# Patient Record
Sex: MALE | Race: WHITE | Employment: FULL TIME | URBAN - METROPOLITAN AREA
[De-identification: names, ages, dates, MRNs, and addresses within clinical notes are randomized per-mention and may not be internally consistent; named-entity substitution may affect disease eponyms.]

---

## 2017-11-08 PROBLEM — E11.9 TYPE 2 DIABETES MELLITUS WITHOUT COMPLICATION (HCC): Status: ACTIVE | Noted: 2017-11-08

## 2018-06-25 ENCOUNTER — HOSPITAL ENCOUNTER (EMERGENCY)
Age: 53
Discharge: HOME | End: 2018-06-25
Payer: COMMERCIAL

## 2018-06-25 VITALS
OXYGEN SATURATION: 96 % | HEART RATE: 97 BPM | TEMPERATURE: 98.5 F | RESPIRATION RATE: 20 BRPM | DIASTOLIC BLOOD PRESSURE: 80 MMHG | SYSTOLIC BLOOD PRESSURE: 155 MMHG

## 2018-06-25 DIAGNOSIS — H10.9 CONJUNCTIVITIS OF LEFT EYE, UNSPECIFIED CONJUNCTIVITIS TYPE: Primary | ICD-10-CM

## 2018-06-25 PROCEDURE — 99212 OFFICE O/P EST SF 10 MIN: CPT

## 2018-06-25 RX ORDER — SULFACETAMIDE SODIUM 100 MG/ML
2 SOLUTION/ DROPS OPHTHALMIC
Qty: 5 ML | Refills: 0 | Status: SHIPPED | OUTPATIENT
Start: 2018-06-25 | End: 2018-06-30

## 2018-07-26 ENCOUNTER — OFFICE VISIT (OUTPATIENT)
Dept: FAMILY MEDICINE CLINIC | Age: 53
End: 2018-07-26
Payer: COMMERCIAL

## 2018-07-26 VITALS
HEART RATE: 86 BPM | WEIGHT: 201.1 LBS | SYSTOLIC BLOOD PRESSURE: 124 MMHG | BODY MASS INDEX: 30.58 KG/M2 | DIASTOLIC BLOOD PRESSURE: 80 MMHG | OXYGEN SATURATION: 97 %

## 2018-07-26 DIAGNOSIS — E78.2 MIXED HYPERLIPIDEMIA: ICD-10-CM

## 2018-07-26 DIAGNOSIS — E11.9 TYPE 2 DIABETES MELLITUS WITHOUT COMPLICATION, WITHOUT LONG-TERM CURRENT USE OF INSULIN (HCC): ICD-10-CM

## 2018-07-26 DIAGNOSIS — I10 BENIGN ESSENTIAL HTN: ICD-10-CM

## 2018-07-26 DIAGNOSIS — Z12.11 COLON CANCER SCREENING: Primary | ICD-10-CM

## 2018-07-26 LAB — HBA1C MFR BLD: 9.4 %

## 2018-07-26 PROCEDURE — 3046F HEMOGLOBIN A1C LEVEL >9.0%: CPT | Performed by: FAMILY MEDICINE

## 2018-07-26 PROCEDURE — G8427 DOCREV CUR MEDS BY ELIG CLIN: HCPCS | Performed by: FAMILY MEDICINE

## 2018-07-26 PROCEDURE — 3017F COLORECTAL CA SCREEN DOC REV: CPT | Performed by: FAMILY MEDICINE

## 2018-07-26 PROCEDURE — 99213 OFFICE O/P EST LOW 20 MIN: CPT | Performed by: FAMILY MEDICINE

## 2018-07-26 PROCEDURE — 1036F TOBACCO NON-USER: CPT | Performed by: FAMILY MEDICINE

## 2018-07-26 PROCEDURE — 2022F DILAT RTA XM EVC RTNOPTHY: CPT | Performed by: FAMILY MEDICINE

## 2018-07-26 PROCEDURE — G8417 CALC BMI ABV UP PARAM F/U: HCPCS | Performed by: FAMILY MEDICINE

## 2018-07-26 PROCEDURE — 83036 HEMOGLOBIN GLYCOSYLATED A1C: CPT | Performed by: FAMILY MEDICINE

## 2018-07-26 RX ORDER — ROSUVASTATIN CALCIUM 20 MG/1
TABLET, COATED ORAL
Qty: 30 TABLET | Refills: 5 | Status: SHIPPED | OUTPATIENT
Start: 2018-07-26 | End: 2019-02-15 | Stop reason: SDUPTHER

## 2018-07-26 RX ORDER — HYDROCHLOROTHIAZIDE 25 MG/1
TABLET ORAL
Qty: 30 TABLET | Refills: 5 | Status: SHIPPED | OUTPATIENT
Start: 2018-07-26 | End: 2019-02-15 | Stop reason: SDUPTHER

## 2018-07-26 RX ORDER — GLIPIZIDE 10 MG/1
TABLET ORAL
Qty: 60 TABLET | Refills: 5 | Status: SHIPPED | OUTPATIENT
Start: 2018-07-26 | End: 2019-02-15 | Stop reason: SDUPTHER

## 2018-07-26 RX ORDER — POTASSIUM CHLORIDE 750 MG/1
10 TABLET, FILM COATED, EXTENDED RELEASE ORAL DAILY
Qty: 30 TABLET | Refills: 5 | Status: SHIPPED | OUTPATIENT
Start: 2018-07-26 | End: 2019-01-15 | Stop reason: SDUPTHER

## 2018-07-26 RX ORDER — LISINOPRIL 10 MG/1
TABLET ORAL
Qty: 30 TABLET | Refills: 5 | Status: SHIPPED | OUTPATIENT
Start: 2018-07-26 | End: 2019-02-15 | Stop reason: SDUPTHER

## 2018-07-30 ASSESSMENT — ENCOUNTER SYMPTOMS
VOMITING: 0
DIARRHEA: 0
ANAL BLEEDING: 0
CHOKING: 0
BACK PAIN: 0
ABDOMINAL PAIN: 0
COLOR CHANGE: 0
FACIAL SWELLING: 0
ABDOMINAL DISTENTION: 0
EYE DISCHARGE: 0
RHINORRHEA: 0
EYE REDNESS: 0
CHEST TIGHTNESS: 0
NAUSEA: 0
VOICE CHANGE: 0
TROUBLE SWALLOWING: 0
RECTAL PAIN: 0
WHEEZING: 0
PHOTOPHOBIA: 0
APNEA: 0
CONSTIPATION: 0
BLOOD IN STOOL: 0
EYE PAIN: 0
STRIDOR: 0
EYE ITCHING: 0

## 2018-07-31 NOTE — PROGRESS NOTES
hyperlipidemia. Pertinent negatives include no chest pain or myalgias. Current antihyperlipidemic treatment includes statins. The current treatment provides mild improvement of lipids. There are no compliance problems. Risk factors for coronary artery disease include diabetes mellitus, dyslipidemia and hypertension. Review of Systems   Constitutional: Negative for activity change, appetite change, fatigue, fever and unexpected weight change. HENT: Negative for dental problem, drooling, ear discharge, facial swelling, hearing loss, mouth sores, nosebleeds, postnasal drip, rhinorrhea, tinnitus, trouble swallowing and voice change. Eyes: Negative for photophobia, pain, discharge, redness, itching and visual disturbance. Respiratory: Negative for apnea, choking, chest tightness, wheezing and stridor. Cardiovascular: Negative for chest pain, palpitations and leg swelling. Gastrointestinal: Negative for abdominal distention, abdominal pain, anal bleeding, blood in stool, constipation, diarrhea, nausea, rectal pain and vomiting. Endocrine: Negative for cold intolerance, heat intolerance, polydipsia, polyphagia and polyuria. Genitourinary: Negative for decreased urine volume, difficulty urinating, discharge, dysuria, enuresis, flank pain, frequency, genital sores, hematuria, penile pain, penile swelling, scrotal swelling, testicular pain and urgency. Musculoskeletal: Negative for arthralgias, back pain, gait problem, joint swelling, myalgias and neck stiffness. Skin: Negative for color change, pallor, rash and wound. Allergic/Immunologic: Negative for environmental allergies, food allergies and immunocompromised state. Neurological: Negative for dizziness, tremors, seizures, syncope, facial asymmetry, speech difficulty, weakness, light-headedness and numbness. Hematological: Negative for adenopathy. Does not bruise/bleed easily.    Psychiatric/Behavioral: Negative for agitation, behavioral problems, confusion, decreased concentration, dysphoric mood, hallucinations, self-injury, sleep disturbance and suicidal ideas. The patient is not nervous/anxious and is not hyperactive. Past Medical History:   Diagnosis Date    Diabetes mellitus ([address]     Hyperlipidemia     Hypertension        Social History   Substance Use Topics    Smoking status: Former Smoker     Types: Cigars    Smokeless tobacco: Never Used    Alcohol use 0.0 oz/week      Comment: socially       Family History   Problem Relation Age of Onset    Heart Disease Mother        Current Outpatient Prescriptions   Medication Sig Dispense Refill    rosuvastatin (CRESTOR) 20 MG tablet TAKE ONE TABLET BY MOUTH EVERY DAY 30 tablet 5    potassium chloride (KLOR-CON) 10 MEQ extended release tablet Take 1 tablet by mouth daily 30 tablet 5    metFORMIN (GLUCOPHAGE) 1000 MG tablet TAKE ONE TABLET BY MOUTH TWO TIMES A DAY with meals 60 tablet 5    lisinopril (PRINIVIL;ZESTRIL) 10 MG tablet TAKE ONE TABLET BY MOUTH EVERY DAY 30 tablet 5    hydrochlorothiazide (HYDRODIURIL) 25 MG tablet TAKE ONE TABLET BY MOUTH EVERY DAY 30 tablet 5    glipiZIDE (GLUCOTROL) 10 MG tablet TAKE ONE TABLET BY MOUTH TWO TIMES A DAY before meals 60 tablet 5    empagliflozin (JARDIANCE) 25 MG tablet One a day 30 tablet 5    linagliptin (TRADJENTA) 5 MG tablet Take 1 tablet by mouth daily 30 tablet 5    fluticasone (FLONASE) 50 MCG/ACT nasal spray Two sprays to each nostril daily x 1 week (for congestion/runny nose). 1 Bottle 0    vitamin D (ERGOCALCIFEROL) 16874 units CAPS capsule TAKE 1 CAPSULE BY MOUTH TWICE A WEEK 8 capsule 5    SITagliptin (JANUVIA) 100 MG tablet Take 1 tablet by mouth every morning (before breakfast) 30 tablet 5    ibuprofen (ADVIL;MOTRIN) 800 MG tablet Take 1 tablet by mouth every 8 hours as needed for Pain. 30 tablet 0    Cinnamon 500 MG CAPS Take 1,000 mg by mouth 2 times daily.       clotrimazole-betamethasone (LOTRISONE) 1-0.05 % cream Apply topically 2 times daily for 4 weeks 15 g 0    sildenafil (VIAGRA) 100 MG tablet Take 1 tablet by mouth as needed for Erectile Dysfunction 10 tablet 5     No current facility-administered medications for this visit. No Known Allergies    I have reviewed Chaz's allergies, medications, problem list, medical, social and family history and have updated as needed in the electronic medical record. ROS: negative other what was mentioned above. Objective:   Physical Exam   Constitutional: He is oriented to person, place, and time. He appears well-developed and well-nourished. No distress. HENT:   Head: Normocephalic and atraumatic. Right Ear: External ear normal.   Left Ear: External ear normal.   Nose: Nose normal.   Mouth/Throat: Oropharynx is clear and moist. No oropharyngeal exudate. Eyes: Conjunctivae and EOM are normal. Pupils are equal, round, and reactive to light. Right eye exhibits no discharge. Left eye exhibits no discharge. No scleral icterus. Neck: Normal range of motion. Neck supple. No JVD present. No tracheal deviation present. No thyromegaly present. Cardiovascular: Normal rate, regular rhythm, normal heart sounds and intact distal pulses. Exam reveals no gallop and no friction rub. No murmur heard. Pulmonary/Chest: Effort normal and breath sounds normal. No stridor. No respiratory distress. He has no wheezes. He has no rales. He exhibits no tenderness. Abdominal: Soft. Bowel sounds are normal. He exhibits no distension and no mass. There is no tenderness. There is no rebound and no guarding. Genitourinary:   Genitourinary Comments: Deferred by patient   Musculoskeletal: Normal range of motion. He exhibits no edema or tenderness. Lymphadenopathy:     He has no cervical adenopathy. Neurological: He is alert and oriented to person, place, and time. He has normal reflexes. No cranial nerve deficit. He exhibits normal muscle tone.  Coordination normal.   Skin: Panel; Future  -     Hemoglobin A1C; Future  -     TSH without Reflex; Future  -     Lipid Panel; Future  -     MICROALBUMIN, UR; Future    Other orders  -     Cancel: SITagliptin (JANUVIA) 100 MG tablet; Take 1 tablet by mouth every morning (before breakfast)      reviewed health maintenance report. Patient is aware of deficiencies and suggested preventative tests.

## 2019-01-15 DIAGNOSIS — I10 BENIGN ESSENTIAL HTN: ICD-10-CM

## 2019-01-15 RX ORDER — POTASSIUM CHLORIDE 750 MG/1
TABLET, FILM COATED, EXTENDED RELEASE ORAL
Qty: 30 TABLET | Refills: 4 | Status: SHIPPED | OUTPATIENT
Start: 2019-01-15 | End: 2019-02-15 | Stop reason: SDUPTHER

## 2019-02-15 ENCOUNTER — TELEPHONE (OUTPATIENT)
Dept: FAMILY MEDICINE CLINIC | Age: 54
End: 2019-02-15

## 2019-02-15 DIAGNOSIS — I10 BENIGN ESSENTIAL HTN: ICD-10-CM

## 2019-02-15 DIAGNOSIS — E11.9 TYPE 2 DIABETES MELLITUS WITHOUT COMPLICATION, WITHOUT LONG-TERM CURRENT USE OF INSULIN (HCC): ICD-10-CM

## 2019-02-15 DIAGNOSIS — E78.2 MIXED HYPERLIPIDEMIA: ICD-10-CM

## 2019-02-16 DIAGNOSIS — E78.2 MIXED HYPERLIPIDEMIA: ICD-10-CM

## 2019-02-16 DIAGNOSIS — E11.9 TYPE 2 DIABETES MELLITUS WITHOUT COMPLICATION, WITHOUT LONG-TERM CURRENT USE OF INSULIN (HCC): ICD-10-CM

## 2019-02-16 DIAGNOSIS — I10 BENIGN ESSENTIAL HTN: ICD-10-CM

## 2019-02-18 RX ORDER — GLIPIZIDE 10 MG/1
TABLET ORAL
Qty: 60 TABLET | Refills: 5 | Status: SHIPPED | OUTPATIENT
Start: 2019-02-18 | End: 2019-03-17 | Stop reason: SDUPTHER

## 2019-02-18 RX ORDER — HYDROCHLOROTHIAZIDE 25 MG/1
TABLET ORAL
Qty: 30 TABLET | Refills: 4 | Status: SHIPPED | OUTPATIENT
Start: 2019-02-18 | End: 2019-05-02 | Stop reason: SDUPTHER

## 2019-02-18 RX ORDER — HYDROCHLOROTHIAZIDE 25 MG/1
TABLET ORAL
Qty: 30 TABLET | Refills: 5 | Status: SHIPPED | OUTPATIENT
Start: 2019-02-18 | End: 2019-08-08 | Stop reason: SDUPTHER

## 2019-02-18 RX ORDER — LISINOPRIL 10 MG/1
TABLET ORAL
Qty: 30 TABLET | Refills: 5 | Status: SHIPPED | OUTPATIENT
Start: 2019-02-18 | End: 2019-08-08 | Stop reason: SDUPTHER

## 2019-02-18 RX ORDER — ROSUVASTATIN CALCIUM 20 MG/1
TABLET, COATED ORAL
Qty: 30 TABLET | Refills: 4 | Status: SHIPPED | OUTPATIENT
Start: 2019-02-18 | End: 2019-05-02 | Stop reason: SDUPTHER

## 2019-02-18 RX ORDER — POTASSIUM CHLORIDE 750 MG/1
TABLET, FILM COATED, EXTENDED RELEASE ORAL
Qty: 30 TABLET | Refills: 4 | Status: SHIPPED | OUTPATIENT
Start: 2019-02-18 | End: 2019-08-08 | Stop reason: SDUPTHER

## 2019-02-18 RX ORDER — LISINOPRIL 10 MG/1
TABLET ORAL
Qty: 30 TABLET | Refills: 4 | Status: SHIPPED | OUTPATIENT
Start: 2019-02-18 | End: 2019-05-02 | Stop reason: SDUPTHER

## 2019-02-18 RX ORDER — GLIPIZIDE 10 MG/1
TABLET ORAL
Qty: 60 TABLET | Refills: 4 | Status: SHIPPED | OUTPATIENT
Start: 2019-02-18 | End: 2019-05-02 | Stop reason: SDUPTHER

## 2019-02-18 RX ORDER — ROSUVASTATIN CALCIUM 20 MG/1
TABLET, COATED ORAL
Qty: 30 TABLET | Refills: 5 | Status: SHIPPED | OUTPATIENT
Start: 2019-02-18 | End: 2019-08-08 | Stop reason: SDUPTHER

## 2019-04-09 DIAGNOSIS — N52.9 ERECTILE DYSFUNCTION, UNSPECIFIED ERECTILE DYSFUNCTION TYPE: ICD-10-CM

## 2019-04-12 RX ORDER — SILDENAFIL 100 MG/1
100 TABLET, FILM COATED ORAL PRN
Qty: 10 TABLET | Refills: 5 | Status: SHIPPED
Start: 2019-04-12 | End: 2020-04-29 | Stop reason: SDUPTHER

## 2019-04-15 ENCOUNTER — HOSPITAL ENCOUNTER (EMERGENCY)
Age: 54
Discharge: HOME | End: 2019-04-15
Payer: COMMERCIAL

## 2019-04-15 ENCOUNTER — APPOINTMENT (OUTPATIENT)
Dept: ULTRASOUND IMAGING | Age: 54
End: 2019-04-15
Payer: COMMERCIAL

## 2019-04-15 VITALS
RESPIRATION RATE: 16 BRPM | TEMPERATURE: 97.9 F | SYSTOLIC BLOOD PRESSURE: 132 MMHG | DIASTOLIC BLOOD PRESSURE: 90 MMHG | OXYGEN SATURATION: 99 % | HEART RATE: 96 BPM | HEIGHT: 68 IN | WEIGHT: 207.56 LBS | BODY MASS INDEX: 31.46 KG/M2

## 2019-04-15 DIAGNOSIS — B02.9 HERPES ZOSTER WITHOUT COMPLICATION: Primary | ICD-10-CM

## 2019-04-15 PROCEDURE — 93971 EXTREMITY STUDY: CPT

## 2019-04-15 PROCEDURE — 99284 EMERGENCY DEPT VISIT MOD MDM: CPT

## 2019-04-15 RX ORDER — HYDROCODONE BITARTRATE AND ACETAMINOPHEN 5; 325 MG/1; MG/1
1 TABLET ORAL EVERY 6 HOURS PRN
Qty: 8 TABLET | Refills: 0 | Status: SHIPPED | OUTPATIENT
Start: 2019-04-15 | End: 2019-04-17

## 2019-04-15 ASSESSMENT — PAIN DESCRIPTION - ORIENTATION: ORIENTATION: LEFT

## 2019-04-15 ASSESSMENT — PAIN SCALES - GENERAL: PAINLEVEL_OUTOF10: 7

## 2019-04-15 ASSESSMENT — PAIN DESCRIPTION - FREQUENCY: FREQUENCY: INTERMITTENT

## 2019-04-15 ASSESSMENT — PAIN DESCRIPTION - LOCATION: LOCATION: GROIN;HIP

## 2019-04-15 ASSESSMENT — PAIN DESCRIPTION - DESCRIPTORS: DESCRIPTORS: SHARP;RADIATING;BURNING

## 2019-04-15 NOTE — ED PROVIDER NOTES
[facility]       Department of Emergency Medicine   ED  Provider Note  Admit Date/RoomTime: 4/15/2019  7:20 AM  ED Room: 16/16  MRN: [id number]  Chief Complaint: Other (sharp burnng pain to left iinner groin, thigh and hip area. no injury)       History of Present Illness   Source of history provided by:  patient. History/Exam Limitations: none. Paco Ramsay is a 48 y.o. male who has a past medical history of:   Past Medical History:   Diagnosis Date    Diabetes mellitus ([address]     Hyperlipidemia     Hypertension     presents to the ED by private car for left leg pain. Patient states that he started 2 weeks ago. He denies any fall or injury. The patient states that he has severe pain when anything touches his skin, including his clothing. He has no pain with weightbearing or movement. Nothing makes it better. Patient does report a rash to the area that started 2 weeks ago as well. He denies any fevers or chills. He denies any abdominal pain or vomiting. He denies any testicular pain. ROS    Pertinent positives and negatives are stated within HPI, all other systems reviewed and are negative. Past Surgical History:   Procedure Laterality Date    TONSILLECTOMY     Social History:  reports that he has quit smoking. His smoking use included cigars. He has never used smokeless tobacco. He reports that he drinks alcohol. He reports that he does not use drugs. Family History: family history includes Heart Disease in his mother. Allergies: Patient has no known allergies. Physical Exam   Oxygen Saturation Interpretation: Normal.   ED Triage Vitals [04/15/19 0714]   BP Temp Temp Source Pulse Resp SpO2 Height Weight   (!) 132/90 97.9 °F (36.6 °C) Oral 96 16 99 % 5' 8\" (1.727 m) 207 lb 9 oz (94.1 kg)       Physical Exam  · Constitutional/General: Alert and oriented x3, well appearing, non toxic  · HEENT:  NC/NT. PERRLA,  Airway patent.   · Neck: Supple, full ROM, non tender to palpation in the midline, no stridor, no crepitus, no meningeal signs  · Respiratory: Lungs clear to auscultation bilaterally, no wheezes, rales, or rhonchi. Not in respiratory distress  · CV:  Regular rate. Regular rhythm. No murmurs, gallops, or rubs. 2+ distal pulses  · Chest: No chest wall tenderness  · GI:  Abdomen Soft, Non tender, Non distended. +BS. No rebound, guarding, or rigidity. No pulsatile masses. · Musculoskeletal: Moves all extremities x 4. Warm and well perfused, no clubbing, cyanosis, or edema. Capillary refill <3 seconds. 2+ dorsalis pedis pulse of the left foot. Brisk capillary refill the digits of the left foot. No calf tenderness, swelling or erythema. No tenderness to palpation over the left lateral hip. Full active range of motion at the left hip and left knee without any pain. Patient is ambulating w/o difficulty. · Integument: skin warm and dry. Patient has a small area of vesicular rash to the left lateral scrotum. No surrounding erythema or induration. No fluctuance or sign of abscess formation. No red streaking. There are also 2 other small areas of clusters of a vesicular rash to the left lateral upper thigh. It is tender to palpation and patient states that this completely reproduces his pain. · Lymphatic: no lymphadenopathy noted  · Neurologic: GCS 15, no focal deficits, symmetric strength 5/5 in the upper and lower extremities bilaterally  · Psychiatric: Normal Affect    Lab / Imaging Results   (All laboratory and radiology results have been personally reviewed by myself)  Labs:  No results found for this visit on 04/15/19. Imaging: All Radiology results interpreted by Radiologist unless otherwise noted. US DUP LOWER EXTREMITY LEFT GILDA   Final Result   No sonographic evidence of left lower extremity deep   venous thrombosis. ED Course / Medical Decision Making   Medications - No data to display     Re-examination:  4/15/19       Time: 0920   Updated on results. Consult(s):   None    Procedure(s):   none    MDM:   Patient has no abdominal pain, denies testicular pain, no vomiting. He has localized pain over the areas of the rash only. No sign of DVT on US today. Patient has a good peripheral pulse. There is no sign of cellulitis or abscess formation. Will d/c home at this time. Likely shingles. Advised to return to the ER if worse in any way. Otherwise to f/u w/PCP. Counseling: The emergency provider has spoken with the patient and discussed todays results, in addition to providing specific details for the plan of care and counseling regarding the diagnosis and prognosis. Questions are answered at this time and they are agreeable with the plan. Assessment      1. Herpes zoster without complication      Plan   Discharge to home  Patient condition is good    New Medications     New Prescriptions    HYDROCODONE-ACETAMINOPHEN (NORCO) 5-325 MG PER TABLET    Take 1 tablet by mouth every 6 hours as needed for Pain for up to 2 days. Electronically signed by SONIA Driver   DD: 4/15/19  **This report was transcribed using voice recognition software. Every effort was made to ensure accuracy; however, inadvertent computerized transcription errors may be present.   END OF ED PROVIDER NOTE       Ozzy Horowitz, [address]  04/15/19 7222

## 2019-04-16 ENCOUNTER — TELEPHONE (OUTPATIENT)
Dept: FAMILY MEDICINE CLINIC | Age: 54
End: 2019-04-16

## 2019-04-16 RX ORDER — VALACYCLOVIR HYDROCHLORIDE 1 G/1
1000 TABLET, FILM COATED ORAL 3 TIMES DAILY
Qty: 21 TABLET | Refills: 0 | Status: SHIPPED | OUTPATIENT
Start: 2019-04-16 | End: 2019-04-23

## 2019-04-16 RX ORDER — PREDNISONE 20 MG/1
40 TABLET ORAL DAILY
Qty: 10 TABLET | Refills: 0 | Status: SHIPPED | OUTPATIENT
Start: 2019-04-16 | End: 2019-04-21

## 2019-04-16 NOTE — TELEPHONE ENCOUNTER
Patient called and states that he was seen in ED and diagnosed with shingles. Patient states he was only given pain meds in ED and sent home. He is asking if he should be seen in the office or if he should be prescribed and anti-viral. Please advise.

## 2019-05-02 ENCOUNTER — OFFICE VISIT (OUTPATIENT)
Dept: FAMILY MEDICINE CLINIC | Age: 54
End: 2019-05-02
Payer: COMMERCIAL

## 2019-05-02 VITALS
WEIGHT: 203 LBS | HEART RATE: 102 BPM | OXYGEN SATURATION: 96 % | BODY MASS INDEX: 30.77 KG/M2 | HEIGHT: 68 IN | SYSTOLIC BLOOD PRESSURE: 130 MMHG | DIASTOLIC BLOOD PRESSURE: 84 MMHG | RESPIRATION RATE: 18 BRPM

## 2019-05-02 DIAGNOSIS — E11.65 TYPE 2 DIABETES MELLITUS WITH HYPERGLYCEMIA, WITHOUT LONG-TERM CURRENT USE OF INSULIN (HCC): Primary | ICD-10-CM

## 2019-05-02 LAB
CREATININE URINE POCT: 100
HBA1C MFR BLD: 12.5 %
MICROALBUMIN/CREAT 24H UR: 150 MG/G{CREAT}
MICROALBUMIN/CREAT UR-RTO: >300

## 2019-05-02 PROCEDURE — 1036F TOBACCO NON-USER: CPT | Performed by: FAMILY MEDICINE

## 2019-05-02 PROCEDURE — 3017F COLORECTAL CA SCREEN DOC REV: CPT | Performed by: FAMILY MEDICINE

## 2019-05-02 PROCEDURE — 82044 UR ALBUMIN SEMIQUANTITATIVE: CPT | Performed by: FAMILY MEDICINE

## 2019-05-02 PROCEDURE — 83036 HEMOGLOBIN GLYCOSYLATED A1C: CPT | Performed by: FAMILY MEDICINE

## 2019-05-02 PROCEDURE — G8417 CALC BMI ABV UP PARAM F/U: HCPCS | Performed by: FAMILY MEDICINE

## 2019-05-02 PROCEDURE — G8427 DOCREV CUR MEDS BY ELIG CLIN: HCPCS | Performed by: FAMILY MEDICINE

## 2019-05-02 PROCEDURE — 99213 OFFICE O/P EST LOW 20 MIN: CPT | Performed by: FAMILY MEDICINE

## 2019-05-02 PROCEDURE — 3046F HEMOGLOBIN A1C LEVEL >9.0%: CPT | Performed by: FAMILY MEDICINE

## 2019-05-02 PROCEDURE — 2022F DILAT RTA XM EVC RTNOPTHY: CPT | Performed by: FAMILY MEDICINE

## 2019-05-02 ASSESSMENT — PATIENT HEALTH QUESTIONNAIRE - PHQ9
1. LITTLE INTEREST OR PLEASURE IN DOING THINGS: 0
SUM OF ALL RESPONSES TO PHQ QUESTIONS 1-9: 0
SUM OF ALL RESPONSES TO PHQ QUESTIONS 1-9: 0
2. FEELING DOWN, DEPRESSED OR HOPELESS: 0
SUM OF ALL RESPONSES TO PHQ9 QUESTIONS 1 & 2: 0

## 2019-05-04 ASSESSMENT — ENCOUNTER SYMPTOMS
FACIAL SWELLING: 0
ANAL BLEEDING: 0
EYE ITCHING: 0
WHEEZING: 0
ABDOMINAL PAIN: 0
APNEA: 0
CHEST TIGHTNESS: 0
EYE DISCHARGE: 0
DIARRHEA: 0
STRIDOR: 0
RHINORRHEA: 0
ABDOMINAL DISTENTION: 0
NAUSEA: 0
BACK PAIN: 0
EYE PAIN: 0
PHOTOPHOBIA: 0
CHOKING: 0
CONSTIPATION: 0
EYE REDNESS: 0
VOICE CHANGE: 0
RECTAL PAIN: 0
TROUBLE SWALLOWING: 0
COLOR CHANGE: 0
BLOOD IN STOOL: 0
VOMITING: 0

## 2019-05-05 NOTE — PROGRESS NOTES
hearing loss, mouth sores, nosebleeds, postnasal drip, rhinorrhea, tinnitus, trouble swallowing and voice change. Eyes: Negative for photophobia, pain, discharge, redness, itching and visual disturbance. Respiratory: Negative for apnea, choking, chest tightness, wheezing and stridor. Cardiovascular: Negative for leg swelling. Gastrointestinal: Negative for abdominal distention, abdominal pain, anal bleeding, blood in stool, constipation, diarrhea, nausea, rectal pain and vomiting. Endocrine: Negative for cold intolerance, heat intolerance, polydipsia, polyphagia and polyuria. Genitourinary: Negative for decreased urine volume, difficulty urinating, discharge, dysuria, enuresis, flank pain, frequency, genital sores, hematuria, penile pain, penile swelling, scrotal swelling, testicular pain and urgency. Musculoskeletal: Negative for arthralgias, back pain, gait problem, joint swelling, myalgias and neck stiffness. Skin: Negative for color change, pallor, rash and wound. Allergic/Immunologic: Negative for environmental allergies, food allergies and immunocompromised state. Neurological: Negative for dizziness, tremors, seizures, syncope, facial asymmetry, speech difficulty, weakness, light-headedness and numbness. Hematological: Negative for adenopathy. Does not bruise/bleed easily. Psychiatric/Behavioral: Negative for agitation, behavioral problems, confusion, decreased concentration, dysphoric mood, hallucinations, self-injury, sleep disturbance and suicidal ideas. The patient is not nervous/anxious and is not hyperactive. Past Medical History:   Diagnosis Date    Diabetes mellitus ([address]     Hyperlipidemia     Hypertension        Social History     Tobacco Use    Smoking status: Former Smoker     Types: Cigars    Smokeless tobacco: Never Used   Substance Use Topics    Alcohol use:  Yes     Alcohol/week: 0.0 oz     Comment: socially       Family History   Problem Relation Age of and time. He appears well-developed and well-nourished. No distress. HENT:   Head: Normocephalic and atraumatic. Right Ear: External ear normal.   Left Ear: External ear normal.   Nose: Nose normal.   Mouth/Throat: Oropharynx is clear and moist. No oropharyngeal exudate. Eyes: Pupils are equal, round, and reactive to light. Conjunctivae and EOM are normal. Right eye exhibits no discharge. Left eye exhibits no discharge. No scleral icterus. Neck: Normal range of motion. Neck supple. No JVD present. No tracheal deviation present. No thyromegaly present. Cardiovascular: Normal rate, regular rhythm, normal heart sounds and intact distal pulses. Exam reveals no gallop and no friction rub. No murmur heard. Pulmonary/Chest: Effort normal and breath sounds normal. No stridor. No respiratory distress. He has no wheezes. He has no rales. He exhibits no tenderness. Abdominal: Soft. Bowel sounds are normal. He exhibits no distension and no mass. There is no tenderness. There is no rebound and no guarding. Genitourinary:   Genitourinary Comments: Deferred by patient   Musculoskeletal: Normal range of motion. He exhibits no edema or tenderness. Lymphadenopathy:     He has no cervical adenopathy. Neurological: He is alert and oriented to person, place, and time. He has normal reflexes. No cranial nerve deficit. He exhibits normal muscle tone. Coordination normal.   Skin: Skin is warm and dry. No rash noted. He is not diaphoretic. No erythema. No pallor. Psychiatric: He has a normal mood and affect. His behavior is normal. Judgment and thought content normal.   Nursing note and vitals reviewed. Assessment / Plan:      Kelly Sandoval was seen today for diabetes and health maintenance.     Diagnoses and all orders for this visit:    Type 2 diabetes mellitus with hyperglycemia, without long-term current use of insulin (HCC)  -     POCT glycosylated hemoglobin (Hb A1C)  -     POCT Microalbumin  -     Dulaglutide

## 2019-05-21 DIAGNOSIS — E11.65 TYPE 2 DIABETES MELLITUS WITH HYPERGLYCEMIA, WITHOUT LONG-TERM CURRENT USE OF INSULIN (HCC): Primary | ICD-10-CM

## 2019-08-05 ENCOUNTER — HOSPITAL ENCOUNTER (OUTPATIENT)
Age: 54
Discharge: HOME | End: 2019-08-07
Payer: COMMERCIAL

## 2019-08-05 DIAGNOSIS — E11.65 TYPE 2 DIABETES MELLITUS WITH HYPERGLYCEMIA, WITHOUT LONG-TERM CURRENT USE OF INSULIN (HCC): ICD-10-CM

## 2019-08-05 LAB
ALBUMIN SERPL-MCNC: 3 G/DL (ref 3.5–5.2)
ALP BLD-CCNC: 72 U/L (ref 40–129)
ALT SERPL-CCNC: 14 U/L (ref 0–40)
ANION GAP SERPL CALCULATED.3IONS-SCNC: 15 MMOL/L (ref 7–16)
AST SERPL-CCNC: 13 U/L (ref 0–39)
BASOPHILS ABSOLUTE: 0.04 E9/L (ref 0–0.2)
BASOPHILS RELATIVE PERCENT: 0.7 % (ref 0–2)
BILIRUB SERPL-MCNC: <0.2 MG/DL (ref 0–1.2)
BUN BLDV-MCNC: 36 MG/DL (ref 6–20)
CALCIUM SERPL-MCNC: 8.6 MG/DL (ref 8.6–10.2)
CHLORIDE BLD-SCNC: 108 MMOL/L (ref 98–107)
CHOLESTEROL, TOTAL: 180 MG/DL (ref 0–199)
CO2: 20 MMOL/L (ref 22–29)
CREAT SERPL-MCNC: 2 MG/DL (ref 0.7–1.2)
EOSINOPHILS ABSOLUTE: 0.16 E9/L (ref 0.05–0.5)
EOSINOPHILS RELATIVE PERCENT: 2.9 % (ref 0–6)
GFR AFRICAN AMERICAN: 43
GFR NON-AFRICAN AMERICAN: 35 ML/MIN/1.73
GLUCOSE BLD-MCNC: 313 MG/DL (ref 74–99)
HBA1C MFR BLD: 11.4 % (ref 4–5.6)
HCT VFR BLD CALC: 38.2 % (ref 37–54)
HDLC SERPL-MCNC: 56 MG/DL
HEMOGLOBIN: 12.5 G/DL (ref 12.5–16.5)
IMMATURE GRANULOCYTES #: 0.02 E9/L
IMMATURE GRANULOCYTES %: 0.4 % (ref 0–5)
LDL CHOLESTEROL CALCULATED: 94 MG/DL (ref 0–99)
LYMPHOCYTES ABSOLUTE: 1.24 E9/L (ref 1.5–4)
LYMPHOCYTES RELATIVE PERCENT: 22.5 % (ref 20–42)
MCH RBC QN AUTO: 27.7 PG (ref 26–35)
MCHC RBC AUTO-ENTMCNC: 32.7 % (ref 32–34.5)
MCV RBC AUTO: 84.5 FL (ref 80–99.9)
MONOCYTES ABSOLUTE: 0.43 E9/L (ref 0.1–0.95)
MONOCYTES RELATIVE PERCENT: 7.8 % (ref 2–12)
NEUTROPHILS ABSOLUTE: 3.61 E9/L (ref 1.8–7.3)
NEUTROPHILS RELATIVE PERCENT: 65.7 % (ref 43–80)
PDW BLD-RTO: 13 FL (ref 11.5–15)
PLATELET # BLD: 252 E9/L (ref 130–450)
PMV BLD AUTO: 11.9 FL (ref 7–12)
POTASSIUM SERPL-SCNC: 3.9 MMOL/L (ref 3.5–5)
RBC # BLD: 4.52 E12/L (ref 3.8–5.8)
SODIUM BLD-SCNC: 143 MMOL/L (ref 132–146)
TOTAL PROTEIN: 5.2 G/DL (ref 6.4–8.3)
TRIGL SERPL-MCNC: 149 MG/DL (ref 0–149)
VLDLC SERPL CALC-MCNC: 30 MG/DL
WBC # BLD: 5.5 E9/L (ref 4.5–11.5)

## 2019-08-05 PROCEDURE — 80061 LIPID PANEL: CPT

## 2019-08-05 PROCEDURE — 80053 COMPREHEN METABOLIC PANEL: CPT

## 2019-08-05 PROCEDURE — 36415 COLL VENOUS BLD VENIPUNCTURE: CPT

## 2019-08-05 PROCEDURE — 83036 HEMOGLOBIN GLYCOSYLATED A1C: CPT

## 2019-08-05 PROCEDURE — 85025 COMPLETE CBC W/AUTO DIFF WBC: CPT

## 2019-08-08 ENCOUNTER — HOSPITAL ENCOUNTER (OUTPATIENT)
Age: 54
Discharge: HOME | End: 2019-08-10
Payer: COMMERCIAL

## 2019-08-08 ENCOUNTER — OFFICE VISIT (OUTPATIENT)
Dept: FAMILY MEDICINE CLINIC | Age: 54
End: 2019-08-08
Payer: COMMERCIAL

## 2019-08-08 VITALS
DIASTOLIC BLOOD PRESSURE: 84 MMHG | WEIGHT: 210 LBS | SYSTOLIC BLOOD PRESSURE: 138 MMHG | BODY MASS INDEX: 31.83 KG/M2 | HEART RATE: 97 BPM | OXYGEN SATURATION: 97 % | RESPIRATION RATE: 18 BRPM | HEIGHT: 68 IN

## 2019-08-08 DIAGNOSIS — E55.9 VITAMIN D DEFICIENCY: ICD-10-CM

## 2019-08-08 DIAGNOSIS — E11.65 TYPE 2 DIABETES MELLITUS WITH HYPERGLYCEMIA, WITHOUT LONG-TERM CURRENT USE OF INSULIN (HCC): ICD-10-CM

## 2019-08-08 DIAGNOSIS — I10 BENIGN ESSENTIAL HTN: ICD-10-CM

## 2019-08-08 DIAGNOSIS — E78.2 MIXED HYPERLIPIDEMIA: ICD-10-CM

## 2019-08-08 DIAGNOSIS — E11.9 TYPE 2 DIABETES MELLITUS WITHOUT COMPLICATION, WITHOUT LONG-TERM CURRENT USE OF INSULIN (HCC): ICD-10-CM

## 2019-08-08 LAB
ANION GAP SERPL CALCULATED.3IONS-SCNC: 14 MMOL/L (ref 7–16)
BUN BLDV-MCNC: 32 MG/DL (ref 6–20)
CALCIUM SERPL-MCNC: 8.7 MG/DL (ref 8.6–10.2)
CHLORIDE BLD-SCNC: 105 MMOL/L (ref 98–107)
CO2: 19 MMOL/L (ref 22–29)
CREAT SERPL-MCNC: 1.9 MG/DL (ref 0.7–1.2)
GFR AFRICAN AMERICAN: 45
GFR NON-AFRICAN AMERICAN: 37 ML/MIN/1.73
GLUCOSE BLD-MCNC: 436 MG/DL (ref 74–99)
POTASSIUM SERPL-SCNC: 4.4 MMOL/L (ref 3.5–5)
SODIUM BLD-SCNC: 138 MMOL/L (ref 132–146)

## 2019-08-08 PROCEDURE — G8417 CALC BMI ABV UP PARAM F/U: HCPCS | Performed by: FAMILY MEDICINE

## 2019-08-08 PROCEDURE — 1036F TOBACCO NON-USER: CPT | Performed by: FAMILY MEDICINE

## 2019-08-08 PROCEDURE — 80048 BASIC METABOLIC PNL TOTAL CA: CPT

## 2019-08-08 PROCEDURE — 99214 OFFICE O/P EST MOD 30 MIN: CPT | Performed by: FAMILY MEDICINE

## 2019-08-08 PROCEDURE — 3017F COLORECTAL CA SCREEN DOC REV: CPT | Performed by: FAMILY MEDICINE

## 2019-08-08 PROCEDURE — 3046F HEMOGLOBIN A1C LEVEL >9.0%: CPT | Performed by: FAMILY MEDICINE

## 2019-08-08 PROCEDURE — 2022F DILAT RTA XM EVC RTNOPTHY: CPT | Performed by: FAMILY MEDICINE

## 2019-08-08 PROCEDURE — G8427 DOCREV CUR MEDS BY ELIG CLIN: HCPCS | Performed by: FAMILY MEDICINE

## 2019-08-08 PROCEDURE — 36415 COLL VENOUS BLD VENIPUNCTURE: CPT

## 2019-08-08 RX ORDER — GLIPIZIDE 10 MG/1
TABLET ORAL
Qty: 180 TABLET | Refills: 5 | Status: SHIPPED | OUTPATIENT
Start: 2019-08-08 | End: 2019-09-09 | Stop reason: SDUPTHER

## 2019-08-08 RX ORDER — HYDROCHLOROTHIAZIDE 25 MG/1
TABLET ORAL
Qty: 90 TABLET | Refills: 5 | Status: SHIPPED | OUTPATIENT
Start: 2019-08-08 | End: 2019-08-08

## 2019-08-08 RX ORDER — POTASSIUM CHLORIDE 750 MG/1
TABLET, FILM COATED, EXTENDED RELEASE ORAL
Qty: 90 TABLET | Refills: 4 | Status: ON HOLD | OUTPATIENT
Start: 2019-08-08 | End: 2020-01-07

## 2019-08-08 RX ORDER — ERGOCALCIFEROL 1.25 MG/1
CAPSULE ORAL
Qty: 27 CAPSULE | Refills: 5 | Status: SHIPPED | OUTPATIENT
Start: 2019-08-08 | End: 2019-10-22 | Stop reason: SDUPTHER

## 2019-08-08 RX ORDER — LISINOPRIL 10 MG/1
TABLET ORAL
Qty: 90 TABLET | Refills: 5 | Status: SHIPPED | OUTPATIENT
Start: 2019-08-08 | End: 2019-10-22 | Stop reason: SDUPTHER

## 2019-08-08 RX ORDER — ROSUVASTATIN CALCIUM 20 MG/1
TABLET, COATED ORAL
Qty: 90 TABLET | Refills: 5 | Status: SHIPPED | OUTPATIENT
Start: 2019-08-08 | End: 2019-10-22

## 2019-08-08 ASSESSMENT — PATIENT HEALTH QUESTIONNAIRE - PHQ9
2. FEELING DOWN, DEPRESSED OR HOPELESS: 0
SUM OF ALL RESPONSES TO PHQ QUESTIONS 1-9: 0
SUM OF ALL RESPONSES TO PHQ QUESTIONS 1-9: 0
SUM OF ALL RESPONSES TO PHQ9 QUESTIONS 1 & 2: 0
1. LITTLE INTEREST OR PLEASURE IN DOING THINGS: 0

## 2019-08-09 ASSESSMENT — ENCOUNTER SYMPTOMS
CHOKING: 0
WHEEZING: 0
EYE REDNESS: 0
APNEA: 0
BLOOD IN STOOL: 0
VOMITING: 0
PHOTOPHOBIA: 0
TROUBLE SWALLOWING: 0
CONSTIPATION: 0
EYE PAIN: 0
SHORTNESS OF BREATH: 0
ABDOMINAL PAIN: 0
EYE DISCHARGE: 0
SORE THROAT: 0
ABDOMINAL DISTENTION: 0
NAUSEA: 0
FACIAL SWELLING: 0
VOICE CHANGE: 0
STRIDOR: 0
DIARRHEA: 0
EYE ITCHING: 0
ANAL BLEEDING: 0
RHINORRHEA: 0
BACK PAIN: 0
COLOR CHANGE: 0
RECTAL PAIN: 0
COUGH: 0
CHEST TIGHTNESS: 0
SINUS PRESSURE: 0

## 2019-08-09 NOTE — PROGRESS NOTES
abdominal distention, abdominal pain, anal bleeding, blood in stool, constipation, diarrhea, nausea, rectal pain and vomiting. Endocrine: Negative for cold intolerance, heat intolerance, polydipsia, polyphagia and polyuria. Genitourinary: Negative for decreased urine volume, difficulty urinating, discharge, dysuria, enuresis, flank pain, frequency, genital sores, hematuria, penile pain, penile swelling, scrotal swelling, testicular pain and urgency. Musculoskeletal: Negative for arthralgias, back pain, gait problem, joint swelling, myalgias, neck pain and neck stiffness. Skin: Negative for color change, pallor, rash and wound. Allergic/Immunologic: Negative for environmental allergies, food allergies and immunocompromised state. Neurological: Negative for dizziness, tremors, seizures, syncope, facial asymmetry, speech difficulty, weakness, light-headedness, numbness and headaches. Hematological: Negative for adenopathy. Does not bruise/bleed easily. Psychiatric/Behavioral: Negative for agitation, behavioral problems, confusion, decreased concentration, dysphoric mood, hallucinations, self-injury, sleep disturbance and suicidal ideas. The patient is not nervous/anxious and is not hyperactive.         Past Medical History:   Diagnosis Date    [address] mellitus [address] [address]     Hyperlipidemia     Hypertension        Social History     Socioeconomic History    Marital status:      Spouse name: Not on file    Number of children: Not on file    Years of education: Not on file    Highest education level: Not on file   Occupational History    Not on file   Social Needs    Financial resource strain: Not on file    Food insecurity:     Worry: Not on file     Inability: Not on file    Transportation needs:     Medical: Not on file     Non-medical: Not on file   Tobacco Use    Smoking status: Former Smoker     Types: Cigars    Smokeless tobacco: Never Used   Substance and Sexual Activity    Alcohol use: Yes     Alcohol/week: 0.0 standard drinks     Comment: socially    Drug use: No    Sexual activity: Yes     Partners: Female   Lifestyle    Physical activity:     Days per week: Not on file     Minutes per session: Not on file    Stress: Not on file   Relationships    Social connections:     Talks on phone: Not on file     Gets together: Not on file     Attends Shinto service: Not on file     Active member of club or organization: Not on file     Attends meetings of clubs or organizations: Not on file     Relationship status: Not on file    Intimate partner violence:     Fear of current or ex partner: Not on file     Emotionally abused: Not on file     Physically abused: Not on file     Forced sexual activity: Not on file   Other Topics Concern    Not on file   Social History Narrative    Not on file       Family History   Problem Relation Age of Onset    Heart Disease Mother        Current Outpatient Medications on File Prior to Visit   Medication Sig Dispense Refill    sildenafil (VIAGRA) 100 MG tablet Take 1 tablet by mouth as needed for Erectile Dysfunction 10 tablet 5    fluticasone (FLONASE) 50 MCG/ACT nasal spray Two sprays to each nostril daily x 1 week (for congestion/runny nose). 1 Bottle 0    clotrimazole-betamethasone (LOTRISONE) 1-0.05 % cream Apply topically 2 times daily for 4 weeks 15 g 0    ibuprofen (ADVIL;MOTRIN) 800 MG tablet Take 1 tablet by mouth every 8 hours as needed for Pain. 30 tablet 0    Cinnamon 500 MG CAPS Take 1,000 mg by mouth 2 times daily. No current facility-administered medications on file prior to visit. No Known Allergies    I have reviewed his allergies, medications, problem list, medical,social and family history and have updated as needed in the electronic medical record. Objective:     Physical Exam   Constitutional: He is oriented to person, place, and time. He appears well-developed and well-nourished. No distress.    HENT:   Head:

## 2019-08-13 ENCOUNTER — TELEPHONE (OUTPATIENT)
Dept: FAMILY MEDICINE CLINIC | Age: 54
End: 2019-08-13

## 2019-08-13 RX ORDER — ACYCLOVIR 800 MG/1
800 TABLET ORAL 2 TIMES DAILY
Qty: 20 TABLET | Refills: 0 | Status: SHIPPED | OUTPATIENT
Start: 2019-08-13 | End: 2019-08-23

## 2019-08-29 ENCOUNTER — TELEPHONE (OUTPATIENT)
Dept: FAMILY MEDICINE CLINIC | Age: 54
End: 2019-08-29

## 2019-09-08 DIAGNOSIS — E11.9 TYPE 2 DIABETES MELLITUS WITHOUT COMPLICATION, WITHOUT LONG-TERM CURRENT USE OF INSULIN (HCC): ICD-10-CM

## 2019-09-09 ENCOUNTER — OFFICE VISIT (OUTPATIENT)
Dept: FAMILY MEDICINE CLINIC | Age: 54
End: 2019-09-09

## 2019-09-09 VITALS
OXYGEN SATURATION: 96 % | HEART RATE: 105 BPM | SYSTOLIC BLOOD PRESSURE: 122 MMHG | WEIGHT: 216 LBS | DIASTOLIC BLOOD PRESSURE: 88 MMHG | RESPIRATION RATE: 18 BRPM | BODY MASS INDEX: 32.74 KG/M2 | HEIGHT: 68 IN

## 2019-09-09 DIAGNOSIS — E11.21 TYPE 2 DIABETES MELLITUS WITH DIABETIC NEPHROPATHY, WITHOUT LONG-TERM CURRENT USE OF INSULIN (HCC): Primary | ICD-10-CM

## 2019-09-09 DIAGNOSIS — R60.0 BILATERAL LEG EDEMA: ICD-10-CM

## 2019-09-09 PROCEDURE — 99213 OFFICE O/P EST LOW 20 MIN: CPT | Performed by: FAMILY MEDICINE

## 2019-09-09 RX ORDER — LINAGLIPTIN 5 MG/1
TABLET, FILM COATED ORAL
Qty: 30 TABLET | Refills: 4 | Status: SHIPPED | OUTPATIENT
Start: 2019-09-09 | End: 2019-10-22 | Stop reason: SDUPTHER

## 2019-09-09 RX ORDER — FUROSEMIDE 40 MG/1
40 TABLET ORAL DAILY
Qty: 14 TABLET | Refills: 0 | Status: SHIPPED | OUTPATIENT
Start: 2019-09-09 | End: 2019-10-22

## 2019-09-09 RX ORDER — GLIPIZIDE 10 MG/1
TABLET ORAL
Qty: 180 TABLET | Refills: 5 | Status: SHIPPED | OUTPATIENT
Start: 2019-09-09 | End: 2019-10-22 | Stop reason: SDUPTHER

## 2019-09-09 ASSESSMENT — ENCOUNTER SYMPTOMS
ANAL BLEEDING: 0
CHEST TIGHTNESS: 0
TROUBLE SWALLOWING: 0
APNEA: 0
COUGH: 0
FACIAL SWELLING: 0
SHORTNESS OF BREATH: 0
ABDOMINAL DISTENTION: 0
EYE DISCHARGE: 0
SORE THROAT: 0
RECTAL PAIN: 0
STRIDOR: 0
ABDOMINAL PAIN: 0
DIARRHEA: 0
VOMITING: 0
RHINORRHEA: 0
COLOR CHANGE: 0
CHOKING: 0
EYE REDNESS: 0
NAUSEA: 0
EYE ITCHING: 0
PHOTOPHOBIA: 0
BLOOD IN STOOL: 0
CONSTIPATION: 0
EYE PAIN: 0
WHEEZING: 0
VOICE CHANGE: 0
SINUS PRESSURE: 0
BACK PAIN: 0

## 2019-09-09 ASSESSMENT — PATIENT HEALTH QUESTIONNAIRE - PHQ9
2. FEELING DOWN, DEPRESSED OR HOPELESS: 0
SUM OF ALL RESPONSES TO PHQ QUESTIONS 1-9: 0
1. LITTLE INTEREST OR PLEASURE IN DOING THINGS: 0
SUM OF ALL RESPONSES TO PHQ QUESTIONS 1-9: 0
SUM OF ALL RESPONSES TO PHQ9 QUESTIONS 1 & 2: 0

## 2019-09-10 NOTE — PROGRESS NOTES
Sima Fraser is a 48 y.o. male  . Subjective:      Diabetes   He presents for his follow-up diabetic visit. He has type 2 diabetes mellitus. His disease course has been worsening. There are no hypoglycemic associated symptoms. Pertinent negatives for hypoglycemia include no confusion, dizziness, headaches, nervousness/anxiousness, pallor, seizures, speech difficulty or tremors. Pertinent negatives for diabetes include no chest pain, no fatigue, no foot paresthesias, no polydipsia, no polyphagia, no polyuria and no weakness. There are no hypoglycemic complications. Symptoms are improving. There are no diabetic complications. Risk factors for coronary artery disease include diabetes mellitus, dyslipidemia and hypertension. Current diabetic treatment includes oral agent (triple therapy). He is compliant with treatment some of the time. His weight is stable. He is following a diabetic diet. Meal planning includes avoidance of concentrated sweets. He has not had a previous visit with a dietitian. He participates in exercise intermittently. His home blood glucose trend is increasing steadily. An ACE inhibitor/angiotensin II receptor blocker is being taken. He does not see a podiatrist.Eye exam is current. Hyperlipidemia   This is a chronic problem. The current episode started more than 1 year ago. The problem is uncontrolled. Recent lipid tests were reviewed and are variable. There are no known factors aggravating his hyperlipidemia. Pertinent negatives include no chest pain, myalgias or shortness of breath. Current antihyperlipidemic treatment includes statins. The current treatment provides mild improvement of lipids. There are no compliance problems. Risk factors for coronary artery disease include diabetes mellitus, dyslipidemia and hypertension. Other   This is a new (leg edema ) problem. The current episode started 1 to 4 weeks ago. The problem occurs daily. The problem has been waxing and waning.  Pertinent numbness and headaches. Hematological: Negative for adenopathy. Does not bruise/bleed easily. Psychiatric/Behavioral: Negative for agitation, behavioral problems, confusion, decreased concentration, dysphoric mood, hallucinations, self-injury, sleep disturbance and suicidal ideas. The patient is not nervous/anxious and is not hyperactive. Past Medical History:   Diagnosis Date    Diabetes mellitus ([facility] [address]     Hyperlipidemia     Hypertension        Social History     Socioeconomic History    Marital status:      Spouse name: Not on file    Number of children: Not on file    Years of education: Not on file    Highest education level: Not on file   Occupational History    Not on file   Social Needs    Financial resource strain: Not on file    Food insecurity:     Worry: Not on file     Inability: Not on file    Transportation needs:     Medical: Not on file     Non-medical: Not on file   Tobacco Use    Smoking status: Former Smoker     Types: Cigars    Smokeless tobacco: Never Used   Substance and Sexual Activity    Alcohol use:  Yes     Alcohol/week: 0.0 standard drinks     Comment: socially    Drug use: No    Sexual activity: Yes     Partners: Female   Lifestyle    Physical activity:     Days per week: Not on file     Minutes per session: Not on file    Stress: Not on file   Relationships    Social connections:     Talks on phone: Not on file     Gets together: Not on file     Attends Scientology service: Not on file     Active member of club or organization: Not on file     Attends meetings of clubs or organizations: Not on file     Relationship status: Not on file    Intimate partner violence:     Fear of current or ex partner: Not on file     Emotionally abused: Not on file     Physically abused: Not on file     Forced sexual activity: Not on file   Other Topics Concern    Not on file   Social History Narrative    Not on file       Family History   Problem Relation Age of Onset

## 2019-09-30 ENCOUNTER — TELEPHONE (OUTPATIENT)
Dept: FAMILY MEDICINE CLINIC | Age: 54
End: 2019-09-30

## 2019-09-30 RX ORDER — FUROSEMIDE 40 MG/1
40 TABLET ORAL DAILY
Qty: 30 TABLET | Refills: 5 | Status: SHIPPED | OUTPATIENT
Start: 2019-09-30 | End: 2019-10-22

## 2019-09-30 RX ORDER — POTASSIUM CHLORIDE 20 MEQ/1
20 TABLET, EXTENDED RELEASE ORAL DAILY
Qty: 30 TABLET | Refills: 5 | Status: ON HOLD | OUTPATIENT
Start: 2019-09-30 | End: 2020-01-10 | Stop reason: HOSPADM

## 2019-09-30 NOTE — TELEPHONE ENCOUNTER
Patient's wife called and she is very concerned about Chaz's leg swelling, since he stopped the 40mg Lasix the swelling has started to to go up into the thigh and waist area. He has not changed anything, no leg pain they just feel heavy, no fluid seeping from the legs.   Please advise    [phone]  Or  [phone]    Last Appointment   9/9/2019  Next Appointment  12/9/2019

## 2019-10-22 ENCOUNTER — HOSPITAL ENCOUNTER (OUTPATIENT)
Age: 54
Discharge: HOME | End: 2019-10-24

## 2019-10-22 ENCOUNTER — OFFICE VISIT (OUTPATIENT)
Dept: FAMILY MEDICINE CLINIC | Age: 54
End: 2019-10-22

## 2019-10-22 VITALS
SYSTOLIC BLOOD PRESSURE: 138 MMHG | TEMPERATURE: 97.9 F | HEART RATE: 111 BPM | WEIGHT: 228 LBS | BODY MASS INDEX: 34.56 KG/M2 | OXYGEN SATURATION: 97 % | DIASTOLIC BLOOD PRESSURE: 86 MMHG | HEIGHT: 68 IN

## 2019-10-22 DIAGNOSIS — E55.9 VITAMIN D DEFICIENCY: ICD-10-CM

## 2019-10-22 DIAGNOSIS — E11.21 TYPE 2 DIABETES MELLITUS WITH DIABETIC NEPHROPATHY, WITHOUT LONG-TERM CURRENT USE OF INSULIN (HCC): ICD-10-CM

## 2019-10-22 DIAGNOSIS — R60.0 BILATERAL LEG EDEMA: ICD-10-CM

## 2019-10-22 DIAGNOSIS — E11.65 TYPE 2 DIABETES MELLITUS WITH HYPERGLYCEMIA, WITHOUT LONG-TERM CURRENT USE OF INSULIN (HCC): ICD-10-CM

## 2019-10-22 DIAGNOSIS — I10 BENIGN ESSENTIAL HTN: ICD-10-CM

## 2019-10-22 DIAGNOSIS — N28.9 RENAL INSUFFICIENCY: Primary | ICD-10-CM

## 2019-10-22 DIAGNOSIS — E11.9 TYPE 2 DIABETES MELLITUS WITHOUT COMPLICATION, WITHOUT LONG-TERM CURRENT USE OF INSULIN (HCC): ICD-10-CM

## 2019-10-22 LAB
ANION GAP SERPL CALCULATED.3IONS-SCNC: 9 MMOL/L (ref 7–16)
BUN BLDV-MCNC: 50 MG/DL (ref 6–20)
CALCIUM SERPL-MCNC: 8.1 MG/DL (ref 8.6–10.2)
CHLORIDE BLD-SCNC: 102 MMOL/L (ref 98–107)
CO2: 24 MMOL/L (ref 22–29)
CREAT SERPL-MCNC: 2.6 MG/DL (ref 0.7–1.2)
GFR AFRICAN AMERICAN: 31
GFR NON-AFRICAN AMERICAN: 26 ML/MIN/1.73
GLUCOSE BLD-MCNC: 436 MG/DL (ref 74–99)
HBA1C MFR BLD: 12.7 %
POTASSIUM SERPL-SCNC: 4.2 MMOL/L (ref 3.5–5)
SODIUM BLD-SCNC: 135 MMOL/L (ref 132–146)

## 2019-10-22 PROCEDURE — 83036 HEMOGLOBIN GLYCOSYLATED A1C: CPT | Performed by: FAMILY MEDICINE

## 2019-10-22 PROCEDURE — 90686 IIV4 VACC NO PRSV 0.5 ML IM: CPT | Performed by: FAMILY MEDICINE

## 2019-10-22 PROCEDURE — 99213 OFFICE O/P EST LOW 20 MIN: CPT | Performed by: FAMILY MEDICINE

## 2019-10-22 PROCEDURE — 90471 IMMUNIZATION ADMIN: CPT | Performed by: FAMILY MEDICINE

## 2019-10-22 PROCEDURE — 36415 COLL VENOUS BLD VENIPUNCTURE: CPT

## 2019-10-22 PROCEDURE — 80048 BASIC METABOLIC PNL TOTAL CA: CPT

## 2019-10-22 RX ORDER — FUROSEMIDE 40 MG/1
40 TABLET ORAL DAILY
Qty: 14 TABLET | Refills: 0 | Status: CANCELLED | OUTPATIENT
Start: 2019-10-22 | End: 2019-12-04

## 2019-10-22 RX ORDER — LOVASTATIN 40 MG/1
40 TABLET ORAL DAILY
Qty: 30 TABLET | Refills: 5 | Status: SHIPPED
Start: 2019-10-22 | End: 2020-05-11

## 2019-10-22 RX ORDER — GLIPIZIDE 10 MG/1
TABLET ORAL
Qty: 120 TABLET | Refills: 5 | Status: SHIPPED | OUTPATIENT
Start: 2019-10-22 | End: 2020-01-06

## 2019-10-22 RX ORDER — LISINOPRIL 10 MG/1
TABLET ORAL
Qty: 90 TABLET | Refills: 5 | Status: ON HOLD | OUTPATIENT
Start: 2019-10-22 | End: 2020-01-10 | Stop reason: HOSPADM

## 2019-10-22 RX ORDER — ERGOCALCIFEROL 1.25 MG/1
CAPSULE ORAL
Qty: 27 CAPSULE | Refills: 3 | Status: SHIPPED
Start: 2019-10-22 | End: 2020-09-08 | Stop reason: SDUPTHER

## 2019-10-23 ASSESSMENT — ENCOUNTER SYMPTOMS
CHEST TIGHTNESS: 0
DIARRHEA: 0
FACIAL SWELLING: 0
EYE PAIN: 0
CONSTIPATION: 0
EYE DISCHARGE: 0
STRIDOR: 0
CHOKING: 0
NAUSEA: 0
RHINORRHEA: 0
ANAL BLEEDING: 0
RECTAL PAIN: 0
EYE ITCHING: 0
WHEEZING: 0
PHOTOPHOBIA: 0
BLOOD IN STOOL: 0
ABDOMINAL PAIN: 0
SINUS PRESSURE: 0
VOMITING: 0
VOICE CHANGE: 0
SORE THROAT: 0
ABDOMINAL DISTENTION: 0
TROUBLE SWALLOWING: 0
APNEA: 0
BACK PAIN: 0
SHORTNESS OF BREATH: 0
COLOR CHANGE: 0
EYE REDNESS: 0
COUGH: 0

## 2019-10-24 ENCOUNTER — TELEPHONE (OUTPATIENT)
Dept: FAMILY MEDICINE CLINIC | Age: 54
End: 2019-10-24

## 2019-10-24 DIAGNOSIS — E11.21 TYPE 2 DIABETES MELLITUS WITH DIABETIC NEPHROPATHY, WITHOUT LONG-TERM CURRENT USE OF INSULIN (HCC): ICD-10-CM

## 2019-10-24 DIAGNOSIS — E11.9 TYPE 2 DIABETES MELLITUS WITHOUT COMPLICATION, WITHOUT LONG-TERM CURRENT USE OF INSULIN (HCC): ICD-10-CM

## 2019-12-09 ENCOUNTER — HOSPITAL ENCOUNTER (OUTPATIENT)
Age: 54
Discharge: HOME | End: 2019-12-11

## 2019-12-09 ENCOUNTER — OFFICE VISIT (OUTPATIENT)
Dept: FAMILY MEDICINE CLINIC | Age: 54
End: 2019-12-09

## 2019-12-09 VITALS
SYSTOLIC BLOOD PRESSURE: 130 MMHG | RESPIRATION RATE: 18 BRPM | OXYGEN SATURATION: 98 % | WEIGHT: 213 LBS | DIASTOLIC BLOOD PRESSURE: 84 MMHG | HEIGHT: 68 IN | HEART RATE: 106 BPM | BODY MASS INDEX: 32.28 KG/M2

## 2019-12-09 DIAGNOSIS — E11.21 TYPE 2 DIABETES MELLITUS WITH DIABETIC NEPHROPATHY, WITHOUT LONG-TERM CURRENT USE OF INSULIN (HCC): ICD-10-CM

## 2019-12-09 DIAGNOSIS — E11.21 TYPE 2 DIABETES MELLITUS WITH DIABETIC NEPHROPATHY, WITHOUT LONG-TERM CURRENT USE OF INSULIN (HCC): Primary | ICD-10-CM

## 2019-12-09 LAB
ALBUMIN SERPL-MCNC: 2.7 G/DL (ref 3.5–5.2)
ALP BLD-CCNC: 73 U/L (ref 40–129)
ALT SERPL-CCNC: 12 U/L (ref 0–40)
ANION GAP SERPL CALCULATED.3IONS-SCNC: 13 MMOL/L (ref 7–16)
AST SERPL-CCNC: 15 U/L (ref 0–39)
BILIRUB SERPL-MCNC: <0.2 MG/DL (ref 0–1.2)
BUN BLDV-MCNC: 48 MG/DL (ref 6–20)
CALCIUM SERPL-MCNC: 8.8 MG/DL (ref 8.6–10.2)
CHLORIDE BLD-SCNC: 109 MMOL/L (ref 98–107)
CO2: 19 MMOL/L (ref 22–29)
CREAT SERPL-MCNC: 2.7 MG/DL (ref 0.7–1.2)
GFR AFRICAN AMERICAN: 30
GFR NON-AFRICAN AMERICAN: 25 ML/MIN/1.73
GLUCOSE BLD-MCNC: 305 MG/DL (ref 74–99)
HBA1C MFR BLD: 10.1 % (ref 4–5.6)
POTASSIUM SERPL-SCNC: 4.9 MMOL/L (ref 3.5–5)
SODIUM BLD-SCNC: 141 MMOL/L (ref 132–146)
TOTAL PROTEIN: 5.1 G/DL (ref 6.4–8.3)

## 2019-12-09 PROCEDURE — 80053 COMPREHEN METABOLIC PANEL: CPT

## 2019-12-09 PROCEDURE — 36415 COLL VENOUS BLD VENIPUNCTURE: CPT

## 2019-12-09 PROCEDURE — 99213 OFFICE O/P EST LOW 20 MIN: CPT | Performed by: FAMILY MEDICINE

## 2019-12-09 PROCEDURE — 83036 HEMOGLOBIN GLYCOSYLATED A1C: CPT

## 2019-12-09 ASSESSMENT — ENCOUNTER SYMPTOMS
COLOR CHANGE: 0
COUGH: 0
NAUSEA: 0
STRIDOR: 0
VOICE CHANGE: 0
DIARRHEA: 0
EYE DISCHARGE: 0
RHINORRHEA: 0
SINUS PRESSURE: 0
SHORTNESS OF BREATH: 0
BLOOD IN STOOL: 0
BACK PAIN: 0
ANAL BLEEDING: 0
EYE ITCHING: 0
CHOKING: 0
SORE THROAT: 0
FACIAL SWELLING: 0
WHEEZING: 0
RECTAL PAIN: 0
ABDOMINAL DISTENTION: 0
EYE PAIN: 0
VOMITING: 0
EYE REDNESS: 0
CHEST TIGHTNESS: 0
ABDOMINAL PAIN: 0
TROUBLE SWALLOWING: 0
PHOTOPHOBIA: 0
APNEA: 0
CONSTIPATION: 0

## 2019-12-31 ENCOUNTER — HOSPITAL ENCOUNTER (OUTPATIENT)
Age: 54
Discharge: HOME | End: 2020-01-02

## 2019-12-31 ENCOUNTER — TELEPHONE (OUTPATIENT)
Dept: FAMILY MEDICINE CLINIC | Age: 54
End: 2019-12-31

## 2019-12-31 LAB
ALBUMIN SERPL-MCNC: 2.8 G/DL (ref 3.5–5.2)
ALP BLD-CCNC: 64 U/L (ref 40–129)
ALT SERPL-CCNC: 13 U/L (ref 0–40)
ANION GAP SERPL CALCULATED.3IONS-SCNC: 12 MMOL/L (ref 7–16)
AST SERPL-CCNC: 17 U/L (ref 0–39)
BASOPHILS ABSOLUTE: 0.04 E9/L (ref 0–0.2)
BASOPHILS RELATIVE PERCENT: 0.7 % (ref 0–2)
BILIRUB SERPL-MCNC: <0.2 MG/DL (ref 0–1.2)
BUN BLDV-MCNC: 43 MG/DL (ref 6–20)
CALCIUM SERPL-MCNC: 8.6 MG/DL (ref 8.6–10.2)
CHLORIDE BLD-SCNC: 107 MMOL/L (ref 98–107)
CO2: 22 MMOL/L (ref 22–29)
CREAT SERPL-MCNC: 2.9 MG/DL (ref 0.7–1.2)
EOSINOPHILS ABSOLUTE: 0.2 E9/L (ref 0.05–0.5)
EOSINOPHILS RELATIVE PERCENT: 3.5 % (ref 0–6)
GFR AFRICAN AMERICAN: 28
GFR NON-AFRICAN AMERICAN: 23 ML/MIN/1.73
GLUCOSE BLD-MCNC: 95 MG/DL (ref 74–99)
HCT VFR BLD CALC: 38.8 % (ref 37–54)
HEMOGLOBIN: 11.7 G/DL (ref 12.5–16.5)
IMMATURE GRANULOCYTES #: 0.02 E9/L
IMMATURE GRANULOCYTES %: 0.3 % (ref 0–5)
LYMPHOCYTES ABSOLUTE: 0.99 E9/L (ref 1.5–4)
LYMPHOCYTES RELATIVE PERCENT: 17.3 % (ref 20–42)
MAGNESIUM: 1.9 MG/DL (ref 1.6–2.6)
MCH RBC QN AUTO: 25.8 PG (ref 26–35)
MCHC RBC AUTO-ENTMCNC: 30.2 % (ref 32–34.5)
MCV RBC AUTO: 85.5 FL (ref 80–99.9)
MONOCYTES ABSOLUTE: 0.49 E9/L (ref 0.1–0.95)
MONOCYTES RELATIVE PERCENT: 8.6 % (ref 2–12)
NEUTROPHILS ABSOLUTE: 3.99 E9/L (ref 1.8–7.3)
NEUTROPHILS RELATIVE PERCENT: 69.6 % (ref 43–80)
PDW BLD-RTO: 13.3 FL (ref 11.5–15)
PHOSPHORUS: 4.4 MG/DL (ref 2.5–4.5)
PLATELET # BLD: 313 E9/L (ref 130–450)
PMV BLD AUTO: 11.1 FL (ref 7–12)
POTASSIUM SERPL-SCNC: 4.4 MMOL/L (ref 3.5–5)
PRO-BNP: 3123 PG/ML (ref 0–125)
RBC # BLD: 4.54 E12/L (ref 3.8–5.8)
SODIUM BLD-SCNC: 141 MMOL/L (ref 132–146)
TOTAL PROTEIN: 5.1 G/DL (ref 6.4–8.3)
URIC ACID, SERUM: 6.1 MG/DL (ref 3.4–7)
WBC # BLD: 5.7 E9/L (ref 4.5–11.5)

## 2019-12-31 PROCEDURE — 80053 COMPREHEN METABOLIC PANEL: CPT

## 2019-12-31 PROCEDURE — 84550 ASSAY OF BLOOD/URIC ACID: CPT

## 2019-12-31 PROCEDURE — 85025 COMPLETE CBC W/AUTO DIFF WBC: CPT

## 2019-12-31 PROCEDURE — 36415 COLL VENOUS BLD VENIPUNCTURE: CPT

## 2019-12-31 PROCEDURE — 83735 ASSAY OF MAGNESIUM: CPT

## 2019-12-31 PROCEDURE — 83880 ASSAY OF NATRIURETIC PEPTIDE: CPT

## 2019-12-31 PROCEDURE — 84100 ASSAY OF PHOSPHORUS: CPT

## 2020-01-02 ENCOUNTER — HOSPITAL ENCOUNTER (OUTPATIENT)
Age: 55
Discharge: HOME | End: 2020-01-04

## 2020-01-02 LAB
24HR URINE VOLUME (ML): 2500 ML
CREATININE 24 HOUR URINE: 1775 MG/24H (ref 980–2200)
Lab: 24 HOURS
PROTEIN 24 HOUR URINE: 15 G/24HR (ref 0–0.14)

## 2020-01-02 PROCEDURE — 84156 ASSAY OF PROTEIN URINE: CPT

## 2020-01-04 ENCOUNTER — APPOINTMENT (OUTPATIENT)
Dept: GENERAL RADIOLOGY | Age: 55
DRG: 871 | End: 2020-01-04
Payer: COMMERCIAL

## 2020-01-04 ENCOUNTER — HOSPITAL ENCOUNTER (EMERGENCY)
Age: 55
Discharge: HOME | DRG: 871 | End: 2020-01-04
Attending: NURSE PRACTITIONER
Payer: COMMERCIAL

## 2020-01-04 VITALS
HEART RATE: 100 BPM | WEIGHT: 210 LBS | TEMPERATURE: 100.2 F | RESPIRATION RATE: 18 BRPM | DIASTOLIC BLOOD PRESSURE: 76 MMHG | OXYGEN SATURATION: 94 % | HEIGHT: 68 IN | SYSTOLIC BLOOD PRESSURE: 160 MMHG | BODY MASS INDEX: 31.83 KG/M2

## 2020-01-04 PROCEDURE — 99212 OFFICE O/P EST SF 10 MIN: CPT

## 2020-01-04 PROCEDURE — 71046 X-RAY EXAM CHEST 2 VIEWS: CPT

## 2020-01-04 RX ORDER — DOXYCYCLINE 100 MG/1
100 CAPSULE ORAL 2 TIMES DAILY
Qty: 20 CAPSULE | Refills: 0 | Status: ON HOLD | OUTPATIENT
Start: 2020-01-04 | End: 2020-01-10 | Stop reason: HOSPADM

## 2020-01-04 RX ORDER — VITAMIN E 268 MG
400 CAPSULE ORAL DAILY
COMMUNITY

## 2020-01-04 ASSESSMENT — PAIN SCALES - GENERAL
PAINLEVEL_OUTOF10: 5
PAINLEVEL_OUTOF10: 5

## 2020-01-04 ASSESSMENT — PAIN DESCRIPTION - PROGRESSION
CLINICAL_PROGRESSION: NOT CHANGED
CLINICAL_PROGRESSION: GRADUALLY WORSENING

## 2020-01-04 ASSESSMENT — PAIN DESCRIPTION - ONSET
ONSET: ON-GOING
ONSET: ON-GOING

## 2020-01-04 ASSESSMENT — PAIN DESCRIPTION - LOCATION: LOCATION: GENERALIZED

## 2020-01-04 ASSESSMENT — PAIN DESCRIPTION - FREQUENCY
FREQUENCY: CONTINUOUS
FREQUENCY: CONTINUOUS

## 2020-01-04 ASSESSMENT — PAIN - FUNCTIONAL ASSESSMENT: PAIN_FUNCTIONAL_ASSESSMENT: 0-10

## 2020-01-04 ASSESSMENT — PAIN DESCRIPTION - PAIN TYPE
TYPE: ACUTE PAIN
TYPE: ACUTE PAIN

## 2020-01-04 ASSESSMENT — PAIN DESCRIPTION - DESCRIPTORS
DESCRIPTORS: ACHING
DESCRIPTORS: ACHING

## 2020-01-04 NOTE — ED PROVIDER NOTES
was transcribed using voice recognition software. Every effort was made to ensure accuracy; however, inadvertent computerized transcription errors may be present.   END OF ED PROVIDER NOTE       DONOVAN Salazar CNP  01/04/20 0869

## 2020-01-06 ENCOUNTER — HOSPITAL ENCOUNTER (INPATIENT)
Age: 55
LOS: 4 days | Discharge: HOME | DRG: 871 | End: 2020-01-10
Attending: EMERGENCY MEDICINE | Admitting: INTERNAL MEDICINE
Payer: COMMERCIAL

## 2020-01-06 ENCOUNTER — APPOINTMENT (OUTPATIENT)
Dept: GENERAL RADIOLOGY | Age: 55
DRG: 871 | End: 2020-01-06
Payer: COMMERCIAL

## 2020-01-06 ENCOUNTER — OFFICE VISIT (OUTPATIENT)
Dept: FAMILY MEDICINE CLINIC | Age: 55
End: 2020-01-06
Payer: COMMERCIAL

## 2020-01-06 VITALS
BODY MASS INDEX: 33.49 KG/M2 | RESPIRATION RATE: 18 BRPM | HEART RATE: 109 BPM | DIASTOLIC BLOOD PRESSURE: 76 MMHG | HEIGHT: 68 IN | OXYGEN SATURATION: 85 % | WEIGHT: 221 LBS | SYSTOLIC BLOOD PRESSURE: 128 MMHG

## 2020-01-06 PROBLEM — A41.9 SEVERE SEPSIS WITH ACUTE ORGAN DYSFUNCTION (HCC): Status: ACTIVE | Noted: 2020-01-06

## 2020-01-06 PROBLEM — R65.20 SEVERE SEPSIS WITH ACUTE ORGAN DYSFUNCTION (HCC): Status: ACTIVE | Noted: 2020-01-06

## 2020-01-06 LAB
ANION GAP SERPL CALCULATED.3IONS-SCNC: 14 MMOL/L (ref 7–16)
BASOPHILS ABSOLUTE: 0.01 E9/L (ref 0–0.2)
BASOPHILS RELATIVE PERCENT: 0.2 % (ref 0–2)
BUN BLDV-MCNC: 55 MG/DL (ref 6–20)
CALCIUM SERPL-MCNC: 8.1 MG/DL (ref 8.6–10.2)
CHLORIDE BLD-SCNC: 102 MMOL/L (ref 98–107)
CHP ED QC CHECK: NORMAL
CO2: 18 MMOL/L (ref 22–29)
CREAT SERPL-MCNC: 3.4 MG/DL (ref 0.7–1.2)
EOSINOPHILS ABSOLUTE: 0 E9/L (ref 0.05–0.5)
EOSINOPHILS RELATIVE PERCENT: 0 % (ref 0–6)
GFR AFRICAN AMERICAN: 23
GFR NON-AFRICAN AMERICAN: 19 ML/MIN/1.73
GLUCOSE BLD-MCNC: 102 MG/DL
GLUCOSE BLD-MCNC: 105 MG/DL (ref 74–99)
HCT VFR BLD CALC: 40.1 % (ref 37–54)
HEMOGLOBIN: 13.2 G/DL (ref 12.5–16.5)
IMMATURE GRANULOCYTES #: 0.03 E9/L
IMMATURE GRANULOCYTES %: 0.5 % (ref 0–5)
INFLUENZA A BY PCR: NOT DETECTED
INFLUENZA B BY PCR: NOT DETECTED
LACTIC ACID, SEPSIS: 1.1 MMOL/L (ref 0.5–1.9)
LYMPHOCYTES ABSOLUTE: 0.6 E9/L (ref 1.5–4)
LYMPHOCYTES RELATIVE PERCENT: 9.1 % (ref 20–42)
MCH RBC QN AUTO: 26.2 PG (ref 26–35)
MCHC RBC AUTO-ENTMCNC: 32.9 % (ref 32–34.5)
MCV RBC AUTO: 79.6 FL (ref 80–99.9)
METER GLUCOSE: 102 MG/DL (ref 74–99)
METER GLUCOSE: 128 MG/DL (ref 74–99)
MONOCYTES ABSOLUTE: 0.39 E9/L (ref 0.1–0.95)
MONOCYTES RELATIVE PERCENT: 5.9 % (ref 2–12)
NEUTROPHILS ABSOLUTE: 5.54 E9/L (ref 1.8–7.3)
NEUTROPHILS RELATIVE PERCENT: 84.3 % (ref 43–80)
PDW BLD-RTO: 12.9 FL (ref 11.5–15)
PLATELET # BLD: 265 E9/L (ref 130–450)
PMV BLD AUTO: 10.9 FL (ref 7–12)
POTASSIUM SERPL-SCNC: 4.2 MMOL/L (ref 3.5–5)
RBC # BLD: 5.04 E12/L (ref 3.8–5.8)
SODIUM BLD-SCNC: 134 MMOL/L (ref 132–146)
WBC # BLD: 6.6 E9/L (ref 4.5–11.5)

## 2020-01-06 PROCEDURE — 1200000000 HC SEMI PRIVATE

## 2020-01-06 PROCEDURE — 71046 X-RAY EXAM CHEST 2 VIEWS: CPT

## 2020-01-06 PROCEDURE — 83605 ASSAY OF LACTIC ACID: CPT

## 2020-01-06 PROCEDURE — 2700000000 HC OXYGEN THERAPY PER DAY

## 2020-01-06 PROCEDURE — 36415 COLL VENOUS BLD VENIPUNCTURE: CPT

## 2020-01-06 PROCEDURE — 87502 INFLUENZA DNA AMP PROBE: CPT

## 2020-01-06 PROCEDURE — 6360000002 HC RX W HCPCS: Performed by: STUDENT IN AN ORGANIZED HEALTH CARE EDUCATION/TRAINING PROGRAM

## 2020-01-06 PROCEDURE — 96375 TX/PRO/DX INJ NEW DRUG ADDON: CPT

## 2020-01-06 PROCEDURE — 82962 GLUCOSE BLOOD TEST: CPT

## 2020-01-06 PROCEDURE — 6370000000 HC RX 637 (ALT 250 FOR IP): Performed by: STUDENT IN AN ORGANIZED HEALTH CARE EDUCATION/TRAINING PROGRAM

## 2020-01-06 PROCEDURE — 87040 BLOOD CULTURE FOR BACTERIA: CPT

## 2020-01-06 PROCEDURE — 2580000003 HC RX 258: Performed by: STUDENT IN AN ORGANIZED HEALTH CARE EDUCATION/TRAINING PROGRAM

## 2020-01-06 PROCEDURE — 96374 THER/PROPH/DIAG INJ IV PUSH: CPT

## 2020-01-06 PROCEDURE — 80048 BASIC METABOLIC PNL TOTAL CA: CPT

## 2020-01-06 PROCEDURE — 85025 COMPLETE CBC W/AUTO DIFF WBC: CPT

## 2020-01-06 PROCEDURE — 99213 OFFICE O/P EST LOW 20 MIN: CPT | Performed by: FAMILY MEDICINE

## 2020-01-06 PROCEDURE — 99285 EMERGENCY DEPT VISIT HI MDM: CPT

## 2020-01-06 PROCEDURE — 84145 PROCALCITONIN (PCT): CPT

## 2020-01-06 RX ORDER — ACETAMINOPHEN 500 MG
1000 TABLET ORAL ONCE
Status: COMPLETED | OUTPATIENT
Start: 2020-01-06 | End: 2020-01-06

## 2020-01-06 RX ORDER — SODIUM CHLORIDE 9 MG/ML
INJECTION, SOLUTION INTRAVENOUS CONTINUOUS
Status: DISCONTINUED | OUTPATIENT
Start: 2020-01-06 | End: 2020-01-07 | Stop reason: SDUPTHER

## 2020-01-06 RX ADMIN — AZITHROMYCIN DIHYDRATE 500 MG: 500 INJECTION, POWDER, LYOPHILIZED, FOR SOLUTION INTRAVENOUS at 22:41

## 2020-01-06 RX ADMIN — SODIUM CHLORIDE: 9 INJECTION, SOLUTION INTRAVENOUS at 22:42

## 2020-01-06 RX ADMIN — ACETAMINOPHEN 1000 MG: 500 TABLET, FILM COATED ORAL at 21:54

## 2020-01-06 RX ADMIN — WATER 1 G: 1 INJECTION INTRAMUSCULAR; INTRAVENOUS; SUBCUTANEOUS at 22:42

## 2020-01-06 ASSESSMENT — ENCOUNTER SYMPTOMS
DIARRHEA: 0
NAUSEA: 0
VOICE CHANGE: 0
WHEEZING: 0
COUGH: 1
CONSTIPATION: 0
COLOR CHANGE: 0
BACK PAIN: 0
SORE THROAT: 0
STRIDOR: 0
VOMITING: 0
TROUBLE SWALLOWING: 0
BLOOD IN STOOL: 0
ABDOMINAL PAIN: 0
SHORTNESS OF BREATH: 1

## 2020-01-06 ASSESSMENT — PAIN SCALES - GENERAL: PAINLEVEL_OUTOF10: 2

## 2020-01-06 ASSESSMENT — PATIENT HEALTH QUESTIONNAIRE - PHQ9
SUM OF ALL RESPONSES TO PHQ QUESTIONS 1-9: 0
1. LITTLE INTEREST OR PLEASURE IN DOING THINGS: 0
SUM OF ALL RESPONSES TO PHQ9 QUESTIONS 1 & 2: 0
SUM OF ALL RESPONSES TO PHQ QUESTIONS 1-9: 0
2. FEELING DOWN, DEPRESSED OR HOPELESS: 0

## 2020-01-06 NOTE — PROGRESS NOTES
Tess Lott is a 48 y.o. male  . Subjective:      Patient came in very fatigued and short of breath. Spo2 would not improve over 88. States sugars have been low. Coughing up copious mucus. Was diagnosed with pneumonia and started on antibiotics but has worsened steadily. Discussed with he and his wife that he needs to get to ER immediately. I suggested by chris . The refused but assured me that they will get there immediately . Review of Systems   Constitutional: Positive for diaphoresis and fatigue. HENT: Positive for congestion, postnasal drip, rhinorrhea, sinus pressure, sinus pain and sneezing. Respiratory: Positive for cough, shortness of breath and wheezing. Past Medical History:   Diagnosis Date    Diabetes mellitus ([facility]     Hyperlipidemia     Hypertension        Social History     Socioeconomic History    Marital status:      Spouse name: Not on file    Number of children: Not on file    Years of education: Not on file    Highest education level: Not on file   Occupational History    Not on file   Social Needs    Financial resource strain: Not on file    Food insecurity:     Worry: Not on file     Inability: Not on file    Transportation needs:     Medical: Not on file     Non-medical: Not on file   Tobacco Use    Smoking status: Former Smoker     Types: Cigars    Smokeless tobacco: Never Used   Substance and Sexual Activity    Alcohol use:  Yes     Alcohol/week: 0.0 standard drinks     Comment: socially    Drug use: No    Sexual activity: Yes     Partners: Female   Lifestyle    Physical activity:     Days per week: Not on file     Minutes per session: Not on file    Stress: Not on file   Relationships    Social connections:     Talks on phone: Not on file     Gets together: Not on file     Attends [employer] service: Not on file     Active member of club or organization: Not on file     Attends meetings of clubs or organizations: Not on file     Relationship

## 2020-01-07 ENCOUNTER — APPOINTMENT (OUTPATIENT)
Dept: CT IMAGING | Age: 55
DRG: 871 | End: 2020-01-07
Payer: COMMERCIAL

## 2020-01-07 ENCOUNTER — APPOINTMENT (OUTPATIENT)
Dept: ULTRASOUND IMAGING | Age: 55
DRG: 871 | End: 2020-01-07
Payer: COMMERCIAL

## 2020-01-07 PROBLEM — A41.9 SEPSIS (HCC): Status: ACTIVE | Noted: 2020-01-07

## 2020-01-07 LAB
ADENOVIRUS BY PCR: NOT DETECTED
ALBUMIN SERPL-MCNC: 1.6 G/DL (ref 3.5–5.2)
ANION GAP SERPL CALCULATED.3IONS-SCNC: 13 MMOL/L (ref 7–16)
BASOPHILS ABSOLUTE: 0.02 E9/L (ref 0–0.2)
BASOPHILS RELATIVE PERCENT: 0.3 % (ref 0–2)
BORDETELLA PARAPERTUSSIS BY PCR: NOT DETECTED
BORDETELLA PERTUSSIS BY PCR: NOT DETECTED
BUN BLDV-MCNC: 55 MG/DL (ref 6–20)
CALCIUM SERPL-MCNC: 7.7 MG/DL (ref 8.6–10.2)
CHLAMYDOPHILIA PNEUMONIAE BY PCR: NOT DETECTED
CHLORIDE BLD-SCNC: 103 MMOL/L (ref 98–107)
CO2: 19 MMOL/L (ref 22–29)
CORONAVIRUS 229E BY PCR: NOT DETECTED
CORONAVIRUS HKU1 BY PCR: NOT DETECTED
CORONAVIRUS NL63 BY PCR: NOT DETECTED
CORONAVIRUS OC43 BY PCR: NOT DETECTED
CREAT SERPL-MCNC: 3.5 MG/DL (ref 0.7–1.2)
EOSINOPHILS ABSOLUTE: 0 E9/L (ref 0.05–0.5)
EOSINOPHILS RELATIVE PERCENT: 0 % (ref 0–6)
GFR AFRICAN AMERICAN: 22
GFR NON-AFRICAN AMERICAN: 18 ML/MIN/1.73
GLUCOSE BLD-MCNC: 133 MG/DL (ref 74–99)
HBA1C MFR BLD: 8.3 % (ref 4–5.6)
HCT VFR BLD CALC: 34.6 % (ref 37–54)
HEMOGLOBIN: 11.2 G/DL (ref 12.5–16.5)
HUMAN METAPNEUMOVIRUS BY PCR: DETECTED
HUMAN RHINOVIRUS/ENTEROVIRUS BY PCR: NOT DETECTED
IMMATURE GRANULOCYTES #: 0.03 E9/L
IMMATURE GRANULOCYTES %: 0.5 % (ref 0–5)
INFLUENZA A BY PCR: NOT DETECTED
INFLUENZA B BY PCR: NOT DETECTED
L. PNEUMOPHILA SEROGP 1 UR AG: NORMAL
LACTIC ACID, SEPSIS: 0.6 MMOL/L (ref 0.5–1.9)
LACTIC ACID, SEPSIS: 0.6 MMOL/L (ref 0.5–1.9)
LYMPHOCYTES ABSOLUTE: 0.75 E9/L (ref 1.5–4)
LYMPHOCYTES RELATIVE PERCENT: 12.2 % (ref 20–42)
MCH RBC QN AUTO: 26 PG (ref 26–35)
MCHC RBC AUTO-ENTMCNC: 32.4 % (ref 32–34.5)
MCV RBC AUTO: 80.3 FL (ref 80–99.9)
METER GLUCOSE: 158 MG/DL (ref 74–99)
METER GLUCOSE: 335 MG/DL (ref 74–99)
METER GLUCOSE: 85 MG/DL (ref 74–99)
METER GLUCOSE: 92 MG/DL (ref 74–99)
METER GLUCOSE: 97 MG/DL (ref 74–99)
MONOCYTES ABSOLUTE: 0.43 E9/L (ref 0.1–0.95)
MONOCYTES RELATIVE PERCENT: 7 % (ref 2–12)
MYCOPLASMA PNEUMONIAE BY PCR: NOT DETECTED
NEUTROPHILS ABSOLUTE: 4.9 E9/L (ref 1.8–7.3)
NEUTROPHILS RELATIVE PERCENT: 80 % (ref 43–80)
PARAINFLUENZA VIRUS 1 BY PCR: NOT DETECTED
PARAINFLUENZA VIRUS 2 BY PCR: NOT DETECTED
PARAINFLUENZA VIRUS 3 BY PCR: NOT DETECTED
PARAINFLUENZA VIRUS 4 BY PCR: NOT DETECTED
PDW BLD-RTO: 12.9 FL (ref 11.5–15)
PLATELET # BLD: 246 E9/L (ref 130–450)
PMV BLD AUTO: 10.7 FL (ref 7–12)
POTASSIUM REFLEX MAGNESIUM: 4.2 MMOL/L (ref 3.5–5)
PROCALCITONIN: 0.82 NG/ML (ref 0–0.08)
PROCALCITONIN: 0.88 NG/ML (ref 0–0.08)
RBC # BLD: 4.31 E12/L (ref 3.8–5.8)
RESPIRATORY SYNCYTIAL VIRUS BY PCR: NOT DETECTED
SODIUM BLD-SCNC: 135 MMOL/L (ref 132–146)
STREP PNEUMONIAE ANTIGEN, URINE: NORMAL
TROPONIN: 0.08 NG/ML (ref 0–0.03)
TROPONIN: 0.09 NG/ML (ref 0–0.03)
TROPONIN: 0.09 NG/ML (ref 0–0.03)
WBC # BLD: 6.1 E9/L (ref 4.5–11.5)

## 2020-01-07 PROCEDURE — 85025 COMPLETE CBC W/AUTO DIFF WBC: CPT

## 2020-01-07 PROCEDURE — 97161 PT EVAL LOW COMPLEX 20 MIN: CPT | Performed by: PHYSICAL THERAPIST

## 2020-01-07 PROCEDURE — 0100U HC RESPIRPTHGN MULT REV TRANS & AMP PRB TECH 21 TRGT: CPT

## 2020-01-07 PROCEDURE — 84484 ASSAY OF TROPONIN QUANT: CPT

## 2020-01-07 PROCEDURE — 6370000000 HC RX 637 (ALT 250 FOR IP): Performed by: NURSE PRACTITIONER

## 2020-01-07 PROCEDURE — 6370000000 HC RX 637 (ALT 250 FOR IP): Performed by: SPECIALIST

## 2020-01-07 PROCEDURE — 2700000000 HC OXYGEN THERAPY PER DAY

## 2020-01-07 PROCEDURE — 87450 HC DIRECT STREP B ANTIGEN: CPT

## 2020-01-07 PROCEDURE — 82962 GLUCOSE BLOOD TEST: CPT

## 2020-01-07 PROCEDURE — 87206 SMEAR FLUORESCENT/ACID STAI: CPT

## 2020-01-07 PROCEDURE — 97530 THERAPEUTIC ACTIVITIES: CPT

## 2020-01-07 PROCEDURE — 83605 ASSAY OF LACTIC ACID: CPT

## 2020-01-07 PROCEDURE — 6360000002 HC RX W HCPCS: Performed by: NURSE PRACTITIONER

## 2020-01-07 PROCEDURE — 36415 COLL VENOUS BLD VENIPUNCTURE: CPT

## 2020-01-07 PROCEDURE — 83036 HEMOGLOBIN GLYCOSYLATED A1C: CPT

## 2020-01-07 PROCEDURE — 97165 OT EVAL LOW COMPLEX 30 MIN: CPT

## 2020-01-07 PROCEDURE — 76775 US EXAM ABDO BACK WALL LIM: CPT

## 2020-01-07 PROCEDURE — 87070 CULTURE OTHR SPECIMN AEROBIC: CPT

## 2020-01-07 PROCEDURE — 2580000003 HC RX 258: Performed by: NURSE PRACTITIONER

## 2020-01-07 PROCEDURE — 94669 MECHANICAL CHEST WALL OSCILL: CPT

## 2020-01-07 PROCEDURE — 94640 AIRWAY INHALATION TREATMENT: CPT

## 2020-01-07 PROCEDURE — 84145 PROCALCITONIN (PCT): CPT

## 2020-01-07 PROCEDURE — 1200000000 HC SEMI PRIVATE

## 2020-01-07 PROCEDURE — 97530 THERAPEUTIC ACTIVITIES: CPT | Performed by: PHYSICAL THERAPIST

## 2020-01-07 PROCEDURE — 2500000003 HC RX 250 WO HCPCS: Performed by: NURSE PRACTITIONER

## 2020-01-07 PROCEDURE — 71250 CT THORAX DX C-: CPT

## 2020-01-07 PROCEDURE — 6360000002 HC RX W HCPCS: Performed by: INTERNAL MEDICINE

## 2020-01-07 PROCEDURE — 82040 ASSAY OF SERUM ALBUMIN: CPT

## 2020-01-07 PROCEDURE — 80048 BASIC METABOLIC PNL TOTAL CA: CPT

## 2020-01-07 RX ORDER — SODIUM CHLORIDE 0.9 % (FLUSH) 0.9 %
10 SYRINGE (ML) INJECTION PRN
Status: DISCONTINUED | OUTPATIENT
Start: 2020-01-07 | End: 2020-01-10 | Stop reason: HOSPADM

## 2020-01-07 RX ORDER — NICOTINE POLACRILEX 4 MG
15 LOZENGE BUCCAL PRN
Status: DISCONTINUED | OUTPATIENT
Start: 2020-01-07 | End: 2020-01-10 | Stop reason: HOSPADM

## 2020-01-07 RX ORDER — DEXTROSE MONOHYDRATE 25 G/50ML
12.5 INJECTION, SOLUTION INTRAVENOUS PRN
Status: DISCONTINUED | OUTPATIENT
Start: 2020-01-07 | End: 2020-01-10 | Stop reason: HOSPADM

## 2020-01-07 RX ORDER — FORMOTEROL FUMARATE 20 UG/2ML
20 SOLUTION RESPIRATORY (INHALATION) 2 TIMES DAILY
Status: DISCONTINUED | OUTPATIENT
Start: 2020-01-07 | End: 2020-01-10 | Stop reason: HOSPADM

## 2020-01-07 RX ORDER — LEVOFLOXACIN 750 MG/1
750 TABLET ORAL EVERY OTHER DAY
Status: DISCONTINUED | OUTPATIENT
Start: 2020-01-07 | End: 2020-01-10 | Stop reason: HOSPADM

## 2020-01-07 RX ORDER — ALBUTEROL SULFATE 2.5 MG/3ML
2.5 SOLUTION RESPIRATORY (INHALATION) EVERY 4 HOURS PRN
Status: DISCONTINUED | OUTPATIENT
Start: 2020-01-07 | End: 2020-01-10 | Stop reason: HOSPADM

## 2020-01-07 RX ORDER — SODIUM CHLORIDE 9 MG/ML
INJECTION, SOLUTION INTRAVENOUS CONTINUOUS
Status: DISCONTINUED | OUTPATIENT
Start: 2020-01-07 | End: 2020-01-08

## 2020-01-07 RX ORDER — METHYLPREDNISOLONE SODIUM SUCCINATE 125 MG/2ML
80 INJECTION, POWDER, LYOPHILIZED, FOR SOLUTION INTRAMUSCULAR; INTRAVENOUS EVERY 8 HOURS
Status: DISCONTINUED | OUTPATIENT
Start: 2020-01-07 | End: 2020-01-08

## 2020-01-07 RX ORDER — ACETAMINOPHEN 325 MG/1
650 TABLET ORAL EVERY 4 HOURS PRN
Status: DISCONTINUED | OUTPATIENT
Start: 2020-01-07 | End: 2020-01-10 | Stop reason: HOSPADM

## 2020-01-07 RX ORDER — BUDESONIDE 0.5 MG/2ML
0.5 INHALANT ORAL 2 TIMES DAILY
Status: DISCONTINUED | OUTPATIENT
Start: 2020-01-07 | End: 2020-01-10 | Stop reason: HOSPADM

## 2020-01-07 RX ORDER — DEXTROSE MONOHYDRATE 50 MG/ML
100 INJECTION, SOLUTION INTRAVENOUS PRN
Status: DISCONTINUED | OUTPATIENT
Start: 2020-01-07 | End: 2020-01-10 | Stop reason: HOSPADM

## 2020-01-07 RX ORDER — SODIUM CHLORIDE 0.9 % (FLUSH) 0.9 %
10 SYRINGE (ML) INJECTION EVERY 12 HOURS SCHEDULED
Status: DISCONTINUED | OUTPATIENT
Start: 2020-01-07 | End: 2020-01-10 | Stop reason: HOSPADM

## 2020-01-07 RX ADMIN — ENOXAPARIN SODIUM 30 MG: 30 INJECTION SUBCUTANEOUS at 10:40

## 2020-01-07 RX ADMIN — FORMOTEROL FUMARATE DIHYDRATE 20 MCG: 20 SOLUTION RESPIRATORY (INHALATION) at 06:10

## 2020-01-07 RX ADMIN — INSULIN LISPRO 4 UNITS: 100 INJECTION, SOLUTION INTRAVENOUS; SUBCUTANEOUS at 21:52

## 2020-01-07 RX ADMIN — DOXYCYCLINE 100 MG: 100 INJECTION, POWDER, LYOPHILIZED, FOR SOLUTION INTRAVENOUS at 12:28

## 2020-01-07 RX ADMIN — DOXYCYCLINE 100 MG: 100 INJECTION, POWDER, LYOPHILIZED, FOR SOLUTION INTRAVENOUS at 01:16

## 2020-01-07 RX ADMIN — SODIUM CHLORIDE, PRESERVATIVE FREE 10 ML: 5 INJECTION INTRAVENOUS at 21:54

## 2020-01-07 RX ADMIN — LEVOFLOXACIN 750 MG: 750 TABLET, FILM COATED ORAL at 18:25

## 2020-01-07 RX ADMIN — METHYLPREDNISOLONE SODIUM SUCCINATE 80 MG: 125 INJECTION, POWDER, FOR SOLUTION INTRAMUSCULAR; INTRAVENOUS at 12:28

## 2020-01-07 RX ADMIN — BUDESONIDE 500 MCG: 0.5 INHALANT RESPIRATORY (INHALATION) at 06:10

## 2020-01-07 RX ADMIN — METHYLPREDNISOLONE SODIUM SUCCINATE 80 MG: 125 INJECTION, POWDER, FOR SOLUTION INTRAMUSCULAR; INTRAVENOUS at 21:52

## 2020-01-07 RX ADMIN — SODIUM CHLORIDE: 9 INJECTION, SOLUTION INTRAVENOUS at 10:39

## 2020-01-07 ASSESSMENT — PAIN DESCRIPTION - PAIN TYPE
TYPE: ACUTE PAIN
TYPE: ACUTE PAIN

## 2020-01-07 ASSESSMENT — PAIN DESCRIPTION - PROGRESSION
CLINICAL_PROGRESSION: GRADUALLY WORSENING
CLINICAL_PROGRESSION: NOT CHANGED

## 2020-01-07 ASSESSMENT — PAIN SCALES - GENERAL
PAINLEVEL_OUTOF10: 0
PAINLEVEL_OUTOF10: 2
PAINLEVEL_OUTOF10: 5

## 2020-01-07 ASSESSMENT — PAIN DESCRIPTION - FREQUENCY
FREQUENCY: INTERMITTENT
FREQUENCY: INTERMITTENT

## 2020-01-07 ASSESSMENT — PAIN DESCRIPTION - DESCRIPTORS
DESCRIPTORS: ACHING;DISCOMFORT;SORE
DESCRIPTORS: ACHING;DISCOMFORT;SORE

## 2020-01-07 ASSESSMENT — PAIN DESCRIPTION - LOCATION
LOCATION: SHOULDER
LOCATION: GENERALIZED

## 2020-01-07 ASSESSMENT — PAIN DESCRIPTION - ONSET
ONSET: ON-GOING
ONSET: GRADUAL

## 2020-01-07 ASSESSMENT — PAIN - FUNCTIONAL ASSESSMENT
PAIN_FUNCTIONAL_ASSESSMENT: ACTIVITIES ARE NOT PREVENTED
PAIN_FUNCTIONAL_ASSESSMENT: PREVENTS OR INTERFERES SOME ACTIVE ACTIVITIES AND ADLS

## 2020-01-07 ASSESSMENT — PAIN DESCRIPTION - ORIENTATION: ORIENTATION: RIGHT

## 2020-01-07 NOTE — PROGRESS NOTES
Internal Medicine Progress Note    Rupal Johnson. Kareen Sultana., & [address] Kareen Sultana., F.AUSTEN.MARTA.O.I. Carol Ann Leon D.O., KEERTHIO.I. Primary Care Physician: Tori Dewitt DO   Admitting Physician:  Brien Cabot, DO  Admission date and time: 1/6/2020  8:35 PM    Room:  [address] [place] [address]    Patient Name: Ward Jo  MRN: [id number]    Date of Service: 1/7/2020     Subjective:  Travis was admitted last evening after being found to have a significant bilateral pneumonia associated with severe sepsis and hypoxic respiratory failure. He was placed on empiric antibiotic therapy, IV fluid hydration and respiratory treatments. Today, he states he feels slightly better but it is not near his baseline. He remains weak and deconditioned. He is coughing with congestion and remains short of breath. Review of System: HEENT:  [place] ear pain, sore throat, sinus or eye problems  Cardiovascular:   Denies any chest pain, irregular heartbeats, or palpitations. Respiratory:   Admits to improving shortness of breath with harsh congested cough. Denies sputum production, hemoptysis, or wheezing. Gastrointestinal:   Denies nausea, vomiting, diarrhea, or constipation. Denies any abdominal pain. Extremities:   Denies any lower extremity swelling or edema. Neurology:    Denies any headache or focal neurological deficits. Admits to generalized weakness and deconditioning but denies focal weakness or paresthesia. Derm:    Denies any rashes, ulcers, or excoriations. Denies bruising. Genitourinary:    Denies any urgency, frequency, hematuria. Voiding without difficulty. Musculoskeletal:  Denies myalgias, joint complaints or back pain      Physical Exam:  I/O this shift:  In: 300 [P.O.:300]  Out: -   Blood pressure (!) 169/81, pulse 100, temperature 98.4 °F (36.9 °C), temperature source Oral, resp. rate 23, height 5' 8\" (1.727 m), weight 210 lb 3.2 oz (95.3 kg), SpO2 94 %.     HEENT: 01/07/2020    LABALBU 1.6 01/07/2020    CREATININE 3.5 01/07/2020    CALCIUM 7.7 01/07/2020    GFRAA 22 01/07/2020    LABGLOM 18 01/07/2020    GLUCOSE 133 01/07/2020     Last 3 Troponin:    Lab Results   Component Value Date    TROPONINI 0.08 01/07/2020    TROPONINI 0.09 01/07/2020       Wound Documentation:          Assessment:   Patient Active Problem List    Diagnosis Date Noted    Sepsis secondary to CAP ([facility] 01/07/2020    Severe [facility] dysfunction ([facility] 01/06/2020    Type 2 diabetes mellitus without complication ([facility] 26/70/8868       ASSESSMENT:  · Severe sepsis with associated acute organ dysfunction secondary to community-acquired pneumonia  · Acute renal failure superimposed on chronic kidney disease stage 4  · Acute respiratory failure with hypoxia secondary to #1  · Insulin-dependent diabetes mellitus  · Hypertension  · Hyperlipidemia     PLAN:  Elder Riana is a 61-year-old  gentleman admitted from [address] [place] [address] emergency department January 6, 2020 for evaluation and treatment of severe sepsis due to community-acquired pneumonia. Infectious evaluation will be undertaken including procalcitonin which was elevated at 0.88, RFA (+) for human metapneumovirus, and cultures and the patient is initiated on IV Rocephin and doxycycline. Scheduled and as needed respiratory treatments will be utilized as well as supportive care and fever management. Gentle IV fluid will utilize due to the acute on chronic kidney injury, nephrology consult will be obtained. Volume status will be monitored, strict intake and output, daily labs. Avoid nephrotoxic medications. Patient is requiring nasal cannula oxygen for respiratory failure, and we will actively work on tapering off nasal cannula oxygen as the patient's condition allows. Patient will be maintained on sliding scale insulin as well as basal insulin and Accu-Cheks will be performed 4 times daily with meals and at bedtime.  Continue current

## 2020-01-07 NOTE — PROGRESS NOTES
Occupational Therapy  Occupational Therapy Initial Assessment    Date:2020  Patient Name: Evans Hammond  MRN: [id number]  : 1965  ROOM #: [id number]    Placement Recommendation: Corinna Angelucci if needed    Equipment Prescriptions Needed: none     [facility]   AM-PAC Daily Activity Inpatient   How much help for putting on and taking off regular lower body clothing?: A Little  How much help for Bathing?: A Little  How much help for Toileting?: A Little  How much help for putting on and taking off regular upper body clothing?: A Little  How much help for taking care of personal grooming?: None  How much help for eating meals?: None  AM-PAC Inpatient Daily Activity Raw Score: 20  AM-PAC Inpatient ADL T-Scale Score : 42.03  ADL Inpatient CMS 0-100% Score: 38.32  ADL Inpatient CMS G-Code Modifier : CJ    Diagnosis:   1. Severe sepsis with acute organ dysfunction ([facility]    2. Acute respiratory failure with hypoxia (HCC)    3. Community acquired pneumonia, unspecified laterality      Past Medical History:   Past Medical History:   Diagnosis Date    Diabetes mellitus ([facility]     Hyperlipidemia     Hypertension          The admitting diagnosis and active problem list as listed above have been reviewed prior to the initiation of this evaluation. Nursing cleared the patient for evaluation. Patient is agreeable to evaluation. Precautions: falls, O2   Pain Scale: Numeric Rate: no pain; Nursing notified. Social history: with family: spouse       Drive: yes    Career:    Home architecture: single family home, [place], 6 steps to enter with rail, tub shower. PLOF: independent with BADL and IADL, ambulated with no device   Equipment owned: grab bars  Cognition: oriented x 4; follows 3 step directions.     good  Problem solving skills   good  Memory    good  Sequencing  Communication: intact   Visual perceptual skills: intact     Glasses: yes, readers    Edema: no    Sensation: intact   Hand Dominance:  Left X  Right     Left Right Comment   Passive range of motion [facility]     Active range of motion [facility]     Muscle Grade 4/5 4/5    /pinch Strength Intact  Intact     [] Malnutrition indicators have been identified and nursing has been notified to ensure a dietitian consult is ordered. Function Assessment    Status  Goal Comment   Feeding:  Independent   Independent      Grooming:  Independent  Independent      Toileting: Supervision  Independent     UE dressing:  Supervision  Independent     LE dressing:  Supervision Independent     Bathing:  Supervision  Independent     Bed Mobility:     N/T  Independent     Functional Transfers:    Supervision  for sit to stand transfer from hallway chair  Independent  Safety: good    Functional Mobility: 90 feet x 2 with no device and  Supervision  Independent       Pt/family participated in establishment of goals. Balance:   Sitting: good   Standing: fair with no device    Endurance: fair       Assessment of Current Deficits:   [x]Functional mobility  []ROM  [x]Strength   []Cognition   []Safety Awareness    [x]ADLs [x]IADLs   [x]Endurance  [x]Balance    []Vision  []Sensation     []Gross Motor Coordination       []Fine Motor Coordination     · Low Complexity  · History: Brief history including review of medical records relating to the problem  · Exam: 1-3 performance Deficits  · Assistance/Modification: No assistance or modifications required to perform tasks. No comorbities affecting occupational performance. Patients Goal: return home   Treatment: OT eval, pt seated in  Hallway chair upon arrival, transfer training with verbal cues for hand placement, static standing balance with no device, functional mobility with no device in hallway, pt assisted back to room and was seated in the bedside chair, lower body dressing while seated in the bedside chair. All needs within reach.   Pt would benefit from continued skilled OT to increase safety and independence with

## 2020-01-07 NOTE — PLAN OF CARE
Problem: Pain:  Goal: Pain level will decrease  Description  Pain level will decrease  Outcome: Met This Shift  Goal: Control of acute pain  Description  Control of acute pain  Outcome: Met This Shift  Goal: Control of chronic pain  Description  Control of chronic pain  Outcome: Met This Shift     Problem: Falls - Risk of:  Goal: Will remain free from falls  Description  Will remain free from falls  Outcome: Met This Shift  Goal: Absence of physical injury  Description  Absence of physical injury  Outcome: Met This Shift     Problem: Discharge Planning:  Goal: Participates in care planning  Description  Participates in care planning  Outcome: Met This Shift     Problem: Airway Clearance - Ineffective:  Goal: Ability to maintain a clear airway will improve  Description  Ability to maintain a clear airway will improve  Outcome: Met This Shift     Problem: Fluid Volume - Deficit:  Goal: Achieves intake and output within specified parameters  Description  Achieves intake and output within specified parameters  Outcome: Met This Shift     Problem: Hyperthermia:  Goal: Ability to maintain a body temperature in the normal range will improve  Description  Ability to maintain a body temperature in the normal range will improve  Outcome: Met This Shift     Problem: Discharge Planning:  Goal: Discharged to appropriate level of care  Description  Discharged to appropriate level of care  Outcome: Not Met This Shift  Note:   Pt. Not discharged yet-newer admission     Problem: Airway Clearance - Ineffective:  Goal: Clear lung sounds  Description  Clear lung sounds  Outcome: Not Met This Shift  Note:   Pt.'s lungs sound junky/have rhonic     Problem: Gas Exchange - Impaired:  Goal: Levels of oxygenation will improve  Description  Levels of oxygenation will improve  Outcome: Not Met This Shift  Note:   Pt.  On NEW oxygen 5 L NC

## 2020-01-07 NOTE — ED PROVIDER NOTES
MCHC 32.9 32.0 - 34.5 %    RDW 12.9 11.5 - 15.0 fL    Platelets 633 872 - 421 E9/L    MPV 10.9 7.0 - 12.0 fL    Neutrophils % 84.3 (H) 43.0 - 80.0 %    Immature Granulocytes % 0.5 0.0 - 5.0 %    Lymphocytes % 9.1 (L) 20.0 - 42.0 %    Monocytes % 5.9 2.0 - 12.0 %    Eosinophils % 0.0 0.0 - 6.0 %    Basophils % 0.2 0.0 - 2.0 %    Neutrophils Absolute 5.54 1.80 - 7.30 E9/L    Immature Granulocytes # 0.03 E9/L    Lymphocytes Absolute 0.60 (L) 1.50 - 4.00 E9/L    Monocytes Absolute 0.39 0.10 - 0.95 E9/L    Eosinophils Absolute 0.00 (L) 0.05 - 0.50 E9/L    Basophils Absolute 0.01 0.00 - 0.20 R7/F   Basic Metabolic Panel   Result Value Ref Range    Sodium 134 132 - 146 mmol/L    Potassium 4.2 3.5 - 5.0 mmol/L    Chloride 102 98 - 107 mmol/L    CO2 18 (L) 22 - 29 mmol/L    Anion Gap 14 7 - 16 mmol/L    Glucose 105 (H) 74 - 99 mg/dL    BUN 55 (H) 6 - 20 mg/dL    CREATININE 3.4 (H) 0.7 - 1.2 mg/dL    GFR Non-African American 19 >=60 mL/min/1.73    GFR African American 23     Calcium 8.1 (L) 8.6 - 10.2 mg/dL   Lactate, Sepsis   Result Value Ref Range    Lactic Acid, Sepsis 1.1 0.5 - 1.9 mmol/L   POCT Glucose   Result Value Ref Range    Glucose 102 mg/dL    QC OK? ok    POCT Glucose   Result Value Ref Range    Meter Glucose 102 (H) 74 - 99 mg/dL       RADIOLOGY:  XR CHEST STANDARD (2 VW)    (Results Pending)         ------------------------- NURSING NOTES AND VITALS REVIEWED ---------------------------  Date / Time Roomed:  1/6/2020  8:35 PM  ED Bed Assignment:  07/07    The nursing notes within the ED encounter and vital signs as below have been reviewed.      Patient Vitals for the past 24 hrs:   BP Temp Temp src Pulse Resp SpO2 Height Weight   01/06/20 2202 (!) 182/88 100.1 °F (37.8 °C) Oral 105 22 93 % -- --   01/06/20 1556 (!) 209/97 101.3 °F (38.5 °C) -- 103 24 93 % 5' 8\" (1.727 m) 210 lb (95.3 kg)       Oxygen Saturation Interpretation: Abnormal and Improved after treatment    ------------------------------------------

## 2020-01-07 NOTE — PROGRESS NOTES
initiation of this evaluation. Last time out of bed: prior to admit    Precautions: falls and O2 ,    Social history: Patient [place] with [place]  [place]  with 2 steps to enter without  Rail then 6-8 steps with rail  Tub shower     Equipment owned: None,       Mentation: alert, cooperative, oriented x 3  and follows directions,      ROM: wfl    STRENGTH: 4/5  PAIN: (measured on a visual analog scale with 0=no pain and 10=excruciating pain) 0/10. FUNCTIONAL ASSESSMENT   Bed Mobility- Supine to sit- Independent          Scooting- Independent       Sit to supine- Not assessed       Transfers-Sit to stand- Supervision      Gait:  Patient ambulated 2 x 100 feet using no device with Supervision   6 liters O2   Steps:  Not assessed      Balance: sit-good        stand fair +    Edema: none noted   Endurance: fair       Treatment:  Therapist educated and facilitated patient on techniques to increase safety and independence with bed mobility, balance, functional transfers, and functional mobility. Sat EOB x 5 minutes to increase dynamic sitting balance and activity tolerance. Patient ambulated in hallway  seated rest pulse ox 96%. Ambulated back to room, assisted up to chair. Patient demonstrating fair   understanding of education/techniques, requiring additional training/education. Skilled monitoring of heartrate, oxygen saturation and patients response to treatment. At end of session, patient in chair with  call light and phone within reach,   all lines and tubes intact, nursing notified. Pt would benefit from continued skilled PT to increase functional independence and quality of life. Patients Goal: home      ASSESSMENT  Patient exhibits decreased strength, balance, coordination impairing functional mobility. GOALS to be met in 2 days.    Bed mobility-  Independent        Transfers-Sit to stand-Independent     Gait:  Patient to ambulate 150 feet   Independent     Steps: Patient to go up

## 2020-01-07 NOTE — PROGRESS NOTES
Blenda Heimlich, charge nurse, was notified of patient's higher BP and that patient did not take dose of lisinopril yesterday at home.     Electronically signed by Alma Chavez RN on 1/7/2020 at 1:48 AM

## 2020-01-07 NOTE — CONSULTS
fever, chills,   ALLERGIES:    No urticaria, hay fever,    EYES:     No blurry vision, loss of vision,eye pain  ENT:      No hearing loss, sore throat  CARDIOVASCULAR:  No chest pain or palpitations  RESPIRATORY:    cough, sob  ENDOCRINE:    No increase thirst, urination   HEME-LYMPH:   No easy bruising or bleeding  GI:     No nausea, vomiting or diarrhea  :     No urinary complaints  NEURO:    No seizures, stroke, HA  MUSCULOSKELETAL:  No muscle aches or pain, no jointpain  SKIN:     No rash or itch  PSYCH:    No depression or anxiety    CURRENT MEDICATIONS     Current Facility-Administered Medications:     budesonide (PULMICORT) nebulizer suspension 500 mcg, 0.5 mg, Nebulization, BID, DONOVAN Roberts - CNP, 500 mcg at 01/07/20 0610    formoterol (PERFOROMIST) nebulizer solution 20 mcg, 20 mcg, Nebulization, BID, DONOVAN Roberts - CNP, 20 mcg at 01/07/20 0610    albuterol (PROVENTIL) nebulizer solution 2.5 mg, 2.5 mg, Nebulization, Q4H PRN, DONOVAN Roberts CNP    sodium chloride flush 0.9 % injection 10 mL, 10 mL, Intravenous, 2 times per day, DONOVAN Roberts CNP    sodium chloride flush 0.9 % injection 10 mL, 10 mL, Intravenous, PRN, DONOVAN Roberts CNP    enoxaparin (LOVENOX) injection 30 mg, 30 mg, Subcutaneous, Daily, DONOVAN Roberts CNP, 30 mg at 01/07/20 1040    0.9 % sodium chloride infusion, , Intravenous, Continuous, DONOVAN Roberts CNP, Last Rate: 75 mL/hr at 01/07/20 1039    acetaminophen (TYLENOL) tablet 650 mg, 650 mg, Oral, Q4H PRN, DONOVAN Roberts CNP    cefTRIAXone (ROCEPHIN) 1 g in sterile water 10 mL IV syringe, 1 g, Intravenous, Q24H, DONOVAN Roberts CNP    doxycycline (VIBRAMYCIN) 100 mg in dextrose 5 % 100 mL IVPB, 100 mg, Intravenous, Q12H, DONOVAN Roberts CNP, Stopped at 01/07/20 0216    glucose (GLUTOSE) 40 % oral gel 15 g, 15 g, Oral, PRN, Imelda Kaur APRN - CNP    dextrose 50 % IV solution, 12.5 g, Intravenous, PRN, with    Cough     dx with pneumonia on 1/4 and at pcp today and sent in, no improvement, did not take diabetic meds due to blood sugars running low, fatigue        FINAL IMPRESSION      FEVERS  Human Metapneumovirus   R/o HCAP  For CT chest   -PROCAL 0.88 repeat  DWAIN/CKD bladder/renal scan     isolation   check resp cx  atbx levaquin q48  Check IG G level    Imaging and labs were reviewed per medical records and any ID pertinent labs were addressed with the patient. The patient/FAMILY  was educated about the diagnosis, prognosis, indications, risks and benefits of treatment. An opportunity to ask questions was given to the patient/FAMILY and questions were answered. Thank you for the consult. We will follow with you.        Electronically signed by Genia Granados MD on 1/7/2020 at 10:44 AM

## 2020-01-08 LAB
ALBUMIN SERPL-MCNC: 1.7 G/DL (ref 3.5–5.2)
ANION GAP SERPL CALCULATED.3IONS-SCNC: 14 MMOL/L (ref 7–16)
BASOPHILS ABSOLUTE: 0 E9/L (ref 0–0.2)
BASOPHILS RELATIVE PERCENT: 0 % (ref 0–2)
BUN BLDV-MCNC: 63 MG/DL (ref 6–20)
BURR CELLS: ABNORMAL
CALCIUM SERPL-MCNC: 8.1 MG/DL (ref 8.6–10.2)
CHLORIDE BLD-SCNC: 104 MMOL/L (ref 98–107)
CO2: 16 MMOL/L (ref 22–29)
CREAT SERPL-MCNC: 3.3 MG/DL (ref 0.7–1.2)
CREATININE URINE: 79 MG/DL (ref 40–278)
EOSINOPHILS ABSOLUTE: 0 E9/L (ref 0.05–0.5)
EOSINOPHILS RELATIVE PERCENT: 0 % (ref 0–6)
GFR AFRICAN AMERICAN: 24
GFR NON-AFRICAN AMERICAN: 20 ML/MIN/1.73
GLUCOSE BLD-MCNC: 450 MG/DL (ref 74–99)
HCT VFR BLD CALC: 37.7 % (ref 37–54)
HEMOGLOBIN: 12.3 G/DL (ref 12.5–16.5)
IGG: 267 MG/DL (ref 700–1600)
LV EF: 55 %
LVEF MODALITY: NORMAL
LYMPHOCYTES ABSOLUTE: 0.16 E9/L (ref 1.5–4)
LYMPHOCYTES RELATIVE PERCENT: 3 % (ref 20–42)
MCH RBC QN AUTO: 26.6 PG (ref 26–35)
MCHC RBC AUTO-ENTMCNC: 32.6 % (ref 32–34.5)
MCV RBC AUTO: 81.6 FL (ref 80–99.9)
METER GLUCOSE: 335 MG/DL (ref 74–99)
METER GLUCOSE: 343 MG/DL (ref 74–99)
METER GLUCOSE: 443 MG/DL (ref 74–99)
METER GLUCOSE: 479 MG/DL (ref 74–99)
MONOCYTES ABSOLUTE: 0 E9/L (ref 0.1–0.95)
MONOCYTES RELATIVE PERCENT: 0 % (ref 2–12)
NEUTROPHILS ABSOLUTE: 5.14 E9/L (ref 1.8–7.3)
NEUTROPHILS RELATIVE PERCENT: 97 % (ref 43–80)
PDW BLD-RTO: 13.1 FL (ref 11.5–15)
PLATELET # BLD: 299 E9/L (ref 130–450)
PMV BLD AUTO: 11.2 FL (ref 7–12)
POIKILOCYTES: ABNORMAL
POTASSIUM REFLEX MAGNESIUM: 4.9 MMOL/L (ref 3.5–5)
PROTEIN PROTEIN: 508 MG/DL (ref 0–12)
PROTEIN/CREAT RATIO: 6.4
PROTEIN/CREAT RATIO: 6.4 (ref 0–0.2)
RBC # BLD: 4.62 E12/L (ref 3.8–5.8)
SODIUM BLD-SCNC: 134 MMOL/L (ref 132–146)
SODIUM URINE: <20 MMOL/L
UREA NITROGEN, UR: 589 MG/DL (ref 800–1666)
WBC # BLD: 5.3 E9/L (ref 4.5–11.5)

## 2020-01-08 PROCEDURE — 82962 GLUCOSE BLOOD TEST: CPT

## 2020-01-08 PROCEDURE — 6360000002 HC RX W HCPCS: Performed by: NURSE PRACTITIONER

## 2020-01-08 PROCEDURE — 94664 DEMO&/EVAL PT USE INHALER: CPT

## 2020-01-08 PROCEDURE — 84300 ASSAY OF URINE SODIUM: CPT

## 2020-01-08 PROCEDURE — 82784 ASSAY IGA/IGD/IGG/IGM EACH: CPT

## 2020-01-08 PROCEDURE — 85025 COMPLETE CBC W/AUTO DIFF WBC: CPT

## 2020-01-08 PROCEDURE — 2700000000 HC OXYGEN THERAPY PER DAY

## 2020-01-08 PROCEDURE — 82570 ASSAY OF URINE CREATININE: CPT

## 2020-01-08 PROCEDURE — 80048 BASIC METABOLIC PNL TOTAL CA: CPT

## 2020-01-08 PROCEDURE — 94640 AIRWAY INHALATION TREATMENT: CPT

## 2020-01-08 PROCEDURE — 36415 COLL VENOUS BLD VENIPUNCTURE: CPT

## 2020-01-08 PROCEDURE — 6370000000 HC RX 637 (ALT 250 FOR IP): Performed by: NURSE PRACTITIONER

## 2020-01-08 PROCEDURE — 2580000003 HC RX 258: Performed by: INTERNAL MEDICINE

## 2020-01-08 PROCEDURE — 82040 ASSAY OF SERUM ALBUMIN: CPT

## 2020-01-08 PROCEDURE — 84540 ASSAY OF URINE/UREA-N: CPT

## 2020-01-08 PROCEDURE — 6370000000 HC RX 637 (ALT 250 FOR IP): Performed by: INTERNAL MEDICINE

## 2020-01-08 PROCEDURE — 6360000002 HC RX W HCPCS: Performed by: INTERNAL MEDICINE

## 2020-01-08 PROCEDURE — 93306 TTE W/DOPPLER COMPLETE: CPT

## 2020-01-08 PROCEDURE — 84156 ASSAY OF PROTEIN URINE: CPT

## 2020-01-08 PROCEDURE — 94669 MECHANICAL CHEST WALL OSCILL: CPT

## 2020-01-08 PROCEDURE — 1200000000 HC SEMI PRIVATE

## 2020-01-08 PROCEDURE — 2500000003 HC RX 250 WO HCPCS: Performed by: INTERNAL MEDICINE

## 2020-01-08 RX ORDER — METHYLPREDNISOLONE SODIUM SUCCINATE 40 MG/ML
40 INJECTION, POWDER, LYOPHILIZED, FOR SOLUTION INTRAMUSCULAR; INTRAVENOUS EVERY 12 HOURS
Status: DISCONTINUED | OUTPATIENT
Start: 2020-01-08 | End: 2020-01-09

## 2020-01-08 RX ORDER — LEVOFLOXACIN 750 MG/1
750 TABLET ORAL EVERY OTHER DAY
Qty: 2 TABLET | Refills: 0 | Status: SHIPPED | OUTPATIENT
Start: 2020-01-09 | End: 2020-01-12

## 2020-01-08 RX ORDER — INSULIN GLARGINE 100 [IU]/ML
20 INJECTION, SOLUTION SUBCUTANEOUS NIGHTLY
Status: DISCONTINUED | OUTPATIENT
Start: 2020-01-08 | End: 2020-01-10 | Stop reason: HOSPADM

## 2020-01-08 RX ADMIN — ENOXAPARIN SODIUM 30 MG: 30 INJECTION SUBCUTANEOUS at 08:11

## 2020-01-08 RX ADMIN — INSULIN LISPRO 12 UNITS: 100 INJECTION, SOLUTION INTRAVENOUS; SUBCUTANEOUS at 11:42

## 2020-01-08 RX ADMIN — BUDESONIDE 500 MCG: 0.5 INHALANT RESPIRATORY (INHALATION) at 06:05

## 2020-01-08 RX ADMIN — INSULIN GLARGINE 20 UNITS: 100 INJECTION, SOLUTION SUBCUTANEOUS at 21:07

## 2020-01-08 RX ADMIN — INSULIN LISPRO 12 UNITS: 100 INJECTION, SOLUTION INTRAVENOUS; SUBCUTANEOUS at 16:56

## 2020-01-08 RX ADMIN — FORMOTEROL FUMARATE DIHYDRATE 20 MCG: 20 SOLUTION RESPIRATORY (INHALATION) at 06:05

## 2020-01-08 RX ADMIN — FORMOTEROL FUMARATE DIHYDRATE 20 MCG: 20 SOLUTION RESPIRATORY (INHALATION) at 19:52

## 2020-01-08 RX ADMIN — SODIUM BICARBONATE: 84 INJECTION, SOLUTION INTRAVENOUS at 10:58

## 2020-01-08 RX ADMIN — INSULIN LISPRO 8 UNITS: 100 INJECTION, SOLUTION INTRAVENOUS; SUBCUTANEOUS at 08:10

## 2020-01-08 RX ADMIN — BUDESONIDE 500 MCG: 0.5 INHALANT RESPIRATORY (INHALATION) at 19:51

## 2020-01-08 RX ADMIN — METHYLPREDNISOLONE SODIUM SUCCINATE 80 MG: 125 INJECTION, POWDER, FOR SOLUTION INTRAMUSCULAR; INTRAVENOUS at 06:21

## 2020-01-08 RX ADMIN — INSULIN LISPRO 4 UNITS: 100 INJECTION, SOLUTION INTRAVENOUS; SUBCUTANEOUS at 21:07

## 2020-01-08 RX ADMIN — METHYLPREDNISOLONE SODIUM SUCCINATE 40 MG: 40 INJECTION, POWDER, FOR SOLUTION INTRAMUSCULAR; INTRAVENOUS at 16:55

## 2020-01-08 ASSESSMENT — PAIN DESCRIPTION - PROGRESSION
CLINICAL_PROGRESSION: NOT CHANGED

## 2020-01-08 ASSESSMENT — PAIN SCALES - GENERAL
PAINLEVEL_OUTOF10: 0

## 2020-01-08 NOTE — PROGRESS NOTES
[address] [place] [address] [facility]  Progress Note    CC: resting in bed- feeling better    Face to face encounter  SUBJECTIVE:  Patient is tolerating medications. No reported adverse drug reactions. No nausea, vomiting, diarrhea. Cough is improved. No rash. Less short of breath. Fevers improved  Feeling better    Medications:  Scheduled Meds:   methylPREDNISolone  40 mg Intravenous Q12H    insulin glargine  20 Units Subcutaneous Nightly    budesonide  0.5 mg Nebulization BID    formoterol  20 mcg Nebulization BID    sodium chloride flush  10 mL Intravenous 2 times per day    enoxaparin  30 mg Subcutaneous Daily    insulin lispro  0-12 Units Subcutaneous TID WC    insulin lispro  0-6 Units Subcutaneous Nightly    levofloxacin  750 mg Oral Every Other Day     Continuous Infusions:   IV infusion builder 75 mL/hr at 01/08/20 1058    dextrose       PRN Meds:albuterol, sodium chloride flush, acetaminophen, glucose, dextrose, glucagon (rDNA), dextrose  OBJECTIVE:  Patient Vitals for the past 24 hrs:   BP Temp Temp src Pulse Resp SpO2 Weight   01/08/20 1030 -- -- -- -- -- 93 % --   01/08/20 0800 (!) 182/81 97.6 °F (36.4 °C) Oral 82 18 96 % --   01/08/20 0704 -- -- -- -- -- -- 223 lb 8 oz (101.4 kg)   01/08/20 0347 -- -- -- 92 -- -- --   01/07/20 2030 (!) 150/82 97.9 °F (36.6 °C) Oral 89 22 93 % --   01/07/20 1500 -- -- -- -- -- 93 % --     Constitutional: The patient is awake, alert, and oriented. Cough is improving-   Skin: Warm and dry. No rashes were noted. Head: Eyes show round, and reactive pupils. No jaundice. Mouth: Moist mucous membranes, no ulcerations, no thrush. Neck: Supple to movements. No lymphadenopathy. Chest: No use of accessory muscles to breathe. Symmetrical expansion. Auscultation reveals no wheezing, crackles, or rhonchi. Diminished on room air today  Cardiovascular: S1 and S2 are rhythmic and regular. No murmurs appreciated.    Abdomen: Positive bowel sounds to auscultation. Benign to palpation. Extremities: No clubbing, no cyanosis, no edema. Laboratory and Tests Review:  Lab Results   Component Value Date    WBC 5.3 01/08/2020    WBC 6.1 01/07/2020    WBC 6.6 01/06/2020    HGB 12.3 (L) 01/08/2020    HCT 37.7 01/08/2020    MCV 81.6 01/08/2020     01/08/2020     Lab Results   Component Value Date    NEUTROABS 5.14 01/08/2020    NEUTROABS 4.90 01/07/2020    NEUTROABS 5.54 01/06/2020     No results found for: CRP  No results found for: SEDRATE  Lab Results   Component Value Date    ALT 13 12/31/2019    AST 17 12/31/2019    ALKPHOS 64 12/31/2019    BILITOT <0.2 12/31/2019     Lab Results   Component Value Date     01/08/2020    K 4.9 01/08/2020     01/08/2020    CO2 16 01/08/2020    BUN 63 01/08/2020    CREATININE 3.3 01/08/2020    GFRAA 24 01/08/2020    LABGLOM 20 01/08/2020    GLUCOSE 450 01/08/2020    PROT 5.1 12/31/2019    LABALBU 1.7 01/08/2020    CALCIUM 8.1 01/08/2020    BILITOT <0.2 12/31/2019    ALKPHOS 64 12/31/2019    AST 17 12/31/2019    ALT 13 12/31/2019     Radiology:      Microbiology:   1/7/2020- respiratory cx- oral christianne  Blood cx- no growth   resp panel- human metapneumovirus     ASSESSMENT:  · Fevers   · Human metapneumovirus   · R/ out HCAP  · DWAIN/ CKD  · IGG deficiency     PLAN:  · Continue Levaquin every 48 hours   · Check cultures  · IGG- 267  · Will order subsets and IGA  · Will repeat again in a month once patient is better  · On solumedrol   · Monitor labs- pro calcitonin elevated     Maryjo Henderson CNS- BC  1:35 PM  1/8/2020     As above    I have independently performed and/or participated in the history, exam, medical decision making, and agree with all pertinent clinical information by NP. Fevers resolved  Human Metapneumovirus pneumonitis  Multifocal bilateral pneumonia more prominent within the right lower lobe and left upper lobe.   Ig G deficiency   dwain    levaquin q48 for 3 doses  Will nee ig G subset/Ig

## 2020-01-08 NOTE — PROGRESS NOTES
Pt urinated 150 in urinal first specimen was voided in toilet. Second was caught in urinal and sent for sample. 24 hr urine will start next urination. He is aware he is to let us know when he voids next.

## 2020-01-08 NOTE — CONSULTS
Units, Subcutaneous, TID WC  insulin lispro (HUMALOG) injection vial 0-6 Units, 0-6 Units, Subcutaneous, Nightly  levofloxacin (LEVAQUIN) tablet 750 mg, 750 mg, Oral, Every Other Day  Allergies:  Patient has no known allergies. Social History:    TOBACCO:  Never used tobacco  ETOH:  Never drank alcohol  DRUGS:  Never used recreational drugs    Family History:       Problem Relation Age of Onset    Heart Disease Mother      REVIEW OF SYSTEMS:    CONSTITUTIONAL:  positive for  malaise  EYES:  negative  HEENT:  negative  RESPIRATORY:  positive for  dry cough and dyspnea  CARDIOVASCULAR:  positive for  dyspnea  GASTROINTESTINAL:  negative  GENITOURINARY:  negative  INTEGUMENT/BREAST:  negative  HEMATOLOGIC/LYMPHATIC:  negative  ALLERGIC/IMMUNOLOGIC:  negative  ENDOCRINE:  negative  MUSCULOSKELETAL:  positive for  muscle weakness  NEUROLOGICAL:  negative  PHYSICAL EXAM:      Vitals:    VITALS:  BP (!) 182/81   Pulse 82   Temp 97.6 °F (36.4 °C) (Oral)   Resp 18   Ht 5' 8\" (1.727 m)   Wt 223 lb 8 oz (101.4 kg)   SpO2 96%   BMI 33.98 kg/m²   24HR BLOOD PRESSURE RANGE:  Systolic (65DAN), WEJ:708 , Min:150 , RJM:289   ; Diastolic (78LFS), VHT:06, Min:81, Max:82    24HR INTAKE/OUTPUT:    Intake/Output Summary (Last 24 hours) at 1/8/2020 1011  Last data filed at 1/8/2020 0626  Gross per 24 hour   Intake 2188.95 ml   Output 350 ml   Net 1838. 95 ml     8HR INTAKE OUTPUT:  No intake/output data recorded.     Constitutional:  Well developed and nourished, in mild respiratory distress  HEENT:  NC/AT, PERRL, supple no JVD  Respiratory: coarse breath sounds bilaterally  Cardiovascular/Edema:  RRR, nor mal s1,s2  Gastrointestinal: soft, no organomegaly , no cva tenderness, bowel sounds present  Neurologic:  Alert and Ox3, non focal  Skin:  Turgor normal  Other:  ++ pretibial edema    DATA:    CBC:   Lab Results   Component Value Date    WBC 6.1 01/07/2020    RBC 4.31 01/07/2020    HGB 11.2 01/07/2020    HCT 34.6 01/07/2020 MCV 80.3 01/07/2020    MCH 26.0 01/07/2020    MCHC 32.4 01/07/2020    RDW 12.9 01/07/2020     01/07/2020    MPV 10.7 01/07/2020     Hemoglobin/Hematocrit:    Lab Results   Component Value Date    HGB 11.2 01/07/2020    HCT 34.6 01/07/2020     Magnesium:    Lab Results   Component Value Date    MG 1.9 12/31/2019     Phosphorus:    Lab Results   Component Value Date    PHOS 4.4 12/31/2019     Uric Acid:    Lab Results   Component Value Date    LABURIC 6.1 12/31/2019     U/A:  No results found for: NITRITE, COLORU, PROTEINU, PHUR, LABCAST, WBCUA, RBCUA, MUCUS, TRICHOMONAS, YEAST, BACTERIA, CLARITYU, SPECGRAV, LEUKOCYTESUR, UROBILINOGEN, BILIRUBINUR, BLOODU, GLUCOSEU, AMORPHOUS  ABG:  No results found for: PH, PCO2, PO2, HCO3, BE, THGB, TCO2, O2SAT  HgBA1c:    Lab Results   Component Value Date    LABA1C 8.3 01/07/2020     Microalbumen/Creatinine ratio:  No components found for: RUCREAT  TSH:    Lab Results   Component Value Date    TSH 1.710 01/16/2018     VITAMIN B12: No components found for: B12  FOLATE:    Lab Results   Component Value Date    FOLATE 13.8 09/21/2015     IRON:  No results found for: IRON  Iron Saturation:  No components found for: PERCENTFE  TIBC:  No results found for: TIBC  FERRITIN:  No results found for: FERRITIN  Radiology Review:    1. No evidence of obstructive uropathy. 2. Mild diffuse increased renal cortical echogenicity, suggestive of   underlying medical renal disease. .         1. Multifocal bilateral pneumonia more prominent within the right   lower lobe and left upper lobe.           IMPRESSION/RECOMMENDATIONS:      Christa Adamson is a 48 y.o. male who presents with significant past medical history of  has a past medical history of Diabetes mellitus ([address] complicated with stage 4 CKD with baseline serum creatinine of 2.6, Hyperlipidemia, and Hypertension. who presents with SOB and productive cough since last Thursday. it was noted that his serum creatinine increased to 4.0 mg/dl

## 2020-01-08 NOTE — PLAN OF CARE
Problem: Pain:  Goal: Control of acute pain  Description  Control of acute pain  1/8/2020 1140 by June Delgado RN  Outcome: Met This Shift     Problem: Falls - Risk of:  Goal: Will remain free from falls  Description  Will remain free from falls  1/8/2020 1140 by June Delgado RN  Outcome: Met This Shift     Problem: Discharge Planning:  Goal: Discharged to appropriate level of care  Description  Discharged to appropriate level of care  1/8/2020 1140 by June Delgado RN  Outcome: Met This Shift     Problem: Airway Clearance - Ineffective:  Goal: Clear lung sounds  Description  Clear lung sounds  1/8/2020 1140 by June Delgado RN  Outcome: Met This Shift     Problem: Fluid Volume - Deficit:  Goal: Achieves intake and output within specified parameters  Description  Achieves intake and output within specified parameters  1/8/2020 1140 by June Delgado RN  Outcome: Met This Shift     Problem: Gas Exchange - Impaired:  Goal: Levels of oxygenation will improve  Description  Levels of oxygenation will improve  1/8/2020 1140 by June Delagdo RN  Outcome: Met This Shift

## 2020-01-08 NOTE — PROGRESS NOTES
Physical Therapy    [id number]    Patient unavailable for physical therapy treatment due to asking to let him sleep, states this is the first time he has been able to sleep since he got here, will check back.

## 2020-01-08 NOTE — PROGRESS NOTES
Internal Medicine Progress Note    Sincere Miranda. Tracy Watts., & [address] Tracy Watts., F.A.C.O.I. Ana Palmer D.O., F.A.C.O.I. Primary Care Physician: Laura Solares DO   Admitting Physician:  Keith iGllis DO  Admission date and time: 1/6/2020  8:35 PM    Room:  [facility]    Patient Name: Evans Hammond  MRN: [id number]    Date of Service: 1/8/2020     Subjective:  Austen Ruffin was admitted last evening after being found to have a significant bilateral pneumonia associated with severe sepsis and hypoxic respiratory failure. He was placed on empiric antibiotic therapy, IV fluid hydration and respiratory treatments. He has improved substantially during his hospitalization. Respiratory film array returned positive for human metapneumovirus. CT scan was performed which does confirm the multifocal pneumonia with worse distributions in the right lower lobe and left upper lobe. Infectious disease has transitioned the patient's antibiotics to oral therapy with Levaquin. Blood sugars have been elevated and IV corticosteroid therapy will be de-escalating with plans to transition to oral tomorrow. Review of System: HEENT:  Milton ear pain, sore throat, sinus or eye problems  Cardiovascular:   Denies any chest pain, irregular heartbeats, or palpitations. Respiratory:   Admits to improving shortness of breath with harsh congested cough. Denies sputum production, hemoptysis, or wheezing. Gastrointestinal:   Denies nausea, vomiting, diarrhea, or constipation. Denies any abdominal pain. Extremities:   Denies any lower extremity swelling or edema. Neurology:    Denies any headache or focal neurological deficits. Admits to generalized weakness and deconditioning but denies focal weakness or paresthesia. Derm:    Denies any rashes, ulcers, or excoriations. Denies bruising. Genitourinary:    Denies any urgency, frequency, hematuria.   Voiding without MONOPCT 7.0 01/07/2020    BASOPCT 0.3 01/07/2020    MONOSABS 0.43 01/07/2020    LYMPHSABS 0.75 01/07/2020    EOSABS 0.00 01/07/2020    BASOSABS 0.02 01/07/2020     BMP:    Lab Results   Component Value Date     01/07/2020    K 4.2 01/07/2020     01/07/2020    CO2 19 01/07/2020    BUN 55 01/07/2020    LABALBU 1.6 01/07/2020    CREATININE 3.5 01/07/2020    CALCIUM 7.7 01/07/2020    GFRAA 22 01/07/2020    LABGLOM 18 01/07/2020    GLUCOSE 133 01/07/2020     Last 3 Troponin:    Lab Results   Component Value Date    TROPONINI 0.09 01/07/2020    TROPONINI 0.08 01/07/2020    TROPONINI 0.09 01/07/2020       Wound Documentation:          Assessment:   Patient Active Problem List    Diagnosis Date Noted    Sepsis secondary to CAP ([facility] 01/07/2020    Severe sepsis with acute organ dysfunction ([facility] 01/06/2020    Type 2 diabetes mellitus without complication ([facility] 74/57/2953       ASSESSMENT:  · Severe sepsis with associated acute organ dysfunction secondary to community-acquired pneumonia  · Acute renal failure superimposed on chronic kidney disease stage 4  · Acute respiratory failure with hypoxia secondary to #1  · Insulin-dependent diabetes mellitus  · Hypertension  · Hyperlipidemia     PLAN:  Rogers Cota is a 51-year-old  gentleman admitted from [address] [place] [address] emergency department January 6, 2020 for evaluation and treatment of severe sepsis due to community-acquired pneumonia. Infectious evaluation will be undertaken including procalcitonin which was elevated at 0.88, RFA (+) for human metapneumovirus, and cultures and the patient was initiated on IV Rocephin and doxycycline. Infectious disease was consulted and transition the patient to oral Levaquin therapy. Gentle IV fluid will utilize due to the acute on chronic kidney injury, nephrology consultation is pending. Renal ultrasound shows no obstructive process or hydronephrosis. Volume status will be monitored, strict intake and output, daily labs.

## 2020-01-09 PROBLEM — J18.9 PNEUMONIA DUE TO INFECTIOUS ORGANISM: Status: ACTIVE | Noted: 2020-01-09

## 2020-01-09 LAB
ALBUMIN SERPL-MCNC: 2.1 G/DL (ref 3.5–5.2)
ALP BLD-CCNC: 73 U/L (ref 40–129)
ALT SERPL-CCNC: 51 U/L (ref 0–40)
ANION GAP SERPL CALCULATED.3IONS-SCNC: 12 MMOL/L (ref 7–16)
AST SERPL-CCNC: 50 U/L (ref 0–39)
BASOPHILS ABSOLUTE: 0.01 E9/L (ref 0–0.2)
BASOPHILS RELATIVE PERCENT: 0.1 % (ref 0–2)
BILIRUB SERPL-MCNC: <0.2 MG/DL (ref 0–1.2)
BUN BLDV-MCNC: 74 MG/DL (ref 6–20)
CALCIUM SERPL-MCNC: 8.4 MG/DL (ref 8.6–10.2)
CHLORIDE BLD-SCNC: 106 MMOL/L (ref 98–107)
CO2: 19 MMOL/L (ref 22–29)
CREAT SERPL-MCNC: 3.2 MG/DL (ref 0.7–1.2)
CULTURE, RESPIRATORY: NORMAL
EOSINOPHILS ABSOLUTE: 0 E9/L (ref 0.05–0.5)
EOSINOPHILS RELATIVE PERCENT: 0 % (ref 0–6)
GFR AFRICAN AMERICAN: 25
GFR NON-AFRICAN AMERICAN: 20 ML/MIN/1.73
GLUCOSE BLD-MCNC: 240 MG/DL (ref 74–99)
HCT VFR BLD CALC: 35.5 % (ref 37–54)
HEMOGLOBIN: 11.6 G/DL (ref 12.5–16.5)
IGA: 93 MG/DL (ref 70–400)
IMMATURE GRANULOCYTES #: 0.05 E9/L
IMMATURE GRANULOCYTES %: 0.3 % (ref 0–5)
LYMPHOCYTES ABSOLUTE: 0.67 E9/L (ref 1.5–4)
LYMPHOCYTES RELATIVE PERCENT: 4.6 % (ref 20–42)
MCH RBC QN AUTO: 26.1 PG (ref 26–35)
MCHC RBC AUTO-ENTMCNC: 32.7 % (ref 32–34.5)
MCV RBC AUTO: 80 FL (ref 80–99.9)
METER GLUCOSE: 240 MG/DL (ref 74–99)
METER GLUCOSE: 249 MG/DL (ref 74–99)
METER GLUCOSE: 282 MG/DL (ref 74–99)
METER GLUCOSE: 284 MG/DL (ref 74–99)
MONOCYTES ABSOLUTE: 0.54 E9/L (ref 0.1–0.95)
MONOCYTES RELATIVE PERCENT: 3.7 % (ref 2–12)
NEUTROPHILS ABSOLUTE: 13.43 E9/L (ref 1.8–7.3)
NEUTROPHILS RELATIVE PERCENT: 91.3 % (ref 43–80)
PDW BLD-RTO: 13.2 FL (ref 11.5–15)
PLATELET # BLD: 386 E9/L (ref 130–450)
PMV BLD AUTO: 11.2 FL (ref 7–12)
POTASSIUM REFLEX MAGNESIUM: 4.7 MMOL/L (ref 3.5–5)
RBC # BLD: 4.44 E12/L (ref 3.8–5.8)
SMEAR, RESPIRATORY: NORMAL
SODIUM BLD-SCNC: 137 MMOL/L (ref 132–146)
TOTAL PROTEIN: 5.2 G/DL (ref 6.4–8.3)
WBC # BLD: 14.7 E9/L (ref 4.5–11.5)

## 2020-01-09 PROCEDURE — 36415 COLL VENOUS BLD VENIPUNCTURE: CPT

## 2020-01-09 PROCEDURE — 94640 AIRWAY INHALATION TREATMENT: CPT

## 2020-01-09 PROCEDURE — 97116 GAIT TRAINING THERAPY: CPT

## 2020-01-09 PROCEDURE — 82962 GLUCOSE BLOOD TEST: CPT

## 2020-01-09 PROCEDURE — 85025 COMPLETE CBC W/AUTO DIFF WBC: CPT

## 2020-01-09 PROCEDURE — 80053 COMPREHEN METABOLIC PANEL: CPT

## 2020-01-09 PROCEDURE — 2500000003 HC RX 250 WO HCPCS: Performed by: INTERNAL MEDICINE

## 2020-01-09 PROCEDURE — 82784 ASSAY IGA/IGD/IGG/IGM EACH: CPT

## 2020-01-09 PROCEDURE — 6370000000 HC RX 637 (ALT 250 FOR IP): Performed by: SPECIALIST

## 2020-01-09 PROCEDURE — 1200000000 HC SEMI PRIVATE

## 2020-01-09 PROCEDURE — 6360000002 HC RX W HCPCS: Performed by: NURSE PRACTITIONER

## 2020-01-09 PROCEDURE — 2580000003 HC RX 258: Performed by: INTERNAL MEDICINE

## 2020-01-09 PROCEDURE — 94669 MECHANICAL CHEST WALL OSCILL: CPT

## 2020-01-09 PROCEDURE — 86160 COMPLEMENT ANTIGEN: CPT

## 2020-01-09 PROCEDURE — 6370000000 HC RX 637 (ALT 250 FOR IP): Performed by: INTERNAL MEDICINE

## 2020-01-09 PROCEDURE — 84165 PROTEIN E-PHORESIS SERUM: CPT

## 2020-01-09 PROCEDURE — 82787 IGG 1 2 3 OR 4 EACH: CPT

## 2020-01-09 PROCEDURE — 86255 FLUORESCENT ANTIBODY SCREEN: CPT

## 2020-01-09 PROCEDURE — 86162 COMPLEMENT TOTAL (CH50): CPT

## 2020-01-09 PROCEDURE — 84166 PROTEIN E-PHORESIS/URINE/CSF: CPT

## 2020-01-09 PROCEDURE — 6370000000 HC RX 637 (ALT 250 FOR IP): Performed by: NURSE PRACTITIONER

## 2020-01-09 RX ORDER — PREDNISONE 20 MG/1
40 TABLET ORAL DAILY
Status: DISCONTINUED | OUTPATIENT
Start: 2020-01-09 | End: 2020-01-10 | Stop reason: HOSPADM

## 2020-01-09 RX ORDER — ALBUMIN (HUMAN) 12.5 G/50ML
50 SOLUTION INTRAVENOUS EVERY 6 HOURS
Status: DISCONTINUED | OUTPATIENT
Start: 2020-01-09 | End: 2020-01-09

## 2020-01-09 RX ORDER — FUROSEMIDE 10 MG/ML
20 INJECTION INTRAMUSCULAR; INTRAVENOUS EVERY 6 HOURS
Status: DISCONTINUED | OUTPATIENT
Start: 2020-01-09 | End: 2020-01-09

## 2020-01-09 RX ADMIN — METHYLPREDNISOLONE SODIUM SUCCINATE 40 MG: 40 INJECTION, POWDER, FOR SOLUTION INTRAMUSCULAR; INTRAVENOUS at 05:55

## 2020-01-09 RX ADMIN — BUDESONIDE 500 MCG: 0.5 INHALANT RESPIRATORY (INHALATION) at 18:44

## 2020-01-09 RX ADMIN — SODIUM BICARBONATE: 84 INJECTION, SOLUTION INTRAVENOUS at 15:38

## 2020-01-09 RX ADMIN — ENOXAPARIN SODIUM 30 MG: 30 INJECTION SUBCUTANEOUS at 08:40

## 2020-01-09 RX ADMIN — SODIUM BICARBONATE: 84 INJECTION, SOLUTION INTRAVENOUS at 01:26

## 2020-01-09 RX ADMIN — INSULIN LISPRO 3 UNITS: 100 INJECTION, SOLUTION INTRAVENOUS; SUBCUTANEOUS at 20:38

## 2020-01-09 RX ADMIN — LEVOFLOXACIN 750 MG: 750 TABLET, FILM COATED ORAL at 13:36

## 2020-01-09 RX ADMIN — PREDNISONE 40 MG: 20 TABLET ORAL at 11:55

## 2020-01-09 RX ADMIN — BUDESONIDE 500 MCG: 0.5 INHALANT RESPIRATORY (INHALATION) at 07:43

## 2020-01-09 RX ADMIN — FORMOTEROL FUMARATE DIHYDRATE 20 MCG: 20 SOLUTION RESPIRATORY (INHALATION) at 18:44

## 2020-01-09 RX ADMIN — INSULIN GLARGINE 20 UNITS: 100 INJECTION, SOLUTION SUBCUTANEOUS at 20:38

## 2020-01-09 RX ADMIN — FORMOTEROL FUMARATE DIHYDRATE 20 MCG: 20 SOLUTION RESPIRATORY (INHALATION) at 07:43

## 2020-01-09 RX ADMIN — INSULIN LISPRO 4 UNITS: 100 INJECTION, SOLUTION INTRAVENOUS; SUBCUTANEOUS at 11:54

## 2020-01-09 RX ADMIN — INSULIN LISPRO 4 UNITS: 100 INJECTION, SOLUTION INTRAVENOUS; SUBCUTANEOUS at 17:02

## 2020-01-09 RX ADMIN — INSULIN LISPRO 6 UNITS: 100 INJECTION, SOLUTION INTRAVENOUS; SUBCUTANEOUS at 08:34

## 2020-01-09 ASSESSMENT — ENCOUNTER SYMPTOMS
SHORTNESS OF BREATH: 1
SINUS PRESSURE: 1
WHEEZING: 1
SINUS PAIN: 1
RHINORRHEA: 1
COUGH: 1

## 2020-01-09 ASSESSMENT — PAIN SCALES - GENERAL
PAINLEVEL_OUTOF10: 0
PAINLEVEL_OUTOF10: 0

## 2020-01-09 NOTE — PROGRESS NOTES
[address] [place] [address] [facility]  Progress Note    CC: resting in bed- feeling better    Face to face encounter    SUBJECTIVE:  Patient is tolerating medications. No reported adverse drug reactions. No nausea, vomiting, diarrhea. No f/c  No c/o  Off o2    Medications:  Scheduled Meds:   predniSONE  40 mg Oral Daily    insulin glargine  20 Units Subcutaneous Nightly    budesonide  0.5 mg Nebulization BID    formoterol  20 mcg Nebulization BID    sodium chloride flush  10 mL Intravenous 2 times per day    enoxaparin  30 mg Subcutaneous Daily    insulin lispro  0-12 Units Subcutaneous TID     insulin lispro  0-6 Units Subcutaneous Nightly    levofloxacin  750 mg Oral Every Other Day     Continuous Infusions:   IV infusion builder 75 mL/hr at 01/09/20 0615    dextrose       PRN Meds:albuterol, sodium chloride flush, acetaminophen, glucose, dextrose, glucagon (rDNA), dextrose  OBJECTIVE:  Patient Vitals for the past 24 hrs:   BP Temp Temp src Pulse Resp SpO2 Weight   01/09/20 0837 (!) 150/90 97.6 °F (36.4 °C) Oral 80 16 94 % --   01/09/20 0645 -- -- -- -- -- -- 217 lb 4.8 oz (98.6 kg)   01/08/20 2330 (!) 164/77 97.6 °F (36.4 °C) Oral 78 16 93 % --   01/08/20 1549 (!) 175/73 97.5 °F (36.4 °C) Oral 86 16 96 % --     Constitutional: The patient is awake, alert, and oriented. Skin: Warm and dry. No rashes were noted. Head: Eyes show round, and reactive pupils. AT/NC    Chest: No use of accessory muscles to breathe. Symmetrical expansion. Diminished post B on room air   Cardiovascular: S1 and S2 are rhythmic and regular. Abdomen: Positive bowel sounds to auscultation. Benign to palpation. NT  Extremities: No clubbing, no cyanosis, NO edema.   CNS NAD no focal deficits    Laboratory and Tests Review:  Lab Results   Component Value Date    WBC 14.7 (H) 01/09/2020    WBC 5.3 01/08/2020    WBC 6.1 01/07/2020    HGB 11.6 (L) 01/09/2020    HCT 35.5 (L) 01/09/2020    MCV 80.0 01/09/2020

## 2020-01-09 NOTE — PROGRESS NOTES
Physical Therapy  Physical Therapy  Daily Treatment Note  1/9/2020  [id number]                      Franci Norwood   [id number]                              1965    Patient Active Problem List   Diagnosis    Type 2 diabetes mellitus without complication [facility]    Severe sepsis with acute organ dysfunction ([facility]    Sepsis secondary to CAP ([facility]    Pneumonia due to infectious organism       Recommendation for discharge: Home, no anticipated needs  Equipment prescriptions needed:none    AM-PAC Mobility Inpatient   How much difficulty turning over in bed?: None  How much difficulty sitting down on / standing up from a chair with arms?: A Little  How much difficulty moving from lying on back to sitting on side of bed?: None  How much help from another person moving to and from a bed to a chair?: A Little  How much help from another person needed to walk in hospital room?: A Little  How much help from another person for climbing 3-5 steps with a railing?: A Little  AM-[facility] Inpatient Mobility Raw Score : 20  AM-PAC Inpatient T-Scale Score : 47.67  Mobility Inpatient CMS 0-100% Score: 35.83  Mobility Inpatient CMS G-Code Modifier : CJ      Precautions: falls and O2    S: Patient cleared by nursing for treatment. Patient is agreeable to treatment. Pain status: (measured on a visual analog scale with 0=no pain and 10=excruciating pain) 0/10. O: Pt was instructed in and performed the following:   Bed Mobility- Supine to sit-Independent     Scooting- Independent     Sit to supine-Independent   Transfers-sit to stand- Supervision     Gait:  Patient ambulated 2 x 100 feet holding onto IV pole with Supervision. Comments: Pt. Wearing face mask due to droplet precautions. Steps:  na  Treatment: Pt. Transferred to [facility] and ambulated in [place] as above. Comment: C/o being cold, anxious to return to bed. Therapist gave extra blankets. Comment: Call light left by patient. A: Jac.  Well, able to ambulate with no deficits. P: Continue with physical therapy   Rik Grubbs PTA  GOALS to be met in 2 days. Bed mobility-  Independent                                         Transfers-Sit to stand-Independent                Gait:  Patient to ambulate 150 feet   Independent                Steps: Patient to go up and down 10  step(s) using 1 rail(s) with  Supervision        Increase strength in affected mm groups by 1/3 grade  Increase balance to allow for improvement towards functional goals. Increase endurance to allow for improvement towards functional goals.

## 2020-01-09 NOTE — PROGRESS NOTES
Internal Medicine Progress Note    Weinbergjory Casiano. Miguel Angel Guzman., & [address] [facility] Miguel Angel Guzman., F.A.C.O.I. Shavon Mcwilliams D.O., F.A.C.O.I. Primary Care Physician: Valeria Anglin DO   Admitting Physician:  Hermann Rojas DO  Admission date and time: 1/6/2020  8:35 PM    Room:  [address] [place] [address]    Patient Name: Jama Hamilton  MRN: [id number]    Date of Service: 1/9/2020     Subjective:  Ebony Richey is seen and examined at bedside today. No family present during my exam.  In regards to his breathing status, he is doing better and has been weaned off nasal cannula oxygen. Respiratory film array returned positive for human metapneumovirus. CT scan was performed which does confirm the multifocal pneumonia with worse distributions in the right lower lobe and left upper lobe. Infectious disease has transitioned the patient's antibiotics to oral therapy with Levaquin, dose reduction with Levaquin has been performed due to renal function. Cough is productive of scant sputum. Appetite has returned to baseline. Review of System: HEENT:  Milton ear pain, sore throat, sinus or eye problems  Cardiovascular:   Denies any chest pain, irregular heartbeats, or palpitations. Respiratory:   Admits to improving shortness of breath with near resolution to baseline. Scant sputum productive cough. Denies sputum production, hemoptysis, or wheezing. Gastrointestinal:   Denies nausea, vomiting, diarrhea, or constipation. Denies any abdominal pain. Extremities:   Denies any lower extremity swelling or edema. Neurology:    Denies any headache or focal neurological deficits. Admits to generalized weakness and deconditioning, improving, but denies focal weakness or paresthesia. Derm:    Denies any rashes, ulcers, or excoriations. Denies bruising. Genitourinary:    Denies any urgency, frequency, hematuria. Voiding without difficulty.   Musculoskeletal:  Denies myalgias, joint complaints or back 01/08/2020    MONOSABS 0.00 01/08/2020    LYMPHSABS 0.16 01/08/2020    EOSABS 0.00 01/08/2020    BASOSABS 0.00 01/08/2020     BMP:    Lab Results   Component Value Date     01/08/2020    K 4.9 01/08/2020     01/08/2020    CO2 16 01/08/2020    BUN 63 01/08/2020    LABALBU 1.7 01/08/2020    CREATININE 3.3 01/08/2020    CALCIUM 8.1 01/08/2020    GFRAA 24 01/08/2020    LABGLOM 20 01/08/2020    GLUCOSE 450 01/08/2020     Last 3 Troponin:    Lab Results   Component Value Date    TROPONINI 0.09 01/07/2020    TROPONINI 0.08 01/07/2020    TROPONINI 0.09 01/07/2020       Wound Documentation:          Assessment:   Patient Active Problem List    Diagnosis Date Noted    Pneumonia due to infectious organism 01/09/2020    [facility] 01/07/2020    Severe sepsis with acute organ dysfunction ([facility] 01/06/2020    Type 2 diabetes mellitus without complication ([facility] 61/62/2244       ASSESSMENT:  · Severe sepsis with associated acute organ dysfunction secondary to community-acquired pneumonia  · Acute renal failure superimposed on chronic kidney disease stage 4, possible nephrotic component  · Acute respiratory failure with hypoxia secondary to #1  · Hypoalbuminemia with peripheral edema   · insulin-dependent diabetes mellitus  · Hypertension  · Hyperlipidemia     PLAN:  Elder Riana is a 28-year-old  gentleman admitted from [address] [place] [address] emergency department January 6, 2020 for evaluation and treatment of severe sepsis due to community-acquired pneumonia. Infectious evaluation will be undertaken including procalcitonin which was elevated at 0.88, RFA (+) for human metapneumovirus, and cultures and the patient was initiated on IV Rocephin and doxycycline. Infectious disease was consulted and transition the patient to oral Levaquin therapy.   IV corticosteroid therapy was initially utilized and weaned to oral prednisone today as patient has been fully weaned off oxygen and no no longer has evidence of bronchospasm. Gentle IV fluid will utilize due to the acute on chronic kidney injury, nephrology consultation has been obtained and IV fluid adjustment have been made to address the patient's acute on chronic renal failure. Renal ultrasound shows no obstructive process or hydronephrosis. Volume status will be monitored, strict intake and output, daily labs. Avoid nephrotoxic medications. Further evaluation of nephrotic component underlying. Patient has been advised to increase his activity and be up and ambulatory when possible today. Continue current therapy. See orders for further plan of care. More than 50% of my  time was spent at the bedside counseling/coordinating care with the patient and/or family with face to face contact. This time was spent reviewing notes and laboratory data as well as instructing and counseling the patient. Time I spent with the family or surrogate(s) is included only if the patient was incapable of providing the necessary information or participating in medical decisions. I also discussed the differential diagnosis and all of the proposed management plans with the patient and individuals accompanying the patient. Jace Crespo requires this high level of physician care and nursing on the IMC/Telemetry unit due the complexity of decision management and chance of rapid decline or death. Continued cardiac monitoring and higher level of nursing are required. I am readily available for any further decision-making and intervention.        DONOVAN Porter - CNP  1/9/2020  7:26 AM

## 2020-01-09 NOTE — PROGRESS NOTES
Department of Internal Medicine  Nephrology Attending Progress Note    Subjective:  Patient is alert and feels better today. Still has leg swelling and dry cough  Current Medications:    Current Facility-Administered Medications: predniSONE (DELTASONE) tablet 40 mg, 40 mg, Oral, Daily  insulin glargine (LANTUS) injection vial 20 Units, 20 Units, Subcutaneous, Nightly  sodium bicarbonate 75 mEq in sodium chloride 0.45 % 1,000 mL infusion, , Intravenous, Continuous  budesonide (PULMICORT) nebulizer suspension 500 mcg, 0.5 mg, Nebulization, BID  formoterol (PERFOROMIST) nebulizer solution 20 mcg, 20 mcg, Nebulization, BID  albuterol (PROVENTIL) nebulizer solution 2.5 mg, 2.5 mg, Nebulization, Q4H PRN  sodium chloride flush 0.9 % injection 10 mL, 10 mL, Intravenous, 2 times per day  sodium chloride flush 0.9 % injection 10 mL, 10 mL, Intravenous, PRN  enoxaparin (LOVENOX) injection 30 mg, 30 mg, Subcutaneous, Daily  acetaminophen (TYLENOL) tablet 650 mg, 650 mg, Oral, Q4H PRN  glucose (GLUTOSE) 40 % oral gel 15 g, 15 g, Oral, PRN  dextrose 50 % IV solution, 12.5 g, Intravenous, PRN  glucagon (rDNA) injection 1 mg, 1 mg, Intramuscular, PRN  dextrose 5 % solution, 100 mL/hr, Intravenous, PRN  insulin lispro (HUMALOG) injection vial 0-12 Units, 0-12 Units, Subcutaneous, TID WC  insulin lispro (HUMALOG) injection vial 0-6 Units, 0-6 Units, Subcutaneous, Nightly  levofloxacin (LEVAQUIN) tablet 750 mg, 750 mg, Oral, Every Other Day  Allergies:  Patient has no known allergies.     PHYSICAL EXAM:      Vitals:    VITALS:  BP (!) 150/90   Pulse 80   Temp 97.6 °F (36.4 °C) (Oral)   Resp 16   Ht 5' 8\" (1.727 m)   Wt 217 lb 4.8 oz (98.6 kg)   SpO2 94%   BMI 33.04 kg/m²   24HR BLOOD PRESSURE RANGE:  Systolic (50JUJ), BUZ:855 , Min:150 , ZZZ:129   ; Diastolic (90MKL), EDS:59, Min:77, Max:90    24HR INTAKE/OUTPUT:      Intake/Output Summary (Last 24 hours) at 1/9/2020 1701  Last data filed at 1/9/2020 1441  Gross per 24 hour nephrotoxic meds  - antibiotics dose adjusted to renal function  - continue iv fluids  - monitor renal function  - keep MAP>70 mm hg  - continue IVFs to 1/2NS with 75 meq sodium Bicarb  - continue SPA tid x 6 doses  - check spep and upep, HBV, HCV serology      Thank you for this consultation. ...     Ángel Rivera MD

## 2020-01-09 NOTE — PLAN OF CARE
Problem: Pain:  Goal: Pain level will decrease  Description  Pain level will decrease  1/9/2020 1450 by Kate Preciado RN  Outcome: Met This Shift     Problem: Pain:  Goal: Control of acute pain  Description  Control of acute pain  1/9/2020 1450 by Kate Preciado RN  Outcome: Met This Shift     Problem: Pain:  Goal: Control of chronic pain  Description  Control of chronic pain  Outcome: Met This Shift     Problem: Falls - Risk of:  Goal: Will remain free from falls  Description  Will remain free from falls  1/9/2020 1450 by Kate Preciado RN  Outcome: Met This Shift     Problem: Falls - Risk of:  Goal: Absence of physical injury  Description  Absence of physical injury  1/9/2020 1450 by Kate Preciado RN  Outcome: Met This Shift     Problem: Discharge Planning:  Goal: Discharged to appropriate level of care  Description  Discharged to appropriate level of care  Outcome: Met This Shift     Problem: Discharge Planning:  Goal: Participates in care planning  Description  Participates in care planning  Outcome: Met This Shift     Problem: Airway Clearance - Ineffective:  Goal: Clear lung sounds  Description  Clear lung sounds  Outcome: Met This Shift     Problem: Airway Clearance - Ineffective:  Goal: Ability to maintain a clear airway will improve  Description  Ability to maintain a clear airway will improve  Outcome: Met This Shift     Problem: Fluid Volume - Deficit:  Goal: Achieves intake and output within specified parameters  Description  Achieves intake and output within specified parameters  Outcome: Met This Shift     Problem: Gas Exchange - Impaired:  Goal: Levels of oxygenation will improve  Description  Levels of oxygenation will improve  Outcome: Met This Shift     Problem: Hyperthermia:  Goal: Ability to maintain a body temperature in the normal range will improve  Description  Ability to maintain a body temperature in the normal range will improve  Outcome: Met This Shift

## 2020-01-10 ENCOUNTER — TELEPHONE (OUTPATIENT)
Dept: FAMILY MEDICINE CLINIC | Age: 55
End: 2020-01-10

## 2020-01-10 VITALS
TEMPERATURE: 98.3 F | WEIGHT: 216.3 LBS | SYSTOLIC BLOOD PRESSURE: 179 MMHG | BODY MASS INDEX: 32.78 KG/M2 | HEART RATE: 92 BPM | DIASTOLIC BLOOD PRESSURE: 82 MMHG | OXYGEN SATURATION: 94 % | RESPIRATION RATE: 18 BRPM | HEIGHT: 68 IN

## 2020-01-10 LAB
ALBUMIN SERPL-MCNC: 1.5 G/DL (ref 3.5–4.7)
ALBUMIN SERPL-MCNC: 1.8 G/DL (ref 3.5–5.2)
ALPHA-1-GLOBULIN: 0.4 G/DL (ref 0.2–0.4)
ALPHA-2-GLOBULIN: 1.2 G/FL (ref 0.5–1)
ANION GAP SERPL CALCULATED.3IONS-SCNC: 12 MMOL/L (ref 7–16)
BASOPHILS ABSOLUTE: 0.01 E9/L (ref 0–0.2)
BASOPHILS RELATIVE PERCENT: 0.1 % (ref 0–2)
BETA GLOBULIN: 0.9 G/DL (ref 0.8–1.3)
BUN BLDV-MCNC: 70 MG/DL (ref 6–20)
BURR CELLS: ABNORMAL
C3 COMPLEMENT: 112 MG/DL (ref 90–180)
C4 COMPLEMENT: 32 MG/DL (ref 10–40)
CALCIUM SERPL-MCNC: 7.8 MG/DL (ref 8.6–10.2)
CHLORIDE BLD-SCNC: 109 MMOL/L (ref 98–107)
CHOLESTEROL, TOTAL: 219 MG/DL (ref 0–199)
CO2: 17 MMOL/L (ref 22–29)
CREAT SERPL-MCNC: 2.9 MG/DL (ref 0.7–1.2)
ELECTROPHORESIS: ABNORMAL
EOSINOPHILS ABSOLUTE: 0 E9/L (ref 0.05–0.5)
EOSINOPHILS RELATIVE PERCENT: 0 % (ref 0–6)
GAMMA GLOBULIN: 0.4 G/DL (ref 0.7–1.6)
GFR AFRICAN AMERICAN: 28
GFR NON-AFRICAN AMERICAN: 23 ML/MIN/1.73
GLUCOSE BLD-MCNC: 219 MG/DL (ref 74–99)
HCT VFR BLD CALC: 35.6 % (ref 37–54)
HDLC SERPL-MCNC: 39 MG/DL
HEMOGLOBIN: 10.9 G/DL (ref 12.5–16.5)
IMMATURE GRANULOCYTES #: 0.04 E9/L
IMMATURE GRANULOCYTES %: 0.4 % (ref 0–5)
LDL CHOLESTEROL CALCULATED: 131 MG/DL (ref 0–99)
LYMPHOCYTES ABSOLUTE: 0.59 E9/L (ref 1.5–4)
LYMPHOCYTES RELATIVE PERCENT: 5.7 % (ref 20–42)
MCH RBC QN AUTO: 26.3 PG (ref 26–35)
MCHC RBC AUTO-ENTMCNC: 30.6 % (ref 32–34.5)
MCV RBC AUTO: 85.8 FL (ref 80–99.9)
METER GLUCOSE: 187 MG/DL (ref 74–99)
METER GLUCOSE: 290 MG/DL (ref 74–99)
MONOCYTES ABSOLUTE: 0.65 E9/L (ref 0.1–0.95)
MONOCYTES RELATIVE PERCENT: 6.2 % (ref 2–12)
NEUTROPHILS ABSOLUTE: 9.15 E9/L (ref 1.8–7.3)
NEUTROPHILS RELATIVE PERCENT: 87.6 % (ref 43–80)
PDW BLD-RTO: 13 FL (ref 11.5–15)
PLATELET # BLD: 337 E9/L (ref 130–450)
PMV BLD AUTO: 11 FL (ref 7–12)
POIKILOCYTES: ABNORMAL
POTASSIUM REFLEX MAGNESIUM: 4.4 MMOL/L (ref 3.5–5)
RBC # BLD: 4.15 E12/L (ref 3.8–5.8)
SODIUM BLD-SCNC: 138 MMOL/L (ref 132–146)
TOTAL PROTEIN: 4.4 G/DL (ref 6.4–8.3)
TRIGL SERPL-MCNC: 246 MG/DL (ref 0–149)
VLDLC SERPL CALC-MCNC: 49 MG/DL
WBC # BLD: 10.4 E9/L (ref 4.5–11.5)

## 2020-01-10 PROCEDURE — 2500000003 HC RX 250 WO HCPCS: Performed by: INTERNAL MEDICINE

## 2020-01-10 PROCEDURE — 82962 GLUCOSE BLOOD TEST: CPT

## 2020-01-10 PROCEDURE — 6360000002 HC RX W HCPCS: Performed by: NURSE PRACTITIONER

## 2020-01-10 PROCEDURE — 80048 BASIC METABOLIC PNL TOTAL CA: CPT

## 2020-01-10 PROCEDURE — 97116 GAIT TRAINING THERAPY: CPT

## 2020-01-10 PROCEDURE — 94640 AIRWAY INHALATION TREATMENT: CPT

## 2020-01-10 PROCEDURE — 36415 COLL VENOUS BLD VENIPUNCTURE: CPT

## 2020-01-10 PROCEDURE — 80061 LIPID PANEL: CPT

## 2020-01-10 PROCEDURE — 94669 MECHANICAL CHEST WALL OSCILL: CPT

## 2020-01-10 PROCEDURE — 82040 ASSAY OF SERUM ALBUMIN: CPT

## 2020-01-10 PROCEDURE — 2580000003 HC RX 258: Performed by: INTERNAL MEDICINE

## 2020-01-10 PROCEDURE — 97110 THERAPEUTIC EXERCISES: CPT

## 2020-01-10 PROCEDURE — 6370000000 HC RX 637 (ALT 250 FOR IP): Performed by: NURSE PRACTITIONER

## 2020-01-10 PROCEDURE — 6370000000 HC RX 637 (ALT 250 FOR IP): Performed by: INTERNAL MEDICINE

## 2020-01-10 PROCEDURE — 85025 COMPLETE CBC W/AUTO DIFF WBC: CPT

## 2020-01-10 RX ORDER — AMLODIPINE BESYLATE 5 MG/1
5 TABLET ORAL DAILY
Qty: 30 TABLET | Refills: 1 | Status: SHIPPED | OUTPATIENT
Start: 2020-01-10 | End: 2021-10-17 | Stop reason: SDUPTHER

## 2020-01-10 RX ORDER — PREDNISONE 20 MG/1
40 TABLET ORAL DAILY
Qty: 60 TABLET | Refills: 0 | Status: SHIPPED | OUTPATIENT
Start: 2020-01-11 | End: 2020-01-20

## 2020-01-10 RX ORDER — FUROSEMIDE 40 MG/1
40 TABLET ORAL DAILY
Qty: 60 TABLET | Refills: 3 | COMMUNITY
Start: 2020-01-10 | End: 2020-04-29 | Stop reason: SDUPTHER

## 2020-01-10 RX ORDER — AMLODIPINE BESYLATE 5 MG/1
5 TABLET ORAL DAILY
Status: DISCONTINUED | OUTPATIENT
Start: 2020-01-10 | End: 2020-01-10 | Stop reason: HOSPADM

## 2020-01-10 RX ORDER — LOVASTATIN 20 MG/1
40 TABLET ORAL DAILY
Status: DISCONTINUED | OUTPATIENT
Start: 2020-01-10 | End: 2020-01-10 | Stop reason: HOSPADM

## 2020-01-10 RX ORDER — SODIUM BICARBONATE 650 MG/1
650 TABLET ORAL 3 TIMES DAILY
Status: DISCONTINUED | OUTPATIENT
Start: 2020-01-10 | End: 2020-01-10 | Stop reason: HOSPADM

## 2020-01-10 RX ORDER — SODIUM BICARBONATE 650 MG/1
650 TABLET ORAL 3 TIMES DAILY
Qty: 90 TABLET | Refills: 1 | Status: SHIPPED | OUTPATIENT
Start: 2020-01-10

## 2020-01-10 RX ADMIN — SODIUM BICARBONATE: 84 INJECTION, SOLUTION INTRAVENOUS at 05:33

## 2020-01-10 RX ADMIN — ENOXAPARIN SODIUM 30 MG: 30 INJECTION SUBCUTANEOUS at 08:17

## 2020-01-10 RX ADMIN — AMLODIPINE BESYLATE 5 MG: 5 TABLET ORAL at 10:29

## 2020-01-10 RX ADMIN — SODIUM BICARBONATE 650 MG TABLET 650 MG: at 10:29

## 2020-01-10 RX ADMIN — FORMOTEROL FUMARATE DIHYDRATE 20 MCG: 20 SOLUTION RESPIRATORY (INHALATION) at 05:30

## 2020-01-10 RX ADMIN — INSULIN LISPRO 6 UNITS: 100 INJECTION, SOLUTION INTRAVENOUS; SUBCUTANEOUS at 11:31

## 2020-01-10 RX ADMIN — ALBUTEROL SULFATE 2.5 MG: 2.5 SOLUTION RESPIRATORY (INHALATION) at 05:29

## 2020-01-10 RX ADMIN — BUDESONIDE 500 MCG: 0.5 INHALANT RESPIRATORY (INHALATION) at 05:30

## 2020-01-10 RX ADMIN — PREDNISONE 40 MG: 20 TABLET ORAL at 08:17

## 2020-01-10 RX ADMIN — INSULIN LISPRO 2 UNITS: 100 INJECTION, SOLUTION INTRAVENOUS; SUBCUTANEOUS at 08:16

## 2020-01-10 RX ADMIN — LOVASTATIN 40 MG: 20 TABLET ORAL at 11:32

## 2020-01-10 ASSESSMENT — PAIN SCALES - GENERAL: PAINLEVEL_OUTOF10: 0

## 2020-01-10 NOTE — PROGRESS NOTES
Physical Therapy  Physical Therapy  Daily Treatment Note  1/10/2020  [id number]                      Milka Coles   [id number]                              1965    Patient Active Problem List   Diagnosis    Type 2 diabetes mellitus without complication (HCC)    Severe sepsis with acute organ dysfunction ([facility]    Sepsis secondary to CAP ([facility]    Pneumonia due to infectious organism       Recommendation for discharge: Home, no anticipated needs  Equipment prescriptions needed:none    AM-[facility] Mobility Inpatient   How much difficulty turning over in bed?: None  How much difficulty sitting down on / standing up from a chair with arms?: None  How much difficulty moving from lying on back to sitting on side of bed?: None  How much help from another person moving to and from a bed to a chair?: None  How much help from another person needed to walk in hospital room?: A Little  How much help from another person for climbing 3-5 steps with a railing?: A Little  AM-[facility] Inpatient Mobility Raw Score : 22  AM-[facility] Inpatient T-Scale Score : 53.28  Mobility Inpatient CMS 0-100% Score: 20.91  Mobility Inpatient CMS G-Code Modifier : CJ      Precautions: falls and O2    S: Patient cleared by nursing for treatment. Patient is agreeable to treatment. Pain status: (measured on a visual analog scale with 0=no pain and 10=excruciating pain) 0/10. O: Pt was instructed in and performed the following:   Bed Mobility- Supine to sit-Independent     Scooting- Independent     Sit to supine-Independent   Transfers-sit to stand- Supervision     Gait:  Patient ambulated 2 x 100 feet holding onto IV pole with Supervision. Comments: Pt. Wearing face mask due to droplet precautions. Steps:  na  Treatment: Pt. Transferred to [facility] and ambulated in [place]fle[place] as above. Returned to chair and performed ankle pumps, laq and marching and heel raises x 10-15 reps each. Comment: Call light left by patient. A: Jac.  Well, ambulating well, states being

## 2020-01-10 NOTE — PROGRESS NOTES
[address] [place] [address] [facility]  Progress Note    CC: resting in bed- feeling better    Face to face encounter    SUBJECTIVE:  Patient is tolerating medications. No reported adverse drug reactions. No nausea, vomiting, diarrhea. No f/c  Ambulated with pt  No c/o  Off o2    Medications:  Scheduled Meds:   amLODIPine  5 mg Oral Daily    lovastatin  40 mg Oral Daily    sodium bicarbonate  650 mg Oral TID    predniSONE  40 mg Oral Daily    insulin glargine  20 Units Subcutaneous Nightly    budesonide  0.5 mg Nebulization BID    formoterol  20 mcg Nebulization BID    sodium chloride flush  10 mL Intravenous 2 times per day    enoxaparin  30 mg Subcutaneous Daily    insulin lispro  0-12 Units Subcutaneous TID WC    insulin lispro  0-6 Units Subcutaneous Nightly    levofloxacin  750 mg Oral Every Other Day     Continuous Infusions:   IV infusion builder 75 mL/hr at 01/10/20 0533    dextrose       PRN Meds:albuterol, sodium chloride flush, acetaminophen, glucose, dextrose, glucagon (rDNA), dextrose  OBJECTIVE:  Patient Vitals for the past 24 hrs:   BP Temp Temp src Pulse Resp SpO2 Weight   01/10/20 1029 (!) 179/82 -- -- -- -- -- --   01/10/20 0736 (!) 171/81 98.3 °F (36.8 °C) Oral 92 18 94 % --   01/10/20 0601 -- -- -- -- -- -- 216 lb 4.8 oz (98.1 kg)   01/09/20 1930 (!) 175/81 97.4 °F (36.3 °C) Oral 82 16 95 % --     Constitutional: The patient is awake, alert, and oriented. Skin: Warm and dry. Head: Eyes show round, and reactive pupils. AT/NC  Chest: No use of accessory muscles to breathe. Diminished post B on room air   Cardiovascular: S1 and S2 are rhythmic and regular. Abdomen: Positive bowel sounds to auscultation. Benign to palpation. NT/nd  Extremities: No clubbing, no cyanosis, NO edema.   CNS  no focal deficits    Laboratory and Tests Review:  Lab Results   Component Value Date    WBC 10.4 01/10/2020    WBC 14.7 (H) 01/09/2020    WBC 5.3 01/08/2020    HGB 10.9 (L)

## 2020-01-10 NOTE — PROGRESS NOTES
[facility] Quality Flow/Interdisciplinary Rounds Progress Note        Quality Flow Rounds held on January 10, 2020    Disciplines Attending:  Bedside Nurse, ,  and Nursing Unit Leadership    Iva Orellana was admitted on 1/6/2020  8:35 PM    Anticipated Discharge Date:  Expected Discharge Date: 01/10/20    Disposition:    Jonn Score:  Jonn Scale Score: 22    Readmission Risk              Risk of Unplanned Readmission:        14           Discussed patient goal for the day, patient clinical progression, and barriers to discharge.   The following Goal(s) of the Day/Commitment(s) have been identified:  DC home today      Garth Daley  January 10, 2020

## 2020-01-11 LAB
IGG 1: 216 MG/DL (ref 240–1118)
IGG 2: 95 MG/DL (ref 124–549)
IGG 3: 20 MG/DL (ref 21–134)
IGG 4: 2 MG/DL (ref 1–123)

## 2020-01-11 NOTE — DISCHARGE SUMMARY
[address]                               DISCHARGE SUMMARY    PATIENT NAME: Gayatri Fairchild                      :        1965  MED REC NO:   [id number]                            ROOM:       [id number]  ACCOUNT NO:   [de-identified]                           ADMIT DATE: 2020  PROVIDER:     David Newton DO                  DISCHARGE DATE:  01/10/2020      ADMITTING DIAGNOSES:  Severe sepsis with associated acute organ  dysfunction secondary to community-acquired pneumonia. FINAL DISCHARGE DIAGNOSES:  Severe sepsis with associated acute organ  dysfunction secondary to community-acquired pneumonia. SECONDARY DISCHARGE DIAGNOSES:  Acute renal failure superimposed upon  chronic kidney disease, stage IV, with possible nephrotic component;  acute respiratory failure with associated arterial hypoxemia secondary  to pneumonia, resolved; hypoalbuminemia with associated peripheral  edema; insulin-dependent diabetes mellitus type 2; essential  hypertension; hyperlipidemia, on a statin agent; normochromic normocytic  anemia; obesity secondary to excessive caloric intake with elevated BMI. COMPLICATIONS:  None. OPERATIONS:  None. CONSULTATIONS OBTAINED:  Dr. Jazmine Carvalho, Dr. Silvia Stockton, and Dr. Desmond Pelayo. No OMT was  given. ADMITTING PHYSICIANS:  Dr. Alex Urrutia and Dr. Angela Perez. CHIEF COMPLAINT AND HISTORY OF CHIEF COMPLAINT:  A pleasant 81-year-old  white male who was admitted to [address] [place] [address]. The patient  presented to the hospital here and was admitted to the hospital through  the [facility] Emergency Room on 2020 after being evaluated for  worsening symptomatology of pneumonia. The patient was evaluated at  urgent care 2 days prior and diagnosed with atypical pneumonia. The  patient was started on doxycycline, but only taken 3 doses of the  medication.   He then noticed that he was feeling much better, but

## 2020-01-12 LAB
BLOOD CULTURE, ROUTINE: NORMAL
COMPLEMENT TOTAL (CH50): 114 CAE UNITS (ref 60–144)
CULTURE, BLOOD 2: NORMAL

## 2020-01-13 ENCOUNTER — TELEPHONE (OUTPATIENT)
Dept: FAMILY MEDICINE CLINIC | Age: 55
End: 2020-01-13

## 2020-01-13 LAB
ADDENDUM ELECTROPHORESIS URINE RANDOM: NORMAL
ANCA IFA: NORMAL

## 2020-01-14 ENCOUNTER — OFFICE VISIT (OUTPATIENT)
Dept: FAMILY MEDICINE CLINIC | Age: 55
End: 2020-01-14
Payer: COMMERCIAL

## 2020-01-14 VITALS
DIASTOLIC BLOOD PRESSURE: 80 MMHG | HEART RATE: 92 BPM | OXYGEN SATURATION: 97 % | WEIGHT: 218 LBS | BODY MASS INDEX: 33.15 KG/M2 | SYSTOLIC BLOOD PRESSURE: 120 MMHG | RESPIRATION RATE: 18 BRPM

## 2020-01-14 PROCEDURE — 99496 TRANSJ CARE MGMT HIGH F2F 7D: CPT | Performed by: FAMILY MEDICINE

## 2020-01-14 PROCEDURE — 1111F DSCHRG MED/CURRENT MED MERGE: CPT | Performed by: FAMILY MEDICINE

## 2020-01-14 RX ORDER — PROMETHAZINE HYDROCHLORIDE AND CODEINE PHOSPHATE 6.25; 1 MG/5ML; MG/5ML
5 SYRUP ORAL EVERY 4 HOURS PRN
Qty: 90 ML | Refills: 0 | Status: SHIPPED | OUTPATIENT
Start: 2020-01-14 | End: 2020-01-17

## 2020-01-14 RX ORDER — IPRATROPIUM BROMIDE 42 UG/1
2 SPRAY, METERED NASAL 3 TIMES DAILY
Qty: 1 BOTTLE | Refills: 3 | Status: SHIPPED
Start: 2020-01-14 | End: 2020-09-08 | Stop reason: SDUPTHER

## 2020-01-14 ASSESSMENT — PATIENT HEALTH QUESTIONNAIRE - PHQ9
1. LITTLE INTEREST OR PLEASURE IN DOING THINGS: 0
2. FEELING DOWN, DEPRESSED OR HOPELESS: 0
SUM OF ALL RESPONSES TO PHQ QUESTIONS 1-9: 0
SUM OF ALL RESPONSES TO PHQ9 QUESTIONS 1 & 2: 0
SUM OF ALL RESPONSES TO PHQ QUESTIONS 1-9: 0

## 2020-01-18 ASSESSMENT — ENCOUNTER SYMPTOMS
DIARRHEA: 1
EYE ITCHING: 0
CONSTIPATION: 0
BLOOD IN STOOL: 0
VOMITING: 0
SORE THROAT: 0
ANAL BLEEDING: 0
VOICE CHANGE: 0
EYE DISCHARGE: 0
RHINORRHEA: 0
RECTAL PAIN: 0
PHOTOPHOBIA: 0
CHEST TIGHTNESS: 0
EYE REDNESS: 0
ABDOMINAL DISTENTION: 0
FACIAL SWELLING: 0
CHOKING: 0
TROUBLE SWALLOWING: 0
APNEA: 0
BACK PAIN: 0
SHORTNESS OF BREATH: 0
COLOR CHANGE: 0
ABDOMINAL PAIN: 0
EYE PAIN: 0
COUGH: 1
NAUSEA: 0
WHEEZING: 1
STRIDOR: 0
SINUS PRESSURE: 0

## 2020-01-18 NOTE — PROGRESS NOTES
Eulalia Tellez is a 48 y.o. male  . Subjective:      Patient is here status post hospitalization for pneumonia and sepsis. From this standpoint he has improved considerably. No fever , chills or aching . He is still having nasal congestion and chronic cough. Sugars have been elevated but he is currently on steroids. We will stop steroids after tomorrows dosage . If he worsens he is to go to ER . Sugars should improve if they don't, we will adjust medications. We will need him to follow up with nephrology and watch renal numbers closely. Patient has been having diarrhea since his admit , so we need to check for C Diff. Review of Systems   Constitutional: Positive for fatigue. Negative for activity change, appetite change, chills, diaphoresis, fever and unexpected weight change. HENT: Negative for congestion, dental problem, drooling, ear discharge, ear pain, facial swelling, hearing loss, mouth sores, nosebleeds, postnasal drip, rhinorrhea, sinus pressure, sneezing, sore throat, tinnitus, trouble swallowing and voice change. Eyes: Negative for photophobia, pain, discharge, redness, itching and visual disturbance. Respiratory: Positive for cough and wheezing. Negative for apnea, choking, chest tightness, shortness of breath and stridor. Cardiovascular: Negative for chest pain, palpitations and leg swelling. Gastrointestinal: Positive for diarrhea. Negative for abdominal distention, abdominal pain, anal bleeding, blood in stool, constipation, nausea, rectal pain and vomiting. Endocrine: Negative for cold intolerance, heat intolerance, polydipsia, polyphagia and polyuria. Genitourinary: Negative for decreased urine volume, difficulty urinating, discharge, dysuria, enuresis, flank pain, frequency, genital sores, hematuria, penile pain, penile swelling, scrotal swelling, testicular pain and urgency.    Musculoskeletal: Negative for arthralgias, back pain, gait problem, joint swelling, myalgias, neck  Intimate partner violence:     Fear of current or ex partner: Not on file     Emotionally abused: Not on file     Physically abused: Not on file     Forced sexual activity: Not on file   Other Topics Concern    Not on file   Social History Narrative    Not on file       Family History   Problem Relation Age of Onset    Heart Disease Mother        Current Outpatient Medications on File Prior to Visit   Medication Sig Dispense Refill    amLODIPine (NORVASC) 5 MG tablet Take 1 tablet by mouth daily 30 tablet 1    predniSONE (DELTASONE) 20 MG tablet Take 2 tablets by mouth daily 60 tablet 0    furosemide (LASIX) 40 MG tablet Take 1 tablet by mouth daily 60 tablet 3    sodium bicarbonate 650 MG tablet Take 1 tablet by mouth 3 times daily 90 tablet 1    Acetaminophen (TYLENOL) 325 MG CAPS Take 650 mg by mouth as needed (FOR PAIN PER PT.)      vitamin E 400 UNIT capsule Take 400 Units by mouth daily      Insulin Degludec (TRESIBA FLEXTOUCH) 100 UNIT/ML SOPN Inject 20 Units into the skin daily (Patient taking differently: Inject 20 Units into the skin every evening ) 3 pen 5    Insulin Syringe-Needle U-100 (AIMSCO INS SYR .3CC/29GX0.5\") 29G X 1/2\" 0.3 ML MISC 1 each by Does not apply route daily 100 each 3    vitamin D (ERGOCALCIFEROL) 34718 units CAPS capsule TAKE 1 CAPSULE BY MOUTH TWICE A WEEK (Patient taking differently: TAKE 1 CAPSULE BY MOUTH TWICE A WEEK. PATIENT STATES TAKES \"USUALLY Tuesday\" AND \"Thursday\") 27 capsule 3    lovastatin (MEVACOR) 40 MG tablet Take 1 tablet by mouth daily 30 tablet 5    Insulin Syringe-Needle U-100 30G X 1/2\" 1 ML MISC 10 units units of Tresiba daily 100 each 11    sildenafil (VIAGRA) 100 MG tablet Take 1 tablet by mouth as needed for Erectile Dysfunction 10 tablet 5    Cinnamon 500 MG CAPS Take 1,000 mg by mouth 2 times daily. No current facility-administered medications on file prior to visit.         No Known Allergies    I have reviewed his allergies, He is alert and oriented to person, place, and time. Cranial Nerves: No cranial nerve deficit. Motor: No abnormal muscle tone. Coordination: Coordination normal.      Deep Tendon Reflexes: Reflexes are normal and symmetric. Reflexes normal.   Psychiatric:         Behavior: Behavior normal.         Thought Content: Thought content normal.         Judgment: Judgment normal.         Assessment / Plan:   Kristen Yates was seen today for care management. Diagnoses and all orders for this visit:    Diarrhea of presumed infectious origin  -     CLOSTRIDIUM DIFFICILE EIA; Future  -     promethazine-codeine (PHENERGAN WITH CODEINE) 6.25-10 MG/5ML syrup; Take 5 mLs by mouth every 4 hours as needed for Cough for up to 3 days. -     ipratropium (ATROVENT) 0.06 % nasal spray; 2 sprays by Nasal route 3 times daily    Pneumonia due to infectious organism, unspecified laterality, unspecified part of lung  -     promethazine-codeine (PHENERGAN WITH CODEINE) 6.25-10 MG/5ML syrup; Take 5 mLs by mouth every 4 hours as needed for Cough for up to 3 days. -     ipratropium (ATROVENT) 0.06 % nasal spray; 2 sprays by Nasal route 3 times daily  -     [place] DISCHARGE MEDS RECONCILED W/ CURRENT OUTPATIENT MED LIST    Severe sepsis with acute organ dysfunction (HCC)  -     promethazine-codeine (PHENERGAN WITH CODEINE) 6.25-10 MG/5ML syrup; Take 5 mLs by mouth every 4 hours as needed for Cough for up to 3 days. -     ipratropium (ATROVENT) 0.06 % nasal spray; 2 sprays by Nasal route 3 times daily  -     [place] DISCHARGE MEDS RECONCILED W/ CURRENT OUTPATIENT MED LIST      Discussed case at length. See above, He is to have a low threshold for returning to ER due to severity of symptoms. He is to follow with renal as directed. Reviewed healthmaintenance report. Patient is aware of deficiencies and suggested preventative tests.

## 2020-01-20 ENCOUNTER — OFFICE VISIT (OUTPATIENT)
Dept: FAMILY MEDICINE CLINIC | Age: 55
End: 2020-01-20
Payer: COMMERCIAL

## 2020-01-20 VITALS
OXYGEN SATURATION: 95 % | RESPIRATION RATE: 18 BRPM | WEIGHT: 238 LBS | BODY MASS INDEX: 36.19 KG/M2 | SYSTOLIC BLOOD PRESSURE: 132 MMHG | HEART RATE: 104 BPM | DIASTOLIC BLOOD PRESSURE: 80 MMHG

## 2020-01-20 PROCEDURE — 99213 OFFICE O/P EST LOW 20 MIN: CPT | Performed by: FAMILY MEDICINE

## 2020-01-20 NOTE — LETTER
[address] [address]  Phone: [phone]  Fax: [address], DO        January 20, 2020    Delores Santamaria  [address] [place] [address] Drive  Manoj Romero [address]      To Whom It May Concern,    Patient may return to full duty on 1/21/2020. If you have any questions or concerns, please don't hesitate to call.     Sincerely,        Henrique Hampton, DO

## 2020-01-22 PROBLEM — N28.9 RENAL INSUFFICIENCY: Status: ACTIVE | Noted: 2020-01-22

## 2020-01-22 PROBLEM — E78.2 MIXED HYPERLIPIDEMIA: Status: ACTIVE | Noted: 2020-01-22

## 2020-01-22 PROBLEM — Z79.4 TYPE 2 DIABETES MELLITUS WITH DIABETIC NEPHROPATHY, WITH LONG-TERM CURRENT USE OF INSULIN (HCC): Status: ACTIVE | Noted: 2020-01-22

## 2020-01-22 PROBLEM — E11.65 TYPE 2 DIABETES MELLITUS WITH HYPERGLYCEMIA, WITHOUT LONG-TERM CURRENT USE OF INSULIN (HCC): Status: ACTIVE | Noted: 2020-01-22

## 2020-01-22 PROBLEM — I10 BENIGN ESSENTIAL HTN: Status: ACTIVE | Noted: 2020-01-22

## 2020-01-22 PROBLEM — E11.21 TYPE 2 DIABETES MELLITUS WITH DIABETIC NEPHROPATHY, WITH LONG-TERM CURRENT USE OF INSULIN (HCC): Status: ACTIVE | Noted: 2020-01-22

## 2020-01-22 ASSESSMENT — ENCOUNTER SYMPTOMS
CHOKING: 0
DIARRHEA: 0
TROUBLE SWALLOWING: 0
COLOR CHANGE: 0
NAUSEA: 0
APNEA: 0
SORE THROAT: 0
EYE REDNESS: 0
STRIDOR: 0
ABDOMINAL PAIN: 0
CONSTIPATION: 0
PHOTOPHOBIA: 0
EYE DISCHARGE: 0
EYE ITCHING: 0
ANAL BLEEDING: 0
FACIAL SWELLING: 0
WHEEZING: 0
RECTAL PAIN: 0
RHINORRHEA: 0
VOMITING: 0
VOICE CHANGE: 0
COUGH: 1
BACK PAIN: 0
ABDOMINAL DISTENTION: 0
CHEST TIGHTNESS: 0
SINUS PRESSURE: 0
SHORTNESS OF BREATH: 0
BLOOD IN STOOL: 0
EYE PAIN: 0

## 2020-01-22 NOTE — PROGRESS NOTES
[facility] is a 48 y.o. male  . Subjective:      Diarrhea resolved. Pneumonia much improved, sugars are improving with the decrease of prednisone. Has appointment next week with nephrology. Review of Systems   Constitutional: Positive for fatigue. Negative for activity change, appetite change, chills, diaphoresis, fever and unexpected weight change. HENT: Negative for congestion, dental problem, drooling, ear discharge, ear pain, facial swelling, hearing loss, mouth sores, nosebleeds, postnasal drip, rhinorrhea, sinus pressure, sneezing, sore throat, tinnitus, trouble swallowing and voice change. Eyes: Negative for photophobia, pain, discharge, redness, itching and visual disturbance. Respiratory: Positive for cough. Negative for apnea, choking, chest tightness, shortness of breath, wheezing and stridor. Cardiovascular: Negative for chest pain, palpitations and leg swelling. Gastrointestinal: Negative for abdominal distention, abdominal pain, anal bleeding, blood in stool, constipation, diarrhea, nausea, rectal pain and vomiting. Endocrine: Negative for cold intolerance, heat intolerance, polydipsia, polyphagia and polyuria. Genitourinary: Negative for decreased urine volume, difficulty urinating, discharge, dysuria, enuresis, flank pain, frequency, genital sores, hematuria, penile pain, penile swelling, scrotal swelling, testicular pain and urgency. Musculoskeletal: Negative for arthralgias, back pain, gait problem, joint swelling, myalgias, neck pain and neck stiffness. Skin: Negative for color change, pallor, rash and wound. Allergic/Immunologic: Negative for environmental allergies, food allergies and immunocompromised state. Neurological: Negative for dizziness, tremors, seizures, syncope, facial asymmetry, speech difficulty, weakness, light-headedness, numbness and headaches. Hematological: Negative for adenopathy. Does not bruise/bleed easily.    Psychiatric/Behavioral: Negative for agitation, behavioral problems, confusion, decreased concentration, dysphoric mood, hallucinations, self-injury, sleep disturbance and suicidal ideas. The patient is not nervous/anxious and is not hyperactive. Past Medical History:   Diagnosis Date    Diabetes mellitus ([facility]     Hyperlipidemia     Hypertension     Type 2 diabetes mellitus with diabetic nephropathy, with long-term current use of insulin ([facility] 1/22/2020       Social History     Socioeconomic History    Marital status:      Spouse name: Not on file    Number of children: Not on file    Years of education: Not on file    Highest education level: Not on file   Occupational History    Not on file   Social Needs    Financial resource strain: Not on file    Food insecurity:     Worry: Not on file     Inability: Not on file    Transportation needs:     Medical: Not on file     Non-medical: Not on file   Tobacco Use    Smoking status: Former Smoker     Types: Cigars    Smokeless tobacco: Never Used   Substance and Sexual Activity    Alcohol use:  Yes     Alcohol/week: 0.0 standard drinks     Comment: socially    Drug use: No    Sexual activity: Yes     Partners: Female   Lifestyle    Physical activity:     Days per week: Not on file     Minutes per session: Not on file    Stress: Not on file   Relationships    Social connections:     Talks on phone: Not on file     Gets together: Not on file     Attends Holiness service: Not on file     Active member of club or organization: Not on file     Attends meetings of clubs or organizations: Not on file     Relationship status: Not on file    Intimate partner violence:     Fear of current or ex partner: Not on file     Emotionally abused: Not on file     Physically abused: Not on file     Forced sexual activity: Not on file   Other Topics Concern    Not on file   Social History Narrative    Not on file       Family History   Problem Relation Age of Onset    Heart

## 2020-02-15 ENCOUNTER — HOSPITAL ENCOUNTER (OUTPATIENT)
Age: 55
Discharge: HOME | End: 2020-02-17
Payer: COMMERCIAL

## 2020-02-15 LAB
ALBUMIN SERPL-MCNC: 2.5 G/DL (ref 3.5–5.2)
ALP BLD-CCNC: 80 U/L (ref 40–129)
ALT SERPL-CCNC: 17 U/L (ref 0–40)
ANION GAP SERPL CALCULATED.3IONS-SCNC: 14 MMOL/L (ref 7–16)
AST SERPL-CCNC: 15 U/L (ref 0–39)
BASOPHILS ABSOLUTE: 0.03 E9/L (ref 0–0.2)
BASOPHILS RELATIVE PERCENT: 0.5 % (ref 0–2)
BILIRUB SERPL-MCNC: <0.2 MG/DL (ref 0–1.2)
BUN BLDV-MCNC: 50 MG/DL (ref 6–20)
CALCIUM SERPL-MCNC: 8.4 MG/DL (ref 8.6–10.2)
CHLORIDE BLD-SCNC: 108 MMOL/L (ref 98–107)
CO2: 21 MMOL/L (ref 22–29)
CREAT SERPL-MCNC: 3.1 MG/DL (ref 0.7–1.2)
CREATININE URINE: 74 MG/DL (ref 40–278)
EOSINOPHILS ABSOLUTE: 0.17 E9/L (ref 0.05–0.5)
EOSINOPHILS RELATIVE PERCENT: 2.7 % (ref 0–6)
GFR AFRICAN AMERICAN: 26
GFR NON-AFRICAN AMERICAN: 21 ML/MIN/1.73
GLUCOSE BLD-MCNC: 193 MG/DL (ref 74–99)
HCT VFR BLD CALC: 32.2 % (ref 37–54)
HEMOGLOBIN: 10 G/DL (ref 12.5–16.5)
IMMATURE GRANULOCYTES #: 0.02 E9/L
IMMATURE GRANULOCYTES %: 0.3 % (ref 0–5)
LYMPHOCYTES ABSOLUTE: 1.18 E9/L (ref 1.5–4)
LYMPHOCYTES RELATIVE PERCENT: 19 % (ref 20–42)
MAGNESIUM: 1.9 MG/DL (ref 1.6–2.6)
MCH RBC QN AUTO: 25.6 PG (ref 26–35)
MCHC RBC AUTO-ENTMCNC: 31.1 % (ref 32–34.5)
MCV RBC AUTO: 82.4 FL (ref 80–99.9)
MONOCYTES ABSOLUTE: 0.53 E9/L (ref 0.1–0.95)
MONOCYTES RELATIVE PERCENT: 8.5 % (ref 2–12)
NEUTROPHILS ABSOLUTE: 4.27 E9/L (ref 1.8–7.3)
NEUTROPHILS RELATIVE PERCENT: 69 % (ref 43–80)
PARATHYROID HORMONE INTACT: 228 PG/ML (ref 15–65)
PDW BLD-RTO: 14.6 FL (ref 11.5–15)
PHOSPHORUS: 4.6 MG/DL (ref 2.5–4.5)
PLATELET # BLD: 475 E9/L (ref 130–450)
PMV BLD AUTO: 10.1 FL (ref 7–12)
POTASSIUM SERPL-SCNC: 4 MMOL/L (ref 3.5–5)
PROTEIN PROTEIN: >600 MG/DL (ref 0–12)
PROTEIN/CREAT RATIO: 8.1
PROTEIN/CREAT RATIO: 8.1 (ref 0–0.2)
RBC # BLD: 3.91 E12/L (ref 3.8–5.8)
SODIUM BLD-SCNC: 143 MMOL/L (ref 132–146)
TOTAL PROTEIN: 5 G/DL (ref 6.4–8.3)
URIC ACID, SERUM: 7.2 MG/DL (ref 3.4–7)
VITAMIN D 25-HYDROXY: 6 NG/ML (ref 30–100)
WBC # BLD: 6.2 E9/L (ref 4.5–11.5)

## 2020-02-15 PROCEDURE — 83735 ASSAY OF MAGNESIUM: CPT

## 2020-02-15 PROCEDURE — 84550 ASSAY OF BLOOD/URIC ACID: CPT

## 2020-02-15 PROCEDURE — 84100 ASSAY OF PHOSPHORUS: CPT

## 2020-02-15 PROCEDURE — 84156 ASSAY OF PROTEIN URINE: CPT

## 2020-02-15 PROCEDURE — 36415 COLL VENOUS BLD VENIPUNCTURE: CPT

## 2020-02-15 PROCEDURE — 85025 COMPLETE CBC W/AUTO DIFF WBC: CPT

## 2020-02-15 PROCEDURE — 82570 ASSAY OF URINE CREATININE: CPT

## 2020-02-15 PROCEDURE — 80053 COMPREHEN METABOLIC PANEL: CPT

## 2020-02-15 PROCEDURE — 82306 VITAMIN D 25 HYDROXY: CPT

## 2020-02-15 PROCEDURE — 83970 ASSAY OF PARATHORMONE: CPT

## 2020-03-04 ENCOUNTER — TELEPHONE (OUTPATIENT)
Dept: FAMILY MEDICINE CLINIC | Age: 55
End: 2020-03-04

## 2020-03-09 ENCOUNTER — OFFICE VISIT (OUTPATIENT)
Dept: FAMILY MEDICINE CLINIC | Age: 55
End: 2020-03-09
Payer: COMMERCIAL

## 2020-03-09 ENCOUNTER — HOSPITAL ENCOUNTER (OUTPATIENT)
Age: 55
Discharge: HOME | End: 2020-03-11
Payer: COMMERCIAL

## 2020-03-09 VITALS
OXYGEN SATURATION: 97 % | WEIGHT: 216 LBS | SYSTOLIC BLOOD PRESSURE: 138 MMHG | BODY MASS INDEX: 32.84 KG/M2 | DIASTOLIC BLOOD PRESSURE: 84 MMHG | RESPIRATION RATE: 18 BRPM | HEART RATE: 94 BPM

## 2020-03-09 LAB
ALBUMIN SERPL-MCNC: 3 G/DL (ref 3.5–5.2)
ALP BLD-CCNC: 76 U/L (ref 40–129)
ALT SERPL-CCNC: 15 U/L (ref 0–40)
ANION GAP SERPL CALCULATED.3IONS-SCNC: 14 MMOL/L (ref 7–16)
AST SERPL-CCNC: 16 U/L (ref 0–39)
BILIRUB SERPL-MCNC: 0.2 MG/DL (ref 0–1.2)
BUN BLDV-MCNC: 54 MG/DL (ref 6–20)
CALCIUM SERPL-MCNC: 9.2 MG/DL (ref 8.6–10.2)
CHLORIDE BLD-SCNC: 106 MMOL/L (ref 98–107)
CO2: 25 MMOL/L (ref 22–29)
CREAT SERPL-MCNC: 3.3 MG/DL (ref 0.7–1.2)
GFR AFRICAN AMERICAN: 24
GFR NON-AFRICAN AMERICAN: 20 ML/MIN/1.73
GLUCOSE BLD-MCNC: 111 MG/DL (ref 74–99)
HBA1C MFR BLD: 7.6 %
POTASSIUM SERPL-SCNC: 4.3 MMOL/L (ref 3.5–5)
SODIUM BLD-SCNC: 145 MMOL/L (ref 132–146)
TOTAL PROTEIN: 5.4 G/DL (ref 6.4–8.3)

## 2020-03-09 PROCEDURE — 80053 COMPREHEN METABOLIC PANEL: CPT

## 2020-03-09 PROCEDURE — 36415 COLL VENOUS BLD VENIPUNCTURE: CPT

## 2020-03-09 PROCEDURE — 3051F HG A1C>EQUAL 7.0%<8.0%: CPT | Performed by: FAMILY MEDICINE

## 2020-03-09 PROCEDURE — 99214 OFFICE O/P EST MOD 30 MIN: CPT | Performed by: FAMILY MEDICINE

## 2020-03-09 PROCEDURE — 83036 HEMOGLOBIN GLYCOSYLATED A1C: CPT | Performed by: FAMILY MEDICINE

## 2020-03-09 RX ORDER — DOXAZOSIN MESYLATE 1 MG/1
1 TABLET ORAL NIGHTLY
COMMUNITY
Start: 2020-03-05 | End: 2021-09-08 | Stop reason: ALTCHOICE

## 2020-03-09 RX ORDER — INSULIN DETEMIR 100 [IU]/ML
20 INJECTION, SOLUTION SUBCUTANEOUS NIGHTLY
Qty: 3 VIAL | Refills: 5 | Status: SHIPPED
Start: 2020-03-09 | End: 2020-08-13 | Stop reason: SDUPTHER

## 2020-03-10 ASSESSMENT — ENCOUNTER SYMPTOMS
EYE ITCHING: 0
COUGH: 0
SINUS PRESSURE: 0
STRIDOR: 0
ABDOMINAL PAIN: 0
RHINORRHEA: 0
CHEST TIGHTNESS: 0
PHOTOPHOBIA: 0
WHEEZING: 0
CHOKING: 0
SHORTNESS OF BREATH: 0
COLOR CHANGE: 0
VOMITING: 0
EYE PAIN: 0
EYE DISCHARGE: 0
EYE REDNESS: 0
ANAL BLEEDING: 0
DIARRHEA: 0
ABDOMINAL DISTENTION: 0
BLOOD IN STOOL: 0
TROUBLE SWALLOWING: 0
CONSTIPATION: 0
NAUSEA: 0
RECTAL PAIN: 0
BACK PAIN: 0
SORE THROAT: 0
APNEA: 0
FACIAL SWELLING: 0
VOICE CHANGE: 0

## 2020-03-10 NOTE — PROGRESS NOTES
change. Eyes: Negative for photophobia, pain, discharge, redness, itching and visual disturbance. Respiratory: Negative for apnea, cough, choking, chest tightness, shortness of breath, wheezing and stridor. Cardiovascular: Negative for chest pain, palpitations and leg swelling. Gastrointestinal: Negative for abdominal distention, abdominal pain, anal bleeding, blood in stool, constipation, diarrhea, nausea, rectal pain and vomiting. Endocrine: Negative for cold intolerance, heat intolerance, polydipsia, polyphagia and polyuria. Genitourinary: Negative for decreased urine volume, difficulty urinating, discharge, dysuria, enuresis, flank pain, frequency, genital sores, hematuria, penile pain, penile swelling, scrotal swelling, testicular pain and urgency. Musculoskeletal: Positive for stiffness. Negative for arthralgias, back pain, gait problem, joint swelling, myalgias, neck pain and neck stiffness. Skin: Negative for color change, pallor, rash and wound. Allergic/Immunologic: Negative for environmental allergies, food allergies and immunocompromised state. Neurological: Negative for dizziness, tremors, seizures, syncope, facial asymmetry, speech difficulty, weakness, light-headedness, numbness and headaches. Hematological: Negative for adenopathy. Does not bruise/bleed easily. Psychiatric/Behavioral: Negative for agitation, behavioral problems, confusion, decreased concentration, dysphoric mood, hallucinations, self-injury, sleep disturbance and suicidal ideas. The patient is not nervous/anxious and is not hyperactive.         Past Medical History:   Diagnosis Date    Diabetes mellitus ([facility]     Hyperlipidemia     Hypertension     Type 2 diabetes mellitus with diabetic nephropathy, with long-term current use of insulin ([facility] 1/22/2020       Social History     Socioeconomic History    Marital status:      Spouse name: Not on file    Number of children: Not on file    Years of Effort: Pulmonary effort is normal. No respiratory distress. Breath sounds: Normal breath sounds. No stridor. No wheezing or rales. Chest:      Chest wall: No tenderness. Abdominal:      General: Bowel sounds are normal. There is no distension. Palpations: Abdomen is soft. There is no mass. Tenderness: There is no abdominal tenderness. There is no guarding or rebound. Genitourinary:     Comments: Deferred by patient  Musculoskeletal:         General: No tenderness. Lymphadenopathy:      Cervical: No cervical adenopathy. Skin:     General: Skin is warm and dry. Coloration: Skin is not pale. Findings: No erythema or rash. Neurological:      Mental Status: He is alert and oriented to person, place, and time. Cranial Nerves: No cranial nerve deficit. Motor: No abnormal muscle tone. Coordination: Coordination normal.      Deep Tendon Reflexes: Reflexes are normal and symmetric. Reflexes normal.   Psychiatric:         Behavior: Behavior normal.         Thought Content: Thought content normal.         Judgment: Judgment normal.         Assessment / Plan:   Cha nA was seen today for 3 month follow-up, hyperlipidemia, hypertension and diabetes. Diagnoses and all orders for this visit:    Type 2 diabetes mellitus with diabetic nephropathy, with long-term current use of insulin ([facility])  -     POCT glycosylated hemoglobin (Hb A1C)  -     Comprehensive Metabolic Panel;  Future  -     insulin detemir (LEVEMIR) 100 UNIT/ML injection vial; Inject 20 Units into the skin nightly  -      DIABETES FOOT EXAM    Type 2 diabetes mellitus with diabetic nephropathy, without long-term current use of insulin ([facility])  -     insulin detemir (LEVEMIR) 100 UNIT/ML injection vial; Inject 20 Units into the skin nightly  -      DIABETES FOOT EXAM    Essential hypertension    Mixed hyperlipidemia    Shoulder impingement syndrome, left    Shoulder impingement, right    shoulder impingement and diabetes has been causing a lot of difficulty with his very physical job. This will make disability a probable direction we will be taking.

## 2020-04-01 ENCOUNTER — TELEPHONE (OUTPATIENT)
Dept: ADMINISTRATIVE | Age: 55
End: 2020-04-01

## 2020-04-02 ENCOUNTER — TELEMEDICINE (OUTPATIENT)
Dept: FAMILY MEDICINE CLINIC | Age: 55
End: 2020-04-02
Payer: COMMERCIAL

## 2020-04-02 ENCOUNTER — TELEPHONE (OUTPATIENT)
Dept: ADMINISTRATIVE | Age: 55
End: 2020-04-02

## 2020-04-02 PROCEDURE — 3051F HG A1C>EQUAL 7.0%<8.0%: CPT | Performed by: FAMILY MEDICINE

## 2020-04-02 PROCEDURE — 99213 OFFICE O/P EST LOW 20 MIN: CPT | Performed by: FAMILY MEDICINE

## 2020-04-02 RX ORDER — MONTELUKAST SODIUM 10 MG/1
10 TABLET ORAL DAILY
Qty: 30 TABLET | Refills: 3 | Status: SHIPPED
Start: 2020-04-02 | End: 2020-09-08 | Stop reason: SDUPTHER

## 2020-04-02 RX ORDER — FLUTICASONE PROPIONATE 50 MCG
2 SPRAY, SUSPENSION (ML) NASAL DAILY
Qty: 3 BOTTLE | Refills: 5 | Status: SHIPPED
Start: 2020-04-02 | End: 2020-09-08 | Stop reason: SDUPTHER

## 2020-04-02 RX ORDER — CEFDINIR 300 MG/1
300 CAPSULE ORAL 2 TIMES DAILY
Qty: 20 CAPSULE | Refills: 0 | Status: SHIPPED | OUTPATIENT
Start: 2020-04-02 | End: 2020-04-12

## 2020-04-02 NOTE — TELEPHONE ENCOUNTER
Pt called and stated he couldn't get his VV to work properly. A good contact # for him is [phone]. Thank you.

## 2020-04-07 ASSESSMENT — ENCOUNTER SYMPTOMS
ANAL BLEEDING: 0
ABDOMINAL DISTENTION: 0
CHEST TIGHTNESS: 0
CHOKING: 0
EYE REDNESS: 0
RECTAL PAIN: 0
EYE DISCHARGE: 0
BACK PAIN: 0
EYE ITCHING: 0
DIARRHEA: 0
COLOR CHANGE: 0
SHORTNESS OF BREATH: 0
EYE PAIN: 0
COUGH: 1
NAUSEA: 0
SINUS PRESSURE: 1
CONSTIPATION: 0
SORE THROAT: 0
ABDOMINAL PAIN: 0
TROUBLE SWALLOWING: 0
PHOTOPHOBIA: 0
BLOOD IN STOOL: 0
WHEEZING: 0
VOMITING: 0
STRIDOR: 0
APNEA: 0
FACIAL SWELLING: 0
RHINORRHEA: 1
SINUS PAIN: 1
VOICE CHANGE: 0

## 2020-04-07 NOTE — PROGRESS NOTES
facial swelling, hearing loss, mouth sores, nosebleeds, sore throat, tinnitus, trouble swallowing and voice change. Eyes: Negative for photophobia, pain, discharge, redness, itching and visual disturbance. Respiratory: Positive for cough. Negative for apnea, choking, chest tightness, shortness of breath, wheezing and stridor. Cardiovascular: Negative for chest pain, palpitations and leg swelling. Gastrointestinal: Negative for abdominal distention, abdominal pain, anal bleeding, blood in stool, constipation, diarrhea, nausea, rectal pain and vomiting. Endocrine: Negative for cold intolerance, heat intolerance, polydipsia, polyphagia and polyuria. Genitourinary: Negative for decreased urine volume, difficulty urinating, discharge, dysuria, enuresis, flank pain, frequency, genital sores, hematuria, penile pain, penile swelling, scrotal swelling, testicular pain and urgency. Musculoskeletal: Negative for arthralgias, back pain, gait problem, joint swelling, myalgias, neck pain and neck stiffness. Skin: Negative for color change, pallor, rash and wound. Allergic/Immunologic: Negative for environmental allergies, food allergies and immunocompromised state. Neurological: Negative for dizziness, tremors, seizures, syncope, facial asymmetry, speech difficulty, weakness, light-headedness, numbness and headaches. Hematological: Negative for adenopathy. Does not bruise/bleed easily. Psychiatric/Behavioral: Negative for agitation, behavioral problems, confusion, decreased concentration, dysphoric mood, hallucinations, self-injury, sleep disturbance and suicidal ideas. The patient is not nervous/anxious and is not hyperactive.         Past Medical History:   Diagnosis Date    Diabetes mellitus ([facility] [address]     Hyperlipidemia     Hypertension     Type 2 diabetes mellitus with diabetic nephropathy, with long-term current use of insulin ([facility] [address] 1/22/2020       Social History     Socioeconomic History    Marital Status: He is alert. Motor: No abnormal muscle tone. Deep Tendon Reflexes: Reflexes are normal and symmetric. Psychiatric:         Mood and Affect: Mood normal.         Behavior: Behavior normal.         Thought Content: Thought content normal.         Judgment: Judgment normal.         Assessment / Plan:   Noemi Justin was seen today for diabetes, cough and sinusitis. Diagnoses and all orders for this visit:    Type 2 diabetes mellitus with hyperglycemia, without long-term current use of insulin ([facility])  -     Hemoglobin A1C; Future  -     Comprehensive Metabolic Panel; Future    Type 2 diabetes mellitus with diabetic nephropathy, with long-term current use of insulin ([facility])  -     Hemoglobin A1C; Future  -     Comprehensive Metabolic Panel; Future    Acute bacterial sinusitis  -     cefdinir (OMNICEF) 300 MG capsule; Take 1 capsule by mouth 2 times daily for 10 days  -     montelukast (SINGULAIR) 10 MG tablet; Take 1 tablet by mouth daily  -     fluticasone (FLONASE) 50 MCG/ACT nasal spray; 2 sprays by Each Nostril route daily    Bronchitis  -     cefdinir (OMNICEF) 300 MG capsule; Take 1 capsule by mouth 2 times daily for 10 days  -     montelukast (SINGULAIR) 10 MG tablet; Take 1 tablet by mouth daily  -     fluticasone (FLONASE) 50 MCG/ACT nasal spray; 2 sprays by Each Nostril route daily        Reviewed healthmaintenance report. Patient is aware of deficiencies and suggested preventative tests.     Time spent: Greater than 15    This visit was completed virtually using My Chart/[employer]

## 2020-04-29 ENCOUNTER — PATIENT MESSAGE (OUTPATIENT)
Dept: FAMILY MEDICINE CLINIC | Age: 55
End: 2020-04-29

## 2020-04-29 RX ORDER — SILDENAFIL 100 MG/1
100 TABLET, FILM COATED ORAL PRN
Qty: 10 TABLET | Refills: 5 | Status: SHIPPED
Start: 2020-04-29 | End: 2020-10-27

## 2020-04-29 RX ORDER — FUROSEMIDE 40 MG/1
40 TABLET ORAL DAILY
Qty: 60 TABLET | Refills: 3 | Status: SHIPPED
Start: 2020-04-29 | End: 2020-06-09 | Stop reason: ALTCHOICE

## 2020-04-29 NOTE — TELEPHONE ENCOUNTER
From: Anh Jeffries  To: Abbey Yeung DO  Sent: 4/29/2020 1:31 AM EDT  Subject: Non-Urgent Medical Question    Hi. I am out of refills of furosemide and was wondering if I can get the medication refilled at the [facility] in [place].

## 2020-05-11 RX ORDER — LOVASTATIN 40 MG/1
TABLET ORAL
Qty: 30 TABLET | Refills: 0 | Status: SHIPPED
Start: 2020-05-11 | End: 2020-07-06 | Stop reason: SDUPTHER

## 2020-05-12 ENCOUNTER — HOSPITAL ENCOUNTER (OUTPATIENT)
Age: 55
Discharge: HOME | End: 2020-05-14
Payer: COMMERCIAL

## 2020-05-12 LAB
ANION GAP SERPL CALCULATED.3IONS-SCNC: 12 MMOL/L (ref 7–16)
BASOPHILS ABSOLUTE: 0.03 E9/L (ref 0–0.2)
BASOPHILS RELATIVE PERCENT: 0.6 % (ref 0–2)
BUN BLDV-MCNC: 71 MG/DL (ref 6–20)
CALCIUM SERPL-MCNC: 8.7 MG/DL (ref 8.6–10.2)
CHLORIDE BLD-SCNC: 105 MMOL/L (ref 98–107)
CO2: 20 MMOL/L (ref 22–29)
CREAT SERPL-MCNC: 4.2 MG/DL (ref 0.7–1.2)
CREATININE URINE: 74 MG/DL (ref 40–278)
EOSINOPHILS ABSOLUTE: 0.19 E9/L (ref 0.05–0.5)
EOSINOPHILS RELATIVE PERCENT: 4.1 % (ref 0–6)
GFR AFRICAN AMERICAN: 18
GFR NON-AFRICAN AMERICAN: 15 ML/MIN/1.73
GLUCOSE BLD-MCNC: 69 MG/DL (ref 74–99)
HCT VFR BLD CALC: 31 % (ref 37–54)
HEMOGLOBIN: 9.5 G/DL (ref 12.5–16.5)
IMMATURE GRANULOCYTES #: 0.01 E9/L
IMMATURE GRANULOCYTES %: 0.2 % (ref 0–5)
LYMPHOCYTES ABSOLUTE: 0.86 E9/L (ref 1.5–4)
LYMPHOCYTES RELATIVE PERCENT: 18.4 % (ref 20–42)
MAGNESIUM: 2.1 MG/DL (ref 1.6–2.6)
MCH RBC QN AUTO: 25.2 PG (ref 26–35)
MCHC RBC AUTO-ENTMCNC: 30.6 % (ref 32–34.5)
MCV RBC AUTO: 82.2 FL (ref 80–99.9)
MONOCYTES ABSOLUTE: 0.59 E9/L (ref 0.1–0.95)
MONOCYTES RELATIVE PERCENT: 12.6 % (ref 2–12)
NEUTROPHILS ABSOLUTE: 3 E9/L (ref 1.8–7.3)
NEUTROPHILS RELATIVE PERCENT: 64.1 % (ref 43–80)
PDW BLD-RTO: 13.3 FL (ref 11.5–15)
PHOSPHORUS: 5.4 MG/DL (ref 2.5–4.5)
PLATELET # BLD: 377 E9/L (ref 130–450)
PMV BLD AUTO: 10.8 FL (ref 7–12)
POTASSIUM SERPL-SCNC: 4.6 MMOL/L (ref 3.5–5)
PROTEIN PROTEIN: >600 MG/DL (ref 0–12)
PROTEIN/CREAT RATIO: 8.1
PROTEIN/CREAT RATIO: 8.1 (ref 0–0.2)
RBC # BLD: 3.77 E12/L (ref 3.8–5.8)
SODIUM BLD-SCNC: 137 MMOL/L (ref 132–146)
URIC ACID, SERUM: 8.1 MG/DL (ref 3.4–7)
WBC # BLD: 4.7 E9/L (ref 4.5–11.5)

## 2020-05-12 PROCEDURE — 80048 BASIC METABOLIC PNL TOTAL CA: CPT

## 2020-05-12 PROCEDURE — 83735 ASSAY OF MAGNESIUM: CPT

## 2020-05-12 PROCEDURE — 84550 ASSAY OF BLOOD/URIC ACID: CPT

## 2020-05-12 PROCEDURE — 85025 COMPLETE CBC W/AUTO DIFF WBC: CPT

## 2020-05-12 PROCEDURE — 82570 ASSAY OF URINE CREATININE: CPT

## 2020-05-12 PROCEDURE — 84156 ASSAY OF PROTEIN URINE: CPT

## 2020-05-12 PROCEDURE — 84100 ASSAY OF PHOSPHORUS: CPT

## 2020-05-12 PROCEDURE — 36415 COLL VENOUS BLD VENIPUNCTURE: CPT

## 2020-05-21 ENCOUNTER — HOSPITAL ENCOUNTER (OUTPATIENT)
Dept: ULTRASOUND IMAGING | Age: 55
Discharge: HOME | End: 2020-05-21
Payer: COMMERCIAL

## 2020-05-21 PROCEDURE — 76775 US EXAM ABDO BACK WALL LIM: CPT

## 2020-05-28 ENCOUNTER — HOSPITAL ENCOUNTER (OUTPATIENT)
Age: 55
Discharge: HOME | End: 2020-05-30
Payer: COMMERCIAL

## 2020-05-28 LAB
ANION GAP SERPL CALCULATED.3IONS-SCNC: 16 MMOL/L (ref 7–16)
BUN BLDV-MCNC: 64 MG/DL (ref 6–20)
CALCIUM SERPL-MCNC: 8.5 MG/DL (ref 8.6–10.2)
CHLORIDE BLD-SCNC: 108 MMOL/L (ref 98–107)
CO2: 20 MMOL/L (ref 22–29)
CREAT SERPL-MCNC: 4.4 MG/DL (ref 0.7–1.2)
FERRITIN: 61 NG/ML
FOLATE: 15 NG/ML (ref 4.8–24.2)
GFR AFRICAN AMERICAN: 17
GFR NON-AFRICAN AMERICAN: 14 ML/MIN/1.73
GLUCOSE BLD-MCNC: 90 MG/DL (ref 74–99)
HCT VFR BLD CALC: 30.1 % (ref 37–54)
IMMATURE RETIC FRACT: 10.7 % (ref 2.3–13.4)
IRON SATURATION: 7 % (ref 20–55)
IRON: 15 MCG/DL (ref 59–158)
POTASSIUM SERPL-SCNC: 4.8 MMOL/L (ref 3.5–5)
RETIC HGB EQUIVALENT: 27.9 PG (ref 28.2–36.6)
RETICULOCYTE ABSOLUTE COUNT: 0.04 E12/L
RETICULOCYTE COUNT PCT: 1 % (ref 0.4–1.9)
SODIUM BLD-SCNC: 144 MMOL/L (ref 132–146)
TOTAL IRON BINDING CAPACITY: 226 MCG/DL (ref 250–450)

## 2020-05-28 PROCEDURE — 82746 ASSAY OF FOLIC ACID SERUM: CPT

## 2020-05-28 PROCEDURE — 36415 COLL VENOUS BLD VENIPUNCTURE: CPT

## 2020-05-28 PROCEDURE — 83550 IRON BINDING TEST: CPT

## 2020-05-28 PROCEDURE — 83880 ASSAY OF NATRIURETIC PEPTIDE: CPT

## 2020-05-28 PROCEDURE — 85045 AUTOMATED RETICULOCYTE COUNT: CPT

## 2020-05-28 PROCEDURE — 82728 ASSAY OF FERRITIN: CPT

## 2020-05-28 PROCEDURE — 80048 BASIC METABOLIC PNL TOTAL CA: CPT

## 2020-05-28 PROCEDURE — 83540 ASSAY OF IRON: CPT

## 2020-05-29 LAB — PRO-BNP: 7856 PG/ML (ref 0–125)

## 2020-06-05 PROBLEM — D63.1 ANEMIA IN CKD (CHRONIC KIDNEY DISEASE): Status: ACTIVE | Noted: 2020-06-05

## 2020-06-05 PROBLEM — N18.4 CHRONIC KIDNEY DISEASE, STAGE IV (SEVERE) (HCC): Status: ACTIVE | Noted: 2020-06-05

## 2020-06-05 PROBLEM — I50.9 CONGESTIVE HEART DISEASE (HCC): Status: ACTIVE | Noted: 2020-06-05

## 2020-06-05 PROBLEM — N18.9 ANEMIA IN CKD (CHRONIC KIDNEY DISEASE): Status: ACTIVE | Noted: 2020-06-05

## 2020-06-05 RX ORDER — SODIUM CHLORIDE 0.9 % (FLUSH) 0.9 %
10 SYRINGE (ML) INJECTION PRN
Status: CANCELLED | OUTPATIENT
Start: 2020-06-05

## 2020-06-09 ENCOUNTER — HOSPITAL ENCOUNTER (OUTPATIENT)
Age: 55
Discharge: HOME | End: 2020-06-11
Payer: COMMERCIAL

## 2020-06-09 ENCOUNTER — OFFICE VISIT (OUTPATIENT)
Dept: FAMILY MEDICINE CLINIC | Age: 55
End: 2020-06-09
Payer: COMMERCIAL

## 2020-06-09 VITALS
OXYGEN SATURATION: 98 % | RESPIRATION RATE: 18 BRPM | SYSTOLIC BLOOD PRESSURE: 124 MMHG | WEIGHT: 225 LBS | HEART RATE: 99 BPM | BODY MASS INDEX: 34.21 KG/M2 | DIASTOLIC BLOOD PRESSURE: 78 MMHG

## 2020-06-09 LAB
BASOPHILS ABSOLUTE: 0.04 E9/L (ref 0–0.2)
BASOPHILS RELATIVE PERCENT: 0.6 % (ref 0–2)
EOSINOPHILS ABSOLUTE: 0.24 E9/L (ref 0.05–0.5)
EOSINOPHILS RELATIVE PERCENT: 3.8 % (ref 0–6)
HBA1C MFR BLD: 7.2 %
HCT VFR BLD CALC: 32.4 % (ref 37–54)
HEMOGLOBIN: 9.7 G/DL (ref 12.5–16.5)
IMMATURE GRANULOCYTES #: 0.01 E9/L
IMMATURE GRANULOCYTES %: 0.2 % (ref 0–5)
LYMPHOCYTES ABSOLUTE: 0.95 E9/L (ref 1.5–4)
LYMPHOCYTES RELATIVE PERCENT: 15.2 % (ref 20–42)
MCH RBC QN AUTO: 24.3 PG (ref 26–35)
MCHC RBC AUTO-ENTMCNC: 29.9 % (ref 32–34.5)
MCV RBC AUTO: 81.2 FL (ref 80–99.9)
MONOCYTES ABSOLUTE: 0.57 E9/L (ref 0.1–0.95)
MONOCYTES RELATIVE PERCENT: 9.1 % (ref 2–12)
NEUTROPHILS ABSOLUTE: 4.44 E9/L (ref 1.8–7.3)
NEUTROPHILS RELATIVE PERCENT: 71.1 % (ref 43–80)
PDW BLD-RTO: 14.2 FL (ref 11.5–15)
PLATELET # BLD: 379 E9/L (ref 130–450)
PMV BLD AUTO: 10.3 FL (ref 7–12)
RBC # BLD: 3.99 E12/L (ref 3.8–5.8)
WBC # BLD: 6.3 E9/L (ref 4.5–11.5)

## 2020-06-09 PROCEDURE — 3051F HG A1C>EQUAL 7.0%<8.0%: CPT | Performed by: FAMILY MEDICINE

## 2020-06-09 PROCEDURE — 82306 VITAMIN D 25 HYDROXY: CPT

## 2020-06-09 PROCEDURE — 36415 COLL VENOUS BLD VENIPUNCTURE: CPT

## 2020-06-09 PROCEDURE — 83036 HEMOGLOBIN GLYCOSYLATED A1C: CPT | Performed by: FAMILY MEDICINE

## 2020-06-09 PROCEDURE — 99214 OFFICE O/P EST MOD 30 MIN: CPT | Performed by: FAMILY MEDICINE

## 2020-06-09 PROCEDURE — 85025 COMPLETE CBC W/AUTO DIFF WBC: CPT

## 2020-06-09 PROCEDURE — 80053 COMPREHEN METABOLIC PANEL: CPT

## 2020-06-09 RX ORDER — BUMETANIDE 2 MG/1
TABLET ORAL
COMMUNITY
Start: 2020-06-03 | End: 2020-06-09 | Stop reason: SDUPTHER

## 2020-06-09 RX ORDER — BUMETANIDE 2 MG/1
TABLET ORAL
Qty: 30 TABLET | Refills: 0
Start: 2020-06-09 | End: 2020-09-08 | Stop reason: SDUPTHER

## 2020-06-09 RX ORDER — METOLAZONE 2.5 MG/1
2.5 TABLET ORAL DAILY
Qty: 30 TABLET | Refills: 5
Start: 2020-06-09 | End: 2020-06-12

## 2020-06-10 LAB
ALBUMIN SERPL-MCNC: 3 G/DL (ref 3.5–5.2)
ALP BLD-CCNC: 86 U/L (ref 40–129)
ALT SERPL-CCNC: 17 U/L (ref 0–40)
ANION GAP SERPL CALCULATED.3IONS-SCNC: 13 MMOL/L (ref 7–16)
AST SERPL-CCNC: 19 U/L (ref 0–39)
BILIRUB SERPL-MCNC: <0.2 MG/DL (ref 0–1.2)
BUN BLDV-MCNC: 64 MG/DL (ref 6–20)
CALCIUM SERPL-MCNC: 8.6 MG/DL (ref 8.6–10.2)
CHLORIDE BLD-SCNC: 104 MMOL/L (ref 98–107)
CO2: 26 MMOL/L (ref 22–29)
CREAT SERPL-MCNC: 4.5 MG/DL (ref 0.7–1.2)
GFR AFRICAN AMERICAN: 17
GFR NON-AFRICAN AMERICAN: 14 ML/MIN/1.73
GLUCOSE BLD-MCNC: 52 MG/DL (ref 74–99)
POTASSIUM SERPL-SCNC: 4.5 MMOL/L (ref 3.5–5)
SODIUM BLD-SCNC: 143 MMOL/L (ref 132–146)
TOTAL PROTEIN: 5.6 G/DL (ref 6.4–8.3)
VITAMIN D 25-HYDROXY: 11 NG/ML (ref 30–100)

## 2020-06-10 ASSESSMENT — ENCOUNTER SYMPTOMS
NAUSEA: 0
COLOR CHANGE: 0
DIARRHEA: 0
FACIAL SWELLING: 0
STRIDOR: 0
BLOOD IN STOOL: 0
ABDOMINAL PAIN: 0
WHEEZING: 0
EYE REDNESS: 0
SHORTNESS OF BREATH: 0
CHEST TIGHTNESS: 0
SORE THROAT: 0
COUGH: 0
APNEA: 0
EYE PAIN: 0
RHINORRHEA: 0
BACK PAIN: 0
SINUS PRESSURE: 0
EYE ITCHING: 0
TROUBLE SWALLOWING: 0
ANAL BLEEDING: 0
RECTAL PAIN: 0
CHOKING: 0
VOICE CHANGE: 0
CONSTIPATION: 0
EYE DISCHARGE: 0
PHOTOPHOBIA: 0
VOMITING: 0
ABDOMINAL DISTENTION: 0

## 2020-06-10 NOTE — PROGRESS NOTES
throat, vomiting or weakness. Nothing aggravates the symptoms. Treatments tried: diuretics just changed  Improvement on treatment: a little early yet, but not much improvement        Review of Systems   Constitutional: Positive for fatigue. Negative for activity change, appetite change, chills, diaphoresis, fever and unexpected weight change. HENT: Negative for congestion, dental problem, drooling, ear discharge, ear pain, facial swelling, hearing loss, mouth sores, nosebleeds, postnasal drip, rhinorrhea, sinus pressure, sneezing, sore throat, tinnitus, trouble swallowing and voice change. Eyes: Negative for photophobia, pain, discharge, redness, itching and visual disturbance. Respiratory: Negative for apnea, cough, choking, chest tightness, shortness of breath, wheezing and stridor. Cardiovascular: Positive for leg swelling. Negative for chest pain and palpitations. Gastrointestinal: Negative for abdominal distention, abdominal pain, anal bleeding, blood in stool, constipation, diarrhea, nausea, rectal pain and vomiting. Endocrine: Negative for cold intolerance, heat intolerance, polydipsia, polyphagia and polyuria. Genitourinary: Negative for decreased urine volume, difficulty urinating, discharge, dysuria, enuresis, flank pain, frequency, genital sores, hematuria, penile pain, penile swelling, scrotal swelling, testicular pain and urgency. Musculoskeletal: Negative for arthralgias, back pain, gait problem, joint swelling, myalgias, neck pain and neck stiffness. Skin: Negative for color change, pallor, rash and wound. Allergic/Immunologic: Negative for environmental allergies, food allergies and immunocompromised state. Neurological: Negative for dizziness, tremors, seizures, syncope, facial asymmetry, speech difficulty, weakness, light-headedness, numbness and headaches. Hematological: Negative for adenopathy. Does not bruise/bleed easily.    Psychiatric/Behavioral: Negative for / Plan:   Dmitry Feng was seen today for 3 month follow-up, diabetes, other, leg swelling and anemia. Diagnoses and all orders for this visit:    Type 2 diabetes mellitus without complication, without long-term current use of insulin (HCC)  -     POCT glycosylated hemoglobin (Hb A1C)    Mixed hyperlipidemia    Renal insufficiency  -     POCT glycosylated hemoglobin (Hb A1C)  -     metOLazone (ZAROXOLYN) 2.5 MG tablet; Take 1 tablet by mouth daily  -     bumetanide (BUMEX) 2 MG tablet; TAKE ONE TABLET BY MOUTH TWO TIMES A DAY  -     CBC Auto Differential; Future  -     Vitamin D 25 Hydroxy; Future    Benign essential HTN  -     POCT glycosylated hemoglobin (Hb A1C)  -     metOLazone (ZAROXOLYN) 2.5 MG tablet; Take 1 tablet by mouth daily  -     bumetanide (BUMEX) 2 MG tablet; TAKE ONE TABLET BY MOUTH TWO TIMES A DAY  -     CBC Auto Differential; Future  -     Vitamin D 25 Hydroxy; Future    Anemia in chronic kidney disease, unspecified CKD stage  -     CBC Auto Differential; Future    Vitamin D deficiency  -     Vitamin D 25 Hydroxy; Future    Leg swelling  -     metOLazone (ZAROXOLYN) 2.5 MG tablet; Take 1 tablet by mouth daily  -     bumetanide (BUMEX) 2 MG tablet; TAKE ONE TABLET BY MOUTH TWO TIMES A DAY      Follow closely with renal. Discussed case at length  Reviewed healthmaintenance report. Patient is aware of deficiencies and suggested preventative tests.

## 2020-06-12 ENCOUNTER — HOSPITAL ENCOUNTER (OUTPATIENT)
Dept: INFUSION THERAPY | Age: 55
Setting detail: INFUSION SERIES
Discharge: HOME | End: 2020-06-12
Payer: COMMERCIAL

## 2020-06-12 VITALS
HEART RATE: 95 BPM | OXYGEN SATURATION: 99 % | DIASTOLIC BLOOD PRESSURE: 78 MMHG | TEMPERATURE: 98.2 F | RESPIRATION RATE: 18 BRPM | SYSTOLIC BLOOD PRESSURE: 138 MMHG

## 2020-06-12 DIAGNOSIS — I50.9 CONGESTIVE HEART FAILURE, UNSPECIFIED HF CHRONICITY, UNSPECIFIED HEART FAILURE TYPE (HCC): ICD-10-CM

## 2020-06-12 DIAGNOSIS — N18.9 ANEMIA IN CHRONIC KIDNEY DISEASE, UNSPECIFIED CKD STAGE: ICD-10-CM

## 2020-06-12 DIAGNOSIS — D63.1 ANEMIA IN CHRONIC KIDNEY DISEASE, UNSPECIFIED CKD STAGE: ICD-10-CM

## 2020-06-12 DIAGNOSIS — N18.4 CHRONIC KIDNEY DISEASE, STAGE IV (SEVERE) (HCC): Primary | ICD-10-CM

## 2020-06-12 PROCEDURE — 96365 THER/PROPH/DIAG IV INF INIT: CPT

## 2020-06-12 PROCEDURE — 2580000003 HC RX 258: Performed by: INTERNAL MEDICINE

## 2020-06-12 PROCEDURE — 6360000002 HC RX W HCPCS: Performed by: INTERNAL MEDICINE

## 2020-06-12 RX ORDER — SODIUM CHLORIDE 0.9 % (FLUSH) 0.9 %
10 SYRINGE (ML) INJECTION PRN
Status: DISCONTINUED | OUTPATIENT
Start: 2020-06-12 | End: 2020-06-13 | Stop reason: HOSPADM

## 2020-06-12 RX ORDER — SODIUM CHLORIDE 0.9 % (FLUSH) 0.9 %
10 SYRINGE (ML) INJECTION PRN
Status: CANCELLED | OUTPATIENT
Start: 2020-06-19

## 2020-06-12 RX ADMIN — Medication 10 ML: at 08:38

## 2020-06-12 RX ADMIN — Medication 10 ML: at 08:57

## 2020-06-12 RX ADMIN — FERRIC CARBOXYMALTOSE INJECTION 750 MG: 50 INJECTION, SOLUTION INTRAVENOUS at 08:38

## 2020-06-12 NOTE — PROGRESS NOTES
Discussed signs and symptoms of allergic or hypersensitivity reaction but not limited to: Itching, with or without rash; acute wheezing or stridor; throat swelling or difficulty swallowing; dizziness and fast heart rate. For these symptoms other than rash and itching you should seek immediate medical attention. For rash, notify your doctor. Patient remained in infusion for required amount of time before discharge. No problems or issues noted.

## 2020-06-24 ENCOUNTER — HOSPITAL ENCOUNTER (OUTPATIENT)
Dept: INFUSION THERAPY | Age: 55
Setting detail: INFUSION SERIES
Discharge: HOME | End: 2020-06-24
Payer: COMMERCIAL

## 2020-06-24 DIAGNOSIS — N18.4 CHRONIC KIDNEY DISEASE, STAGE IV (SEVERE) (HCC): Primary | ICD-10-CM

## 2020-06-24 DIAGNOSIS — I50.9 CONGESTIVE HEART FAILURE, UNSPECIFIED HF CHRONICITY, UNSPECIFIED HEART FAILURE TYPE (HCC): ICD-10-CM

## 2020-06-24 DIAGNOSIS — D63.1 ANEMIA IN CHRONIC KIDNEY DISEASE, UNSPECIFIED CKD STAGE: ICD-10-CM

## 2020-06-24 DIAGNOSIS — N18.9 ANEMIA IN CHRONIC KIDNEY DISEASE, UNSPECIFIED CKD STAGE: ICD-10-CM

## 2020-06-24 PROCEDURE — 2580000003 HC RX 258: Performed by: INTERNAL MEDICINE

## 2020-06-24 PROCEDURE — 6360000002 HC RX W HCPCS: Performed by: INTERNAL MEDICINE

## 2020-06-24 PROCEDURE — 96365 THER/PROPH/DIAG IV INF INIT: CPT

## 2020-06-24 RX ORDER — SODIUM CHLORIDE 0.9 % (FLUSH) 0.9 %
10 SYRINGE (ML) INJECTION PRN
Status: CANCELLED | OUTPATIENT
Start: 2020-06-24

## 2020-06-24 RX ORDER — SODIUM CHLORIDE 0.9 % (FLUSH) 0.9 %
10 SYRINGE (ML) INJECTION PRN
Status: DISCONTINUED | OUTPATIENT
Start: 2020-06-24 | End: 2020-06-25 | Stop reason: HOSPADM

## 2020-06-24 RX ADMIN — Medication 10 ML: at 13:04

## 2020-06-24 RX ADMIN — Medication 10 ML: at 13:36

## 2020-06-24 RX ADMIN — FERRIC CARBOXYMALTOSE INJECTION 750 MG: 50 INJECTION, SOLUTION INTRAVENOUS at 13:17

## 2020-06-24 NOTE — PROGRESS NOTES
Patient remained in infusion for required amount of time before discharge. No problems or issues noted. Discussed signs and symptoms of allergic or hypersensitivity reaction but not limited to: Itching, with or without rash; acute wheezing or stridor; throat swelling or difficulty swallowing; dizziness and fast heart rate. For these symptoms other than rash and itching you should seek immediate medical attention. For rash, notify your doctor. Patient was offered a patient informational packet at discharge. Patient refuses packet at this time.

## 2020-07-06 ENCOUNTER — TELEPHONE (OUTPATIENT)
Dept: FAMILY MEDICINE CLINIC | Age: 55
End: 2020-07-06

## 2020-07-06 RX ORDER — VALACYCLOVIR HYDROCHLORIDE 1 G/1
1000 TABLET, FILM COATED ORAL 3 TIMES DAILY
Qty: 21 TABLET | Refills: 0 | Status: SHIPPED | OUTPATIENT
Start: 2020-07-06 | End: 2020-07-13

## 2020-07-06 NOTE — TELEPHONE ENCOUNTER
Per Patient he was advised by his Kidney dr to see gastro for anemia. I did request last office visit notes from last week.

## 2020-07-07 RX ORDER — LOVASTATIN 40 MG/1
40 TABLET ORAL DAILY
Qty: 30 TABLET | Refills: 3 | Status: SHIPPED
Start: 2020-07-07 | End: 2020-09-08 | Stop reason: SDUPTHER

## 2020-07-17 ENCOUNTER — HOSPITAL ENCOUNTER (OUTPATIENT)
Age: 55
Discharge: HOME | End: 2020-07-19
Payer: COMMERCIAL

## 2020-07-17 PROCEDURE — U0003 INFECTIOUS AGENT DETECTION BY NUCLEIC ACID (DNA OR RNA); SEVERE ACUTE RESPIRATORY SYNDROME CORONAVIRUS 2 (SARS-COV-2) (CORONAVIRUS DISEASE [COVID-19]), AMPLIFIED PROBE TECHNIQUE, MAKING USE OF HIGH THROUGHPUT TECHNOLOGIES AS DESCRIBED BY CMS-2020-01-R: HCPCS

## 2020-07-18 LAB
SARS-COV-2: NOT DETECTED
SOURCE: NORMAL

## 2020-08-13 RX ORDER — INSULIN DETEMIR 100 [IU]/ML
20 INJECTION, SOLUTION SUBCUTANEOUS NIGHTLY
Qty: 3 VIAL | Refills: 5 | Status: SHIPPED
Start: 2020-08-13 | End: 2020-09-08 | Stop reason: SDUPTHER

## 2020-08-31 RX ORDER — GLIPIZIDE 10 MG/1
TABLET ORAL
Qty: 180 TABLET | Refills: 0 | OUTPATIENT
Start: 2020-08-31

## 2020-09-08 ENCOUNTER — OFFICE VISIT (OUTPATIENT)
Dept: FAMILY MEDICINE CLINIC | Age: 55
End: 2020-09-08
Payer: COMMERCIAL

## 2020-09-08 VITALS
WEIGHT: 221 LBS | HEART RATE: 86 BPM | SYSTOLIC BLOOD PRESSURE: 130 MMHG | OXYGEN SATURATION: 97 % | BODY MASS INDEX: 33.6 KG/M2 | DIASTOLIC BLOOD PRESSURE: 78 MMHG | RESPIRATION RATE: 18 BRPM

## 2020-09-08 PROCEDURE — 99214 OFFICE O/P EST MOD 30 MIN: CPT | Performed by: FAMILY MEDICINE

## 2020-09-08 RX ORDER — MONTELUKAST SODIUM 10 MG/1
10 TABLET ORAL DAILY
Qty: 30 TABLET | Refills: 3 | Status: SHIPPED
Start: 2020-09-08 | End: 2021-01-11

## 2020-09-08 RX ORDER — BUMETANIDE 2 MG/1
TABLET ORAL
Qty: 30 TABLET | Refills: 5 | Status: SHIPPED
Start: 2020-09-08 | End: 2020-12-30

## 2020-09-08 RX ORDER — LOVASTATIN 40 MG/1
40 TABLET ORAL DAILY
Qty: 30 TABLET | Refills: 3 | Status: SHIPPED
Start: 2020-09-08 | End: 2021-01-11

## 2020-09-08 RX ORDER — ERGOCALCIFEROL 1.25 MG/1
CAPSULE ORAL
Qty: 27 CAPSULE | Refills: 3 | Status: SHIPPED
Start: 2020-09-08 | End: 2021-09-09

## 2020-09-08 RX ORDER — IPRATROPIUM BROMIDE 42 UG/1
2 SPRAY, METERED NASAL 3 TIMES DAILY
Qty: 1 BOTTLE | Refills: 3 | Status: SHIPPED
Start: 2020-09-08 | End: 2021-09-08

## 2020-09-08 RX ORDER — INSULIN DETEMIR 100 [IU]/ML
20 INJECTION, SOLUTION SUBCUTANEOUS NIGHTLY
Qty: 3 VIAL | Refills: 5 | Status: SHIPPED
Start: 2020-09-08 | End: 2020-10-09 | Stop reason: SDUPTHER

## 2020-09-08 RX ORDER — FLUTICASONE PROPIONATE 50 MCG
2 SPRAY, SUSPENSION (ML) NASAL DAILY
Qty: 3 BOTTLE | Refills: 5 | Status: SHIPPED | OUTPATIENT
Start: 2020-09-08

## 2020-09-10 ENCOUNTER — HOSPITAL ENCOUNTER (OUTPATIENT)
Age: 55
Discharge: HOME | End: 2020-09-12
Payer: COMMERCIAL

## 2020-09-10 LAB
ALBUMIN SERPL-MCNC: 3.4 G/DL (ref 3.5–5.2)
ALP BLD-CCNC: 68 U/L (ref 40–129)
ALT SERPL-CCNC: 15 U/L (ref 0–40)
ANION GAP SERPL CALCULATED.3IONS-SCNC: 19 MMOL/L (ref 7–16)
AST SERPL-CCNC: 13 U/L (ref 0–39)
BASOPHILS ABSOLUTE: 0.04 E9/L (ref 0–0.2)
BASOPHILS RELATIVE PERCENT: 0.7 % (ref 0–2)
BILIRUB SERPL-MCNC: 0.2 MG/DL (ref 0–1.2)
BUN BLDV-MCNC: 74 MG/DL (ref 6–20)
CALCIUM SERPL-MCNC: 8.5 MG/DL (ref 8.6–10.2)
CHLORIDE BLD-SCNC: 105 MMOL/L (ref 98–107)
CHOLESTEROL, TOTAL: 158 MG/DL (ref 0–199)
CO2: 23 MMOL/L (ref 22–29)
CREAT SERPL-MCNC: 5 MG/DL (ref 0.7–1.2)
EOSINOPHILS ABSOLUTE: 0.23 E9/L (ref 0.05–0.5)
EOSINOPHILS RELATIVE PERCENT: 4 % (ref 0–6)
GFR AFRICAN AMERICAN: 15
GFR NON-AFRICAN AMERICAN: 12 ML/MIN/1.73
GLUCOSE BLD-MCNC: 62 MG/DL (ref 74–99)
HBA1C MFR BLD: 6.4 % (ref 4–5.6)
HCT VFR BLD CALC: 32 % (ref 37–54)
HDLC SERPL-MCNC: 62 MG/DL
HEMOGLOBIN: 10.6 G/DL (ref 12.5–16.5)
IMMATURE GRANULOCYTES #: 0.01 E9/L
IMMATURE GRANULOCYTES %: 0.2 % (ref 0–5)
LDL CHOLESTEROL CALCULATED: 87 MG/DL (ref 0–99)
LYMPHOCYTES ABSOLUTE: 0.7 E9/L (ref 1.5–4)
LYMPHOCYTES RELATIVE PERCENT: 12.1 % (ref 20–42)
MCH RBC QN AUTO: 28.3 PG (ref 26–35)
MCHC RBC AUTO-ENTMCNC: 33.1 % (ref 32–34.5)
MCV RBC AUTO: 85.6 FL (ref 80–99.9)
MONOCYTES ABSOLUTE: 0.56 E9/L (ref 0.1–0.95)
MONOCYTES RELATIVE PERCENT: 9.7 % (ref 2–12)
NEUTROPHILS ABSOLUTE: 4.26 E9/L (ref 1.8–7.3)
NEUTROPHILS RELATIVE PERCENT: 73.3 % (ref 43–80)
PDW BLD-RTO: 15 FL (ref 11.5–15)
PLATELET # BLD: 290 E9/L (ref 130–450)
PMV BLD AUTO: 10.6 FL (ref 7–12)
POTASSIUM SERPL-SCNC: 4.4 MMOL/L (ref 3.5–5)
RBC # BLD: 3.74 E12/L (ref 3.8–5.8)
SODIUM BLD-SCNC: 147 MMOL/L (ref 132–146)
T4 FREE: 1.15 NG/DL (ref 0.93–1.7)
TOTAL PROTEIN: 5.4 G/DL (ref 6.4–8.3)
TRIGL SERPL-MCNC: 43 MG/DL (ref 0–149)
TSH SERPL DL<=0.05 MIU/L-ACNC: 2.86 UIU/ML (ref 0.27–4.2)
VLDLC SERPL CALC-MCNC: 9 MG/DL
WBC # BLD: 5.8 E9/L (ref 4.5–11.5)

## 2020-09-10 PROCEDURE — 83036 HEMOGLOBIN GLYCOSYLATED A1C: CPT

## 2020-09-10 PROCEDURE — 80053 COMPREHEN METABOLIC PANEL: CPT

## 2020-09-10 PROCEDURE — 84443 ASSAY THYROID STIM HORMONE: CPT

## 2020-09-10 PROCEDURE — 85025 COMPLETE CBC W/AUTO DIFF WBC: CPT

## 2020-09-10 PROCEDURE — 84439 ASSAY OF FREE THYROXINE: CPT

## 2020-09-10 PROCEDURE — 36415 COLL VENOUS BLD VENIPUNCTURE: CPT

## 2020-09-10 PROCEDURE — 80061 LIPID PANEL: CPT

## 2020-09-14 PROBLEM — A41.9 SEPSIS (HCC): Status: RESOLVED | Noted: 2020-01-07 | Resolved: 2020-09-14

## 2020-09-14 PROBLEM — R65.20 SEVERE SEPSIS WITH ACUTE ORGAN DYSFUNCTION (HCC): Status: RESOLVED | Noted: 2020-01-06 | Resolved: 2020-09-14

## 2020-09-14 PROBLEM — A41.9 SEVERE SEPSIS WITH ACUTE ORGAN DYSFUNCTION (HCC): Status: RESOLVED | Noted: 2020-01-06 | Resolved: 2020-09-14

## 2020-09-14 PROBLEM — I50.9 CONGESTIVE HEART DISEASE (HCC): Status: RESOLVED | Noted: 2020-06-05 | Resolved: 2020-09-14

## 2020-09-14 PROBLEM — J18.9 PNEUMONIA DUE TO INFECTIOUS ORGANISM: Status: RESOLVED | Noted: 2020-01-09 | Resolved: 2020-09-14

## 2020-09-14 ASSESSMENT — ENCOUNTER SYMPTOMS
CONSTIPATION: 0
NAUSEA: 0
CHOKING: 0
RECTAL PAIN: 0
COLOR CHANGE: 0
PHOTOPHOBIA: 0
VOMITING: 0
SORE THROAT: 0
ABDOMINAL PAIN: 0
ANAL BLEEDING: 0
VOICE CHANGE: 0
APNEA: 0
CHEST TIGHTNESS: 0
EYE REDNESS: 0
RHINORRHEA: 1
SHORTNESS OF BREATH: 0
WHEEZING: 0
BACK PAIN: 0
SINUS COMPLAINT: 1
TROUBLE SWALLOWING: 0
STRIDOR: 0
EYE DISCHARGE: 0
COUGH: 0
ABDOMINAL DISTENTION: 0
EYE ITCHING: 0
FACIAL SWELLING: 0
SINUS PRESSURE: 0
BLOOD IN STOOL: 0
DIARRHEA: 0
EYE PAIN: 0

## 2020-09-14 NOTE — PROGRESS NOTES
Mohan Oneill is a 50 y.o. male  . Subjective:      I have been very concerned about patients renal numbers. He missed his last appointment with renal. He has one coming up but it is not for over a month. I am again concerned that his renal insufficiency is progressing. We will see what his numbers are today and then get him in sooner if not improved. Diabetes   He presents for his follow-up diabetic visit. He has type 2 diabetes mellitus. His disease course has been improving. There are no hypoglycemic associated symptoms. Pertinent negatives for hypoglycemia include no confusion, dizziness, headaches, nervousness/anxiousness, pallor, seizures, speech difficulty or tremors. Associated symptoms include fatigue and foot paresthesias. Pertinent negatives for diabetes include no chest pain, no polydipsia, no polyphagia, no polyuria and no weakness. There are no hypoglycemic complications. Symptoms are improving. Diabetic complications include nephropathy and peripheral neuropathy. Risk factors for coronary artery disease include diabetes mellitus, dyslipidemia, hypertension and male sex. Current diabetic treatment includes insulin injections. He is compliant with treatment some of the time. His weight is fluctuating minimally. He is following a generally healthy diet. Meal planning includes avoidance of concentrated sweets. He participates in exercise intermittently. His home blood glucose trend is decreasing steadily. An ACE inhibitor/angiotensin II receptor blocker is not being taken. He does not see a podiatrist.Eye exam is current. Hyperlipidemia   This is a chronic problem. The current episode started more than 1 year ago. The problem is resistant. Recent lipid tests were reviewed and are variable. Pertinent negatives include no chest pain, myalgias or shortness of breath. Current antihyperlipidemic treatment includes statins. The current treatment provides moderate improvement of lipids.  Compliance problems include adherence to diet. Risk factors for coronary artery disease include hypertension, dyslipidemia and diabetes mellitus. Hypertension   This is a chronic problem. The current episode started more than 1 year ago. The problem has been gradually improving since onset. The problem is resistant. Pertinent negatives include no chest pain, headaches, neck pain, palpitations or shortness of breath. Past treatments include alpha 1 blockers, diuretics and calcium channel blockers. The current treatment provides moderate improvement. There are no compliance problems. Fatigue   This is a chronic problem. The current episode started more than 1 year ago. The problem occurs intermittently. The problem has been waxing and waning. Associated symptoms include congestion and fatigue. Pertinent negatives include no abdominal pain, arthralgias, chest pain, chills, coughing, diaphoresis, fever, headaches, joint swelling, myalgias, nausea, neck pain, numbness, rash, sore throat, vomiting or weakness. Nothing aggravates the symptoms. He has tried nothing for the symptoms. The treatment provided no relief. Sinus Problem   This is a recurrent problem. The current episode started more than 1 month ago. The problem has been waxing and waning since onset. Associated symptoms include congestion and sneezing. Pertinent negatives include no chills, coughing, diaphoresis, ear pain, headaches, neck pain, shortness of breath, sinus pressure or sore throat. Past treatments include nothing. The treatment provided no relief. Review of Systems   Constitutional: Positive for fatigue. Negative for activity change, appetite change, chills, diaphoresis, fever and unexpected weight change. HENT: Positive for congestion, postnasal drip, rhinorrhea and sneezing.  Negative for dental problem, drooling, ear discharge, ear pain, facial swelling, hearing loss, mouth sores, nosebleeds, sinus pressure, sore throat, tinnitus, trouble swallowing and voice change. Eyes: Negative for photophobia, pain, discharge, redness, itching and visual disturbance. Respiratory: Negative for apnea, cough, choking, chest tightness, shortness of breath, wheezing and stridor. Cardiovascular: Negative for chest pain, palpitations and leg swelling. Gastrointestinal: Negative for abdominal distention, abdominal pain, anal bleeding, blood in stool, constipation, diarrhea, nausea, rectal pain and vomiting. Endocrine: Negative for cold intolerance, heat intolerance, polydipsia, polyphagia and polyuria. Genitourinary: Negative for decreased urine volume, difficulty urinating, discharge, dysuria, enuresis, flank pain, frequency, genital sores, hematuria, penile pain, penile swelling, scrotal swelling, testicular pain and urgency. Musculoskeletal: Negative for arthralgias, back pain, gait problem, joint swelling, myalgias, neck pain and neck stiffness. Skin: Negative for color change, pallor, rash and wound. Allergic/Immunologic: Negative for environmental allergies, food allergies and immunocompromised state. Neurological: Negative for dizziness, tremors, seizures, syncope, facial asymmetry, speech difficulty, weakness, light-headedness, numbness and headaches. Hematological: Negative for adenopathy. Does not bruise/bleed easily. Psychiatric/Behavioral: Negative for agitation, behavioral problems, confusion, decreased concentration, dysphoric mood, hallucinations, self-injury, sleep disturbance and suicidal ideas. The patient is not nervous/anxious and is not hyperactive.         Past Medical History:   Diagnosis Date    Congestive heart disease ([facility] [address] 6/5/2020    Diabetes mellitus ([facility] [address]     Hyperlipidemia     Hypertension     Type 2 diabetes mellitus with diabetic nephropathy, with long-term current use of insulin ([facility] [address] 1/22/2020       Social History     Socioeconomic History    Marital status:      Spouse name: Not on file    Number of children: Not on file    Years of education: Not on file    Highest education level: Not on file   Occupational History    Not on file   Social Needs    Financial resource strain: Not on file    Food insecurity     Worry: Not on file     Inability: Not on file    Transportation needs     Medical: Not on file     Non-medical: Not on file   Tobacco Use    Smoking status: Former Smoker     Types: Cigars    Smokeless tobacco: Never Used   Substance and Sexual Activity    Alcohol use:  Yes     Alcohol/week: 0.0 standard drinks     Comment: socially    Drug use: No    Sexual activity: Yes     Partners: Female   Lifestyle    Physical activity     Days per week: Not on file     Minutes per session: Not on file    Stress: Not on file   Relationships    Social connections     Talks on phone: Not on file     Gets together: Not on file     Attends Zoroastrian service: Not on file     Active member of club or organization: Not on file     Attends meetings of clubs or organizations: Not on file     Relationship status: Not on file    Intimate partner violence     Fear of current or ex partner: Not on file     Emotionally abused: Not on file     Physically abused: Not on file     Forced sexual activity: Not on file   Other Topics Concern    Not on file   Social History Narrative    Not on file       Family History   Problem Relation Age of Onset    Heart Disease Mother        Current Outpatient Medications on File Prior to Visit   Medication Sig Dispense Refill    Insulin Syringe-Needle U-100 ([employer]IM[employer] INS SYR .3CC/29GX0.5\") 29G X 1/2\" 0.3 ML MISC 1 each by Does not apply route daily 100 each 3    Misc Natural Products (APPLE CIDER VINEGAR DIET PO) Take by mouth      Zinc Sulfate (ZINC 15 PO) Take by mouth      sildenafil (VIAGRA) 100 MG tablet Take 1 tablet by mouth as needed for Erectile Dysfunction 10 tablet 5    doxazosin (CARDURA) 1 MG tablet       amLODIPine (NORVASC) 5 MG tablet Take 1 tablet by mouth daily 30 tablet 1    sodium bicarbonate 650 MG tablet Take 1 tablet by mouth 3 times daily 90 tablet 1    Acetaminophen (TYLENOL) 325 MG CAPS Take 650 mg by mouth as needed (FOR PAIN PER PT.)      vitamin E 400 UNIT capsule Take 400 Units by mouth daily      Insulin Syringe-Needle U-100 30G X 1/2\" 1 ML MISC 10 units units of Tresiba daily 100 each 11    Cinnamon 500 MG CAPS Take 1,000 mg by mouth 2 times daily. No current facility-administered medications on file prior to visit. No Known Allergies    I have reviewed his allergies, medications, problem list, medical,social and family history and have updated as needed in the electronic medical record. Objective:     Physical Exam  Vitals signs and nursing note reviewed. Constitutional:       General: He is not in acute distress. Appearance: He is well-developed. He is not diaphoretic. HENT:      Head: Normocephalic and atraumatic. Right Ear: External ear normal.      Left Ear: External ear normal.      Nose: Congestion and rhinorrhea present. Mouth/Throat:      Pharynx: No oropharyngeal exudate. Eyes:      General: No scleral icterus. Right eye: No discharge. Left eye: No discharge. Conjunctiva/sclera: Conjunctivae normal.      Pupils: Pupils are equal, round, and reactive to light. Neck:      Musculoskeletal: Normal range of motion and neck supple. Thyroid: No thyromegaly. Vascular: No JVD. Trachea: No tracheal deviation. Cardiovascular:      Rate and Rhythm: Normal rate and regular rhythm. Heart sounds: Normal heart sounds. No murmur. No friction rub. No gallop. Pulmonary:      Effort: Pulmonary effort is normal. No respiratory distress. Breath sounds: Normal breath sounds. No stridor. No wheezing or rales. Chest:      Chest wall: No tenderness. Abdominal:      General: Bowel sounds are normal. There is no distension. Palpations: Abdomen is soft. There is no mass. Tenderness: There is no abdominal tenderness. There is no guarding or rebound. Genitourinary:     Comments: Deferred by patient  Musculoskeletal: Normal range of motion. General: No tenderness. Lymphadenopathy:      Cervical: No cervical adenopathy. Skin:     General: Skin is warm and dry. Coloration: Skin is not pale. Findings: No erythema or rash. Neurological:      Mental Status: He is alert and oriented to person, place, and time. Cranial Nerves: No cranial nerve deficit. Motor: No abnormal muscle tone. Coordination: Coordination normal.      Deep Tendon Reflexes: Reflexes are normal and symmetric. Reflexes normal.   Psychiatric:         Behavior: Behavior normal.         Thought Content: Thought content normal.         Judgment: Judgment normal.         Assessment / Plan:   Tawny Helton was seen today for 3 month follow-up, diabetes, allergic rhinitis , hyperlipidemia, hypertension and fatigue. Diagnoses and all orders for this visit:    Chronic kidney disease, stage IV (severe) ([facility])    Vitamin D deficiency  -     vitamin D (ERGOCALCIFEROL) 1.25 MG (88004 UT) CAPS capsule; TAKE 1 CAPSULE BY MOUTH TWICE A WEEK  -     lovastatin (MEVACOR) 40 MG tablet; Take 1 tablet by mouth daily  -     Comprehensive Metabolic Panel; Future  -     CBC Auto Differential; Future  -     TSH without Reflex; Future  -     Hemoglobin A1C; Future  -     Lipid Panel; Future  -     T4, Free; Future    Acute bacterial sinusitis  -     montelukast (SINGULAIR) 10 MG tablet; Take 1 tablet by mouth daily  -     fluticasone (FLONASE) 50 MCG/ACT nasal spray; 2 sprays by Each Nostril route daily  -     Comprehensive Metabolic Panel; Future  -     CBC Auto Differential; Future  -     TSH without Reflex; Future  -     Hemoglobin A1C; Future  -     Lipid Panel; Future  -     T4, Free; Future    Bronchitis  -     montelukast (SINGULAIR) 10 MG tablet;  Take 1 tablet by mouth daily  -     fluticasone (FLONASE) 50 MCG/ACT nasal spray; 2 sprays by Each Nostril route daily  -     Comprehensive Metabolic Panel; Future  -     CBC Auto Differential; Future  -     TSH without Reflex; Future  -     Hemoglobin A1C; Future  -     Lipid Panel; Future  -     T4, Free; Future    Benign essential HTN  -     lovastatin (MEVACOR) 40 MG tablet; Take 1 tablet by mouth daily  -     bumetanide (BUMEX) 2 MG tablet; TAKE ONE TABLET BY MOUTH TWO TIMES A DAY  -     Comprehensive Metabolic Panel; Future  -     CBC Auto Differential; Future  -     TSH without Reflex; Future  -     Hemoglobin A1C; Future  -     Lipid Panel; Future  -     T4, Free; Future    Bilateral leg edema  -     lovastatin (MEVACOR) 40 MG tablet; Take 1 tablet by mouth daily  -     Comprehensive Metabolic Panel; Future  -     CBC Auto Differential; Future  -     TSH without Reflex; Future  -     Hemoglobin A1C; Future  -     Lipid Panel; Future  -     T4, Free; Future    Type 2 diabetes mellitus with diabetic nephropathy, without long-term current use of insulin (HCC)  -     lovastatin (MEVACOR) 40 MG tablet; Take 1 tablet by mouth daily  -     insulin detemir (LEVEMIR) 100 UNIT/ML injection vial; Inject 20 Units into the skin nightly  -     Comprehensive Metabolic Panel; Future  -     CBC Auto Differential; Future  -     TSH without Reflex; Future  -     Hemoglobin A1C; Future  -     Lipid Panel; Future  -     T4, Free; Future    Renal insufficiency  -     lovastatin (MEVACOR) 40 MG tablet; Take 1 tablet by mouth daily  -     bumetanide (BUMEX) 2 MG tablet; TAKE ONE TABLET BY MOUTH TWO TIMES A DAY  -     Comprehensive Metabolic Panel; Future  -     CBC Auto Differential; Future  -     TSH without Reflex; Future  -     Hemoglobin A1C; Future  -     Lipid Panel;  Future  -     T4, Free; Future    Pneumonia due to infectious organism, unspecified laterality, unspecified part of lung  -     ipratropium (ATROVENT) 0.06 % nasal spray; 2 sprays by Nasal route 3 times

## 2020-10-13 RX ORDER — INSULIN DETEMIR 100 [IU]/ML
20 INJECTION, SOLUTION SUBCUTANEOUS NIGHTLY
Qty: 3 VIAL | Refills: 5 | Status: SHIPPED
Start: 2020-10-13 | End: 2020-11-16 | Stop reason: SDUPTHER

## 2020-10-27 RX ORDER — SILDENAFIL 100 MG/1
TABLET, FILM COATED ORAL
Qty: 10 TABLET | Refills: 0 | Status: SHIPPED | OUTPATIENT
Start: 2020-10-27

## 2020-11-12 RX ORDER — INSULIN DETEMIR 100 [IU]/ML
20 INJECTION, SOLUTION SUBCUTANEOUS NIGHTLY
Qty: 3 VIAL | Refills: 5 | OUTPATIENT
Start: 2020-11-12

## 2020-11-12 NOTE — TELEPHONE ENCOUNTER
Pt called about his rx being refused     [phone] pt phone number    Pt also inquiring about flu vac and shingles and pneomina vac

## 2020-11-18 RX ORDER — INSULIN DETEMIR 100 [IU]/ML
20 INJECTION, SOLUTION SUBCUTANEOUS NIGHTLY
Qty: 3 VIAL | Refills: 5 | Status: SHIPPED
Start: 2020-11-18 | End: 2020-11-19 | Stop reason: SDUPTHER

## 2020-11-19 RX ORDER — INSULIN DETEMIR 100 [IU]/ML
20 INJECTION, SOLUTION SUBCUTANEOUS NIGHTLY
Qty: 3 VIAL | Refills: 5 | Status: SHIPPED
Start: 2020-11-19 | End: 2020-12-08 | Stop reason: SDUPTHER

## 2020-11-23 RX ORDER — METOLAZONE 2.5 MG/1
2.5 TABLET ORAL DAILY
Status: ON HOLD | COMMUNITY
End: 2021-06-08 | Stop reason: HOSPADM

## 2020-11-23 NOTE — PROGRESS NOTES
Valente 38 PRE-ADMISSION TESTING GENERAL INSTRUCTIONS- [facility]-phone number:[phone]    GENERAL INSTRUCTIONS  [x] Antibacterial Soap shower Night before and/or AM of Surgery  [] Jose Luis wipe instruction sheet and wipes given. [x] Nothing by mouth after midnight, including gum, candy, mints, or water. [x] You may brush your teeth, gargle, but do NOT swallow water. []Hibiclens shower  the night before and the morning of surgery. Do not use             Hibiclens on your face or head. [x]No smoking, chewing tobacco, illegal drugs, or alcohol within 24 hours of your surgery. [x] Jewelry, valuables or body piercing's should not be brought to the hospital. All body and/or tongue piercing's must be removed prior to arriving to hospital.  ALL hair pins must be removed. [x] Do not wear makeup, lotions, powders, deodorant. Nail polish as directed by the nurse. [x] Arrange transportation with a responsible adult  to and from the hospital. If you do not have a responsible adult  to transport you, you will need to make arrangements with a medical transportation company (i.e. [employer]. A Uber/taxi/bus is not appropriate unless you are accompanied by a responsible adult ). Arrange for someone to be with you for the remainder of the day and for 24 hours after your procedure due to having had anesthesia. Who will be your  for transportation?______WIFE____________   Who will be staying with you for 24 hrs after your procedure?________WIFE__________  [x] Bring insurance card and photo ID.  [] Transfusion Bracelet: Please bring with you to hospital, day of surgery  [] Bring urine specimen day of surgery. Any small container is acceptable. [] Use inhalers the morning of surgery and bring with you to hospital.  [] Bring copy of living will or healthcare power of  papers to be placed in your electronic record.   [] CPAP/BI-PAP: Please bring your machine if you are to spend the night in the hospital.     PARKING INSTRUCTIONS:   [x] Arrival Time:____0915_________  · [] Parking lot '\"I\"  is located on [facility] (the corner of [facility] and [facility]). To enter, press the button and the gate will lift. A free token will be provided to exit the lot. One car per patient is allowed to park in this lot. All other cars are to park on [address] [place] [address] either in the parking garage or the handicap lot. [x] To reach the [facility] lobby from [address] [place] [address], upon entering the hospital, take elevator B to the 3rd floor. EDUCATION INSTRUCTIONS:      [] Knee or hip replacement booklet & exercise pamphlets given. [] Kaelyn 77 placed in chart. [] Pre-admission Testing educational folder given  [] Incentive Spirometry,coughing & deep breathing exercises reviewed. []Medication information sheet(s)   []Fluoroscopy-Xray used in surgery reviewed with patient. Educational pamphlet placed in chart. [x]Pain: Post-op pain is normal and to be expected. You will be asked to rate your pain from 0-10(a zero is not acceptable-education is needed). Your post-op pain goal is:4  [] Ask your nurse for your pain medication. [] Joint camp offered. [] Joint replacement booklets given. [] Other:___________________________    MEDICATION INSTRUCTIONS:   [x]Bring a complete list of your medications, please write the last time you took the medicine, give this list to the nurse. [x] Take the following medications the morning of surgery with 1-2 ounces of water: SEE MED SHEET  [x] Stop herbal supplements and vitamins 5 days before your surgery. [x] DO NOT take any diabetic medicine the morning of surgery. Follow instructions for insulin the day before surgery. [x] If you are diabetic and your blood sugar is low or you feel symptomatic, you may drink 1-2 ounces of apple juice or take a glucose tablet.   The morning of your procedure, you may call the pre-op area if you have concerns about your blood sugar [phone]. [] Use your inhalers the morning of surgery. Bring your emergency inhaler with you day of surgery. [] Follow physician instructions regarding any blood thinners you may be taking. WHAT TO EXPECT:  [x] The day of surgery you will be greeted and checked in by the [employer].  In addition, you will be registered in the [place] by a Patient Access Representative. Please bring your photo ID and insurance card. A nurse will greet you in accordance to the time you are needed in the pre-op area to prepare you for surgery. Please do not be discouraged if you are not greeted in the order you arrive as there are many variables that are involved in patient preparation. Your patience is greatly appreciated as you wait for your nurse. Please bring in items such as: books, magazines, newspapers, electronics, or any other items  to occupy your time in the waiting area. [x]  Delays may occur with surgery and staff will make a sincere effort to keep you informed of delays. If any delays occur with your procedure, we apologize ahead of time for your inconvenience as we recognize the value of your time.

## 2020-11-25 ENCOUNTER — PREP FOR PROCEDURE (OUTPATIENT)
Dept: SURGERY | Age: 55
End: 2020-11-25

## 2020-11-25 ENCOUNTER — HOSPITAL ENCOUNTER (OUTPATIENT)
Age: 55
Discharge: HOME | End: 2020-11-27
Payer: COMMERCIAL

## 2020-11-25 ENCOUNTER — HOSPITAL ENCOUNTER (OUTPATIENT)
Age: 55
Discharge: HOME | End: 2020-11-25
Payer: COMMERCIAL

## 2020-11-25 PROCEDURE — 93005 ELECTROCARDIOGRAM TRACING: CPT | Performed by: ANESTHESIOLOGY

## 2020-11-25 PROCEDURE — U0003 INFECTIOUS AGENT DETECTION BY NUCLEIC ACID (DNA OR RNA); SEVERE ACUTE RESPIRATORY SYNDROME CORONAVIRUS 2 (SARS-COV-2) (CORONAVIRUS DISEASE [COVID-19]), AMPLIFIED PROBE TECHNIQUE, MAKING USE OF HIGH THROUGHPUT TECHNOLOGIES AS DESCRIBED BY CMS-2020-01-R: HCPCS

## 2020-11-25 RX ORDER — SODIUM CHLORIDE 9 MG/ML
INJECTION, SOLUTION INTRAVENOUS CONTINUOUS
Status: CANCELLED | OUTPATIENT
Start: 2020-11-25

## 2020-11-25 RX ORDER — SODIUM CHLORIDE 0.9 % (FLUSH) 0.9 %
10 SYRINGE (ML) INJECTION EVERY 12 HOURS SCHEDULED
Status: CANCELLED | OUTPATIENT
Start: 2020-11-25

## 2020-11-25 RX ORDER — SODIUM CHLORIDE 0.9 % (FLUSH) 0.9 %
10 SYRINGE (ML) INJECTION PRN
Status: CANCELLED | OUTPATIENT
Start: 2020-11-25

## 2020-11-26 LAB
EKG ATRIAL RATE: 82 BPM
EKG P AXIS: 66 DEGREES
EKG P-R INTERVAL: 140 MS
EKG Q-T INTERVAL: 390 MS
EKG QRS DURATION: 98 MS
EKG QTC CALCULATION (BAZETT): 455 MS
EKG R AXIS: 46 DEGREES
EKG T AXIS: 29 DEGREES
EKG VENTRICULAR RATE: 82 BPM
SARS-COV-2: NOT DETECTED
SOURCE: NORMAL

## 2020-11-28 ENCOUNTER — HOSPITAL ENCOUNTER (OUTPATIENT)
Age: 55
Discharge: HOME | End: 2020-11-28
Payer: COMMERCIAL

## 2020-11-28 PROCEDURE — 36415 COLL VENOUS BLD VENIPUNCTURE: CPT

## 2020-11-28 PROCEDURE — 87340 HEPATITIS B SURFACE AG IA: CPT

## 2020-11-30 ENCOUNTER — HOSPITAL ENCOUNTER (OUTPATIENT)
Age: 55
Setting detail: OUTPATIENT SURGERY
Discharge: HOME | End: 2020-11-30
Attending: SURGERY | Admitting: SURGERY
Payer: COMMERCIAL

## 2020-11-30 ENCOUNTER — ANESTHESIA EVENT (OUTPATIENT)
Dept: OPERATING ROOM | Age: 55
End: 2020-11-30
Payer: COMMERCIAL

## 2020-11-30 ENCOUNTER — APPOINTMENT (OUTPATIENT)
Dept: GENERAL RADIOLOGY | Age: 55
End: 2020-11-30
Attending: SURGERY
Payer: COMMERCIAL

## 2020-11-30 ENCOUNTER — ANESTHESIA (OUTPATIENT)
Dept: OPERATING ROOM | Age: 55
End: 2020-11-30
Payer: COMMERCIAL

## 2020-11-30 VITALS — SYSTOLIC BLOOD PRESSURE: 143 MMHG | OXYGEN SATURATION: 95 % | DIASTOLIC BLOOD PRESSURE: 93 MMHG | TEMPERATURE: 97.3 F

## 2020-11-30 VITALS
BODY MASS INDEX: 31.37 KG/M2 | HEART RATE: 86 BPM | SYSTOLIC BLOOD PRESSURE: 174 MMHG | HEIGHT: 68 IN | OXYGEN SATURATION: 95 % | TEMPERATURE: 97.5 F | DIASTOLIC BLOOD PRESSURE: 81 MMHG | RESPIRATION RATE: 18 BRPM | WEIGHT: 207 LBS

## 2020-11-30 LAB
ANION GAP SERPL CALCULATED.3IONS-SCNC: 15 MMOL/L (ref 7–16)
BASOPHILS ABSOLUTE: 0.04 E9/L (ref 0–0.2)
BASOPHILS RELATIVE PERCENT: 0.5 % (ref 0–2)
BUN BLDV-MCNC: 90 MG/DL (ref 6–20)
CALCIUM SERPL-MCNC: 9 MG/DL (ref 8.6–10.2)
CHLORIDE BLD-SCNC: 101 MMOL/L (ref 98–107)
CO2: 23 MMOL/L (ref 22–29)
CREAT SERPL-MCNC: 5.8 MG/DL (ref 0.7–1.2)
EOSINOPHILS ABSOLUTE: 0.3 E9/L (ref 0.05–0.5)
EOSINOPHILS RELATIVE PERCENT: 4 % (ref 0–6)
GFR AFRICAN AMERICAN: 12
GFR NON-AFRICAN AMERICAN: 10 ML/MIN/1.73
GLUCOSE BLD-MCNC: 181 MG/DL (ref 74–99)
HCT VFR BLD CALC: 33.4 % (ref 37–54)
HEMOGLOBIN: 11.8 G/DL (ref 12.5–16.5)
HEPATITIS B SURFACE ANTIGEN INTERPRETATION: NORMAL
IMMATURE GRANULOCYTES #: 0.03 E9/L
IMMATURE GRANULOCYTES %: 0.4 % (ref 0–5)
LYMPHOCYTES ABSOLUTE: 1.1 E9/L (ref 1.5–4)
LYMPHOCYTES RELATIVE PERCENT: 14.7 % (ref 20–42)
MCH RBC QN AUTO: 28.9 PG (ref 26–35)
MCHC RBC AUTO-ENTMCNC: 35.3 % (ref 32–34.5)
MCV RBC AUTO: 81.9 FL (ref 80–99.9)
METER GLUCOSE: 191 MG/DL (ref 74–99)
METER GLUCOSE: 207 MG/DL (ref 74–99)
MONOCYTES ABSOLUTE: 0.69 E9/L (ref 0.1–0.95)
MONOCYTES RELATIVE PERCENT: 9.2 % (ref 2–12)
NEUTROPHILS ABSOLUTE: 5.34 E9/L (ref 1.8–7.3)
NEUTROPHILS RELATIVE PERCENT: 71.2 % (ref 43–80)
PDW BLD-RTO: 12.1 FL (ref 11.5–15)
PLATELET # BLD: 295 E9/L (ref 130–450)
PMV BLD AUTO: 10.5 FL (ref 7–12)
POTASSIUM REFLEX MAGNESIUM: 4.3 MMOL/L (ref 3.5–5)
RBC # BLD: 4.08 E12/L (ref 3.8–5.8)
SODIUM BLD-SCNC: 139 MMOL/L (ref 132–146)
WBC # BLD: 7.5 E9/L (ref 4.5–11.5)

## 2020-11-30 PROCEDURE — 85025 COMPLETE CBC W/AUTO DIFF WBC: CPT

## 2020-11-30 PROCEDURE — 6360000002 HC RX W HCPCS: Performed by: SURGERY

## 2020-11-30 PROCEDURE — 3600000013 HC SURGERY LEVEL 3 ADDTL 15MIN: Performed by: SURGERY

## 2020-11-30 PROCEDURE — 36415 COLL VENOUS BLD VENIPUNCTURE: CPT

## 2020-11-30 PROCEDURE — 6370000000 HC RX 637 (ALT 250 FOR IP): Performed by: ANESTHESIOLOGY

## 2020-11-30 PROCEDURE — 2500000003 HC RX 250 WO HCPCS: Performed by: ANESTHESIOLOGIST ASSISTANT

## 2020-11-30 PROCEDURE — 82962 GLUCOSE BLOOD TEST: CPT

## 2020-11-30 PROCEDURE — 71045 X-RAY EXAM CHEST 1 VIEW: CPT

## 2020-11-30 PROCEDURE — 7100000001 HC PACU RECOVERY - ADDTL 15 MIN: Performed by: SURGERY

## 2020-11-30 PROCEDURE — 3600000003 HC SURGERY LEVEL 3 BASE: Performed by: SURGERY

## 2020-11-30 PROCEDURE — 2580000003 HC RX 258: Performed by: SURGERY

## 2020-11-30 PROCEDURE — 7100000010 HC PHASE II RECOVERY - FIRST 15 MIN: Performed by: SURGERY

## 2020-11-30 PROCEDURE — 2709999900 HC NON-CHARGEABLE SUPPLY: Performed by: SURGERY

## 2020-11-30 PROCEDURE — C1750 CATH, HEMODIALYSIS,LONG-TERM: HCPCS | Performed by: SURGERY

## 2020-11-30 PROCEDURE — 7100000011 HC PHASE II RECOVERY - ADDTL 15 MIN: Performed by: SURGERY

## 2020-11-30 PROCEDURE — 6360000002 HC RX W HCPCS: Performed by: ANESTHESIOLOGIST ASSISTANT

## 2020-11-30 PROCEDURE — 7100000000 HC PACU RECOVERY - FIRST 15 MIN: Performed by: SURGERY

## 2020-11-30 PROCEDURE — 3700000001 HC ADD 15 MINUTES (ANESTHESIA): Performed by: SURGERY

## 2020-11-30 PROCEDURE — 3700000000 HC ANESTHESIA ATTENDED CARE: Performed by: SURGERY

## 2020-11-30 PROCEDURE — 80048 BASIC METABOLIC PNL TOTAL CA: CPT

## 2020-11-30 RX ORDER — OXYCODONE HYDROCHLORIDE AND ACETAMINOPHEN 5; 325 MG/1; MG/1
1 TABLET ORAL
Status: COMPLETED | OUTPATIENT
Start: 2020-11-30 | End: 2020-11-30

## 2020-11-30 RX ORDER — SODIUM CHLORIDE 0.9 % (FLUSH) 0.9 %
10 SYRINGE (ML) INJECTION PRN
Status: DISCONTINUED | OUTPATIENT
Start: 2020-11-30 | End: 2020-11-30 | Stop reason: HOSPADM

## 2020-11-30 RX ORDER — ONDANSETRON 2 MG/ML
INJECTION INTRAMUSCULAR; INTRAVENOUS PRN
Status: DISCONTINUED | OUTPATIENT
Start: 2020-11-30 | End: 2020-11-30 | Stop reason: SDUPTHER

## 2020-11-30 RX ORDER — FENTANYL CITRATE 50 UG/ML
INJECTION, SOLUTION INTRAMUSCULAR; INTRAVENOUS PRN
Status: DISCONTINUED | OUTPATIENT
Start: 2020-11-30 | End: 2020-11-30 | Stop reason: SDUPTHER

## 2020-11-30 RX ORDER — NEOSTIGMINE METHYLSULFATE 1 MG/ML
INJECTION, SOLUTION INTRAVENOUS PRN
Status: DISCONTINUED | OUTPATIENT
Start: 2020-11-30 | End: 2020-11-30 | Stop reason: SDUPTHER

## 2020-11-30 RX ORDER — PROMETHAZINE HYDROCHLORIDE 25 MG/ML
6.25 INJECTION, SOLUTION INTRAMUSCULAR; INTRAVENOUS
Status: DISCONTINUED | OUTPATIENT
Start: 2020-11-30 | End: 2020-11-30 | Stop reason: HOSPADM

## 2020-11-30 RX ORDER — MIDAZOLAM HYDROCHLORIDE 1 MG/ML
INJECTION INTRAMUSCULAR; INTRAVENOUS PRN
Status: DISCONTINUED | OUTPATIENT
Start: 2020-11-30 | End: 2020-11-30 | Stop reason: SDUPTHER

## 2020-11-30 RX ORDER — FENTANYL CITRATE 50 UG/ML
25 INJECTION, SOLUTION INTRAMUSCULAR; INTRAVENOUS EVERY 5 MIN PRN
Status: DISCONTINUED | OUTPATIENT
Start: 2020-11-30 | End: 2020-11-30 | Stop reason: HOSPADM

## 2020-11-30 RX ORDER — FENTANYL CITRATE 50 UG/ML
50 INJECTION, SOLUTION INTRAMUSCULAR; INTRAVENOUS EVERY 5 MIN PRN
Status: DISCONTINUED | OUTPATIENT
Start: 2020-11-30 | End: 2020-11-30 | Stop reason: HOSPADM

## 2020-11-30 RX ORDER — LIDOCAINE HYDROCHLORIDE 20 MG/ML
INJECTION, SOLUTION INTRAVENOUS PRN
Status: DISCONTINUED | OUTPATIENT
Start: 2020-11-30 | End: 2020-11-30 | Stop reason: SDUPTHER

## 2020-11-30 RX ORDER — GLYCOPYRROLATE 1 MG/5 ML
SYRINGE (ML) INTRAVENOUS PRN
Status: DISCONTINUED | OUTPATIENT
Start: 2020-11-30 | End: 2020-11-30 | Stop reason: SDUPTHER

## 2020-11-30 RX ORDER — PROPOFOL 10 MG/ML
INJECTION, EMULSION INTRAVENOUS PRN
Status: DISCONTINUED | OUTPATIENT
Start: 2020-11-30 | End: 2020-11-30 | Stop reason: SDUPTHER

## 2020-11-30 RX ORDER — DIPHENHYDRAMINE HYDROCHLORIDE 50 MG/ML
12.5 INJECTION INTRAMUSCULAR; INTRAVENOUS
Status: DISCONTINUED | OUTPATIENT
Start: 2020-11-30 | End: 2020-11-30 | Stop reason: HOSPADM

## 2020-11-30 RX ORDER — HYDROCODONE BITARTRATE AND ACETAMINOPHEN 5; 325 MG/1; MG/1
1 TABLET ORAL 2 TIMES DAILY PRN
Qty: 10 TABLET | Refills: 0 | Status: SHIPPED | OUTPATIENT
Start: 2020-11-30 | End: 2020-12-03

## 2020-11-30 RX ORDER — SODIUM CHLORIDE 9 MG/ML
INJECTION, SOLUTION INTRAVENOUS CONTINUOUS
Status: DISCONTINUED | OUTPATIENT
Start: 2020-11-30 | End: 2020-11-30 | Stop reason: HOSPADM

## 2020-11-30 RX ORDER — MEPERIDINE HYDROCHLORIDE 25 MG/ML
12.5 INJECTION INTRAMUSCULAR; INTRAVENOUS; SUBCUTANEOUS EVERY 5 MIN PRN
Status: DISCONTINUED | OUTPATIENT
Start: 2020-11-30 | End: 2020-11-30 | Stop reason: HOSPADM

## 2020-11-30 RX ORDER — DEXAMETHASONE SODIUM PHOSPHATE 10 MG/ML
INJECTION INTRAMUSCULAR; INTRAVENOUS PRN
Status: DISCONTINUED | OUTPATIENT
Start: 2020-11-30 | End: 2020-11-30 | Stop reason: SDUPTHER

## 2020-11-30 RX ORDER — SODIUM CHLORIDE 0.9 % (FLUSH) 0.9 %
10 SYRINGE (ML) INJECTION EVERY 12 HOURS SCHEDULED
Status: DISCONTINUED | OUTPATIENT
Start: 2020-11-30 | End: 2020-11-30 | Stop reason: HOSPADM

## 2020-11-30 RX ORDER — ROCURONIUM BROMIDE 10 MG/ML
INJECTION, SOLUTION INTRAVENOUS PRN
Status: DISCONTINUED | OUTPATIENT
Start: 2020-11-30 | End: 2020-11-30 | Stop reason: SDUPTHER

## 2020-11-30 RX ADMIN — ONDANSETRON HYDROCHLORIDE 4 MG: 2 INJECTION, SOLUTION INTRAMUSCULAR; INTRAVENOUS at 13:41

## 2020-11-30 RX ADMIN — Medication 0.6 MG: at 13:49

## 2020-11-30 RX ADMIN — OXYCODONE AND ACETAMINOPHEN 1 TABLET: 5; 325 TABLET ORAL at 15:36

## 2020-11-30 RX ADMIN — PROPOFOL 160 MG: 10 INJECTION, EMULSION INTRAVENOUS at 12:39

## 2020-11-30 RX ADMIN — FENTANYL CITRATE 50 MCG: 50 INJECTION, SOLUTION INTRAMUSCULAR; INTRAVENOUS at 12:39

## 2020-11-30 RX ADMIN — LIDOCAINE HYDROCHLORIDE 80 MG: 20 INJECTION, SOLUTION INTRAVENOUS at 12:39

## 2020-11-30 RX ADMIN — Medication 3 MG: at 13:49

## 2020-11-30 RX ADMIN — ROCURONIUM BROMIDE 10 MG: 10 INJECTION, SOLUTION INTRAVENOUS at 13:02

## 2020-11-30 RX ADMIN — DEXAMETHASONE SODIUM PHOSPHATE 10 MG: 10 INJECTION INTRAMUSCULAR; INTRAVENOUS at 12:46

## 2020-11-30 RX ADMIN — ROCURONIUM BROMIDE 30 MG: 10 INJECTION, SOLUTION INTRAVENOUS at 12:40

## 2020-11-30 RX ADMIN — SODIUM CHLORIDE: 9 INJECTION, SOLUTION INTRAVENOUS at 10:56

## 2020-11-30 RX ADMIN — FENTANYL CITRATE 50 MCG: 50 INJECTION, SOLUTION INTRAMUSCULAR; INTRAVENOUS at 13:42

## 2020-11-30 RX ADMIN — VANCOMYCIN HYDROCHLORIDE 1000 MG: 1 INJECTION, POWDER, LYOPHILIZED, FOR SOLUTION INTRAVENOUS at 10:57

## 2020-11-30 RX ADMIN — MIDAZOLAM 2 MG: 1 INJECTION INTRAMUSCULAR; INTRAVENOUS at 12:36

## 2020-11-30 ASSESSMENT — PULMONARY FUNCTION TESTS
PIF_VALUE: 3
PIF_VALUE: 20
PIF_VALUE: 24
PIF_VALUE: 1
PIF_VALUE: 12
PIF_VALUE: 25
PIF_VALUE: 24
PIF_VALUE: 23
PIF_VALUE: 26
PIF_VALUE: 19
PIF_VALUE: 1
PIF_VALUE: 16
PIF_VALUE: 15
PIF_VALUE: 24
PIF_VALUE: 26
PIF_VALUE: 19
PIF_VALUE: 26
PIF_VALUE: 25
PIF_VALUE: 13
PIF_VALUE: 1
PIF_VALUE: 26
PIF_VALUE: 5
PIF_VALUE: 15
PIF_VALUE: 28
PIF_VALUE: 27
PIF_VALUE: 18
PIF_VALUE: 23
PIF_VALUE: 19
PIF_VALUE: 26
PIF_VALUE: 3
PIF_VALUE: 26
PIF_VALUE: 13
PIF_VALUE: 18
PIF_VALUE: 26
PIF_VALUE: 25
PIF_VALUE: 10
PIF_VALUE: 2
PIF_VALUE: 26
PIF_VALUE: 1
PIF_VALUE: 27
PIF_VALUE: 5
PIF_VALUE: 26
PIF_VALUE: 5
PIF_VALUE: 19
PIF_VALUE: 12
PIF_VALUE: 7
PIF_VALUE: 18
PIF_VALUE: 25
PIF_VALUE: 23
PIF_VALUE: 16
PIF_VALUE: 19
PIF_VALUE: 19
PIF_VALUE: 27
PIF_VALUE: 14
PIF_VALUE: 40
PIF_VALUE: 19
PIF_VALUE: 27
PIF_VALUE: 5
PIF_VALUE: 19
PIF_VALUE: 14
PIF_VALUE: 22
PIF_VALUE: 2
PIF_VALUE: 26
PIF_VALUE: 2
PIF_VALUE: 14
PIF_VALUE: 16
PIF_VALUE: 19
PIF_VALUE: 15
PIF_VALUE: 19
PIF_VALUE: 26
PIF_VALUE: 3
PIF_VALUE: 25
PIF_VALUE: 26
PIF_VALUE: 3
PIF_VALUE: 16
PIF_VALUE: 27
PIF_VALUE: 19
PIF_VALUE: 3
PIF_VALUE: 19
PIF_VALUE: 13
PIF_VALUE: 18
PIF_VALUE: 19
PIF_VALUE: 1
PIF_VALUE: 25
PIF_VALUE: 19
PIF_VALUE: 19
PIF_VALUE: 26

## 2020-11-30 ASSESSMENT — PAIN - FUNCTIONAL ASSESSMENT: PAIN_FUNCTIONAL_ASSESSMENT: 0-10

## 2020-11-30 ASSESSMENT — PAIN SCALES - GENERAL
PAINLEVEL_OUTOF10: 0
PAINLEVEL_OUTOF10: 8
PAINLEVEL_OUTOF10: 0

## 2020-11-30 ASSESSMENT — PAIN DESCRIPTION - LOCATION
LOCATION: ABDOMEN
LOCATION: ABDOMEN

## 2020-11-30 ASSESSMENT — PAIN DESCRIPTION - PAIN TYPE
TYPE: SURGICAL PAIN
TYPE: SURGICAL PAIN

## 2020-11-30 ASSESSMENT — PAIN DESCRIPTION - DESCRIPTORS: DESCRIPTORS: CONSTANT;SHARP

## 2020-11-30 NOTE — ANESTHESIA PRE PROCEDURE
Department of Anesthesiology  Preprocedure Note       Name:  Marlene Ryan   Age:  50 y.o.  :  1965                                          MRN:  [id number]         Date:  2020      Surgeon: Katheryn Santos):  Idris Garber MD    Procedure: Procedure(s):  CATHETER INSERTION PERITONEAL DIALYSIS LAPAROSCOPIC    Medications prior to admission:   Prior to Admission medications    Medication Sig Start Date End Date Taking?  Authorizing Provider   metOLazone (ZAROXOLYN) 2.5 MG tablet Take 2.5 mg by mouth daily   Yes Historical Provider, MD   insulin detemir (LEVEMIR) 100 UNIT/ML injection vial Inject 20 Units into the skin nightly 20  Yes Milwaukee Larchmont, DO   vitamin D (ERGOCALCIFEROL) 1.25 MG (88936 UT) CAPS capsule TAKE 1 CAPSULE BY MOUTH TWICE A WEEK 20  Yes Richard Larchmont, DO   montelukast (SINGULAIR) 10 MG tablet Take 1 tablet by mouth daily 20  Yes Richard Larchmont, DO   lovastatin (MEVACOR) 40 MG tablet Take 1 tablet by mouth daily 20  Yes Richard Larchmont, DO   bumetanide (BUMEX) 2 MG tablet TAKE ONE TABLET BY MOUTH TWO TIMES A DAY 20  Yes Richard Larchmont, DO   Insulin Syringe-Needle U-100 (AIMSCO INS SYR .3CC/29GX0.5\") 29G X 1/2\" 0.3 ML MISC 1 each by Does not apply route daily 20  Yes Milwaukee Larchmont, DO   Misc Natural Products (APPLE CIDER VINEGAR DIET PO) Take by mouth   Yes Historical Provider, MD   Zinc Sulfate (ZINC 15 PO) Take by mouth   Yes Historical Provider, MD   doxazosin (CARDURA) 1 MG tablet Take 1 mg by mouth nightly  3/5/20  Yes Historical Provider, MD   amLODIPine (NORVASC) 5 MG tablet Take 1 tablet by mouth daily 1/10/20  Yes Natan Mayes DO   sodium bicarbonate 650 MG tablet Take 1 tablet by mouth 3 times daily  Patient taking differently: Take 650 mg by mouth 3 times daily as needed  1/10/20  Yes Natan Mayes DO   Acetaminophen (TYLENOL) 325 MG CAPS Take 650 mg by mouth as needed (FOR PAIN PER PT.)   Yes Historical Provider, MD   vitamin E 400 UNIT capsule Take 400 Units by mouth daily   Yes Historical Provider, MD   Insulin Syringe-Needle U-100 30G X 1/2\" 1 ML MISC 10 units units of Tresiba daily 10/22/19  Yes Rebecca Pillion, DO   sildenafil (VIAGRA) 100 MG tablet TAKE ONE TABLET BY MOUTH AS NEEDED FOR ERECTILE DYSFUNCTION 10/27/20   Rebecca Pillion, DO   ipratropium (ATROVENT) 0.06 % nasal spray 2 sprays by Nasal route 3 times daily  Patient not taking: Reported on 11/23/2020 9/8/20 9/8/21  Rebecca Pillion, DO   fluticasone Peg Slight) 50 MCG/ACT nasal spray 2 sprays by Each Nostril route daily 9/8/20   Rebecca Pillion, DO   Cinnamon 500 MG CAPS Take 1,000 mg by mouth 2 times daily.     Historical Provider, MD       Current medications:    Current Facility-Administered Medications   Medication Dose Route Frequency Provider Last Rate Last Dose    0.9 % sodium chloride infusion   Intravenous Continuous Ramon Gomez MD 42 mL/hr at 11/30/20 1056      sodium chloride flush 0.9 % injection 10 mL  10 mL Intravenous 2 times per day Ramon Gomez MD        sodium chloride flush 0.9 % injection 10 mL  10 mL Intravenous PRN Ramon Gomez MD        vancomycin 1000 mg IVPB in 250 mL D5W addavial  1,000 mg Intravenous On Call to Navdeep Cristobal  mL/hr at 11/30/20 1057 1,000 mg at 11/30/20 1057       Allergies:  No Known Allergies    Problem List:    Patient Active Problem List   Diagnosis Code    Type 2 diabetes mellitus without complication ([facility] E27.6    Type 2 diabetes mellitus with hyperglycemia, without long-term current use of insulin ([facility]) E11.65    Renal insufficiency N28.9    Mixed hyperlipidemia E78.2    Benign essential HTN I10    Chronic kidney disease, stage IV (severe) (HCC) N18.4    Anemia in CKD (chronic kidney disease) N18.9, D63.1       Past Medical History:        Diagnosis Date    Congestive heart disease ([facility] 6/5/2020    Diabetes mellitus ([facility]     Hyperlipidemia     Hypertension     Type 2 diabetes mellitus with POCCL, POCBUN, POCHEMO, POCHCT in the last 72 hours. Coags: No results found for: PROTIME, INR, APTT    HCG (If Applicable): No results found for: PREGTESTUR, PREGSERUM, HCG, HCGQUANT     ABGs: No results found for: PHART, PO2ART, MHH5KGB, EVD2YLD, BEART, B2RKWDLR     Type & Screen (If Applicable):  No results found for: LABABO, LABRH    Drug/Infectious Status (If Applicable):  No results found for: HIV, HEPCAB    COVID-19 Screening (If Applicable):   Lab Results   Component Value Date    COVID19 Not Detected 2020       EK2020  Ventricular Rate  82  BPM  Final  2020  8:54 AM  [facility]    Atrial Rate  82  BPM  Final  2020  8:54 AM  [facility]    P-R Interval  140  ms  Final  2020  8:54 AM  [facility]    QRS Duration  98  ms  Final  2020  8:54 AM  [facility]    Q-T Interval  390  ms  Final  2020  8:54 AM  [facility]    QTc Calculation (Bazett)  455  ms  Final  2020  8:54 AM  [facility]    P Axis  66  degrees  Final  2020  8:54 AM  [facility]    R Axis  46  degrees  Final  2020  8:54 AM  [facility]    T Axis  29  degrees  Final  2020  8:54 AM  [facility]    Testing Performed By     Lab - Abbreviation  Name  Director  Address  Valid Date Range    360-[facility]  [facility] MUSE  Unknown  Unknown  16 0721-Present    Narrative & Impression     Normal sinus rhythm  Normal ECG  No previous ECGs available  Confirmed by Pravin Carlos ([id number]) on 2020 1:01:37 PM     ECHO: 2020     Findings      Left Ventricle   Mildly dilated left ventricle. Severe left ventricular concentric hypertrophy noted. Ejection fraction is visually estimated at 55%. No evidence of left ventricular mass or thrombus noted. No regional wall motion abnormalities seen. Right Ventricle   Mildly dilated right ventricle. No evidence of a thrombus in the right ventricle. Left Atrium   The left atrium is mildly dilated. Interatrial septum appears intact.    No evidence of thrombus within left atrium. Right Atrium   Mildly enlarged right atrium size. Mitral Valve   Mild mitral regurgitation is present. No mitral valve stenosis present. Tricuspid Valve   Physiologic and/or trace tricuspid regurgitation. RVSP is 47.29 mmHg. Pulmonary hypertension is mild to moderate . Aortic Valve   Aortic valve opens well. The aortic valve is trileaflet. No evidence of aortic valve regurgitation. No hemodynamically significant aortic stenosis is present. Pulmonic Valve   Pulmonic valve is structurally normal.      Pericardial Effusion   No evidence of pericardial effusion. Aorta   The aorta is within normal limits. Miscellaneous   Regular rhythm. Conclusions      Summary   Mildly dilated left ventricle. Severe left ventricular concentric hypertrophy noted. Ejection fraction is visually estimated at 55%. No evidence of left ventricular mass or thrombus noted. No regional wall motion abnormalities seen. The left atrium is mildly dilated. Interatrial septum appears intact. No evidence of thrombus within left atrium. Mildly dilated right ventricle. No evidence of a thrombus in the right ventricle. Mildly enlarged right atrium size. Mild mitral regurgitation is present. No mitral valve stenosis present. Physiologic and/or trace tricuspid regurgitation. RVSP is 47.29 mmHg. Pulmonary hypertension is mild to moderate . Regular rhythm.        Anesthesia Evaluation  Patient summary reviewed and Nursing notes reviewed  Airway: Mallampati: III  TM distance: >3 FB   Neck ROM: full  Mouth opening: < 3 FB Dental: normal exam         Pulmonary:                              Cardiovascular:    (+) hypertension:, CHF:, pulmonary hypertension:, hyperlipidemia      ECG reviewed  Rhythm: regular  Rate: normal  Echocardiogram reviewed                  Neuro/Psych:               GI/Hepatic/Renal:   (+) renal disease (pt has never had HD prior to this point): ESRD,           Endo/Other:    (+) DiabetesType II DM, using insulin, blood dyscrasia: anemia:., .                 Abdominal:   (+) obese,         Vascular:                                        Anesthesia Plan      general     ASA 4       Induction: intravenous. Anesthetic plan and risks discussed with patient. Plan discussed with attending. Saskia Fuentes   11/30/2020        Agree with above assessment. Physical exam unchanged. Spoke to patient about anesthetic plan. Patient understands and wishes to proceed. Patient and spouse appear very irritated (mostly because of delay waiting for surgeon).   Explained to them that we would proceed as soon as the surgeon arrives.  (this addendum was done preop but unable to be filed electronically at that time)

## 2020-11-30 NOTE — PROGRESS NOTES
Admitted to Same Day Surgery Unit. Preop instructions given to patient & family. Covid Test done: Yes, 11/25/20    Results: NEGATIVE    Self-quarantine guidelines followed since tested? Yes    Any unusual S/S or concerns expressed or observed?  No

## 2020-11-30 NOTE — PROGRESS NOTES
Admitted to [facility]. Fluids given. Family @ BS. Call light within reach.

## 2020-11-30 NOTE — ANESTHESIA POSTPROCEDURE EVALUATION
Department of Anesthesiology  Postprocedure Note    Patient: Princess Crane  MRN: [id number]  YOB: 1965  Date of evaluation: 11/30/2020  Time:  5:08 PM     Procedure Summary     Date:  11/30/20 Room / Location:  [address] [place] [address] 10 / [facility]    Anesthesia Start:  1236 Anesthesia Stop:  0314    Procedure:  CATHETER INSERTION PERITONEAL DIALYSIS LAPAROSCOPIC (N/A Abdomen) Diagnosis:  (RENAL FAILURE)    Surgeon:  Maggi Riggs MD Responsible Provider:  Harpreet Brown MD    Anesthesia Type:  general ASA Status:  4          Anesthesia Type: general    Juana Phase I: Juana Score: 9    Juana Phase II: Juana Score: 10    Last vitals: Reviewed and per EMR flowsheets.        Anesthesia Post Evaluation    Patient location during evaluation: PACU  Patient participation: complete - patient participated  Level of consciousness: awake  Airway patency: patent  Nausea & Vomiting: no vomiting and no nausea  Complications: no  Cardiovascular status: hemodynamically stable  Respiratory status: acceptable  Hydration status: stable

## 2020-11-30 NOTE — PROGRESS NOTES
[employer]/ Michael Fish Called, nurse to nurse given. Spoke with Jacqulynn Nageotte . Patients test results review, VS reported to receiving nurse. Any and all important information regarding patient disclosed.

## 2020-11-30 NOTE — BRIEF OP NOTE
Brief Postoperative Note      Patient: Alysha Woods  YOB: 1965  MRN: [id number]    Date of Procedure: 11/30/2020    Pre-Op Diagnosis: RENAL FAILURE    Post-Op Diagnosis: Same       Procedure(s):  CATHETER INSERTION PERITONEAL DIALYSIS LAPAROSCOPIC    Surgeon(s):  Svai Quarles MD    Assistant:  Surgical Assistant: Ulises Patino    Anesthesia: General    Estimated Blood Loss (mL): Minimal    Complications: None    Specimens:   * No specimens in log *    Implants:  * No implants in log *      Drains: * No LDAs found *    Findings: ESRD PD placement with in and out flow unimpeded    Electronically signed by Savi Quarles MD on 11/30/2020 at 1:54 PM

## 2020-12-08 ENCOUNTER — VIRTUAL VISIT (OUTPATIENT)
Dept: FAMILY MEDICINE CLINIC | Age: 55
End: 2020-12-08
Payer: COMMERCIAL

## 2020-12-08 PROCEDURE — 99214 OFFICE O/P EST MOD 30 MIN: CPT | Performed by: FAMILY MEDICINE

## 2020-12-08 RX ORDER — INSULIN DETEMIR 100 [IU]/ML
20 INJECTION, SOLUTION SUBCUTANEOUS NIGHTLY
Qty: 3 VIAL | Refills: 5 | Status: SHIPPED | OUTPATIENT
Start: 2020-12-08

## 2020-12-09 ASSESSMENT — ENCOUNTER SYMPTOMS
RECTAL PAIN: 0
BLOOD IN STOOL: 0
ABDOMINAL PAIN: 0
CONSTIPATION: 0
CHEST TIGHTNESS: 0
EYE REDNESS: 0
NAUSEA: 0
VOICE CHANGE: 0
TROUBLE SWALLOWING: 0
SHORTNESS OF BREATH: 0
VOMITING: 0
BACK PAIN: 0
STRIDOR: 0
SINUS COMPLAINT: 1
EYE ITCHING: 0
SINUS PAIN: 0
EYE PAIN: 0
COUGH: 0
ANAL BLEEDING: 0
CHOKING: 0
COLOR CHANGE: 0
RHINORRHEA: 0
SORE THROAT: 0
DIARRHEA: 0
ABDOMINAL DISTENTION: 0
EYE DISCHARGE: 0
WHEEZING: 0
FACIAL SWELLING: 0
SINUS PRESSURE: 0
PHOTOPHOBIA: 0
APNEA: 0

## 2020-12-09 NOTE — PROGRESS NOTES
TeleMedicine Video Visit    This visit was performed as a virtual video visit using a synchronous, two-way, audio-video telehealth technology platform. Patient identification was verified at the start of the visit, including the patient's telephone number and physical location. I discussed with the patient the nature of our telehealth visits, that:     1. Due to the nature of an audio- video modality, the only components of a physical exam that could be done are the elements supported by direct observation. 2. I would evaluate the patient and recommend diagnostics and treatments based on my assessment. 3. If it was felt that the patient should be evaluated in clinic or an emergency room setting, then they would be directed there. 4. Our sessions are not being recorded and that personal health information is protected. 5. Our team would provide follow up care in person if/when the patient needs it. Patient does agree to proceed with telemedicine consultation. Patient's location: home address in [facility]  Physician  location other address in [facility] other people involved in call, patient only      Time spent: Greater than 15    This visit was completed virtually using Doxy. glenys Lam Bridgette is a 50 y.o. male  . Subjective:      Patient is following with renal and is doing better. I preparing for dialysis. We will follow regularly. Patients sugars should continue to improve/ Discussed diet and diabetic control. Diabetes   He presents for his follow-up diabetic visit. He has type 2 diabetes mellitus. His disease course has been improving. There are no hypoglycemic associated symptoms. Pertinent negatives for hypoglycemia include no confusion, dizziness, headaches, nervousness/anxiousness, pallor, seizures, speech difficulty or tremors. Associated symptoms include fatigue and foot paresthesias. Pertinent negatives for diabetes include no chest pain, no polydipsia, no polyphagia, no polyuria and no weakness. There are no hypoglycemic complications. Symptoms are improving. Diabetic complications include nephropathy and peripheral neuropathy. Risk factors for coronary artery disease include diabetes mellitus, dyslipidemia, hypertension and male sex. Current diabetic treatment includes insulin injections. He is compliant with treatment some of the time. His weight is fluctuating minimally. He is following a generally healthy diet. Meal planning includes avoidance of concentrated sweets. He participates in exercise intermittently. His home blood glucose trend is decreasing steadily. An ACE inhibitor/angiotensin II receptor blocker is not being taken. He does not see a podiatrist.Eye exam is current. Hyperlipidemia   This is a chronic problem. The current episode started more than 1 year ago. The problem is resistant. Recent lipid tests were reviewed and are variable. Pertinent negatives include no chest pain, myalgias or shortness of breath. Current antihyperlipidemic treatment includes statins. The current treatment provides moderate improvement of lipids. Compliance problems include adherence to diet. Risk factors for coronary artery disease include hypertension, dyslipidemia and diabetes mellitus. Hypertension   This is a chronic problem. The current episode started more than 1 year ago. The problem has been gradually improving since onset. The problem is resistant. Pertinent negatives include no chest pain, headaches, neck pain, palpitations or shortness of breath. Past treatments include alpha 1 blockers, diuretics and calcium channel blockers. The current treatment provides moderate improvement. There are no compliance problems. Fatigue   This is a chronic problem. The current episode started more than 1 year ago. The problem occurs intermittently. The problem has been waxing and waning. Associated symptoms include fatigue.  Pertinent negatives include no abdominal pain, arthralgias, chest pain, chills, congestion, coughing, diaphoresis, fever, headaches, joint swelling, myalgias, nausea, neck pain, numbness, rash, sore throat, vomiting or weakness. Nothing aggravates the symptoms. He has tried nothing for the symptoms. The treatment provided no relief. Sinus Problem   This is a recurrent problem. The current episode started more than 1 month ago. The problem has been waxing and waning since onset. Pertinent negatives include no chills, congestion, coughing, diaphoresis, ear pain, headaches, neck pain, shortness of breath, sinus pressure, sneezing or sore throat. Past treatments include nothing. The treatment provided no relief. Review of Systems   Constitutional: Positive for fatigue. Negative for activity change, appetite change, chills, diaphoresis, fever and unexpected weight change. HENT: Negative for congestion, dental problem, drooling, ear discharge, ear pain, facial swelling, hearing loss, mouth sores, nosebleeds, postnasal drip, rhinorrhea, sinus pressure, sinus pain, sneezing, sore throat, tinnitus, trouble swallowing and voice change. Eyes: Negative for photophobia, pain, discharge, redness, itching and visual disturbance. Respiratory: Negative for apnea, cough, choking, chest tightness, shortness of breath, wheezing and stridor. Cardiovascular: Negative for chest pain, palpitations and leg swelling. Gastrointestinal: Negative for abdominal distention, abdominal pain, anal bleeding, blood in stool, constipation, diarrhea, nausea, rectal pain and vomiting. Endocrine: Negative for cold intolerance, heat intolerance, polydipsia, polyphagia and polyuria. Genitourinary: Negative for decreased urine volume, difficulty urinating, discharge, dysuria, enuresis, flank pain, frequency, genital sores, hematuria, penile pain, penile swelling, scrotal swelling, testicular pain and urgency.    Musculoskeletal: Negative for arthralgias, back pain, gait problem, joint swelling, myalgias, neck pain and neck stiffness. Skin: Negative for color change, pallor, rash and wound. Allergic/Immunologic: Negative for environmental allergies, food allergies and immunocompromised state. Neurological: Negative for dizziness, tremors, seizures, syncope, facial asymmetry, speech difficulty, weakness, light-headedness, numbness and headaches. Hematological: Negative for adenopathy. Does not bruise/bleed easily. Psychiatric/Behavioral: Negative for agitation, behavioral problems, confusion, decreased concentration, dysphoric mood, hallucinations, self-injury, sleep disturbance and suicidal ideas. The patient is not nervous/anxious and is not hyperactive. Past Medical History:   Diagnosis Date    Congestive heart disease ([facility]) 6/5/2020    Diabetes mellitus ([facility])     Hyperlipidemia     Hypertension     Type 2 diabetes mellitus with diabetic nephropathy, with long-term current use of insulin ([facility]) 1/22/2020       Social History     Socioeconomic History    Marital status:      Spouse name: Not on file    Number of children: Not on file    Years of education: Not on file    Highest education level: Not on file   Occupational History    Not on file   Social Needs    Financial resource strain: Not on file    Food insecurity     Worry: Not on file     Inability: Not on file    Transportation needs     Medical: Not on file     Non-medical: Not on file   Tobacco Use    Smoking status: Former Smoker     Types: Cigars    Smokeless tobacco: Never Used   Substance and Sexual Activity    Alcohol use:  Yes     Alcohol/week: 0.0 standard drinks     Comment: socially    Drug use: No    Sexual activity: Yes     Partners: Female   Lifestyle    Physical activity     Days per week: Not on file     Minutes per session: Not on file    Stress: Not on file   Relationships    Social connections     Talks on phone: Not on file     Gets together: Not on file     Attends [employer] service: Not on file Active member of club or organization: Not on file     Attends meetings of clubs or organizations: Not on file     Relationship status: Not on file    Intimate partner violence     Fear of current or ex partner: Not on file     Emotionally abused: Not on file     Physically abused: Not on file     Forced sexual activity: Not on file   Other Topics Concern    Not on file   Social History Narrative    Not on file       Family History   Problem Relation Age of Onset    Heart Disease Mother        Current Outpatient Medications on File Prior to Visit   Medication Sig Dispense Refill    metOLazone (ZAROXOLYN) 2.5 MG tablet Take 2.5 mg by mouth daily      sildenafil (VIAGRA) 100 MG tablet TAKE ONE TABLET BY MOUTH AS NEEDED FOR ERECTILE DYSFUNCTION 10 tablet 0    vitamin D (ERGOCALCIFEROL) 1.25 MG (85987 UT) CAPS capsule TAKE 1 CAPSULE BY MOUTH TWICE A WEEK 27 capsule 3    montelukast (SINGULAIR) 10 MG tablet Take 1 tablet by mouth daily 30 tablet 3    lovastatin (MEVACOR) 40 MG tablet Take 1 tablet by mouth daily 30 tablet 3    ipratropium (ATROVENT) 0.06 % nasal spray 2 sprays by Nasal route 3 times daily 1 Bottle 3    fluticasone (FLONASE) 50 MCG/ACT nasal spray 2 sprays by Each Nostril route daily 3 Bottle 5    bumetanide (BUMEX) 2 MG tablet TAKE ONE TABLET BY MOUTH TWO TIMES A DAY 30 tablet 5    Insulin Syringe-Needle U-100 (AIMSCO INS SYR .3CC/29GX0.5\") 29G X 1/2\" 0.3 ML MISC 1 each by Does not apply route daily 100 each 3    Misc Natural Products (APPLE CIDER VINEGAR DIET PO) Take by mouth      Zinc Sulfate (ZINC 15 PO) Take by mouth      doxazosin (CARDURA) 1 MG tablet Take 1 mg by mouth nightly       amLODIPine (NORVASC) 5 MG tablet Take 1 tablet by mouth daily 30 tablet 1    sodium bicarbonate 650 MG tablet Take 1 tablet by mouth 3 times daily (Patient taking differently: Take 650 mg by mouth 3 times daily as needed ) 90 tablet 1    Acetaminophen (TYLENOL) 325 MG CAPS Take 650 mg by mouth as needed (FOR PAIN PER PT.)      vitamin E 400 UNIT capsule Take 400 Units by mouth daily      Insulin Syringe-Needle U-100 30G X 1/2\" 1 ML MISC 10 units units of Tresiba daily 100 each 11    Cinnamon 500 MG CAPS Take 1,000 mg by mouth 2 times daily. No current facility-administered medications on file prior to visit. No Known Allergies    I have reviewed his allergies, medications, problem list, medical,social and family history and have updated as needed in the electronic medical record. Objective:     Physical Exam  Constitutional:       General: He is not in acute distress. Appearance: Normal appearance. He is normal weight. He is not ill-appearing, toxic-appearing or diaphoretic. HENT:      Head: Normocephalic and atraumatic. Pulmonary:      Comments: No respiratory distress  Skin:     Comments: No rash, no lesion   Neurological:      General: No focal deficit present. Mental Status: He is alert and oriented to person, place, and time. Psychiatric:         Mood and Affect: Mood normal.         Behavior: Behavior normal.         Thought Content: Thought content normal.         Judgment: Judgment normal.         Assessment / Plan:   Davis Lucia was seen today for 3 month follow-up. Diagnoses and all orders for this visit:    ESRD (end stage renal disease) ([facility] [address]    Type 2 diabetes mellitus with diabetic nephropathy, with long-term current use of insulin (HCC)  -     insulin detemir (LEVEMIR) 100 UNIT/ML injection vial; Inject 20 Units into the skin nightly    Type 2 diabetes mellitus with diabetic nephropathy, without long-term current use of insulin (HCC)  -     insulin detemir (LEVEMIR) 100 UNIT/ML injection vial; Inject 20 Units into the skin nightly    Benign essential HTN    Mixed hyperlipidemia        Reviewed healthmaintenance report. Patient is aware of deficiencies and suggested preventative tests.

## 2020-12-30 RX ORDER — BUMETANIDE 2 MG/1
TABLET ORAL
Qty: 30 TABLET | Refills: 0 | Status: SHIPPED | OUTPATIENT
Start: 2020-12-30

## 2021-01-11 DIAGNOSIS — E11.21 TYPE 2 DIABETES MELLITUS WITH DIABETIC NEPHROPATHY, WITHOUT LONG-TERM CURRENT USE OF INSULIN (HCC): ICD-10-CM

## 2021-01-11 DIAGNOSIS — J01.90 ACUTE BACTERIAL SINUSITIS: ICD-10-CM

## 2021-01-11 DIAGNOSIS — J40 BRONCHITIS: ICD-10-CM

## 2021-01-11 DIAGNOSIS — B96.89 ACUTE BACTERIAL SINUSITIS: ICD-10-CM

## 2021-01-11 DIAGNOSIS — R60.0 BILATERAL LEG EDEMA: ICD-10-CM

## 2021-01-11 DIAGNOSIS — I10 BENIGN ESSENTIAL HTN: ICD-10-CM

## 2021-01-11 DIAGNOSIS — N28.9 RENAL INSUFFICIENCY: ICD-10-CM

## 2021-01-11 DIAGNOSIS — E55.9 VITAMIN D DEFICIENCY: ICD-10-CM

## 2021-01-11 RX ORDER — LOVASTATIN 40 MG/1
TABLET ORAL
Qty: 30 TABLET | Refills: 0 | Status: SHIPPED | OUTPATIENT
Start: 2021-01-11 | End: 2021-10-17 | Stop reason: SDUPTHER

## 2021-01-11 RX ORDER — MONTELUKAST SODIUM 10 MG/1
TABLET ORAL
Qty: 30 TABLET | Refills: 0 | Status: SHIPPED
Start: 2021-01-11 | End: 2022-03-29 | Stop reason: SDUPTHER

## 2021-01-13 LAB
ANION GAP SERPL CALCULATED.3IONS-SCNC: 18 MMOL/L (ref 7–16)
BUN BLDV-MCNC: 75 MG/DL (ref 6–20)
CALCIUM SERPL-MCNC: 9 MG/DL (ref 8.6–10.2)
CHLORIDE BLD-SCNC: 101 MMOL/L (ref 98–107)
CO2: 20 MMOL/L (ref 22–29)
CREAT SERPL-MCNC: 5.3 MG/DL (ref 0.7–1.2)
GFR AFRICAN AMERICAN: 14
GFR NON-AFRICAN AMERICAN: 11 ML/MIN/1.73
GLUCOSE BLD-MCNC: 235 MG/DL (ref 74–99)
POTASSIUM SERPL-SCNC: 4.2 MMOL/L (ref 3.5–5)
SODIUM BLD-SCNC: 139 MMOL/L (ref 132–146)

## 2021-06-04 ENCOUNTER — HOSPITAL ENCOUNTER (INPATIENT)
Age: 56
LOS: 5 days | Discharge: NURSING, REHAB OR LONG-TERM CARE | DRG: 907 | End: 2021-06-09
Attending: EMERGENCY MEDICINE | Admitting: INTERNAL MEDICINE
Payer: COMMERCIAL

## 2021-06-04 ENCOUNTER — APPOINTMENT (OUTPATIENT)
Dept: GENERAL RADIOLOGY | Age: 56
DRG: 907 | End: 2021-06-04
Payer: COMMERCIAL

## 2021-06-04 DIAGNOSIS — K65.9 PERITONITIS (HCC): Primary | ICD-10-CM

## 2021-06-04 LAB
ALBUMIN SERPL-MCNC: 3.3 G/DL (ref 3.5–5.2)
ALP BLD-CCNC: 111 U/L (ref 40–129)
ALT SERPL-CCNC: 12 U/L (ref 0–40)
ANION GAP SERPL CALCULATED.3IONS-SCNC: 17 MMOL/L (ref 7–16)
ANISOCYTOSIS: ABNORMAL
APTT: 25.6 SEC (ref 24.5–35.1)
AST SERPL-CCNC: 18 U/L (ref 0–39)
BACTERIA: ABNORMAL /HPF
BASOPHILS ABSOLUTE: 0 E9/L (ref 0–0.2)
BASOPHILS RELATIVE PERCENT: 0.3 % (ref 0–2)
BILIRUB SERPL-MCNC: 0.3 MG/DL (ref 0–1.2)
BILIRUBIN URINE: NEGATIVE
BLOOD, URINE: ABNORMAL
BUN BLDV-MCNC: 72 MG/DL (ref 6–20)
CALCIUM SERPL-MCNC: 8 MG/DL (ref 8.6–10.2)
CHLORIDE BLD-SCNC: 90 MMOL/L (ref 98–107)
CLARITY: ABNORMAL
CO2: 22 MMOL/L (ref 22–29)
COARSE CASTS, UA: ABNORMAL /LPF (ref 0–2)
COLOR: YELLOW
CREAT SERPL-MCNC: 8.2 MG/DL (ref 0.7–1.2)
EOSINOPHILS ABSOLUTE: 0.22 E9/L (ref 0.05–0.5)
EOSINOPHILS RELATIVE PERCENT: 3.7 % (ref 0–6)
GFR AFRICAN AMERICAN: 8
GFR NON-AFRICAN AMERICAN: 7 ML/MIN/1.73
GLUCOSE BLD-MCNC: 271 MG/DL (ref 74–99)
GLUCOSE URINE: 500 MG/DL
HCT VFR BLD CALC: 28.6 % (ref 37–54)
HEMOGLOBIN: 10.1 G/DL (ref 12.5–16.5)
INR BLD: 1
KETONES, URINE: NEGATIVE MG/DL
LACTIC ACID, SEPSIS: 1.4 MMOL/L (ref 0.5–1.9)
LACTIC ACID, SEPSIS: 1.4 MMOL/L (ref 0.5–1.9)
LEUKOCYTE ESTERASE, URINE: NEGATIVE
LYMPHOCYTES ABSOLUTE: 0.47 E9/L (ref 1.5–4)
LYMPHOCYTES RELATIVE PERCENT: 8.3 % (ref 20–42)
MCH RBC QN AUTO: 29.2 PG (ref 26–35)
MCHC RBC AUTO-ENTMCNC: 35.3 % (ref 32–34.5)
MCV RBC AUTO: 82.7 FL (ref 80–99.9)
METER GLUCOSE: 441 MG/DL (ref 74–99)
MONOCYTES ABSOLUTE: 0.3 E9/L (ref 0.1–0.95)
MONOCYTES RELATIVE PERCENT: 4.6 % (ref 2–12)
NEUTROPHILS ABSOLUTE: 4.9 E9/L (ref 1.8–7.3)
NEUTROPHILS RELATIVE PERCENT: 83.3 % (ref 43–80)
NITRITE, URINE: NEGATIVE
OVALOCYTES: ABNORMAL
PDW BLD-RTO: 14.5 FL (ref 11.5–15)
PH UA: 5.5 (ref 5–9)
PLATELET # BLD: 264 E9/L (ref 130–450)
PMV BLD AUTO: 11 FL (ref 7–12)
POIKILOCYTES: ABNORMAL
POLYCHROMASIA: ABNORMAL
POTASSIUM REFLEX MAGNESIUM: 4 MMOL/L (ref 3.5–5)
PROTEIN UA: >=300 MG/DL
PROTHROMBIN TIME: 10.9 SEC (ref 9.3–12.4)
RBC # BLD: 3.46 E12/L (ref 3.8–5.8)
RBC UA: ABNORMAL /HPF (ref 0–2)
SODIUM BLD-SCNC: 129 MMOL/L (ref 132–146)
SPECIFIC GRAVITY UA: 1.01 (ref 1–1.03)
TEAR DROP CELLS: ABNORMAL
TOTAL PROTEIN: 6.1 G/DL (ref 6.4–8.3)
UROBILINOGEN, URINE: 0.2 E.U./DL
VANCOMYCIN RANDOM: 23.3 MCG/ML (ref 5–40)
WBC # BLD: 5.9 E9/L (ref 4.5–11.5)
WBC UA: ABNORMAL /HPF (ref 0–5)

## 2021-06-04 PROCEDURE — 36415 COLL VENOUS BLD VENIPUNCTURE: CPT

## 2021-06-04 PROCEDURE — 2580000003 HC RX 258: Performed by: INTERNAL MEDICINE

## 2021-06-04 PROCEDURE — 83605 ASSAY OF LACTIC ACID: CPT

## 2021-06-04 PROCEDURE — 80202 ASSAY OF VANCOMYCIN: CPT

## 2021-06-04 PROCEDURE — 85730 THROMBOPLASTIN TIME PARTIAL: CPT

## 2021-06-04 PROCEDURE — 6360000002 HC RX W HCPCS: Performed by: EMERGENCY MEDICINE

## 2021-06-04 PROCEDURE — 80053 COMPREHEN METABOLIC PANEL: CPT

## 2021-06-04 PROCEDURE — 0WPG03Z REMOVAL OF INFUSION DEVICE FROM PERITONEAL CAVITY, OPEN APPROACH: ICD-10-PCS | Performed by: FAMILY MEDICINE

## 2021-06-04 PROCEDURE — 85025 COMPLETE CBC W/AUTO DIFF WBC: CPT

## 2021-06-04 PROCEDURE — 87088 URINE BACTERIA CULTURE: CPT

## 2021-06-04 PROCEDURE — 87205 SMEAR GRAM STAIN: CPT

## 2021-06-04 PROCEDURE — 89051 BODY FLUID CELL COUNT: CPT

## 2021-06-04 PROCEDURE — 02HV33Z INSERTION OF INFUSION DEVICE INTO SUPERIOR VENA CAVA, PERCUTANEOUS APPROACH: ICD-10-PCS | Performed by: FAMILY MEDICINE

## 2021-06-04 PROCEDURE — 87070 CULTURE OTHR SPECIMN AEROBIC: CPT

## 2021-06-04 PROCEDURE — 85610 PROTHROMBIN TIME: CPT

## 2021-06-04 PROCEDURE — 71045 X-RAY EXAM CHEST 1 VIEW: CPT

## 2021-06-04 PROCEDURE — 87040 BLOOD CULTURE FOR BACTERIA: CPT

## 2021-06-04 PROCEDURE — 93005 ELECTROCARDIOGRAM TRACING: CPT | Performed by: NURSE PRACTITIONER

## 2021-06-04 PROCEDURE — 1200000000 HC SEMI PRIVATE

## 2021-06-04 PROCEDURE — 5A1D70Z PERFORMANCE OF URINARY FILTRATION, INTERMITTENT, LESS THAN 6 HOURS PER DAY: ICD-10-PCS | Performed by: FAMILY MEDICINE

## 2021-06-04 PROCEDURE — 90945 DIALYSIS ONE EVALUATION: CPT | Performed by: INTERNAL MEDICINE

## 2021-06-04 PROCEDURE — 81001 URINALYSIS AUTO W/SCOPE: CPT

## 2021-06-04 PROCEDURE — 82962 GLUCOSE BLOOD TEST: CPT

## 2021-06-04 PROCEDURE — 99285 EMERGENCY DEPT VISIT HI MDM: CPT

## 2021-06-04 PROCEDURE — 6360000002 HC RX W HCPCS: Performed by: INTERNAL MEDICINE

## 2021-06-04 PROCEDURE — 96374 THER/PROPH/DIAG INJ IV PUSH: CPT

## 2021-06-04 PROCEDURE — 6370000000 HC RX 637 (ALT 250 FOR IP): Performed by: INTERNAL MEDICINE

## 2021-06-04 RX ORDER — CARVEDILOL 6.25 MG/1
6.25 TABLET ORAL 2 TIMES DAILY WITH MEALS
Status: DISCONTINUED | OUTPATIENT
Start: 2021-06-05 | End: 2021-06-09 | Stop reason: HOSPADM

## 2021-06-04 RX ORDER — INSULIN GLARGINE 100 [IU]/ML
20 INJECTION, SOLUTION SUBCUTANEOUS NIGHTLY
Status: DISCONTINUED | OUTPATIENT
Start: 2021-06-04 | End: 2021-06-09 | Stop reason: HOSPADM

## 2021-06-04 RX ORDER — BUMETANIDE 1 MG/1
2 TABLET ORAL 2 TIMES DAILY
Status: DISCONTINUED | OUTPATIENT
Start: 2021-06-05 | End: 2021-06-06

## 2021-06-04 RX ORDER — AMLODIPINE BESYLATE 5 MG/1
5 TABLET ORAL DAILY
Status: DISCONTINUED | OUTPATIENT
Start: 2021-06-05 | End: 2021-06-09 | Stop reason: HOSPADM

## 2021-06-04 RX ORDER — DOXAZOSIN 2 MG/1
1 TABLET ORAL NIGHTLY
Status: DISCONTINUED | OUTPATIENT
Start: 2021-06-04 | End: 2021-06-06

## 2021-06-04 RX ORDER — METOLAZONE 2.5 MG/1
2.5 TABLET ORAL DAILY
Status: DISCONTINUED | OUTPATIENT
Start: 2021-06-05 | End: 2021-06-09 | Stop reason: HOSPADM

## 2021-06-04 RX ORDER — SODIUM CHLORIDE 0.9 % (FLUSH) 0.9 %
5-40 SYRINGE (ML) INJECTION EVERY 12 HOURS SCHEDULED
Status: DISCONTINUED | OUTPATIENT
Start: 2021-06-04 | End: 2021-06-09 | Stop reason: HOSPADM

## 2021-06-04 RX ORDER — CARVEDILOL 6.25 MG/1
6.25 TABLET ORAL 2 TIMES DAILY WITH MEALS
COMMUNITY
End: 2021-10-17 | Stop reason: SDUPTHER

## 2021-06-04 RX ORDER — FLUTICASONE PROPIONATE 50 MCG
2 SPRAY, SUSPENSION (ML) NASAL DAILY
Status: DISCONTINUED | OUTPATIENT
Start: 2021-06-05 | End: 2021-06-09 | Stop reason: HOSPADM

## 2021-06-04 RX ORDER — DEXTROSE MONOHYDRATE 50 MG/ML
100 INJECTION, SOLUTION INTRAVENOUS PRN
Status: DISCONTINUED | OUTPATIENT
Start: 2021-06-04 | End: 2021-06-09 | Stop reason: HOSPADM

## 2021-06-04 RX ORDER — DEXTROSE MONOHYDRATE 25 G/50ML
12.5 INJECTION, SOLUTION INTRAVENOUS PRN
Status: DISCONTINUED | OUTPATIENT
Start: 2021-06-04 | End: 2021-06-09 | Stop reason: HOSPADM

## 2021-06-04 RX ORDER — SODIUM CHLORIDE 9 MG/ML
25 INJECTION, SOLUTION INTRAVENOUS PRN
Status: DISCONTINUED | OUTPATIENT
Start: 2021-06-04 | End: 2021-06-09 | Stop reason: HOSPADM

## 2021-06-04 RX ORDER — MONTELUKAST SODIUM 10 MG/1
10 TABLET ORAL DAILY
Status: DISCONTINUED | OUTPATIENT
Start: 2021-06-05 | End: 2021-06-09 | Stop reason: HOSPADM

## 2021-06-04 RX ORDER — ONDANSETRON 4 MG/1
4 TABLET, ORALLY DISINTEGRATING ORAL EVERY 8 HOURS PRN
Status: DISCONTINUED | OUTPATIENT
Start: 2021-06-04 | End: 2021-06-09 | Stop reason: HOSPADM

## 2021-06-04 RX ORDER — IPRATROPIUM BROMIDE 42 UG/1
2 SPRAY, METERED NASAL 3 TIMES DAILY
Status: DISCONTINUED | OUTPATIENT
Start: 2021-06-04 | End: 2021-06-04 | Stop reason: CLARIF

## 2021-06-04 RX ORDER — ONDANSETRON 2 MG/ML
4 INJECTION INTRAMUSCULAR; INTRAVENOUS EVERY 6 HOURS PRN
Status: DISCONTINUED | OUTPATIENT
Start: 2021-06-04 | End: 2021-06-09 | Stop reason: HOSPADM

## 2021-06-04 RX ORDER — POLYETHYLENE GLYCOL 3350 17 G/17G
17 POWDER, FOR SOLUTION ORAL DAILY PRN
Status: DISCONTINUED | OUTPATIENT
Start: 2021-06-04 | End: 2021-06-09 | Stop reason: HOSPADM

## 2021-06-04 RX ORDER — ATORVASTATIN CALCIUM 20 MG/1
20 TABLET, FILM COATED ORAL NIGHTLY
Status: DISCONTINUED | OUTPATIENT
Start: 2021-06-05 | End: 2021-06-09 | Stop reason: HOSPADM

## 2021-06-04 RX ORDER — FENTANYL CITRATE 50 UG/ML
100 INJECTION, SOLUTION INTRAMUSCULAR; INTRAVENOUS ONCE
Status: COMPLETED | OUTPATIENT
Start: 2021-06-04 | End: 2021-06-04

## 2021-06-04 RX ORDER — SODIUM CHLORIDE 0.9 % (FLUSH) 0.9 %
5-40 SYRINGE (ML) INJECTION PRN
Status: DISCONTINUED | OUTPATIENT
Start: 2021-06-04 | End: 2021-06-09 | Stop reason: HOSPADM

## 2021-06-04 RX ORDER — NICOTINE POLACRILEX 4 MG
15 LOZENGE BUCCAL PRN
Status: DISCONTINUED | OUTPATIENT
Start: 2021-06-04 | End: 2021-06-09 | Stop reason: HOSPADM

## 2021-06-04 RX ORDER — ACETAMINOPHEN 325 MG/1
650 TABLET ORAL EVERY 6 HOURS PRN
Status: DISCONTINUED | OUTPATIENT
Start: 2021-06-04 | End: 2021-06-09 | Stop reason: HOSPADM

## 2021-06-04 RX ORDER — ACETAMINOPHEN 650 MG/1
650 SUPPOSITORY RECTAL EVERY 6 HOURS PRN
Status: DISCONTINUED | OUTPATIENT
Start: 2021-06-04 | End: 2021-06-09 | Stop reason: HOSPADM

## 2021-06-04 RX ORDER — HEPARIN SODIUM 10000 [USP'U]/ML
5000 INJECTION, SOLUTION INTRAVENOUS; SUBCUTANEOUS EVERY 8 HOURS SCHEDULED
Status: DISCONTINUED | OUTPATIENT
Start: 2021-06-04 | End: 2021-06-09 | Stop reason: HOSPADM

## 2021-06-04 RX ORDER — SODIUM BICARBONATE 650 MG/1
650 TABLET ORAL 3 TIMES DAILY
Status: DISCONTINUED | OUTPATIENT
Start: 2021-06-04 | End: 2021-06-06

## 2021-06-04 RX ADMIN — FENTANYL CITRATE 100 MCG: 50 INJECTION, SOLUTION INTRAMUSCULAR; INTRAVENOUS at 16:16

## 2021-06-04 RX ADMIN — INSULIN GLARGINE 20 UNITS: 100 INJECTION, SOLUTION SUBCUTANEOUS at 22:50

## 2021-06-04 RX ADMIN — HEPARIN SODIUM 5000 UNITS: 10000 INJECTION INTRAVENOUS; SUBCUTANEOUS at 22:50

## 2021-06-04 RX ADMIN — CEFEPIME HYDROCHLORIDE 1000 MG: 1 INJECTION, POWDER, FOR SOLUTION INTRAMUSCULAR; INTRAVENOUS at 18:57

## 2021-06-04 RX ADMIN — INSULIN LISPRO 6 UNITS: 100 INJECTION, SOLUTION INTRAVENOUS; SUBCUTANEOUS at 22:51

## 2021-06-04 RX ADMIN — Medication 10 ML: at 23:38

## 2021-06-04 ASSESSMENT — PAIN DESCRIPTION - DESCRIPTORS: DESCRIPTORS: SHARP

## 2021-06-04 ASSESSMENT — PAIN SCALES - GENERAL
PAINLEVEL_OUTOF10: 3
PAINLEVEL_OUTOF10: 3
PAINLEVEL_OUTOF10: 5

## 2021-06-04 ASSESSMENT — PAIN DESCRIPTION - PAIN TYPE
TYPE: ACUTE PAIN
TYPE: ACUTE PAIN

## 2021-06-04 ASSESSMENT — PAIN DESCRIPTION - LOCATION
LOCATION: ABDOMEN
LOCATION: ABDOMEN

## 2021-06-04 ASSESSMENT — PAIN DESCRIPTION - FREQUENCY: FREQUENCY: CONTINUOUS

## 2021-06-04 NOTE — ED NOTES
Bed: 07  Expected date:   Expected time:   Means of arrival:   Comments:  triage     Elizabeth Leon RN  06/04/21 6923

## 2021-06-04 NOTE — ED NOTES
FIRST PROVIDER CONTACT ASSESSMENT NOTE      Department of Emergency Medicine   Admit Date: No admission date for patient encounter. Chief Complaint: Vascular Access Problem (sent in by nephrologist for evaluation of peritoneal dialysis catheter poss infection.)      History of Present Illness:    Griselda Walker is a 49 y.o. male who presents to the ED for evaluation for peritonitis sent by Dr. Zakia Pringle for abnormal cultures from last night from the peritoneal dialysis catheter. States has been running intermittent fevers.         -----------------END OF FIRST PROVIDER CONTACT ASSESSMENT NOTE--------------  Electronically signed by DONOVAN Banks CNP   DD: 6/4/21               DONOVAN Mack CNP  06/04/21 1530

## 2021-06-04 NOTE — ED PROVIDER NOTES
[address]  Department of Emergency Medicine   ED  Encounter Note  Admit Date/RoomTime: 2021  3:33 PM  ED Room: [facility]/Counts include 234 beds at the [facility]-    NAME: Michelle Belle  : 1965  MRN: [id number]     Chief Complaint:  Vascular Access Problem (sent in by nephrologist for evaluation of peritoneal dialysis catheter poss infection.)    History of Present Illness        Michelle Belle is a 49 y.o. old male who presents to the emergency department for evaluation of known peritonitis. He is here with his wife who provides history as well. He is a peritoneal dialysis patient. He was treated a few weeks ago for peritonitis with vancomycin infusions. He seemed to be doing better up until a couple days ago when his fluid became cloudy again. He received vancomycin and cefepime yesterday and today. He was sent in by Dr. Westley Valdivia for admission and further treatment. He did not have dialysis last night. He denies fever or chills. He does currently complain of abdominal pain and discomfort. .  ROS   Pertinent positives and negatives are stated within HPI, all other systems reviewed and are negative. Past Medical History:  has a past medical history of Congestive heart disease ([address] Diabetes mellitus ([address] Hyperlipidemia, Hypertension, and Type 2 diabetes mellitus with diabetic nephropathy, with long-term current use of insulin ([address] Surgical History:  has a past surgical history that includes Tonsillectomy and Dialysis Catheter Insertion (N/A, 2020). Social History:  reports that he has quit smoking. His smoking use included cigars. He has never used smokeless tobacco. He reports current alcohol use. He reports that he does not use drugs. Family History: family history includes Heart Disease in his mother. Allergies: Patient has no known allergies.     Physical Exam   Oxygen Saturation Interpretation: Normal.        ED Triage Vitals   BP Temp Temp src Pulse Resp SpO2 Height Weight 06/04/21 1528 06/04/21 1444 -- 06/04/21 1444 06/04/21 1528 06/04/21 1444 06/04/21 1528 06/04/21 1528   (!) 165/76 98.4 °F (36.9 °C)  82 16 95 % 5' 8\" (1.727 m) 200 lb (90.7 kg)         Constitutional:  Alert, development consistent with age. HEENT:  NC/NT. Airway patent. Neck:  Normal ROM. Supple. Respiratory:  Clear to auscultation and breath sounds equal.  CV:  Regular rate and rhythm, normal heart sounds, without pathological murmurs, ectopy, gallops, or rubs. GI: Abdomen Soft, distended, moderate diffuse tenderness, PD catheter present clean dry intact, good bowel sounds. No firm or pulsatile mass. Back:  No costovertebral tenderness. Integument:  Normal turgor. Warm, dry, without visible rash, unless noted elsewhere. Lymphatic: no lymphadenopathy noted  Neurological:  Oriented. Motor functions intact.     Lab / Imaging Results   (All laboratory and radiology results have been personally reviewed by myself)  Labs:  Results for orders placed or performed during the hospital encounter of 06/04/21   Lactate, Sepsis   Result Value Ref Range    Lactic Acid, Sepsis 1.4 0.5 - 1.9 mmol/L   Lactate, Sepsis   Result Value Ref Range    Lactic Acid, Sepsis 1.4 0.5 - 1.9 mmol/L   CBC auto differential   Result Value Ref Range    WBC 5.9 4.5 - 11.5 E9/L    RBC 3.46 (L) 3.80 - 5.80 E12/L    Hemoglobin 10.1 (L) 12.5 - 16.5 g/dL    Hematocrit 28.6 (L) 37.0 - 54.0 %    MCV 82.7 80.0 - 99.9 fL    MCH 29.2 26.0 - 35.0 pg    MCHC 35.3 (H) 32.0 - 34.5 %    RDW 14.5 11.5 - 15.0 fL    Platelets 689 897 - 352 E9/L    MPV 11.0 7.0 - 12.0 fL    Neutrophils % 83.3 (H) 43.0 - 80.0 %    Lymphocytes % 8.3 (L) 20.0 - 42.0 %    Monocytes % 4.6 2.0 - 12.0 %    Eosinophils % 3.7 0.0 - 6.0 %    Basophils % 0.3 0.0 - 2.0 %    Neutrophils Absolute 4.90 1.80 - 7.30 E9/L    Lymphocytes Absolute 0.47 (L) 1.50 - 4.00 E9/L    Monocytes Absolute 0.30 0.10 - 0.95 E9/L    Eosinophils Absolute 0.22 0.05 - 0.50 E9/L    Basophils Absolute 0.00 0.00 - 0.20 E9/L    Anisocytosis 2+     Polychromasia 1+     Poikilocytes 1+     Ovalocytes 1+     Tear Drop Cells 1+    Comprehensive Metabolic Panel w/ Reflex to MG   Result Value Ref Range    Sodium 129 (L) 132 - 146 mmol/L    Potassium reflex Magnesium 4.0 3.5 - 5.0 mmol/L    Chloride 90 (L) 98 - 107 mmol/L    CO2 22 22 - 29 mmol/L    Anion Gap 17 (H) 7 - 16 mmol/L    Glucose 271 (H) 74 - 99 mg/dL    BUN 72 (H) 6 - 20 mg/dL    CREATININE 8.2 (HH) 0.7 - 1.2 mg/dL    GFR Non-African American 7 >=60 mL/min/1.73    GFR African American 8     Calcium 8.0 (L) 8.6 - 10.2 mg/dL    Total Protein 6.1 (L) 6.4 - 8.3 g/dL    Albumin 3.3 (L) 3.5 - 5.2 g/dL    Total Bilirubin 0.3 0.0 - 1.2 mg/dL    Alkaline Phosphatase 111 40 - 129 U/L    ALT 12 0 - 40 U/L    AST 18 0 - 39 U/L   Urinalysis   Result Value Ref Range    Color, UA Yellow Straw/Yellow    Clarity, UA CLOUDY (A) Clear    Glucose, Ur 500 (A) Negative mg/dL    Bilirubin Urine Negative Negative    Ketones, Urine Negative Negative mg/dL    Specific Gravity, UA 1.015 1.005 - 1.030    Blood, Urine SMALL (A) Negative    pH, UA 5.5 5.0 - 9.0    Protein, UA >=300 (A) Negative mg/dL    Urobilinogen, Urine 0.2 <2.0 E.U./dL    Nitrite, Urine Negative Negative    Leukocyte Esterase, Urine Negative Negative   APTT   Result Value Ref Range    aPTT 25.6 24.5 - 35.1 sec   Protime-INR   Result Value Ref Range    Protime 10.9 9.3 - 12.4 sec    INR 1.0    VANCOMYCIN, RANDOM   Result Value Ref Range    Vancomycin Rm 23.3 5.0 - 40.0 mcg/mL   Microscopic Urinalysis   Result Value Ref Range    Coarse Casts, UA 3-5 (A) 0 - 2 /LPF    WBC, UA 1-3 0 - 5 /HPF    RBC, UA 2-5 0 - 2 /HPF    Bacteria, UA MANY (A) None Seen /HPF   EKG 12 lead   Result Value Ref Range    Ventricular Rate 77 BPM    Atrial Rate 77 BPM    P-R Interval 104 ms    QRS Duration 98 ms    Q-T Interval 420 ms    QTc Calculation (Bazett) 475 ms    P Axis 54 degrees    R Axis 42 degrees    T Axis 22 degrees     Imaging:   All Radiology results interpreted by Radiologist unless otherwise noted. XR CHEST 1 VIEW   Final Result   No acute process. EKG #1:  Interpreted by emergency department physician unless otherwise noted. Time:  15:58    Rate: 77  Rhythm: Sinus rhythm. Interpretation: Normal sinus rhythm.     ED Course / Medical Decision Making     Medications   sodium bicarbonate tablet 650 mg (has no administration in time range)   doxazosin (CARDURA) tablet 1 mg (has no administration in time range)   fluticasone (FLONASE) 50 MCG/ACT nasal spray 2 spray (has no administration in time range)   amLODIPine (NORVASC) tablet 5 mg (has no administration in time range)   bumetanide (BUMEX) tablet 2 mg (has no administration in time range)   carvedilol (COREG) tablet 6.25 mg (has no administration in time range)   insulin glargine (LANTUS) injection vial 20 Units (has no administration in time range)   atorvastatin (LIPITOR) tablet 20 mg (has no administration in time range)   metOLazone (ZAROXOLYN) tablet 2.5 mg (has no administration in time range)   montelukast (SINGULAIR) tablet 10 mg (has no administration in time range)   vitamin E capsule 400 Units (has no administration in time range)   glucose (GLUTOSE) 40 % oral gel 15 g (has no administration in time range)   dextrose 50 % IV solution (has no administration in time range)   glucagon (rDNA) injection 1 mg (has no administration in time range)   dextrose 5 % solution (has no administration in time range)   insulin lispro (HUMALOG) injection vial 0-12 Units (has no administration in time range)   insulin lispro (HUMALOG) injection vial 0-6 Units (has no administration in time range)   sodium chloride flush 0.9 % injection 5-40 mL (has no administration in time range)   sodium chloride flush 0.9 % injection 5-40 mL (has no administration in time range)   0.9 % sodium chloride infusion (has no administration in time range)   heparin (porcine) injection 5,000 Units (has no administration in time range)   ondansetron (ZOFRAN-ODT) disintegrating tablet 4 mg (has no administration in time range)     Or   ondansetron (ZOFRAN) injection 4 mg (has no administration in time range)   polyethylene glycol (GLYCOLAX) packet 17 g (has no administration in time range)   acetaminophen (TYLENOL) tablet 650 mg (has no administration in time range)     Or   acetaminophen (TYLENOL) suppository 650 mg (has no administration in time range)   cefepime (MAXIPIME) 1000 mg IVPB minibag (has no administration in time range)   vancomycin (VANCOCIN) intermittent dosing (placeholder) (has no administration in time range)   fentaNYL (SUBLIMAZE) injection 100 mcg (100 mcg Intravenous Given 6/4/21 1616)   cefepime (MAXIPIME) 1000 mg IVPB minibag (1,000 mg Intravenous New Bag 6/4/21 1857)        Re-Evaluations:  6/4/21      Patients symptoms are improving. Consultations:             IP CONSULT TO NEPHROLOGY  IP CONSULT TO VASCULAR SURGERY  IP CONSULT TO GENERAL SURGERY  IP CONSULT TO INTERNAL MEDICINE  IP CONSULT TO PHARMACY  IP CONSULT TO VASCULAR SURGERY  IP CONSULT TO GENERAL SURGERY    Procedures:   none    MDM: Patient presents to the ED for evaluation of peritonitis. He already did receive vancomycin and cefepime today. I discussed the case with Dr. Akilah Whitehead. So far, his outpatient cultures have not grown any particular organism. We will repeat cultures today, a dialysis nurse was sent down to obtain the fluid. Plan for PD cath removal and temporary hemodialysis. Vascular surgery and general surgery were consulted. Patient accepted by sound physicians for further management. Plan of Care/Counseling:  Myself: Camilla Zlueta DO reviewed today's visit with the patient in addition to providing specific details for the plan of care and counseling regarding the diagnosis and prognosis. Questions are answered at this time and are agreeable with the plan. Assessment      1.  Peritonitis ([address]      This

## 2021-06-04 NOTE — H&P
Hospital Medicine History & Physical      PCP: Elieser Shay DO    Date of Admission: 6/4/2021    Date of Service: Pt seen/examined on 6/4/2021 and admitted to Inpatient with expected LOS greater than two midnights due to medical therapy. Chief Complaint: Peritonitis      History Of Present Illness:    Mr. Prince Awad, a 49y.o. year old male  who  has a past medical history of Congestive heart disease ([address] Diabetes mellitus ([address] Hyperlipidemia, Hypertension, and Type 2 diabetes mellitus with diabetic nephropathy, with long-term current use of insulin ([address] He was recently diagnosed with ESRD and he gets peritoneal dialysis. Patient apparently has had recent peritonitis a month ago and he was treated with vancomycin and cefepime for 3 weeks after which his symptoms improved. No obvious organism was obtaining the culture. Patient again noticed increasing abdominal pain yesterday and he went to dialysis unit where they merlin the peritoneal fluid which was cloudy. Patient assessed denies any fever but admits to having some chills. Patient was given vancomycin yesterday and he went back to dialysis center today when he got another dose of vancomycin and he was admitted here for transition to hemodialysis at least for now. Patient on a long-term basis would like to be on peritoneal dialysis. He is awaiting renal transplant. He otherwise denies any nausea, vomiting, diarrhea. Fluid was sent for culture again.     Past Medical History:          Diagnosis Date    Congestive heart disease ([address] 6/5/2020    Diabetes mellitus ([address]     Hyperlipidemia     Hypertension     Type 2 diabetes mellitus with diabetic nephropathy, with long-term current use of insulin ([address] 1/22/2020   ESRD    Past Surgical History:          Procedure Laterality Date    DIALYSIS CATHETER INSERTION N/A 11/30/2020    CATHETER INSERTION PERITONEAL DIALYSIS LAPAROSCOPIC performed by Emerson Roberson MD at [facility] TONSILLECTOMY         Medications Prior to Admission:      Prior to Admission medications    Medication Sig Start Date End Date Taking? Authorizing Provider   carvedilol (COREG) 6.25 MG tablet Take 6.25 mg by mouth 2 times daily (with meals)   Yes Historical Provider, MD   lovastatin (MEVACOR) 40 MG tablet TAKE ONE TABLET BY MOUTH DAILY 1/11/21  Yes Stoney Kiran DO   montelukast (SINGULAIR) 10 MG tablet TAKE ONE TABLET BY MOUTH DAILY 1/11/21  Yes Stoney Kiran DO   bumetanide (BUMEX) 2 MG tablet TAKE ONE TABLET BY MOUTH TWO TIMES A DAY 12/30/20  Yes Stoney Kiran DO   insulin detemir (LEVEMIR) 100 UNIT/ML injection vial Inject 20 Units into the skin nightly 12/8/20  Yes Stoney Kiran DO   metOLazone (ZAROXOLYN) 2.5 MG tablet Take 2.5 mg by mouth daily   Yes Historical Provider, MD   vitamin D (ERGOCALCIFEROL) 1.25 MG (70517 UT) CAPS capsule TAKE 1 CAPSULE BY MOUTH TWICE A WEEK 9/8/20  Yes Stoney Kiran DO   Insulin Syringe-Needle U-100 (AIMSCO INS SYR .3CC/29GX0.5\") 29G X 1/2\" 0.3 ML MISC 1 each by Does not apply route daily 8/13/20  Yes Stoney Kiran DO   Zinc Sulfate (ZINC 15 PO) Take by mouth   Yes Historical Provider, MD   amLODIPine (NORVASC) 5 MG tablet Take 1 tablet by mouth daily 1/10/20  Yes Natan Mayes, DO   Acetaminophen (TYLENOL) 325 MG CAPS Take 650 mg by mouth as needed (FOR PAIN PER PT.)   Yes Historical Provider, MD   vitamin E 400 UNIT capsule Take 400 Units by mouth daily   Yes Historical Provider, MD   Insulin Syringe-Needle U-100 30G X 1/2\" 1 ML MISC 10 units units of Tresiba daily 10/22/19  Yes Ej Spaulding, DO   Cinnamon 500 MG CAPS Take 1,000 mg by mouth 2 times daily.    Yes Historical Provider, MD   sildenafil (VIAGRA) 100 MG tablet TAKE ONE TABLET BY MOUTH AS NEEDED FOR ERECTILE DYSFUNCTION 10/27/20   Stoney Kiran DO   ipratropium (ATROVENT) 0.06 % nasal spray 2 sprays by Nasal route 3 times daily 9/8/20 9/8/21  Stoney Kiran,    fluticasone (FLONASE) 50 last 72 hours. Urinalysis:      Lab Results   Component Value Date    NITRU Negative 06/04/2021    WBCUA 1-3 06/04/2021    BACTERIA MANY 06/04/2021    RBCUA 2-5 06/04/2021    BLOODU SMALL 06/04/2021    SPECGRAV 1.015 06/04/2021    GLUCOSEU 500 06/04/2021       Imaging:  XR CHEST 1 VIEW   Final Result   No acute process. ASSESSMENT:  Active Hospital Problems    Diagnosis Date Noted    Peritonitis ([facility] [K65.9] 06/04/2021     · ESRD  · Insulin-dependent diabetes mellitus  · Hyponatremia  · Chronic anemia  · Hypertension    Plan:  · Patient will be admitted to the hospital.  Consultation was placed to nephrology. Plan is for hemodialysis catheter placement and removal of peritoneal dialysis catheter. Patient be placed on empiric antibiotics for now. Will await further recommendations from nephrology, will follow cultures  · Continue insulin, ADA diet and sliding scale coverage  · Monitor electrolytes closely  · Continue antihypertensives  · Treatment plan discussed with patient's wife at bedside  · Body mass index is 30.41 kg/m². DVT Prophylaxis  · Lovenox    Diet  · No diet orders on file    Code Status  · Prior      Electronically signed by Nayana Briceno MD on 6/4/2021 at 6:13 PM  Please contact me via [employer]        NOTE: This report was transcribed using voice recognition software. Every effort was made to ensure accuracy; however, inadvertent computerized transcription errors may be present.

## 2021-06-05 LAB
ANION GAP SERPL CALCULATED.3IONS-SCNC: 14 MMOL/L (ref 7–16)
APPEARANCE FLUID: NORMAL
APPEARANCE FLUID: NORMAL
BUN BLDV-MCNC: 64 MG/DL (ref 6–20)
CALCIUM SERPL-MCNC: 7.7 MG/DL (ref 8.6–10.2)
CELL COUNT FLUID TYPE: NORMAL
CELL COUNT FLUID TYPE: NORMAL
CHLORIDE BLD-SCNC: 90 MMOL/L (ref 98–107)
CO2: 25 MMOL/L (ref 22–29)
COLOR FLUID: COLORLESS
COLOR FLUID: NORMAL
CREAT SERPL-MCNC: 8.3 MG/DL (ref 0.7–1.2)
EKG ATRIAL RATE: 77 BPM
EKG P AXIS: 54 DEGREES
EKG P-R INTERVAL: 104 MS
EKG Q-T INTERVAL: 420 MS
EKG QRS DURATION: 98 MS
EKG QTC CALCULATION (BAZETT): 475 MS
EKG R AXIS: 42 DEGREES
EKG T AXIS: 22 DEGREES
EKG VENTRICULAR RATE: 77 BPM
GFR AFRICAN AMERICAN: 8
GFR NON-AFRICAN AMERICAN: 7 ML/MIN/1.73
GLUCOSE BLD-MCNC: 315 MG/DL (ref 74–99)
HBA1C MFR BLD: 10.3 % (ref 4–5.6)
HCT VFR BLD CALC: 24.9 % (ref 37–54)
HEMOGLOBIN: 8.8 G/DL (ref 12.5–16.5)
MAGNESIUM: 1.6 MG/DL (ref 1.6–2.6)
MCH RBC QN AUTO: 29.1 PG (ref 26–35)
MCHC RBC AUTO-ENTMCNC: 35.3 % (ref 32–34.5)
MCV RBC AUTO: 82.5 FL (ref 80–99.9)
METER GLUCOSE: 144 MG/DL (ref 74–99)
METER GLUCOSE: 187 MG/DL (ref 74–99)
METER GLUCOSE: 318 MG/DL (ref 74–99)
METER GLUCOSE: 320 MG/DL (ref 74–99)
METER GLUCOSE: 459 MG/DL (ref 74–99)
MONOCYTE, FLUID: 32 %
MONOCYTE, FLUID: 46 %
NEUTROPHIL, FLUID: 54 %
NEUTROPHIL, FLUID: 68 %
NUCLEATED CELLS FLUID: 2269 /UL
NUCLEATED CELLS FLUID: 230 /UL
PDW BLD-RTO: 14.2 FL (ref 11.5–15)
PHOSPHORUS: 4.9 MG/DL (ref 2.5–4.5)
PLATELET # BLD: 213 E9/L (ref 130–450)
PMV BLD AUTO: 10.7 FL (ref 7–12)
POTASSIUM SERPL-SCNC: 2.8 MMOL/L (ref 3.5–5)
RBC # BLD: 3.02 E12/L (ref 3.8–5.8)
RBC FLUID: <2000 /UL
RBC FLUID: <2000 /UL
SODIUM BLD-SCNC: 129 MMOL/L (ref 132–146)
VANCOMYCIN RANDOM: 18.7 MCG/ML (ref 5–40)
WBC # BLD: 3.8 E9/L (ref 4.5–11.5)

## 2021-06-05 PROCEDURE — 84100 ASSAY OF PHOSPHORUS: CPT

## 2021-06-05 PROCEDURE — 80202 ASSAY OF VANCOMYCIN: CPT

## 2021-06-05 PROCEDURE — 80048 BASIC METABOLIC PNL TOTAL CA: CPT

## 2021-06-05 PROCEDURE — 90945 DIALYSIS ONE EVALUATION: CPT

## 2021-06-05 PROCEDURE — 6360000002 HC RX W HCPCS: Performed by: INTERNAL MEDICINE

## 2021-06-05 PROCEDURE — 80074 ACUTE HEPATITIS PANEL: CPT

## 2021-06-05 PROCEDURE — 89051 BODY FLUID CELL COUNT: CPT

## 2021-06-05 PROCEDURE — 83735 ASSAY OF MAGNESIUM: CPT

## 2021-06-05 PROCEDURE — 93010 ELECTROCARDIOGRAM REPORT: CPT | Performed by: INTERNAL MEDICINE

## 2021-06-05 PROCEDURE — 6370000000 HC RX 637 (ALT 250 FOR IP): Performed by: INTERNAL MEDICINE

## 2021-06-05 PROCEDURE — 36415 COLL VENOUS BLD VENIPUNCTURE: CPT

## 2021-06-05 PROCEDURE — 1200000000 HC SEMI PRIVATE

## 2021-06-05 PROCEDURE — 86706 HEP B SURFACE ANTIBODY: CPT

## 2021-06-05 PROCEDURE — 85027 COMPLETE CBC AUTOMATED: CPT

## 2021-06-05 PROCEDURE — 6360000002 HC RX W HCPCS: Performed by: NURSE PRACTITIONER

## 2021-06-05 PROCEDURE — 2580000003 HC RX 258: Performed by: INTERNAL MEDICINE

## 2021-06-05 PROCEDURE — 82962 GLUCOSE BLOOD TEST: CPT

## 2021-06-05 PROCEDURE — 83036 HEMOGLOBIN GLYCOSYLATED A1C: CPT

## 2021-06-05 RX ORDER — POTASSIUM CHLORIDE 7.45 MG/ML
10 INJECTION INTRAVENOUS
Status: COMPLETED | OUTPATIENT
Start: 2021-06-05 | End: 2021-06-05

## 2021-06-05 RX ORDER — FLASH GLUCOSE SENSOR
KIT MISCELLANEOUS
Qty: 2 EACH | Refills: 0 | Status: SHIPPED | OUTPATIENT
Start: 2021-06-05 | End: 2021-10-13 | Stop reason: SDUPTHER

## 2021-06-05 RX ORDER — MAGNESIUM SULFATE 1 G/100ML
1000 INJECTION INTRAVENOUS ONCE
Status: COMPLETED | OUTPATIENT
Start: 2021-06-05 | End: 2021-06-05

## 2021-06-05 RX ADMIN — POTASSIUM CHLORIDE 10 MEQ: 10 INJECTION, SOLUTION INTRAVENOUS at 15:29

## 2021-06-05 RX ADMIN — SODIUM BICARBONATE 650 MG: 650 TABLET ORAL at 21:27

## 2021-06-05 RX ADMIN — Medication 10 ML: at 21:34

## 2021-06-05 RX ADMIN — DOXAZOSIN 1 MG: 2 TABLET ORAL at 22:43

## 2021-06-05 RX ADMIN — HEPARIN SODIUM 5000 UNITS: 10000 INJECTION INTRAVENOUS; SUBCUTANEOUS at 21:28

## 2021-06-05 RX ADMIN — INSULIN GLARGINE 20 UNITS: 100 INJECTION, SOLUTION SUBCUTANEOUS at 21:29

## 2021-06-05 RX ADMIN — FLUTICASONE PROPIONATE 2 SPRAY: 50 SPRAY, METERED NASAL at 09:55

## 2021-06-05 RX ADMIN — CEFEPIME HYDROCHLORIDE 1000 MG: 1 INJECTION, POWDER, FOR SOLUTION INTRAMUSCULAR; INTRAVENOUS at 22:42

## 2021-06-05 RX ADMIN — BUMETANIDE 2 MG: 1 TABLET ORAL at 17:33

## 2021-06-05 RX ADMIN — CARVEDILOL 6.25 MG: 6.25 TABLET, FILM COATED ORAL at 17:33

## 2021-06-05 RX ADMIN — MAGNESIUM SULFATE HEPTAHYDRATE 1000 MG: 1 INJECTION, SOLUTION INTRAVENOUS at 13:57

## 2021-06-05 RX ADMIN — Medication 10 ML: at 09:55

## 2021-06-05 RX ADMIN — CARVEDILOL 6.25 MG: 6.25 TABLET, FILM COATED ORAL at 09:55

## 2021-06-05 RX ADMIN — INSULIN LISPRO 6 UNITS: 100 INJECTION, SOLUTION INTRAVENOUS; SUBCUTANEOUS at 21:29

## 2021-06-05 RX ADMIN — MONTELUKAST SODIUM 10 MG: 10 TABLET ORAL at 09:55

## 2021-06-05 RX ADMIN — Medication 400 UNITS: at 09:55

## 2021-06-05 RX ADMIN — ATORVASTATIN CALCIUM 20 MG: 20 TABLET, FILM COATED ORAL at 21:27

## 2021-06-05 RX ADMIN — HEPARIN SODIUM 5000 UNITS: 10000 INJECTION INTRAVENOUS; SUBCUTANEOUS at 06:09

## 2021-06-05 RX ADMIN — METOLAZONE 2.5 MG: 2.5 TABLET ORAL at 09:55

## 2021-06-05 RX ADMIN — VANCOMYCIN HYDROCHLORIDE 750 MG: 10 INJECTION, POWDER, LYOPHILIZED, FOR SOLUTION INTRAVENOUS at 17:00

## 2021-06-05 RX ADMIN — BUMETANIDE 2 MG: 1 TABLET ORAL at 09:55

## 2021-06-05 RX ADMIN — POTASSIUM CHLORIDE 10 MEQ: 10 INJECTION, SOLUTION INTRAVENOUS at 16:20

## 2021-06-05 RX ADMIN — POTASSIUM CHLORIDE 10 MEQ: 10 INJECTION, SOLUTION INTRAVENOUS at 12:44

## 2021-06-05 RX ADMIN — INSULIN LISPRO 8 UNITS: 100 INJECTION, SOLUTION INTRAVENOUS; SUBCUTANEOUS at 09:55

## 2021-06-05 RX ADMIN — AMLODIPINE BESYLATE 5 MG: 5 TABLET ORAL at 09:55

## 2021-06-05 RX ADMIN — SODIUM BICARBONATE 650 MG: 650 TABLET ORAL at 09:55

## 2021-06-05 RX ADMIN — SODIUM BICARBONATE 650 MG: 650 TABLET ORAL at 13:39

## 2021-06-05 RX ADMIN — POTASSIUM CHLORIDE 10 MEQ: 10 INJECTION, SOLUTION INTRAVENOUS at 16:55

## 2021-06-05 ASSESSMENT — PAIN SCALES - GENERAL
PAINLEVEL_OUTOF10: 0
PAINLEVEL_OUTOF10: 0

## 2021-06-05 NOTE — FLOWSHEET NOTE
CCPD initiated without difficulty, cath care per policy/procedure using strict aspetic technique. Dressing changed, clear yellow effluent drained.  Pt tolerated well bp 156/74 p 75

## 2021-06-05 NOTE — PROGRESS NOTES
Pharmacy Consultation Note  (Antibiotic Dosing and Monitoring)    Initial consult date: 6/4/21  Consulting physician: Odalys  Drug: Vancomycin  Indication: Peritonitis      Age/  Gender Height Weight IBW Dosing weight  kg  Allergy Information   56 y.o./male 5' 8\" (172.7 cm) 200 lb (90.7 kg)     Ideal body weight: 68.4 kg (150 lb 12.7 oz)  Adjusted ideal body weight: 77.3 kg (170 lb 7.6 oz)  77  Patient has no known allergies. Other anti-infectives Start date Stop date                         Date  Tmax WBC BUN/CR UOP CrCL  (mL/min) Drug/Dose Time   Given Level(s)   (Time) Comments   6/4 afebrile 5.9 72/8.2  ESRD PD No Marlen  23.3 @ 1553    6/5        Cesar@walkby                                No intake or output data in the 24 hours ending 06/05/21 0000    Average urine output:    Cultures:    Site Date Result                    No results for input(s): BLOODCULT2 in the last 72 hours. Historical Cultures:  No results found for: ORG  No results for input(s): BC in the last 72 hours. Radiology:      Assessment:  · Pharmacy consulted to dose vancomycin for this 49 y.o. male for peritonitis. · Goal trough = 15-20 mcg/mL. · Day 1; ESRD PD    Plan:  · Random level AM.  · Pharmacist will follow and monitor/adjust dosing as necessary if vancomycin is to continue.       Neal Morejon, PharmD,  6/5/2021 12:00 AM

## 2021-06-05 NOTE — PROGRESS NOTES
bicarbonate  650 mg Oral TID    doxazosin  1 mg Oral Nightly    fluticasone  2 spray Each Nostril Daily    amLODIPine  5 mg Oral Daily    bumetanide  2 mg Oral BID    carvedilol  6.25 mg Oral BID WC    insulin glargine  20 Units Subcutaneous Nightly    atorvastatin  20 mg Oral Nightly    metOLazone  2.5 mg Oral Daily    montelukast  10 mg Oral Daily    vitamin E  400 Units Oral Daily    insulin lispro  0-12 Units Subcutaneous TID WC    insulin lispro  0-6 Units Subcutaneous Nightly    sodium chloride flush  5-40 mL Intravenous 2 times per day    heparin (porcine)  5,000 Units Subcutaneous 3 times per day    cefepime  1,000 mg Intravenous Q24H    vancomycin (VANCOCIN) intermittent dosing (placeholder)   Other RX Placeholder     PRN Meds: glucose, dextrose, glucagon (rDNA), dextrose, sodium chloride flush, sodium chloride, ondansetron **OR** ondansetron, polyethylene glycol, acetaminophen **OR** acetaminophen    Labs:     Recent Labs     06/04/21  1553   WBC 5.9   HGB 10.1*   HCT 28.6*          Recent Labs     06/04/21  1553   *   K 4.0   CL 90*   CO2 22   BUN 72*   CREATININE 8.2*   CALCIUM 8.0*       Recent Labs     06/04/21  1553   PROT 6.1*   ALKPHOS 111   ALT 12   AST 18   BILITOT 0.3       Recent Labs     06/04/21  1553   INR 1.0       No results for input(s): CKTOTAL, TROPONINI in the last 72 hours. Chronic labs:    Lab Results   Component Value Date    CHOL 158 09/10/2020    TRIG 43 09/10/2020    HDL 62 09/10/2020    LDLCALC 87 09/10/2020    TSH 2.860 09/10/2020    PSA 0.56 07/10/2017    INR 1.0 06/04/2021    LABA1C 10.3 (H) 06/05/2021       Radiology: REVIEWED DAILY    +++++++++++++++++++++++++++++++++++++++++++++++++  Devon Castillo MD  [facility] Physician - [address] [facility], [place]  +++++++++++++++++++++++++++++++++++++++++++++++++  NOTE: This report was transcribed using voice recognition software.  Every effort was made to ensure accuracy; however, inadvertent computerized transcription errors may be present.

## 2021-06-05 NOTE — PROGRESS NOTES
Pharmacy Note    Griselda Walker was ordered nasal ipratropium. Per the [address], this medication is non-formulary and not stocked by pharmacy for the reason indicated below. The medication can be reordered at discharge.      Medications in which risks outweigh benefits during hospitalization:         -  oral bisphosphonates         -  raloxifene (Evista)      Medications that lack necessity during an acute hospital stay:      -  nasal antihistamines        -  nasal ipratropium 0.03% and 0.06%        -  nasal miacalcin        -  acyclovir topical cream/ointment orders for herpes labialis (cold sores)

## 2021-06-05 NOTE — CONSULTS
Department of Internal Medicine  Nephrology Consult Note      Reason for Consult:  Peritonitis,ESRD  Requesting Physician:  Amy Hong DO    CHIEF COMPLAINT:  Vascular Access Problem     History Obtained From:  patient, electronic medical record    HISTORY OF PRESENT ILLNESS:  Briefly Mr. Janina Pham is a 49year old male with a past medical history of ESRD on Peritoneal dialysis through [facility], CHF, Type II DM with diabetic nephropathy, HTN, Hyperlipidemia who presented to the ER on 6/4/21 for abnormal cultures from his peritoneal dialysis catheter. Patient apparently has had recent peritonitis a month ago and he was treated with vancomycin and cefepime for 3 weeks after which his symptoms improved. However, Patient again noticed increasing abdominal pain yesterday (6/3/21), intermittent fevers and he went to dialysis unit where they merlin the peritoneal fluid which was cloudy. admits to having some chills. Patient was given vancomycin and cefepime on 6/3/21 and he went back to dialysis center yesterday ( 6/4/21) when he got another dose of vancomycin and cefepime and he was admitted here for transition to hemodialysis at least for now. So far, his outpatient cultures have not grown any particular organism. He had dialysis last night. Patient on a long-term basis would like to be on peritoneal dialysis. He is awaiting renal transplant. ER lab work revealed WBC 5.9, Hgb 10.1, Sodium 129, BUN 72, Creatinine 8.2, and cultures were again obtained in the ER. Renal was consulted for ESRD on Peritoneal Dialysis and Peritonitis.           Past Medical History:        Diagnosis Date    Congestive heart disease ([address] 6/5/2020    Diabetes mellitus ([address]     Hyperlipidemia     Hypertension     Type 2 diabetes mellitus with diabetic nephropathy, with long-term current use of insulin ([address] 1/22/2020     Past Surgical History:        Procedure Laterality Date    DIALYSIS CATHETER INSERTION N/A 11/30/2020 CATHETER INSERTION PERITONEAL DIALYSIS LAPAROSCOPIC performed by Britney Wells MD at [facility] TONSILLECTOMY       Current Medications:    Current Facility-Administered Medications: vancomycin (VANCOCIN) 750 mg in dextrose 5 % 250 mL IVPB, 750 mg, Intravenous, Once  potassium chloride 10 mEq/100 mL IVPB (Peripheral Line), 10 mEq, Intravenous, Q1H  sodium bicarbonate tablet 650 mg, 650 mg, Oral, TID  doxazosin (CARDURA) tablet 1 mg, 1 mg, Oral, Nightly  fluticasone (FLONASE) 50 MCG/ACT nasal spray 2 spray, 2 spray, Each Nostril, Daily  amLODIPine (NORVASC) tablet 5 mg, 5 mg, Oral, Daily  bumetanide (BUMEX) tablet 2 mg, 2 mg, Oral, BID  carvedilol (COREG) tablet 6.25 mg, 6.25 mg, Oral, BID WC  insulin glargine (LANTUS) injection vial 20 Units, 20 Units, Subcutaneous, Nightly  atorvastatin (LIPITOR) tablet 20 mg, 20 mg, Oral, Nightly  [Held by provider] metOLazone (ZAROXOLYN) tablet 2.5 mg, 2.5 mg, Oral, Daily  montelukast (SINGULAIR) tablet 10 mg, 10 mg, Oral, Daily  vitamin E capsule 400 Units, 400 Units, Oral, Daily  glucose (GLUTOSE) 40 % oral gel 15 g, 15 g, Oral, PRN  dextrose 50 % IV solution, 12.5 g, Intravenous, PRN  glucagon (rDNA) injection 1 mg, 1 mg, Intramuscular, PRN  dextrose 5 % solution, 100 mL/hr, Intravenous, PRN  insulin lispro (HUMALOG) injection vial 0-12 Units, 0-12 Units, Subcutaneous, TID WC  insulin lispro (HUMALOG) injection vial 0-6 Units, 0-6 Units, Subcutaneous, Nightly  sodium chloride flush 0.9 % injection 5-40 mL, 5-40 mL, Intravenous, 2 times per day  sodium chloride flush 0.9 % injection 5-40 mL, 5-40 mL, Intravenous, PRN  0.9 % sodium chloride infusion, 25 mL, Intravenous, PRN  heparin (porcine) injection 5,000 Units, 5,000 Units, Subcutaneous, 3 times per day  ondansetron (ZOFRAN-ODT) disintegrating tablet 4 mg, 4 mg, Oral, Q8H PRN **OR** ondansetron (ZOFRAN) injection 4 mg, 4 mg, Intravenous, Q6H PRN  polyethylene glycol (GLYCOLAX) packet 17 g, 17 g, Oral, Daily with Differential:    Lab Results   Component Value Date    WBC 3.8 06/05/2021    RBC 3.02 06/05/2021    HGB 8.8 06/05/2021    HCT 24.9 06/05/2021     06/05/2021    MCV 82.5 06/05/2021    MCH 29.1 06/05/2021    MCHC 35.3 06/05/2021    RDW 14.2 06/05/2021    LYMPHOPCT 8.3 06/04/2021    MONOPCT 4.6 06/04/2021    BASOPCT 0.3 06/04/2021    MONOSABS 0.30 06/04/2021    LYMPHSABS 0.47 06/04/2021    EOSABS 0.22 06/04/2021    BASOSABS 0.00 06/04/2021     CMP:    Lab Results   Component Value Date     06/05/2021    K 2.8 06/05/2021    K 4.0 06/04/2021    CL 90 06/05/2021    CO2 25 06/05/2021    BUN 64 06/05/2021    CREATININE 8.3 06/05/2021    GFRAA 8 06/05/2021    LABGLOM 7 06/05/2021    GLUCOSE 315 06/05/2021    PROT 6.1 06/04/2021    LABALBU 3.3 06/04/2021    CALCIUM 7.7 06/05/2021    BILITOT 0.3 06/04/2021    ALKPHOS 111 06/04/2021    AST 18 06/04/2021    ALT 12 06/04/2021     Magnesium:    Lab Results   Component Value Date    MG 1.6 06/05/2021     Phosphorus:    Lab Results   Component Value Date    PHOS 4.9 06/05/2021     Radiology Review:     Chest XR 6/4/21   No acute process. IMPRESSION/RECOMMENDATIONS:     Briefly Mr. Fernando Whalen is a 49year old male with a past medical history of ESRD on Peritoneal dialysis through [facility], CHF, Type II DM with diabetic nephropathy, HTN, Hyperlipidemia who presented to the ER on 6/4/21 for abnormal cultures from his peritoneal dialysis catheter. Patient apparently has had recent peritonitis a month ago and he was treated with vancomycin and cefepime for 3 weeks after which his symptoms improved. However, Patient again noticed increasing abdominal pain yesterday (6/3/21), intermittent fevers and he went to dialysis unit where they merlin the peritoneal fluid which was cloudy. admits to having some chills.   Patient was given vancomycin and cefepime on 6/3/21 and he went back to dialysis center yesterday ( 6/4/21) when he got another dose of vancomycin and cefepime and he was admitted here for transition to hemodialysis at least for now. So far, his outpatient cultures have not grown any particular organism. He had dialysis last night. Patient on a long-term basis would like to be on peritoneal dialysis. He is awaiting renal transplant. ER lab work revealed WBC 5.9, Hgb 10.1, Sodium 129, BUN 72, Creatinine 8.2, and cultures were again obtained in the ER. Renal was consulted for ESRD on Peritoneal Dialysis and Peritonitis. 1. Peritonitis, on Cefepime and Vancomycin  2. High body cell count, 72>68>54  3. ESRD on PD, transitioning to HD   4. Hyponatremia, corrected sodium 134 ( due to hyperglycemia)  5. Hypokalemia, likely from diuretics, for supplementation  6. Chronic Low bicarbonate, on sodium bicarb   7. HFpEF, echo 1/8/20- EF 55% on Bumex and Metolazone  8. HTN, on Amlodipine, Coreg, Doxazosin,   9. Type II DM, H A1c: 10.3  10. Anemia of chronic disease, no DANIELLA needed as hemoglobin is >10. Plan:    · Replace potassium  · Replace Magnesium   · Vascular consulted for Tunneled Hd catheter placement- NPO in hopes tunneled dialysis catheter can be placed today. · General Surgery consulted for removal of peritoneal dialysis catheter- For Monday  · HD after HD catheter is placed. · Glucose control   · Continue to monitor renal function and electrolytes    · Case discussed with family at bedside. Thank you very much Dr. Rhianna Zacarias DO for allowing us to participate in the care of Mr. Sheryle Jaffe.

## 2021-06-05 NOTE — PROGRESS NOTES
Pharmacy Consultation Note  (Antibiotic Dosing and Monitoring)    Initial consult date: 6/4/21  Consulting physician: Odalys  Drug: Vancomycin  Indication: Peritonitis      Age/  Gender Height Weight IBW Dosing weight  kg  Allergy Information   56 y.o./male 5' 8\" (172.7 cm) 200 lb (90.7 kg)     Ideal body weight: 68.4 kg (150 lb 12.7 oz)  Adjusted ideal body weight: 81.4 kg (179 lb 6.4 oz)  77  Patient has no known allergies. Other anti-infectives Start date Stop date                         Date  Tmax WBC ESRD UOP Drug/Dose Time   Given Level(s)   (Time) Comments   6/4 afebrile 5.9 PD -- No Vanco -- 23.3 @ 1553    6/5 afebrile 3.8 PD -- Vancomycin 750 mg IV x 1 dose (1130) Random level 18.7                                 Intake/Output Summary (Last 24 hours) at 6/5/2021 1050  Last data filed at 6/5/2021 0945  Gross per 24 hour   Intake --   Output 444 ml   Net -444 ml       Average urine output:    Cultures:    Site Date Result                    No results for input(s): Елена Hipps in the last 72 hours. Historical Cultures:  No results found for: ORG  No results for input(s): BC in the last 72 hours. Radiology:      Assessment:  · Pharmacy consulted to dose vancomycin for this 49 y.o. male for peritonitis. · Goal trough = 15-20 mcg/mL. · 6/5: Patient for PD cath removal and temporary HD cath to be placed. Random level 18.1 mcg/mL    Plan:  · Vancomycin 750 mg IV x 1 dose  · Will dose vancomycin by random levels and HD schedule   · Pharmacist will follow and monitor/adjust dosing as necessary if vancomycin is to continue.

## 2021-06-05 NOTE — CONSULTS
Department of General Surgery - Adult  Surgical Service   Attending Consult Note      Reason for Consult:  PD failure  Requesting Physician:  Yuri Giraldo MD    CHIEF COMPLAINT:  Abdominal Pain    History Obtained From:  patient, electronic medical record    HISTORY OF PRESENT ILLNESS:                The patient is a 49 y.o. male who presents with abdominal pain and high cell count.     Past Medical History:        Diagnosis Date    Congestive heart disease ([facility] 6/5/2020    Diabetes mellitus ([facility]     Hyperlipidemia     Hypertension     Type 2 diabetes mellitus with diabetic nephropathy, with long-term [facility] 1/22/2020     Past Surgical History:        Procedure Laterality Date    DIALYSIS CATHETER INSERTION N/A 11/30/2020    CATHETER INSERTION PERITONEAL DIALYSIS LAPAROSCOPIC performed by Jorge L Larsen MD at [facility]       Current Medications:   Current Facility-Administered Medications: sodium bicarbonate tablet 650 mg, 650 mg, Oral, TID  doxazosin (CARDURA) tablet 1 mg, 1 mg, Oral, Nightly  fluticasone (FLONASE) 50 MCG/ACT nasal spray 2 spray, 2 spray, Each Nostril, Daily  amLODIPine (NORVASC) tablet 5 mg, 5 mg, Oral, Daily  bumetanide (BUMEX) tablet 2 mg, 2 mg, Oral, BID  carvedilol (COREG) tablet 6.25 mg, 6.25 mg, Oral, BID WC  insulin glargine (LANTUS) injection vial 20 Units, 20 Units, Subcutaneous, Nightly  atorvastatin (LIPITOR) tablet 20 mg, 20 mg, Oral, Nightly  metOLazone (ZAROXOLYN) tablet 2.5 mg, 2.5 mg, Oral, Daily  montelukast (SINGULAIR) tablet 10 mg, 10 mg, Oral, Daily  vitamin E capsule 400 Units, 400 Units, Oral, Daily  glucose (GLUTOSE) 40 % oral gel 15 g, 15 g, Oral, PRN  dextrose 50 % IV solution, 12.5 g, Intravenous, PRN  glucagon (rDNA) injection 1 mg, 1 mg, Intramuscular, PRN  dextrose 5 % solution, 100 mL/hr, Intravenous, PRN  insulin lispro (HUMALOG) injection vial 0-12 Units, 0-12 Units, Subcutaneous, TID WC  insulin lispro (HUMALOG) injection vial 0-6 Units, 0-6 Units, Subcutaneous, Nightly  sodium chloride flush 0.9 % injection 5-40 mL, 5-40 mL, Intravenous, 2 times per day  sodium chloride flush 0.9 % injection 5-40 mL, 5-40 mL, Intravenous, PRN  0.9 % sodium chloride infusion, 25 mL, Intravenous, PRN  heparin (porcine) injection 5,000 Units, 5,000 Units, Subcutaneous, 3 times per day  ondansetron (ZOFRAN-ODT) disintegrating tablet 4 mg, 4 mg, Oral, Q8H PRN **OR** ondansetron (ZOFRAN) injection 4 mg, 4 mg, Intravenous, Q6H PRN  polyethylene glycol (GLYCOLAX) packet 17 g, 17 g, Oral, Daily PRN  acetaminophen (TYLENOL) tablet 650 mg, 650 mg, Oral, Q6H PRN **OR** acetaminophen (TYLENOL) suppository 650 mg, 650 mg, Rectal, Q6H PRN  cefepime (MAXIPIME) 1000 mg IVPB minibag, 1,000 mg, Intravenous, Q24H  vancomycin (VANCOCIN) intermittent dosing (placeholder), , Other, RX Placeholder  Allergies:  Patient has no known allergies. Social History:   ESRD diabetes  Family History:       Problem Relation Age of Onset    Heart Disease Mother        REVIEW OF SYSTEMS:    H&P    PHYSICAL EXAM:    VITALS:  BP (!) 155/66   Pulse 81   Temp 98 °F (36.7 °C) (Bladder)   Resp 18   Ht 5' 8\" (1.727 m)   Wt 222 lb 4.8 oz (100.8 kg)   SpO2 98%   BMI 33.80 kg/m²   24HR INTAKE/OUTPUT:    Intake/Output Summary (Last 24 hours) at 2021 1105  Last data filed at 2021 0945  Gross per 24 hour   Intake --   Output 444 ml   Net -444 ml     TEMPERATURE:  Current - Temp: 98 °F (36.7 °C);  Max - Temp  Av.4 °F (36.9 °C)  Min: 98 °F (36.7 °C)  Max: 98.8 °F (37.1 °C)  RESPIRATIONS RANGE: Resp  Av.3  Min: 15  Max: 18  PULSE RANGE: Pulse  Av.8  Min: 75  Max: 86  BLOOD PRESSURE RANGE:  Systolic (12XZJ), STD:216 , Min:155 , YLB:137   ; Diastolic (36DRX), YOB:55, Min:66, Max:80    PULSE OXIMETRY RANGE: SpO2  Av.3 %  Min: 95 %  Max: 98 %  CONSTITUTIONAL:  fatigued, alert, cooperative, no apparent distress, appears older than stated age and moderately

## 2021-06-05 NOTE — CONSULTS
atorvastatin  20 mg Oral Nightly    [Held by provider] metOLazone  2.5 mg Oral Daily    montelukast  10 mg Oral Daily    vitamin E  400 Units Oral Daily    insulin lispro  0-12 Units Subcutaneous TID WC    insulin lispro  0-6 Units Subcutaneous Nightly    sodium chloride flush  5-40 mL Intravenous 2 times per day    heparin (porcine)  5,000 Units Subcutaneous 3 times per day    cefepime  1,000 mg Intravenous Q24H    vancomycin (VANCOCIN) intermittent dosing (placeholder)   Other RX Placeholder     Continuous Infusions:   dextrose      sodium chloride       PRN Meds:glucose, dextrose, glucagon (rDNA), dextrose, sodium chloride flush, sodium chloride, ondansetron **OR** ondansetron, polyethylene glycol, acetaminophen **OR** acetaminophen    Allergies:  Patient has no known allergies. Social History:   Social History     Socioeconomic History    Marital status:      Spouse name: None    Number of children: None    Years of education: None    Highest education level: None   Occupational History    None   Tobacco Use    Smoking status: Former Smoker     Types: Cigars    Smokeless tobacco: Never Used   Vaping Use    Vaping Use: Never used   Substance and Sexual Activity    Alcohol use: Yes     Alcohol/week: 0.0 standard drinks     Comment: socially    Drug use: No    Sexual activity: Yes     Partners: Female   Other Topics Concern    None   Social History Narrative    None     Social Determinants of Health     Financial Resource Strain:     Difficulty of Paying Living Expenses:    Food Insecurity:     Worried About Running Out of Food in the Last Year:     Ran Out of Food in the Last Year:    Transportation Needs:     Lack of Transportation (Medical):      Lack of Transportation (Non-Medical):    Physical Activity:     Days of Exercise per Week:     Minutes of Exercise per Session:    Stress:     Feeling of Stress :    Social Connections:     Frequency of Communication with Friends and Family:     Frequency of Social Gatherings with Friends and Family:     Attends [employer] Services:     Active Member of Clubs or Organizations:     Attends Club or Organization Meetings:     Marital Status:    Intimate Partner Violence:     Fear of Current or Ex-Partner:     Emotionally Abused:     Physically Abused:     Sexually Abused:      Family History:       Problem Relation Age of Onset    Heart Disease Mother    . Otherwise non-pertinent to the chief complaint. REVIEW OF SYSTEMS:    CONSTITUTIONAL:  No chills, fevers or night sweats. No loss of weight. EYES:  No double vision or drainage from eyes, ears or throat. HEENT:  No neck stiffness. No dysphagia. No drainage from eyes, ears or throat  RESPIRATORY:  No cough, productive sputum or hemoptysis. CARDIOVASCULAR:  No chest pain, palpitations, orthopnea or dyspnea on exertion. GASTROINTESTINAL: Tender with cloudy peritoneal fluid  GENITOURINARY:  No frequency burning dysuria or hematuria. INTEGUMENT/BREAST:  No rash or breast masses. HEMATOLOGIC/LYMPHATIC:  No lymphadenopathy or blood dyscrasics. ALLERGIC/IMMUNOLOGIC:  No anaphylaxis. ENDOCRINE:  No polyuria or polydipsia or temperature intolerance. MUSCULOSKELETAL:  No myalgia or arthralgia. Full ROM. NEUROLOGICAL:  No focal motor sensory deficit. BEHAVIOR/PSYCH:  No psychosis. PHYSICAL EXAM:    Vitals:    BP (!) 155/66   Pulse 81   Temp 98 °F (36.7 °C) (Bladder)   Resp 18   Ht 5' 8\" (1.727 m)   Wt 222 lb 4.8 oz (100.8 kg)   SpO2 98%   BMI 33.80 kg/m²   Constitutional: The patient is awake, alert, and oriented. Skin: Warm and dry. No rashes were noted. No jaundice. HEENT: Eyes show round, and reactive pupils. Moist mucous membranes, no ulcerations, no thrush. Neck: Supple to movements. No lymphadenopathy. Chest: No use of accessory muscles to breathe. Symmetrical expansion. Auscultation reveals no wheezing, crackles, or rhonchi.    Cardiovascular: S1 and S2 are rhythmic and regular. No murmurs appreciated. Abdomen: Positive bowel sounds to auscultation. Benign to palpation. No masses felt. No hepatosplenomegaly. Decreased bowel sounds; tender  Genitourinary male  Extremities: No clubbing, no cyanosis, no edema. Musculoskeletal: Equal and symmetrically  Neurological: No focal  Lines: peripheral      CBC+dif:  Recent Labs     06/04/21  1553 06/05/21  1016   WBC 5.9 3.8*   HGB 10.1* 8.8*   HCT 28.6* 24.9*   MCV 82.7 82.5    213   NEUTROABS 4.90  --      No results found for: CRP  No results found for: CRPHS  No results found for: SEDRATE  Lab Results   Component Value Date    ALT 12 06/04/2021    AST 18 06/04/2021    ALKPHOS 111 06/04/2021    BILITOT 0.3 06/04/2021     Lab Results   Component Value Date     06/05/2021    K 2.8 06/05/2021    K 4.0 06/04/2021    CL 90 06/05/2021    CO2 25 06/05/2021    BUN 64 06/05/2021    CREATININE 8.3 06/05/2021    GFRAA 8 06/05/2021    LABGLOM 7 06/05/2021    GLUCOSE 315 06/05/2021    PROT 6.1 06/04/2021    LABALBU 3.3 06/04/2021    CALCIUM 7.7 06/05/2021    BILITOT 0.3 06/04/2021    ALKPHOS 111 06/04/2021    AST 18 06/04/2021    ALT 12 06/04/2021       Lab Results   Component Value Date    PROTIME 10.9 06/04/2021    INR 1.0 06/04/2021       Lab Results   Component Value Date    TSH 2.860 09/10/2020       Lab Results   Component Value Date    COLORU Yellow 06/04/2021    PHUR 5.5 06/04/2021    WBCUA 1-3 06/04/2021    RBCUA 2-5 06/04/2021    BACTERIA MANY 06/04/2021    CLARITYU CLOUDY 06/04/2021    SPECGRAV 1.015 06/04/2021    LEUKOCYTESUR Negative 06/04/2021    UROBILINOGEN 0.2 06/04/2021    BILIRUBINUR Negative 06/04/2021    BLOODU SMALL 06/04/2021    GLUCOSEU 500 06/04/2021       No results found for: DWB9IAK, BEART, Z3CSOKXV, PHART, THGBART, SHZ4HXJ, PO2ART, MYW6OEJ  Radiology:  XR CHEST 1 VIEW   Final Result   No acute process.              · Microbiology: [facility] the [facility] system in the hospital.  Renal panel was outgrown in the last episode. Plan:    · Cont Vanco and cefepime on a daily  · Intraperitoneal vancomycin and cefepime. The IV can stop once the intraperitoneal so started  · Check cultures  · Baseline ESR, CRP  · Monitor labs  · Will follow with you    Thank you for having us see this patient in consultation. I will be discussing this case with the treating physicians.       Electronically signed by Deanna Wyatt MD on 6/5/2021 at 2:47 PM

## 2021-06-05 NOTE — CONSULTS
Vascular Surgery Consultation Note    Reason for Consult: Tunneled dialysis catheter    HPI:    This is a 49 y.o. male with ESRD currently on peritoneal dialysis who was sent in by Dr. Dagmar Nickerson for evaluation. He had an episode of recent peritonitis and was receiving vancomycin infusions for this. He was doing well for a small amount of time after this but then yesterday he began to notice cloudy fluid coming from his PD catheter and he was having some increased abdominal pain. It is suspected he has another peritoneal infection and vascular surgery is consulted for placement of a tunneled dialysis line so that he may receive hemodialysis as he is treated for his infection. He denies ever having seen a vascular surgeon before.  He denies ever having any central access procedures in the past.       ROS: Negative if blank [], Positive if [x]  General Vascular   [] Fevers [] Claudication (Blocks)   [] Chills [] Rest Pain   [] Weight Loss [] Tissue Loss   [] Chest Pain [] Clotting Disorder   [] SOB at rest [] Leg Swelling   [] SOB with exertion [] DVT/PE      [] Nausea    [] Vomiting [] Stroke/TIA   [x] Abdominal Pain [] Focal weakness   [] Melena [] Slurred Speech   [] Hematochezia [] Vision Changes   [] Hematuria    [] Dysuria [] Hx of Central Catheters   [] Wears Glasses/Contacts [] Dialysis and If so date initiated   [] Blindness       [] Difficulty swallowing        Past Medical History:   Diagnosis Date    Congestive heart disease ([facility] [address] 6/5/2020    Diabetes mellitus ([facility] [address]     Hyperlipidemia     Hypertension     Type 2 diabetes mellitus with diabetic nephropathy, with long-term current use of insulin ([facility] [address] 1/22/2020        Past Surgical History:   Procedure Laterality Date    DIALYSIS CATHETER INSERTION N/A 11/30/2020    CATHETER INSERTION PERITONEAL DIALYSIS LAPAROSCOPIC performed by Gino Ulrich MD at [place] [address]         Current Medications:    dextrose      sodium chloride glucose, dextrose, glucagon (rDNA), dextrose, sodium chloride flush, sodium chloride, ondansetron **OR** ondansetron, polyethylene glycol, acetaminophen **OR** acetaminophen    sodium bicarbonate  650 mg Oral TID    doxazosin  1 mg Oral Nightly    fluticasone  2 spray Each Nostril Daily    amLODIPine  5 mg Oral Daily    bumetanide  2 mg Oral BID    carvedilol  6.25 mg Oral BID WC    insulin glargine  20 Units Subcutaneous Nightly    atorvastatin  20 mg Oral Nightly    metOLazone  2.5 mg Oral Daily    montelukast  10 mg Oral Daily    vitamin E  400 Units Oral Daily    insulin lispro  0-12 Units Subcutaneous TID WC    insulin lispro  0-6 Units Subcutaneous Nightly    sodium chloride flush  5-40 mL Intravenous 2 times per day    heparin (porcine)  5,000 Units Subcutaneous 3 times per day    cefepime  1,000 mg Intravenous Q24H    vancomycin (VANCOCIN) intermittent dosing (placeholder)   Other RX Placeholder        Allergies:  Patient has no known allergies. Social History     Socioeconomic History    Marital status:      Spouse name: Not on file    Number of children: Not on file    Years of education: Not on file    Highest education level: Not on file   Occupational History    Not on file   Tobacco Use    Smoking status: Former Smoker     Types: Cigars    Smokeless tobacco: Never Used   Vaping Use    Vaping Use: Never used   Substance and Sexual Activity    Alcohol use: Yes     Alcohol/week: 0.0 standard drinks     Comment: socially    Drug use: No    Sexual activity: Yes     Partners: Female   Other Topics Concern    Not on file   Social History Narrative    Not on file     Social Determinants of Health     Financial Resource Strain:     Difficulty of Paying Living Expenses:    Food Insecurity:     Worried About Running Out of Food in the Last Year:     [address] in the Last Year:    Transportation Needs:     Lack of Transportation (Medical):      Lack of Transportation (Non-Medical):    Physical Activity:     Days of Exercise per Week:     Minutes of Exercise per Session:    Stress:     Feeling of Stress :    Social Connections:     Frequency of Communication with Friends and Family:     Frequency of Social Gatherings with Friends and Family:     Attends [employer] Services:     Active Member of Clubs or Organizations:     Attends Club or Organization Meetings:     Marital Status:    Intimate Partner Violence:     Fear of Current or Ex-Partner:     Emotionally Abused:     Physically Abused:     Sexually Abused:         Family History   Problem Relation Age of Onset    Heart Disease Mother        PHYSICAL EXAM:    BP (!) 166/67   Pulse 86   Temp 98.8 °F (37.1 °C) (Oral)   Resp 16   Ht 5' 8\" (1.727 m)   Wt 200 lb (90.7 kg)   SpO2 98%   BMI 30.41 kg/m²   CONSTITUTIONAL:  awake, alert, cooperative, no apparent distress, and appears stated age  NEURO:  Normal  EYES:  lids and lashes normal, sclera clear and conjunctiva normal  ENT:  normocepalic, without obvious abnormality, external ears without lesions  NECK:  supple, symmetrical, trachea midline, no jugular venous distension  LUNGS:  no increased work of breathing  CARDIOVASCULAR:  regular rate and rhythm  ABDOMEN:  soft, non-distended, tenderness noted diffusely  SKIN:  no bruising or bleeding    EXTREMITIES:    R UE Swelling absent Incisions absent       5/5 Strength    L UE Swelling absent Incisions absent       5/5 Strength    R LE Edema absent     Incisions absent    Varicose veins absent    Wounds absent    normalcaprefill   5/5 Strength   Neuropathy is absent    L LE Edema absent     Incisions absent    Varicose veins absent    Wounds absent    normalcaprefill   5/5 Strength   Neuropathy is absent      LABS:    Lab Results   Component Value Date    WBC 5.9 06/04/2021    HGB 10.1 (L) 06/04/2021    HCT 28.6 (L) 06/04/2021     06/04/2021    PROTIME 10.9 06/04/2021    INR 1.0 06/04/2021 K 4.0 06/04/2021    BUN 72 (H) 06/04/2021    CREATININE 8.2 (HH) 06/04/2021       RADIOLOGY:  XR CHEST 1 VIEW    Result Date: 6/4/2021  EXAMINATION: ONE XRAY VIEW OF THE CHEST 6/4/2021 4:32 pm COMPARISON: None. HISTORY: ORDERING SYSTEM PROVIDED HISTORY: fever TECHNOLOGIST PROVIDED HISTORY: Reason for exam:->fever What reading provider will be dictating this exam?->CRC FINDINGS: The lungs are without acute focal process. There is no effusion or pneumothorax. The cardiomediastinal silhouette is without acute process. The osseous structures are without acute process. No acute process.          Assesment/Plan  49 y.o. male with ESRD, currently on PD with active peritonitis requiring possibly temporary hemodialysis     - Can place tunneled dialysis catheter, timing TBD  - keep NPO in case it can be done today  - overall care per primary    Will discuss with Dr. Kaden Fernandez MD  6/5/21  1:45 AM EDT

## 2021-06-06 ENCOUNTER — ANESTHESIA EVENT (OUTPATIENT)
Dept: OPERATING ROOM | Age: 56
DRG: 907 | End: 2021-06-06
Payer: COMMERCIAL

## 2021-06-06 LAB
ANION GAP SERPL CALCULATED.3IONS-SCNC: 16 MMOL/L (ref 7–16)
APPEARANCE FLUID: NORMAL
BASOPHILS ABSOLUTE: 0.02 E9/L (ref 0–0.2)
BASOPHILS RELATIVE PERCENT: 0.5 % (ref 0–2)
BUN BLDV-MCNC: 60 MG/DL (ref 6–20)
C-REACTIVE PROTEIN: 2.6 MG/DL (ref 0–0.4)
CALCIUM SERPL-MCNC: 7.5 MG/DL (ref 8.6–10.2)
CELL COUNT FLUID TYPE: NORMAL
CHLORIDE BLD-SCNC: 92 MMOL/L (ref 98–107)
CO2: 24 MMOL/L (ref 22–29)
COLOR FLUID: COLORLESS
CREAT SERPL-MCNC: 7.8 MG/DL (ref 0.7–1.2)
EOSINOPHILS ABSOLUTE: 0.3 E9/L (ref 0.05–0.5)
EOSINOPHILS RELATIVE PERCENT: 7.6 % (ref 0–6)
GFR AFRICAN AMERICAN: 9
GFR NON-AFRICAN AMERICAN: 7 ML/MIN/1.73
GLUCOSE BLD-MCNC: 366 MG/DL (ref 74–99)
HCT VFR BLD CALC: 24.6 % (ref 37–54)
HEMOGLOBIN: 8.4 G/DL (ref 12.5–16.5)
IMMATURE GRANULOCYTES #: 0.02 E9/L
IMMATURE GRANULOCYTES %: 0.5 % (ref 0–5)
LYMPHOCYTES ABSOLUTE: 0.58 E9/L (ref 1.5–4)
LYMPHOCYTES RELATIVE PERCENT: 14.7 % (ref 20–42)
MAGNESIUM: 1.7 MG/DL (ref 1.6–2.6)
MCH RBC QN AUTO: 28.7 PG (ref 26–35)
MCHC RBC AUTO-ENTMCNC: 34.1 % (ref 32–34.5)
MCV RBC AUTO: 84 FL (ref 80–99.9)
METER GLUCOSE: 186 MG/DL (ref 74–99)
METER GLUCOSE: 258 MG/DL (ref 74–99)
METER GLUCOSE: 393 MG/DL (ref 74–99)
METER GLUCOSE: 79 MG/DL (ref 74–99)
MONOCYTE, FLUID: 71 %
MONOCYTES ABSOLUTE: 0.44 E9/L (ref 0.1–0.95)
MONOCYTES RELATIVE PERCENT: 11.2 % (ref 2–12)
NEUTROPHIL, FLUID: 29 %
NEUTROPHILS ABSOLUTE: 2.58 E9/L (ref 1.8–7.3)
NEUTROPHILS RELATIVE PERCENT: 65.5 % (ref 43–80)
NUCLEATED CELLS FLUID: 101 /UL
PDW BLD-RTO: 14.3 FL (ref 11.5–15)
PHOSPHORUS: 5 MG/DL (ref 2.5–4.5)
PLATELET # BLD: 224 E9/L (ref 130–450)
PMV BLD AUTO: 11 FL (ref 7–12)
POLYCHROMASIA: ABNORMAL
POTASSIUM REFLEX MAGNESIUM: 2.5 MMOL/L (ref 3.5–5)
RBC # BLD: 2.93 E12/L (ref 3.8–5.8)
RBC FLUID: <2000 /UL
SEDIMENTATION RATE, ERYTHROCYTE: 75 MM/HR (ref 0–15)
SODIUM BLD-SCNC: 132 MMOL/L (ref 132–146)
URINE CULTURE, ROUTINE: NORMAL
VANCOMYCIN RANDOM: 22.4 MCG/ML (ref 5–40)
VITAMIN D 25-HYDROXY: 12 NG/ML (ref 30–100)
WBC # BLD: 3.9 E9/L (ref 4.5–11.5)

## 2021-06-06 PROCEDURE — 82306 VITAMIN D 25 HYDROXY: CPT

## 2021-06-06 PROCEDURE — 6370000000 HC RX 637 (ALT 250 FOR IP): Performed by: INTERNAL MEDICINE

## 2021-06-06 PROCEDURE — 2580000003 HC RX 258: Performed by: INTERNAL MEDICINE

## 2021-06-06 PROCEDURE — 89051 BODY FLUID CELL COUNT: CPT

## 2021-06-06 PROCEDURE — 6360000002 HC RX W HCPCS: Performed by: INTERNAL MEDICINE

## 2021-06-06 PROCEDURE — 6360000002 HC RX W HCPCS: Performed by: NURSE PRACTITIONER

## 2021-06-06 PROCEDURE — 6370000000 HC RX 637 (ALT 250 FOR IP): Performed by: FAMILY MEDICINE

## 2021-06-06 PROCEDURE — 85651 RBC SED RATE NONAUTOMATED: CPT

## 2021-06-06 PROCEDURE — 90945 DIALYSIS ONE EVALUATION: CPT

## 2021-06-06 PROCEDURE — 83735 ASSAY OF MAGNESIUM: CPT

## 2021-06-06 PROCEDURE — 1200000000 HC SEMI PRIVATE

## 2021-06-06 PROCEDURE — 86140 C-REACTIVE PROTEIN: CPT

## 2021-06-06 PROCEDURE — 36415 COLL VENOUS BLD VENIPUNCTURE: CPT

## 2021-06-06 PROCEDURE — 84100 ASSAY OF PHOSPHORUS: CPT

## 2021-06-06 PROCEDURE — 85025 COMPLETE CBC W/AUTO DIFF WBC: CPT

## 2021-06-06 PROCEDURE — 82962 GLUCOSE BLOOD TEST: CPT

## 2021-06-06 PROCEDURE — 80048 BASIC METABOLIC PNL TOTAL CA: CPT

## 2021-06-06 PROCEDURE — 80202 ASSAY OF VANCOMYCIN: CPT

## 2021-06-06 RX ORDER — MAGNESIUM SULFATE IN WATER 40 MG/ML
2000 INJECTION, SOLUTION INTRAVENOUS ONCE
Status: COMPLETED | OUTPATIENT
Start: 2021-06-06 | End: 2021-06-06

## 2021-06-06 RX ORDER — ERGOCALCIFEROL 1.25 MG/1
50000 CAPSULE ORAL WEEKLY
Status: DISCONTINUED | OUTPATIENT
Start: 2021-06-06 | End: 2021-06-09 | Stop reason: HOSPADM

## 2021-06-06 RX ORDER — BUMETANIDE 1 MG/1
2 TABLET ORAL
Status: DISCONTINUED | OUTPATIENT
Start: 2021-06-06 | End: 2021-06-09 | Stop reason: HOSPADM

## 2021-06-06 RX ORDER — CHOLECALCIFEROL (VITAMIN D3) 10 MCG
1 TABLET ORAL DAILY
Status: DISCONTINUED | OUTPATIENT
Start: 2021-06-06 | End: 2021-06-09 | Stop reason: HOSPADM

## 2021-06-06 RX ORDER — POTASSIUM CHLORIDE 7.45 MG/ML
10 INJECTION INTRAVENOUS
Status: COMPLETED | OUTPATIENT
Start: 2021-06-06 | End: 2021-06-06

## 2021-06-06 RX ADMIN — INSULIN LISPRO 8 UNITS: 100 INJECTION, SOLUTION INTRAVENOUS; SUBCUTANEOUS at 18:37

## 2021-06-06 RX ADMIN — AMLODIPINE BESYLATE 5 MG: 5 TABLET ORAL at 08:53

## 2021-06-06 RX ADMIN — INSULIN LISPRO 2 UNITS: 100 INJECTION, SOLUTION INTRAVENOUS; SUBCUTANEOUS at 18:29

## 2021-06-06 RX ADMIN — CARVEDILOL 6.25 MG: 6.25 TABLET, FILM COATED ORAL at 17:02

## 2021-06-06 RX ADMIN — INSULIN LISPRO 3 UNITS: 100 INJECTION, SOLUTION INTRAVENOUS; SUBCUTANEOUS at 21:25

## 2021-06-06 RX ADMIN — BUMETANIDE 2 MG: 1 TABLET ORAL at 17:03

## 2021-06-06 RX ADMIN — CEFEPIME HYDROCHLORIDE 1000 MG: 1 INJECTION, POWDER, FOR SOLUTION INTRAMUSCULAR; INTRAVENOUS at 18:30

## 2021-06-06 RX ADMIN — INSULIN LISPRO 8 UNITS: 100 INJECTION, SOLUTION INTRAVENOUS; SUBCUTANEOUS at 09:06

## 2021-06-06 RX ADMIN — INSULIN GLARGINE 20 UNITS: 100 INJECTION, SOLUTION SUBCUTANEOUS at 21:24

## 2021-06-06 RX ADMIN — SODIUM BICARBONATE 650 MG: 650 TABLET ORAL at 08:52

## 2021-06-06 RX ADMIN — CARVEDILOL 6.25 MG: 6.25 TABLET, FILM COATED ORAL at 08:53

## 2021-06-06 RX ADMIN — HEPARIN SODIUM 5000 UNITS: 10000 INJECTION INTRAVENOUS; SUBCUTANEOUS at 05:02

## 2021-06-06 RX ADMIN — HEPARIN SODIUM 5000 UNITS: 10000 INJECTION INTRAVENOUS; SUBCUTANEOUS at 14:11

## 2021-06-06 RX ADMIN — ATORVASTATIN CALCIUM 20 MG: 20 TABLET, FILM COATED ORAL at 20:54

## 2021-06-06 RX ADMIN — Medication 400 UNITS: at 10:45

## 2021-06-06 RX ADMIN — Medication 10 ML: at 20:55

## 2021-06-06 RX ADMIN — INSULIN LISPRO 10 UNITS: 100 INJECTION, SOLUTION INTRAVENOUS; SUBCUTANEOUS at 09:03

## 2021-06-06 RX ADMIN — POTASSIUM CHLORIDE 10 MEQ: 10 INJECTION, SOLUTION INTRAVENOUS at 08:59

## 2021-06-06 RX ADMIN — SODIUM BICARBONATE 650 MG: 650 TABLET ORAL at 14:11

## 2021-06-06 RX ADMIN — MONTELUKAST SODIUM 10 MG: 10 TABLET ORAL at 08:52

## 2021-06-06 RX ADMIN — POTASSIUM CHLORIDE 10 MEQ: 10 INJECTION, SOLUTION INTRAVENOUS at 11:27

## 2021-06-06 RX ADMIN — MAGNESIUM SULFATE HEPTAHYDRATE 2000 MG: 40 INJECTION, SOLUTION INTRAVENOUS at 14:11

## 2021-06-06 RX ADMIN — ERGOCALCIFEROL 50000 UNITS: 1.25 CAPSULE ORAL at 20:54

## 2021-06-06 RX ADMIN — BUMETANIDE 2 MG: 1 TABLET ORAL at 08:53

## 2021-06-06 RX ADMIN — NEPHROCAP 1 MG: 1 CAP ORAL at 17:02

## 2021-06-06 RX ADMIN — Medication 10 ML: at 08:59

## 2021-06-06 RX ADMIN — POTASSIUM CHLORIDE 10 MEQ: 10 INJECTION, SOLUTION INTRAVENOUS at 12:50

## 2021-06-06 RX ADMIN — INSULIN LISPRO 8 UNITS: 100 INJECTION, SOLUTION INTRAVENOUS; SUBCUTANEOUS at 13:25

## 2021-06-06 RX ADMIN — POTASSIUM CHLORIDE 10 MEQ: 10 INJECTION, SOLUTION INTRAVENOUS at 10:19

## 2021-06-06 ASSESSMENT — LIFESTYLE VARIABLES: SMOKING_STATUS: 0

## 2021-06-06 ASSESSMENT — PAIN SCALES - GENERAL
PAINLEVEL_OUTOF10: 0
PAINLEVEL_OUTOF10: 3

## 2021-06-06 ASSESSMENT — PAIN DESCRIPTION - LOCATION: LOCATION: ABDOMEN

## 2021-06-06 NOTE — PROGRESS NOTES
Pharmacy Consultation Note  (Antibiotic Dosing and Monitoring)    Initial consult date: 6/4/21  Consulting physician: Odalys  Drug: Vancomycin  Indication: Peritonitis      Age/  Gender Height Weight IBW Dosing weight  kg  Allergy Information   56 y.o./male 5' 8\" (172.7 cm) 200 lb (90.7 kg)     Ideal body weight: 68.4 kg (150 lb 12.7 oz)  Adjusted ideal body weight: 82.2 kg (181 lb 3.5 oz)  77  Patient has no known allergies. Other anti-infectives Start date Stop date                         Date  Tmax WBC ESRD UOP Drug/Dose Time   Given Level(s)   (Time) Comments   6/4 afebrile 5.9 PD -- No Vanco -- 23.3 @ 1553    6/5 afebrile 3.8 PD -- Vancomycin 750 mg IV x 1 dose 1700 Random level 18.7     6/6 afebrile 3.9 PD -- No Vanco -- Random level 22.4    6/7       Random level with am labs          Intake/Output Summary (Last 24 hours) at 6/6/2021 1212  Last data filed at 6/6/2021 0703  Gross per 24 hour   Intake --   Output 512 ml   Net -512 ml       Average urine output:    Cultures:    Site Date Result                    Recent Labs     06/04/21  1553   BLOODCULT2 24 Hours no growth        Historical Cultures:  No results found for: ORG  Recent Labs     06/04/21  1553   BC 24 Hours no growth       Radiology:      Assessment:  · Pharmacy consulted to dose vancomycin for this 49 y.o. male for peritonitis. · Goal trough = 15-20 mcg/mL. · 6/5: Patient for PD cath removal and temporary HD cath to be placed. Random level 18.1 mcg/mL  · 6/6: Patient for PD cath removal and tesio placement on 6/7. Patient to start HD on 6/7. Random level today 22.4 mcg/mL. Plan:  · No vancomycin dose today, random level with am labs tomorrow am.   · Will dose vancomycin by random levels and HD schedule   · Pharmacist will follow and monitor/adjust dosing as necessary if vancomycin is to continue.     Binh Morales, PharmD, BCPS 6/6/2021 12:15 PM

## 2021-06-06 NOTE — ANESTHESIA PRE PROCEDURE
Department of Anesthesiology  Preprocedure Note       Name:  Jhonathan Enciso   Age:  49 y.o.  :  1965                                          MRN:  [id number]         Date:  2021      Surgeon: Jamal Nguyen):  MD New Kitchen MD    Procedure: Procedure(s):  PD CATHETER REMOVAL  WANTS TO FOLLOW OTHER CASES  CATHETER INSERTION HEMODIALYSIS    Medications prior to admission:   Prior to Admission medications    Medication Sig Start Date End Date Taking?  Authorizing Provider   Continuous Blood Gluc Sensor (FREESTYLE RUBI 14 DAY SENSOR) MISC Use as directed 21  Yes Lynn Ro MD   carvedilol (COREG) 6.25 MG tablet Take 6.25 mg by mouth 2 times daily (with meals)   Yes Historical Provider, MD   lovastatin (MEVACOR) 40 MG tablet TAKE ONE TABLET BY MOUTH DAILY 21  Yes Ki Liao DO   montelukast (SINGULAIR) 10 MG tablet TAKE ONE TABLET BY MOUTH DAILY 21  Yes Ki Liao DO   bumetanide (BUMEX) 2 MG tablet TAKE ONE TABLET BY MOUTH TWO TIMES A DAY 20  Yes Ki Liao DO   insulin detemir (LEVEMIR) 100 UNIT/ML injection vial Inject 20 Units into the skin nightly 20  Yes Ki Liao DO   metOLazone (ZAROXOLYN) 2.5 MG tablet Take 2.5 mg by mouth daily   Yes Historical Provider, MD   vitamin D (ERGOCALCIFEROL) 1.25 MG (63084 UT) CAPS capsule TAKE 1 CAPSULE BY MOUTH TWICE A WEEK 20  Yes Ki Liao DO   Insulin Syringe-Needle U-100 (AIMSCO INS SYR .3CC/29GX0.5\") 29G X 1/2\" 0.3 ML MISC 1 each by Does not apply route daily 20  Yes Ki Liao DO   Zinc Sulfate (ZINC 15 PO) Take by mouth   Yes Historical Provider, MD   amLODIPine (NORVASC) 5 MG tablet Take 1 tablet by mouth daily 1/10/20  Yes Natan Mayes, DO   Acetaminophen (TYLENOL) 325 MG CAPS Take 650 mg by mouth as needed (FOR PAIN PER PT.)   Yes Historical Provider, MD   vitamin E 400 UNIT capsule Take 400 Units by mouth daily   Yes Historical Provider, MD   Insulin Syringe-Needle U-100 30G X 1/2\" 1 ML MISC 10 units units of Tresiba daily 10/22/19  Yes Ej Spaulding,    Cinnamon 500 MG CAPS Take 1,000 mg by mouth 2 times daily.    Yes Historical Provider, MD   sildenafil (VIAGRA) 100 MG tablet TAKE ONE TABLET BY MOUTH AS NEEDED FOR ERECTILE DYSFUNCTION 10/27/20   Arsen Mcgill, DO   ipratropium (ATROVENT) 0.06 % nasal spray 2 sprays by Nasal route 3 times daily 9/8/20 9/8/21  Arsen Cmgill, DO   fluticasone [facility]) 50 MCG/ACT nasal spray 2 sprays by Each Nostril route daily 9/8/20   Arsen Mcgill, DO   Misc Natural Products (APPLE CIDER VINEGAR DIET PO) Take by mouth    Historical Provider, MD   doxazosin (CARDURA) 1 MG tablet Take 1 mg by mouth nightly  3/5/20   Historical Provider, MD   sodium bicarbonate 650 MG tablet Take 1 tablet by mouth 3 times daily  Patient taking differently: Take 650 mg by mouth 3 times daily as needed  1/10/20   Smita Lantigua DO       Current medications:    Current Facility-Administered Medications   Medication Dose Route Frequency Provider Last Rate Last Admin    insulin lispro (HUMALOG) injection vial 8 Units  8 Units Subcutaneous TID  Norma Amador MD   8 Units at 06/06/21 1837    vitamin D (ERGOCALCIFEROL) capsule 50,000 Units  50,000 Units Oral Weekly Carly Hsu MD   50,000 Units at 06/06/21 2054    bumetanide (BUMEX) tablet 2 mg  2 mg Oral TID  Carly Hsu MD   2 mg at 06/06/21 1703    b complex-C-folic acid (NEPHROCAPS) capsule 1 mg  1 capsule Oral Daily Carly Hsu MD   1 mg at 06/06/21 1702    fluticasone (FLONASE) 50 MCG/ACT nasal spray 2 spray  2 spray Each Nostril Daily Al Morrow MD   2 spray at 06/05/21 0955    amLODIPine (NORVASC) tablet 5 mg  5 mg Oral Daily Al Morrwo MD   5 mg at 06/06/21 0853    carvedilol (COREG) tablet 6.25 mg  6.25 mg Oral BID  Al Morrow MD   6.25 mg at 06/06/21 1127    insulin glargine (LANTUS) injection vial 20 Units  20 Units Subcutaneous Nightly Al Nasreen Sorto MD   20 Units at 06/06/21 2124    atorvastatin (LIPITOR) tablet 20 mg  20 mg Oral Nightly Nayana Briceno MD   20 mg at 06/06/21 2054    [Held by provider] metOLazone (ZAROXOLYN) tablet 2.5 mg  2.5 mg Oral Daily Nayana Briceno MD   2.5 mg at 06/05/21 0955    montelukast (SINGULAIR) tablet 10 mg  10 mg Oral Daily Nayana Briceno MD   10 mg at 06/06/21 7577    vitamin E capsule 400 Units  400 Units Oral Daily Nayana Briceno MD   400 Units at 06/06/21 1045    glucose (GLUTOSE) 40 % oral gel 15 g  15 g Oral PRN Al Morrow MD        dextrose 50 % IV solution  12.5 g Intravenous PRN Nayana Briceno MD        glucagon (rDNA) injection 1 mg  1 mg Intramuscular PRN Nayana Briceno MD        dextrose 5 % solution  100 mL/hr Intravenous PRN Nayana Briceno MD        insulin lispro (HUMALOG) injection vial 0-12 Units  0-12 Units Subcutaneous TID WC Nayana Briceno MD   2 Units at 06/06/21 1829    insulin lispro (HUMALOG) injection vial 0-6 Units  0-6 Units Subcutaneous Nightly Nayana Briceno MD   3 Units at 06/06/21 2125    sodium chloride flush 0.9 % injection 5-40 mL  5-40 mL Intravenous 2 times per day Nayana Briceno MD   10 mL at 06/06/21 2055    sodium chloride flush 0.9 % injection 5-40 mL  5-40 mL Intravenous PRN Nayana Briceno MD        0.9 % sodium chloride infusion  25 mL Intravenous PRN Nayana Briceno MD        heparin (porcine) injection 5,000 Units  5,000 Units Subcutaneous 3 times per day Nayana Briceno MD   5,000 Units at 06/06/21 1411    ondansetron (ZOFRAN-ODT) disintegrating tablet 4 mg  4 mg Oral Q8H PRN Al Morrow MD        Or    ondansetron (ZOFRAN) injection 4 mg  4 mg Intravenous Q6H PRN Al Morrow MD        polyethylene glycol (GLYCOLAX) packet 17 g  17 g Oral Daily PRN Al Morrow MD        acetaminophen (TYLENOL) tablet 650 mg  650 mg Oral Q6H PRN Al Morrow MD        Or    acetaminophen (TYLENOL) suppository 650 mg  650 mg BP Readings from Last 3 Encounters:   06/07/21 (!) 164/66   11/30/20 (!) 174/81   11/30/20 (!) 143/93       NPO Status: Time of last liquid consumption: 2300                        Time of last solid consumption: 2000                        Date of last liquid consumption: 06/06/21                        Date of last solid food consumption: 06/06/21    BMI:   Wt Readings from Last 3 Encounters:   06/07/21 221 lb 1.9 oz (100.3 kg)   11/30/20 207 lb (93.9 kg)   09/08/20 221 lb (100.2 kg)     Body mass index is 33.62 kg/m². CBC:   Lab Results   Component Value Date    WBC 3.9 06/06/2021    RBC 2.93 06/06/2021    HGB 8.4 06/06/2021    HCT 24.6 06/06/2021    MCV 84.0 06/06/2021    RDW 14.3 06/06/2021     06/06/2021       CMP:   Lab Results   Component Value Date     06/06/2021    K 2.5 06/06/2021    CL 92 06/06/2021    CO2 24 06/06/2021    BUN 60 06/06/2021    CREATININE 7.8 06/06/2021    GFRAA 9 06/06/2021    LABGLOM 7 06/06/2021    GLUCOSE 366 06/06/2021    PROT 6.1 06/04/2021    CALCIUM 7.5 06/06/2021    BILITOT 0.3 06/04/2021    ALKPHOS 111 06/04/2021    AST 18 06/04/2021    ALT 12 06/04/2021       POC Tests: No results for input(s): POCGLU, POCNA, POCK, POCCL, POCBUN, POCHEMO, POCHCT in the last 72 hours.     Coags:   Lab Results   Component Value Date    PROTIME 10.9 06/04/2021    INR 1.0 06/04/2021    APTT 25.6 06/04/2021       HCG (If Applicable): No results found for: PREGTESTUR, PREGSERUM, HCG, HCGQUANT     ABGs: No results found for: PHART, PO2ART, AOJ0AZF, NKN5ZWE, BEART, M6WRUIEV     Type & Screen (If Applicable):  No results found for: LABABO, LABRH    Drug/Infectious Status (If Applicable):  No results found for: HIV, HEPCAB    COVID-19 Screening (If Applicable):   Lab Results   Component Value Date    COVID19 Not Detected 11/25/2020     ECG 6/4/21  Narrative & Impression    Sinus rhythm  Normal ECG  No previous ECGs available  Confirmed by Ruth Ann Katz [id number]) on 6/5/2021 5:08:42 AM     ECHO 1/8/21   Regular rhythm. Conclusions      Summary   Mildly dilated left ventricle. Severe left ventricular concentric hypertrophy noted. Ejection fraction is visually estimated at 55%. No evidence of left ventricular mass or thrombus noted. No regional wall motion abnormalities seen. The left atrium is mildly dilated. Interatrial septum appears intact. No evidence of thrombus within left atrium. Mildly dilated right ventricle. No evidence of a thrombus in the right ventricle. Mildly enlarged right atrium size. Mild mitral regurgitation is present. No mitral valve stenosis present. Physiologic and/or trace tricuspid regurgitation. RVSP is 47.29 mmHg. Pulmonary hypertension is mild to moderate . Regular rhythm. Signature    Anesthesia Evaluation  Patient summary reviewed and Nursing notes reviewed no history of anesthetic complications:   Airway: Mallampati: III  TM distance: >3 FB   Neck ROM: full  Mouth opening: > = 3 FB Dental:      Comment: Denies any loose teeth    Pulmonary:Negative Pulmonary ROS and normal exam  breath sounds clear to auscultation      (-) not a current smoker                           Cardiovascular:  Exercise tolerance: good (>4 METS),   (+) hypertension:, CHF:,       ECG reviewed  Rhythm: regular  Rate: normal  Echocardiogram reviewed         Beta Blocker:  Order written         Neuro/Psych:   Negative Neuro/Psych ROS              GI/Hepatic/Renal:   (+) renal disease (ESRD on PD, transitioning to HD ): dialysis and ESRD,           Endo/Other:    (+) DiabetesType II DM, , blood dyscrasia: anemia:., .                  ROS comment:    PD CATHETER REMOVAL  WANTS TO FOLLOW OTHER CASES (N/A )       CATHETER INSERTION HEMODIALYSIS (N/A ) Abdominal:   (+) obese,         Vascular: negative vascular ROS. Anesthesia Plan      MAC     ASA 4       Induction: intravenous.     MIPS: Prophylactic antiemetics administered. Anesthetic plan and risks discussed with patient. Use of blood products discussed with patient whom consented to blood products. Plan discussed with CRNA and attending.               Enrique Stallings DO   6/7/2021

## 2021-06-06 NOTE — PROGRESS NOTES
0810-Assessment complete and charted patient awake in bed denies abdominal, nausea, fever and chills. Peritoneal dialysis cath in place will continue to monitor.

## 2021-06-06 NOTE — PROGRESS NOTES
Hospitalist Progress Note      SYNOPSIS: Patient admitted on 2021 for abdominal pain concerning for PD related peritonitis      SUBJECTIVE:    Patient seen and examined  Records reviewed. Surgery tomorrow  BS elevated      Stable overnight. No other overnight issues reported. Temp (24hrs), Av.7 °F (36.5 °C), Min:97.4 °F (36.3 °C), Max:98 °F (36.7 °C)    DIET: Diet NPO Exceptions are: Sips of Water with Meds  ADULT DIET; Regular; 3 carb choices (45 gm/meal)  CODE: Full Code    Intake/Output Summary (Last 24 hours) at 2021 0738  Last data filed at 2021 0945  Gross per 24 hour   Intake --   Output 444 ml   Net -444 ml       OBJECTIVE:    BP (!) 152/68   Pulse 75   Temp 97.4 °F (36.3 °C) (Temporal)   Resp 18   Ht 5' 8\" (1.727 m)   Wt 222 lb 4.8 oz (100.8 kg)   SpO2 98%   BMI 33.80 kg/m²     General appearance: No apparent distress, appears stated age and cooperative. HEENT:  Conjunctivae/corneas clear. Neck: Supple. No jugular venous distention. Respiratory: Clear to auscultation bilaterally, normal respiratory effort  Cardiovascular: Regular rate rhythm, normal S1-S2  Abdomen: Soft, nontender, nondistended  Musculoskeletal: No clubbing, cyanosis, no bilateral lower extremity edema. Brisk capillary refill. Skin:  No rashes  on visible skin  Neurologic: awake, alert and following commands     ASSESSMENT:    PD related peritonitis  End-stage renal disease  Diabetes mellitus, uncontrolled, insulin-dependent  Hyponatremia  Chronic anemia  Hypertension       PLAN:    Long discussions about freestyle andrew at the time of discharge. Scripts in chart. Dialysis per nephrology. Discussed with vascular surgery. Likely line placement on Monday. General surgery also consulted for PD removal on Monday. Glucose accordion reviewed  IV cefepime and vancomycin, ID consultations appropriate.   Body fluid culture reviewed from   Titrate insulin  NPO midnight    DISPOSITION: Pending further course    Medications:  REVIEWED DAILY    Infusion Medications    dextrose      sodium chloride       Scheduled Medications    insulin lispro  8 Units Subcutaneous TID     sodium bicarbonate  650 mg Oral TID    doxazosin  1 mg Oral Nightly    fluticasone  2 spray Each Nostril Daily    amLODIPine  5 mg Oral Daily    bumetanide  2 mg Oral BID    carvedilol  6.25 mg Oral BID WC    insulin glargine  20 Units Subcutaneous Nightly    atorvastatin  20 mg Oral Nightly    [Held by provider] metOLazone  2.5 mg Oral Daily    montelukast  10 mg Oral Daily    vitamin E  400 Units Oral Daily    insulin lispro  0-12 Units Subcutaneous TID WC    insulin lispro  0-6 Units Subcutaneous Nightly    sodium chloride flush  5-40 mL Intravenous 2 times per day    heparin (porcine)  5,000 Units Subcutaneous 3 times per day    cefepime  1,000 mg Intravenous Q24H    vancomycin (VANCOCIN) intermittent dosing (placeholder)   Other RX Placeholder     PRN Meds: glucose, dextrose, glucagon (rDNA), dextrose, sodium chloride flush, sodium chloride, ondansetron **OR** ondansetron, polyethylene glycol, acetaminophen **OR** acetaminophen    Labs:     Recent Labs     06/04/21  1553 06/05/21  1016 06/06/21  0501   WBC 5.9 3.8* 3.9*   HGB 10.1* 8.8* 8.4*   HCT 28.6* 24.9* 24.6*    213 224       Recent Labs     06/04/21  1553 06/05/21  1016 06/06/21  0501   * 129* 132   K 4.0 2.8* 2.5*   CL 90* 90* 92*   CO2 22 25 24   BUN 72* 64* 60*   CREATININE 8.2* 8.3* 7.8*   CALCIUM 8.0* 7.7* 7.5*   PHOS  --  4.9* 5.0*       Recent Labs     06/04/21  1553   PROT 6.1*   ALKPHOS 111   ALT 12   AST 18   BILITOT 0.3       Recent Labs     06/04/21  1553   INR 1.0       No results for input(s): CKTOTAL, TROPONINI in the last 72 hours.     Chronic labs:    Lab Results   Component Value Date    CHOL 158 09/10/2020    TRIG 43 09/10/2020    HDL 62 09/10/2020    LDLCALC 87 09/10/2020    TSH 2.860 09/10/2020    PSA 0.56 07/10/2017    INR 1.0 06/04/2021    LABA1C 10.3 (H) 06/05/2021       Radiology: REVIEWED DAILY    +++++++++++++++++++++++++++++++++++++++++++++++++  Keely Alexander MD  [facility] [place] Physician - [address] [place] [address]  +++++++++++++++++++++++++++++++++++++++++++++++++  NOTE: This report was transcribed using voice recognition software. Every effort was made to ensure accuracy; however, inadvertent computerized transcription errors may be present.

## 2021-06-06 NOTE — PROGRESS NOTES
Department of Internal Medicine  Nephrology Progress Note    Events Reviewed. Subjective: We are following Mr. Angela Lehman for Peritonitis and ESRD on PD.        Current Medications:    Current Facility-Administered Medications: insulin lispro (HUMALOG) injection vial 8 Units, 8 Units, Subcutaneous, TID WC  potassium chloride 10 mEq/100 mL IVPB (Peripheral Line), 10 mEq, Intravenous, Q1H  sodium bicarbonate tablet 650 mg, 650 mg, Oral, TID  doxazosin (CARDURA) tablet 1 mg, 1 mg, Oral, Nightly  fluticasone (FLONASE) 50 MCG/ACT nasal spray 2 spray, 2 spray, Each Nostril, Daily  amLODIPine (NORVASC) tablet 5 mg, 5 mg, Oral, Daily  bumetanide (BUMEX) tablet 2 mg, 2 mg, Oral, BID  carvedilol (COREG) tablet 6.25 mg, 6.25 mg, Oral, BID WC  insulin glargine (LANTUS) injection vial 20 Units, 20 Units, Subcutaneous, Nightly  atorvastatin (LIPITOR) tablet 20 mg, 20 mg, Oral, Nightly  [Held by provider] metOLazone (ZAROXOLYN) tablet 2.5 mg, 2.5 mg, Oral, Daily  montelukast (SINGULAIR) tablet 10 mg, 10 mg, Oral, Daily  vitamin E capsule 400 Units, 400 Units, Oral, Daily  glucose (GLUTOSE) 40 % oral gel 15 g, 15 g, Oral, PRN  dextrose 50 % IV solution, 12.5 g, Intravenous, PRN  glucagon (rDNA) injection 1 mg, 1 mg, Intramuscular, PRN  dextrose 5 % solution, 100 mL/hr, Intravenous, PRN  insulin lispro (HUMALOG) injection vial 0-12 Units, 0-12 Units, Subcutaneous, TID   insulin lispro (HUMALOG) injection vial 0-6 Units, 0-6 Units, Subcutaneous, Nightly  sodium chloride flush 0.9 % injection 5-40 mL, 5-40 mL, Intravenous, 2 times per day  sodium chloride flush 0.9 % injection 5-40 mL, 5-40 mL, Intravenous, PRN  0.9 % sodium chloride infusion, 25 mL, Intravenous, PRN  heparin (porcine) injection 5,000 Units, 5,000 Units, Subcutaneous, 3 times per day  ondansetron (ZOFRAN-ODT) disintegrating tablet 4 mg, 4 mg, Oral, Q8H PRN **OR** ondansetron (ZOFRAN) injection 4 mg, 4 mg, Intravenous, Q6H PRN  polyethylene glycol (GLYCOLAX) 06/04/2021    ALT 12 06/04/2021     Magnesium:    Lab Results   Component Value Date    MG 1.7 06/06/2021     Phosphorus:    Lab Results   Component Value Date    PHOS 5.0 06/06/2021     Radiology Review:     Chest XR 6/4/21   No acute process. IMPRESSION/RECOMMENDATIONS:     Briefly Mr. Griselda Walker is a 49year old male with a past medical history of ESRD on Peritoneal dialysis through [facility], CHF, Type II DM with diabetic nephropathy, HTN, Hyperlipidemia who presented to the ER on 6/4/21 for abnormal cultures from his peritoneal dialysis catheter. Patient apparently has had recent peritonitis a month ago and he was treated with vancomycin and cefepime for 3 weeks after which his symptoms improved. However, Patient again noticed increasing abdominal pain yesterday (6/3/21), intermittent fevers and he went to dialysis unit where they merlin the peritoneal fluid which was cloudy. admits to having some chills. Patient was given vancomycin and cefepime on 6/3/21 and he went back to dialysis center yesterday ( 6/4/21) when he got another dose of vancomycin and cefepime and he was admitted here for transition to hemodialysis at least for now. So far, his outpatient cultures have not grown any particular organism. He had dialysis last night. Patient on a long-term basis would like to be on peritoneal dialysis. He is awaiting renal transplant. ER lab work revealed WBC 5.9, Hgb 10.1, Sodium 129, BUN 72, Creatinine 8.2, and cultures were again obtained in the ER. Renal was consulted for ESRD on Peritoneal Dialysis and Peritonitis. 1. Peritonitis, on Cefepime and Vancomycin  2. High body cell count, 72>68>54  3. ESRD on PD, transitioning to HD   4. Hyponatremia, corrected sodium 134 ( due to hyperglycemia)  5. Hypokalemia, likely from diuretics, for supplementation  6. Chronic Low bicarbonate, on sodium bicarb   7. HFpEF, echo 1/8/20- EF 55% on Bumex and Metolazone  8.  HTN, on Amlodipine, Coreg, Doxazosin, 9. Type II DM, H A1c: 10.3  10. Anemia of chronic disease, no DANIELLA needed as hemoglobin is >10. Plan:    · Replace Potassium  · Replace Magnesium   · To get HD tunneled Catheter placement and Peritoneal dialysis catheter removal on Monday. · HD tomorrow after HD catheter is placed.   Then home hd at [address] [place] [address] office  · Glucose control   · Obtain Iron panel, Vitamin D level in am   · Continue to monitor renal function and electrolytes  · Stop oral bicarb  · Increase bumex to 2 mg po tid  · Monitor labs    Discussed with RN and family    Brooke Steve MD

## 2021-06-06 NOTE — PROGRESS NOTES
Department of General Surgery - Adult  Surgical Service   Attending Progress Note      SUBJECTIVE:  Stable no abdominal pain    OBJECTIVE  PD exchanges noted    Physical    VITALS:  BP (!) 146/65   Pulse 68   Temp 97.7 °F (36.5 °C) (Oral)   Resp 18   Ht 5' 8\" (1.727 m)   Wt 226 lb 13.7 oz (102.9 kg)   SpO2 98%   BMI 34.49 kg/m²   INTAKE/OUTPUT:    Intake/Output Summary (Last 24 hours) at 2021 1055  Last data filed at 2021 0703  Gross per 24 hour   Intake --   Output 512 ml   Net -512 ml     TEMPERATURE:  Current - Temp: 97.7 °F (36.5 °C);  Max - Temp  Av.7 °F (36.5 °C)  Min: 97.4 °F (36.3 °C)  Max: 98.1 °F (36.7 °C)  RESPIRATIONS RANGE: Resp  Av.3  Min: 16  Max: 18  PULSE RANGE: Pulse  Av  Min: 68  Max: 75  BLOOD PRESSURE RANGE:  Systolic (05MYH), IQC:351 , Min:146 , PJL:193   ; Diastolic (84QYE), CAX:45, Min:65, Max:68    PULSE OXIMETRY RANGE: No data recorded  CONSTITUTIONAL:  fatigued, alert, cooperative, no apparent distress, appears older than stated age and moderately obese  EYES:  lids and lashes normal, pupils equal, round and reactive to light and extra-ocular muscles intact  NECK:  supple, symmetrical, trachea midline, skin normal and no stridor  LUNGS:  no increased work of breathing, good air exchange, no retractions and clear to auscultation  CARDIOVASCULAR:  normal apical pulses, regular rate and rhythm and normal S1 and S2  ABDOMEN:  scars noted left upper quadrant, normal bowel sounds, soft, non-distended, non-tender, voluntary guarding absent and no masses palpated  Data  CBC with Differential:    Lab Results   Component Value Date    WBC 3.9 2021    RBC 2.93 2021    HGB 8.4 2021    HCT 24.6 2021     2021    MCV 84.0 2021    MCH 28.7 2021    MCHC 34.1 2021    RDW 14.3 2021    LYMPHOPCT 14.7 2021    MONOPCT 11.2 2021    BASOPCT 0.5 2021    MONOSABS 0.44 2021    LYMPHSABS 0.58 2021 EOSABS 0.30 06/06/2021    BASOSABS 0.02 06/06/2021     CMP:    Lab Results   Component Value Date     06/06/2021    K 2.5 06/06/2021    CL 92 06/06/2021    CO2 24 06/06/2021    BUN 60 06/06/2021    CREATININE 7.8 06/06/2021    GFRAA 9 06/06/2021    LABGLOM 7 06/06/2021    GLUCOSE 366 06/06/2021    PROT 6.1 06/04/2021    LABALBU 3.3 06/04/2021    CALCIUM 7.5 06/06/2021    BILITOT 0.3 06/04/2021    ALKPHOS 111 06/04/2021    AST 18 06/04/2021    ALT 12 06/04/2021       Current Inpatient Medications    Current Facility-Administered Medications: insulin lispro (HUMALOG) injection vial 8 Units, 8 Units, Subcutaneous, TID WC  potassium chloride 10 mEq/100 mL IVPB (Peripheral Line), 10 mEq, Intravenous, Q1H  magnesium sulfate 2000 mg in 50 mL IVPB premix, 2,000 mg, Intravenous, Once  sodium bicarbonate tablet 650 mg, 650 mg, Oral, TID  doxazosin (CARDURA) tablet 1 mg, 1 mg, Oral, Nightly  fluticasone (FLONASE) 50 MCG/ACT nasal spray 2 spray, 2 spray, Each Nostril, Daily  amLODIPine (NORVASC) tablet 5 mg, 5 mg, Oral, Daily  bumetanide (BUMEX) tablet 2 mg, 2 mg, Oral, BID  carvedilol (COREG) tablet 6.25 mg, 6.25 mg, Oral, BID WC  insulin glargine (LANTUS) injection vial 20 Units, 20 Units, Subcutaneous, Nightly  atorvastatin (LIPITOR) tablet 20 mg, 20 mg, Oral, Nightly  [Held by provider] metOLazone (ZAROXOLYN) tablet 2.5 mg, 2.5 mg, Oral, Daily  montelukast (SINGULAIR) tablet 10 mg, 10 mg, Oral, Daily  vitamin E capsule 400 Units, 400 Units, Oral, Daily  glucose (GLUTOSE) 40 % oral gel 15 g, 15 g, Oral, PRN  dextrose 50 % IV solution, 12.5 g, Intravenous, PRN  glucagon (rDNA) injection 1 mg, 1 mg, Intramuscular, PRN  dextrose 5 % solution, 100 mL/hr, Intravenous, PRN  insulin lispro (HUMALOG) injection vial 0-12 Units, 0-12 Units, Subcutaneous, TID WC  insulin lispro (HUMALOG) injection vial 0-6 Units, 0-6 Units, Subcutaneous, Nightly  sodium chloride flush 0.9 % injection 5-40 mL, 5-40 mL, Intravenous, 2 times per

## 2021-06-06 NOTE — FLOWSHEET NOTE
06/06/21 1600   Vitals   BP (!) 146/60   Temp 97.6 °F (36.4 °C)   Temp Source Oral   Pulse 67   Resp 16   Weight 227 lb 1.2 oz (103 kg)   Cycler   Verification of Prescription CCPD   Total Volume Programmed 26248 mL   Therapy Time (Hours:Minutes) 12   Fill Volume 2000 mL   Last Fill Volume 0 mL   Number of Cycles 5   Bag Volume 5000 mL   Number of Bags Used 2   CCPD initiated, connected using aseptic technique, draining clear yellow effluent, filling without difficulty. Report to Shazia GOMEZ.

## 2021-06-06 NOTE — PLAN OF CARE
Problem: Pain:  Description: Pain management should include both nonpharmacologic and pharmacologic interventions. Goal: Pain level will decrease  Description: Pain level will decrease  6/6/2021 1103 by Zia Mooney RN  Outcome: Ongoing     Problem: Pain:  Description: Pain management should include both nonpharmacologic and pharmacologic interventions. Goal: Control of acute pain  Description: Control of acute pain  6/6/2021 1103 by Zia Mooney RN  Outcome: Ongoing     Problem: Pain:  Description: Pain management should include both nonpharmacologic and pharmacologic interventions.   Goal: Control of chronic pain  Description: Control of chronic pain  6/6/2021 1103 by Zia Mooney RN  Outcome: Ongoing

## 2021-06-06 NOTE — PROGRESS NOTES
[address] [place] [address] [facility]  SU[facility]  Progress Note    CC: abdominal pain   Face to face encounter   SUBJECTIVE:  Patient is tolerating medications. No reported adverse drug reactions. ROS: No nausea, vomiting, diarrhea. No rash. No fevers. Laying in bed. Wife at bedside- upset about medications are different from his home meds. Medications:  Scheduled Meds:   insulin lispro  8 Units Subcutaneous TID WC    potassium chloride  10 mEq Intravenous Q1H    magnesium sulfate  2,000 mg Intravenous Once    sodium bicarbonate  650 mg Oral TID    doxazosin  1 mg Oral Nightly    fluticasone  2 spray Each Nostril Daily    amLODIPine  5 mg Oral Daily    bumetanide  2 mg Oral BID    carvedilol  6.25 mg Oral BID WC    insulin glargine  20 Units Subcutaneous Nightly    atorvastatin  20 mg Oral Nightly    [Held by provider] metOLazone  2.5 mg Oral Daily    montelukast  10 mg Oral Daily    vitamin E  400 Units Oral Daily    insulin lispro  0-12 Units Subcutaneous TID WC    insulin lispro  0-6 Units Subcutaneous Nightly    sodium chloride flush  5-40 mL Intravenous 2 times per day    heparin (porcine)  5,000 Units Subcutaneous 3 times per day    cefepime  1,000 mg Intravenous Q24H    vancomycin (VANCOCIN) intermittent dosing (placeholder)   Other RX Placeholder     Continuous Infusions:   dextrose      sodium chloride       PRN Meds:glucose, dextrose, glucagon (rDNA), dextrose, sodium chloride flush, sodium chloride, ondansetron **OR** ondansetron, polyethylene glycol, acetaminophen **OR** acetaminophen  OBJECTIVE:  Patient Vitals for the past 24 hrs:   BP Temp Temp src Pulse Resp Weight   06/06/21 0810 (!) 146/65 97.7 °F (36.5 °C) Oral 68 18 --   06/06/21 0703 (!) 157/67 98.1 °F (36.7 °C) Oral 70 16 226 lb 13.7 oz (102.9 kg)   06/05/21 2330 (!) 152/68 97.4 °F (36.3 °C) Temporal 75 18 --     Constitutional: The patient is awake, alert, and oriented. Skin: Warm and dry. No rashes were noted.    Head: Eyes show round, and reactive pupils. No jaundice. Mouth: Moist mucous membranes, no ulcerations, no thrush. Neck: Supple to movements. No lymphadenopathy. Chest: No use of accessory muscles to breathe. Symmetrical expansion. Auscultation reveals no wheezing, crackles, or rhonchi. Cardiovascular: S1 and S2 are rhythmic and regular. No murmurs appreciated. Abdomen: Positive bowel sounds to auscultation. Benign to palpation. Minimally tenderness- PD cath in place   Extremities: No clubbing, no cyanosis, no edema. PIV    Laboratory and Tests Review:  Lab Results   Component Value Date    WBC 3.9 (L) 06/06/2021    WBC 3.8 (L) 06/05/2021    WBC 5.9 06/04/2021    HGB 8.4 (L) 06/06/2021    HCT 24.6 (L) 06/06/2021    MCV 84.0 06/06/2021     06/06/2021     Lab Results   Component Value Date    NEUTROABS 2.58 06/06/2021    NEUTROABS 4.90 06/04/2021    NEUTROABS 5.34 11/30/2020     No results found for: CRP  No results found for: SEDRATE  Lab Results   Component Value Date    ALT 12 06/04/2021    AST 18 06/04/2021    ALKPHOS 111 06/04/2021    BILITOT 0.3 06/04/2021     Lab Results   Component Value Date     06/06/2021    K 2.5 06/06/2021    CL 92 06/06/2021    CO2 24 06/06/2021    BUN 60 06/06/2021    CREATININE 7.8 06/06/2021    GFRAA 9 06/06/2021    LABGLOM 7 06/06/2021    GLUCOSE 366 06/06/2021    PROT 6.1 06/04/2021    LABALBU 3.3 06/04/2021    CALCIUM 7.5 06/06/2021    BILITOT 0.3 06/04/2021    ALKPHOS 111 06/04/2021    AST 18 06/04/2021    ALT 12 06/04/2021     Radiology:  Reviewed     Microbiology:    Body fluid culture- pending  Urine culture- no growth   Blood culture- no growth     ASSESSMENT:  Peritonitis   Abdominal pain  ESRD  Leukopenia     PLAN:  Continue vancomycin and cefepime daily   Pharmacy dosing   Can change antibiotics to intraperitoneal once started   Check cultures  For PD cath removal and placement of tesio   Monitor labs    DONOVAN Padilla  11:33 AM  6/6/2021 Pt seen and examined. Above discussed agree with advanced practice nurse. Labs, cultures, and radiographs reviewed. Face to Face encounter occurred. Changes made as necessary.      Seth Sigala MD

## 2021-06-07 ENCOUNTER — ANESTHESIA (OUTPATIENT)
Dept: OPERATING ROOM | Age: 56
DRG: 907 | End: 2021-06-07
Payer: COMMERCIAL

## 2021-06-07 ENCOUNTER — APPOINTMENT (OUTPATIENT)
Dept: GENERAL RADIOLOGY | Age: 56
DRG: 907 | End: 2021-06-07
Payer: COMMERCIAL

## 2021-06-07 VITALS — OXYGEN SATURATION: 90 % | DIASTOLIC BLOOD PRESSURE: 53 MMHG | SYSTOLIC BLOOD PRESSURE: 110 MMHG

## 2021-06-07 LAB
ANION GAP SERPL CALCULATED.3IONS-SCNC: 18 MMOL/L (ref 7–16)
APPEARANCE FLUID: CLEAR
BASOPHILS ABSOLUTE: 0.03 E9/L (ref 0–0.2)
BASOPHILS RELATIVE PERCENT: 0.7 % (ref 0–2)
BODY FLUID CULTURE, STERILE: NORMAL
BUN BLDV-MCNC: 47 MG/DL (ref 6–20)
CALCIUM IONIZED: 1.17 MMOL/L (ref 1.15–1.33)
CALCIUM SERPL-MCNC: 7.9 MG/DL (ref 8.6–10.2)
CELL COUNT FLUID TYPE: NORMAL
CHLORIDE BLD-SCNC: 97 MMOL/L (ref 98–107)
CO2: 24 MMOL/L (ref 22–29)
COLOR FLUID: COLORLESS
CREAT SERPL-MCNC: 6.2 MG/DL (ref 0.7–1.2)
EOSINOPHILS ABSOLUTE: 0.22 E9/L (ref 0.05–0.5)
EOSINOPHILS RELATIVE PERCENT: 5.3 % (ref 0–6)
GFR AFRICAN AMERICAN: 11
GFR NON-AFRICAN AMERICAN: 9 ML/MIN/1.73
GLUCOSE BLD-MCNC: 152 MG/DL (ref 74–99)
GRAM STAIN RESULT: NORMAL
HBV SURFACE AB TITR SER: NORMAL {TITER}
HCT VFR BLD CALC: 26.4 % (ref 37–54)
HEMOGLOBIN: 9.1 G/DL (ref 12.5–16.5)
IMMATURE GRANULOCYTES #: 0.02 E9/L
IMMATURE GRANULOCYTES %: 0.5 % (ref 0–5)
IRON SATURATION: 40 % (ref 20–55)
IRON: 60 MCG/DL (ref 59–158)
LYMPHOCYTES ABSOLUTE: 0.6 E9/L (ref 1.5–4)
LYMPHOCYTES RELATIVE PERCENT: 14.5 % (ref 20–42)
MAGNESIUM: 2 MG/DL (ref 1.6–2.6)
MCH RBC QN AUTO: 29 PG (ref 26–35)
MCHC RBC AUTO-ENTMCNC: 34.5 % (ref 32–34.5)
MCV RBC AUTO: 84.1 FL (ref 80–99.9)
METER GLUCOSE: 146 MG/DL (ref 74–99)
METER GLUCOSE: 150 MG/DL (ref 74–99)
METER GLUCOSE: 183 MG/DL (ref 74–99)
METER GLUCOSE: 278 MG/DL (ref 74–99)
MONOCYTE, FLUID: 85 %
MONOCYTES ABSOLUTE: 0.43 E9/L (ref 0.1–0.95)
MONOCYTES RELATIVE PERCENT: 10.4 % (ref 2–12)
NEUTROPHIL, FLUID: 15 %
NEUTROPHILS ABSOLUTE: 2.84 E9/L (ref 1.8–7.3)
NEUTROPHILS RELATIVE PERCENT: 68.6 % (ref 43–80)
NUCLEATED CELLS FLUID: 34 /UL
PDW BLD-RTO: 14.6 FL (ref 11.5–15)
PHOSPHORUS: 4 MG/DL (ref 2.5–4.5)
PLATELET # BLD: 264 E9/L (ref 130–450)
PMV BLD AUTO: 10.1 FL (ref 7–12)
POTASSIUM REFLEX MAGNESIUM: 3.1 MMOL/L (ref 3.5–5)
RBC # BLD: 3.14 E12/L (ref 3.8–5.8)
RBC FLUID: <2000 /UL
SODIUM BLD-SCNC: 139 MMOL/L (ref 132–146)
TOTAL IRON BINDING CAPACITY: 150 MCG/DL (ref 250–450)
VANCOMYCIN RANDOM: 16.5 MCG/ML (ref 5–40)
WBC # BLD: 4.1 E9/L (ref 4.5–11.5)

## 2021-06-07 PROCEDURE — 82330 ASSAY OF CALCIUM: CPT

## 2021-06-07 PROCEDURE — 3700000001 HC ADD 15 MINUTES (ANESTHESIA): Performed by: SURGERY

## 2021-06-07 PROCEDURE — 3600000003 HC SURGERY LEVEL 3 BASE: Performed by: SURGERY

## 2021-06-07 PROCEDURE — 2709999900 HC NON-CHARGEABLE SUPPLY: Performed by: SURGERY

## 2021-06-07 PROCEDURE — 83540 ASSAY OF IRON: CPT

## 2021-06-07 PROCEDURE — 36558 INSERT TUNNELED CV CATH: CPT | Performed by: SURGERY

## 2021-06-07 PROCEDURE — 2580000003 HC RX 258

## 2021-06-07 PROCEDURE — 83735 ASSAY OF MAGNESIUM: CPT

## 2021-06-07 PROCEDURE — 6370000000 HC RX 637 (ALT 250 FOR IP): Performed by: FAMILY MEDICINE

## 2021-06-07 PROCEDURE — 3209999900 FLUORO FOR SURGICAL PROCEDURES

## 2021-06-07 PROCEDURE — 3700000000 HC ANESTHESIA ATTENDED CARE: Performed by: SURGERY

## 2021-06-07 PROCEDURE — 80048 BASIC METABOLIC PNL TOTAL CA: CPT

## 2021-06-07 PROCEDURE — 3600000013 HC SURGERY LEVEL 3 ADDTL 15MIN: Performed by: SURGERY

## 2021-06-07 PROCEDURE — C1881 DIALYSIS ACCESS SYSTEM: HCPCS | Performed by: SURGERY

## 2021-06-07 PROCEDURE — 7100000001 HC PACU RECOVERY - ADDTL 15 MIN: Performed by: SURGERY

## 2021-06-07 PROCEDURE — 71045 X-RAY EXAM CHEST 1 VIEW: CPT

## 2021-06-07 PROCEDURE — 6360000002 HC RX W HCPCS: Performed by: INTERNAL MEDICINE

## 2021-06-07 PROCEDURE — 1200000000 HC SEMI PRIVATE

## 2021-06-07 PROCEDURE — 82962 GLUCOSE BLOOD TEST: CPT

## 2021-06-07 PROCEDURE — 83550 IRON BINDING TEST: CPT

## 2021-06-07 PROCEDURE — 90935 HEMODIALYSIS ONE EVALUATION: CPT

## 2021-06-07 PROCEDURE — 85025 COMPLETE CBC W/AUTO DIFF WBC: CPT

## 2021-06-07 PROCEDURE — 36415 COLL VENOUS BLD VENIPUNCTURE: CPT

## 2021-06-07 PROCEDURE — 6370000000 HC RX 637 (ALT 250 FOR IP): Performed by: INTERNAL MEDICINE

## 2021-06-07 PROCEDURE — 7100000000 HC PACU RECOVERY - FIRST 15 MIN: Performed by: SURGERY

## 2021-06-07 PROCEDURE — 6360000002 HC RX W HCPCS

## 2021-06-07 PROCEDURE — 2580000003 HC RX 258: Performed by: INTERNAL MEDICINE

## 2021-06-07 PROCEDURE — 2500000003 HC RX 250 WO HCPCS: Performed by: SURGERY

## 2021-06-07 PROCEDURE — 80202 ASSAY OF VANCOMYCIN: CPT

## 2021-06-07 PROCEDURE — 84100 ASSAY OF PHOSPHORUS: CPT

## 2021-06-07 PROCEDURE — 87070 CULTURE OTHR SPECIMN AEROBIC: CPT

## 2021-06-07 PROCEDURE — 6360000002 HC RX W HCPCS: Performed by: FAMILY MEDICINE

## 2021-06-07 RX ORDER — PROPOFOL 10 MG/ML
INJECTION, EMULSION INTRAVENOUS CONTINUOUS PRN
Status: DISCONTINUED | OUTPATIENT
Start: 2021-06-07 | End: 2021-06-07 | Stop reason: SDUPTHER

## 2021-06-07 RX ORDER — LIDOCAINE HYDROCHLORIDE AND EPINEPHRINE 10; 10 MG/ML; UG/ML
INJECTION, SOLUTION INFILTRATION; PERINEURAL PRN
Status: DISCONTINUED | OUTPATIENT
Start: 2021-06-07 | End: 2021-06-07 | Stop reason: ALTCHOICE

## 2021-06-07 RX ORDER — FENTANYL CITRATE 50 UG/ML
INJECTION, SOLUTION INTRAMUSCULAR; INTRAVENOUS PRN
Status: DISCONTINUED | OUTPATIENT
Start: 2021-06-07 | End: 2021-06-07 | Stop reason: SDUPTHER

## 2021-06-07 RX ORDER — SODIUM CHLORIDE 9 MG/ML
INJECTION, SOLUTION INTRAVENOUS CONTINUOUS PRN
Status: DISCONTINUED | OUTPATIENT
Start: 2021-06-07 | End: 2021-06-07 | Stop reason: SDUPTHER

## 2021-06-07 RX ORDER — MIDAZOLAM HYDROCHLORIDE 1 MG/ML
INJECTION INTRAMUSCULAR; INTRAVENOUS PRN
Status: DISCONTINUED | OUTPATIENT
Start: 2021-06-07 | End: 2021-06-07 | Stop reason: SDUPTHER

## 2021-06-07 RX ORDER — MORPHINE SULFATE 2 MG/ML
2 INJECTION, SOLUTION INTRAMUSCULAR; INTRAVENOUS EVERY 4 HOURS PRN
Status: DISCONTINUED | OUTPATIENT
Start: 2021-06-07 | End: 2021-06-09 | Stop reason: HOSPADM

## 2021-06-07 RX ADMIN — Medication 10 ML: at 21:31

## 2021-06-07 RX ADMIN — SODIUM CHLORIDE: 9 INJECTION, SOLUTION INTRAVENOUS at 10:41

## 2021-06-07 RX ADMIN — EPOETIN ALFA-EPBX 5000 UNITS: 10000 INJECTION, SOLUTION INTRAVENOUS; SUBCUTANEOUS at 22:13

## 2021-06-07 RX ADMIN — VANCOMYCIN HYDROCHLORIDE 750 MG: 10 INJECTION, POWDER, LYOPHILIZED, FOR SOLUTION INTRAVENOUS at 22:13

## 2021-06-07 RX ADMIN — INSULIN LISPRO 8 UNITS: 100 INJECTION, SOLUTION INTRAVENOUS; SUBCUTANEOUS at 18:41

## 2021-06-07 RX ADMIN — MIDAZOLAM 2 MG: 1 INJECTION INTRAMUSCULAR; INTRAVENOUS at 10:49

## 2021-06-07 RX ADMIN — CARVEDILOL 6.25 MG: 6.25 TABLET, FILM COATED ORAL at 09:56

## 2021-06-07 RX ADMIN — Medication 10 ML: at 09:55

## 2021-06-07 RX ADMIN — INSULIN GLARGINE 20 UNITS: 100 INJECTION, SOLUTION SUBCUTANEOUS at 21:35

## 2021-06-07 RX ADMIN — INSULIN LISPRO 2 UNITS: 100 INJECTION, SOLUTION INTRAVENOUS; SUBCUTANEOUS at 18:45

## 2021-06-07 RX ADMIN — FENTANYL CITRATE 50 MCG: 50 INJECTION, SOLUTION INTRAMUSCULAR; INTRAVENOUS at 10:49

## 2021-06-07 RX ADMIN — BUMETANIDE 2 MG: 1 TABLET ORAL at 18:13

## 2021-06-07 RX ADMIN — FENTANYL CITRATE 50 MCG: 50 INJECTION, SOLUTION INTRAMUSCULAR; INTRAVENOUS at 10:59

## 2021-06-07 RX ADMIN — MORPHINE SULFATE 2 MG: 2 INJECTION, SOLUTION INTRAMUSCULAR; INTRAVENOUS at 18:42

## 2021-06-07 RX ADMIN — BUMETANIDE 2 MG: 1 TABLET ORAL at 13:46

## 2021-06-07 RX ADMIN — AMLODIPINE BESYLATE 5 MG: 5 TABLET ORAL at 09:55

## 2021-06-07 RX ADMIN — PROPOFOL 50 MCG/KG/MIN: 10 INJECTION, EMULSION INTRAVENOUS at 10:49

## 2021-06-07 RX ADMIN — INSULIN LISPRO 3 UNITS: 100 INJECTION, SOLUTION INTRAVENOUS; SUBCUTANEOUS at 21:35

## 2021-06-07 RX ADMIN — HEPARIN SODIUM 5000 UNITS: 10000 INJECTION INTRAVENOUS; SUBCUTANEOUS at 21:30

## 2021-06-07 RX ADMIN — Medication 400 UNITS: at 13:43

## 2021-06-07 RX ADMIN — ACETAMINOPHEN 650 MG: 325 TABLET ORAL at 18:08

## 2021-06-07 RX ADMIN — ATORVASTATIN CALCIUM 20 MG: 20 TABLET, FILM COATED ORAL at 21:31

## 2021-06-07 RX ADMIN — CARVEDILOL 6.25 MG: 6.25 TABLET, FILM COATED ORAL at 18:13

## 2021-06-07 RX ADMIN — CEFEPIME HYDROCHLORIDE 1000 MG: 1 INJECTION, POWDER, FOR SOLUTION INTRAMUSCULAR; INTRAVENOUS at 18:17

## 2021-06-07 RX ADMIN — NEPHROCAP 1 MG: 1 CAP ORAL at 13:43

## 2021-06-07 RX ADMIN — PROPOFOL 20 MG: 10 INJECTION, EMULSION INTRAVENOUS at 10:50

## 2021-06-07 ASSESSMENT — PAIN SCALES - GENERAL
PAINLEVEL_OUTOF10: 0
PAINLEVEL_OUTOF10: 0
PAINLEVEL_OUTOF10: 3
PAINLEVEL_OUTOF10: 0
PAINLEVEL_OUTOF10: 7
PAINLEVEL_OUTOF10: 0

## 2021-06-07 ASSESSMENT — PULMONARY FUNCTION TESTS
PIF_VALUE: 0
PIF_VALUE: 5
PIF_VALUE: 5
PIF_VALUE: 1
PIF_VALUE: 0
PIF_VALUE: 5
PIF_VALUE: 0
PIF_VALUE: 1
PIF_VALUE: 0
PIF_VALUE: 5
PIF_VALUE: 0
PIF_VALUE: 5
PIF_VALUE: 0
PIF_VALUE: 5
PIF_VALUE: 5
PIF_VALUE: 0
PIF_VALUE: 0
PIF_VALUE: 6
PIF_VALUE: 0
PIF_VALUE: 5
PIF_VALUE: 0
PIF_VALUE: 5
PIF_VALUE: 5
PIF_VALUE: 0
PIF_VALUE: 5
PIF_VALUE: 0

## 2021-06-07 NOTE — FLOWSHEET NOTE
CCPD disconnected using aseptic technique. No pt c/o at this time.  Labs drawn per policy per order; this nurse hand delivered lab.      06/07/21 0805   Vitals   BP (!) 164/66   Temp 98.6 °F (37 °C)   Temp Source Temporal   Pulse 71   Resp 18   Peritoneal Dialysis (CAPD manual)   Effluent Appearance Clear;Light;Yellow   Cycler   Verification of Prescription CCPD   Ultrafiltration (UF) (mL) 868 mL

## 2021-06-07 NOTE — PROGRESS NOTES
0915-Assessment complete and charted patient awake in bed denies abdominal pain fever and chills safety maintained will continue to monitor. 1315-Patient returned to room from procedure vital signs obtained right chest dialysis cath in place. ABD site PÉREZ with glue intact.

## 2021-06-07 NOTE — FLOWSHEET NOTE
06/07/21 1723   Vital Signs   /67   Temp 98.2 °F (36.8 °C)   Pulse 66   Resp 18   Weight 213 lb 6.5 oz (96.8 kg)   Percent Weight Change -2.52   Post-Hemodialysis Assessment   Post-Treatment Procedures Blood returned;Catheter capped, clamped with Citrate x 2 ports   Machine Disinfection Process Acid/Vinegar Clean;Heat Disinfect; Exterior Machine Disinfection   Rinseback Volume (ml) 300 ml   Total Liters Processed (l/min) 50.2 l/min   Dialyzer Clearance Moderately streaked   Duration of Treatment (minutes) 180 minutes   Hemodialysis Intake (ml) 300 ml   Hemodialysis Output (ml) 2300 ml   NET Removed (ml) 2000 ml   Tolerated Treatment Good   Patient Response to Treatment pt erwin well   Bilateral Breath Sounds Clear

## 2021-06-07 NOTE — PROGRESS NOTES
Vascular Surgery Progress Note    Pt is being seen in f/u today regarding HD dialysis access    Subjective  Pt s/e. No events overnight. Feels well. Current Medications:    dextrose      sodium chloride        glucose, dextrose, glucagon (rDNA), dextrose, sodium chloride flush, sodium chloride, ondansetron **OR** ondansetron, polyethylene glycol, acetaminophen **OR** acetaminophen    insulin lispro  8 Units Subcutaneous TID WC    vitamin D  50,000 Units Oral Weekly    bumetanide  2 mg Oral TID WC    b complex-C-folic acid  1 capsule Oral Daily    fluticasone  2 spray Each Nostril Daily    amLODIPine  5 mg Oral Daily    carvedilol  6.25 mg Oral BID WC    insulin glargine  20 Units Subcutaneous Nightly    atorvastatin  20 mg Oral Nightly    [Held by provider] metOLazone  2.5 mg Oral Daily    montelukast  10 mg Oral Daily    vitamin E  400 Units Oral Daily    insulin lispro  0-12 Units Subcutaneous TID WC    insulin lispro  0-6 Units Subcutaneous Nightly    sodium chloride flush  5-40 mL Intravenous 2 times per day    heparin (porcine)  5,000 Units Subcutaneous 3 times per day    cefepime  1,000 mg Intravenous Q24H    vancomycin (VANCOCIN) intermittent dosing (placeholder)   Other RX Placeholder        PHYSICAL EXAM:    BP (!) 156/59   Pulse 73   Temp 98 °F (36.7 °C) (Bladder)   Resp 18   Ht 5' 8\" (1.727 m)   Wt 227 lb 1.2 oz (103 kg)   SpO2 95%   BMI 34.53 kg/m²     Intake/Output Summary (Last 24 hours) at 6/7/2021 0610  Last data filed at 6/6/2021 2139  Gross per 24 hour   Intake 100 ml   Output 512 ml   Net -412 ml          Gen: awake, alert and oriented x3, no apparent distress  Neck: no obvious scars to neck or upper chest  CVS: RRR  Resp: No increased work of breathing  Abd: Soft, non-tender, non-distended.  PD catheter in place      LABS:    Lab Results   Component Value Date    WBC 3.9 (L) 06/06/2021    HGB 8.4 (L) 06/06/2021    HCT 24.6 (L) 06/06/2021     06/06/2021

## 2021-06-07 NOTE — CARE COORDINATION
Patient had a REMOVAL OF PERITONEAL DIALYSIS CATHETER and CATHETER INSERTION HEMODIALYSIS. Per nephrology note today, home hd at [facility] office. Philomena Lunch Dr. Chele Dillard via [employer] to see which dialysis facility in [facility] he follows at so that a referral can be made for outpatient hemodialysis. Await response.   Tae Shows JASON DAVIS

## 2021-06-07 NOTE — PROGRESS NOTES
Hospitalist Progress Note      SYNOPSIS: Patient admitted on 2021 for abdominal pain concerning for PD related peritonitis      SUBJECTIVE:    Patient seen and examined  Records reviewed. No new complaints  Blood sugars improved per review of chart  Vital signs stable  No fevers overnight    Stable overnight. No other overnight issues reported. Temp (24hrs), Av.1 °F (36.7 °C), Min:97.5 °F (36.4 °C), Max:98.6 °F (37 °C)    DIET: Diet NPO Exceptions are: Sips of Water with Meds  CODE: Full Code    Intake/Output Summary (Last 24 hours) at 2021 0935  Last data filed at 2021 0805  Gross per 24 hour   Intake 200 ml   Output 868 ml   Net -668 ml       OBJECTIVE:    BP (!) 164/66   Pulse 71   Temp 98.6 °F (37 °C) (Temporal)   Resp 18   Ht 5' 8\" (1.727 m)   Wt 221 lb 1.9 oz (100.3 kg)   SpO2 99%   BMI 33.62 kg/m²     General appearance: No apparent distress, appears stated age and cooperative. HEENT:  Conjunctivae/corneas clear. Neck: Supple. No jugular venous distention. Respiratory: Clear to auscultation bilaterally, normal respiratory effort  Cardiovascular: Regular rate rhythm, normal S1-S2  Abdomen: Soft, nontender, nondistended  Musculoskeletal: No clubbing, cyanosis, no bilateral lower extremity edema. Brisk capillary refill. Skin:  No rashes  on visible skin  Neurologic: awake, alert and following commands     ASSESSMENT:    PD related peritonitis  End-stage renal disease  Diabetes mellitus, uncontrolled, insulin-dependent  Hyponatremia  Chronic anemia  Hypertension       PLAN:    Vancomycin/cefepime  Change antibiotics intraperitoneal per ID  PD cath removal/debris dialysis catheter placement planned today  Continue current insulin regimen. Avoid hypoglycemia. CCPD per nephrology  Monique Rajan has been ordered at the time of discharge.   Discussed with vascular general surgeons    DISPOSITION: Pending further course    Medications:  REVIEWED DAILY    Infusion Medications    dextrose      sodium chloride       Scheduled Medications    insulin lispro  8 Units Subcutaneous TID     vitamin D  50,000 Units Oral Weekly    bumetanide  2 mg Oral TID     b complex-C-folic acid  1 capsule Oral Daily    fluticasone  2 spray Each Nostril Daily    amLODIPine  5 mg Oral Daily    carvedilol  6.25 mg Oral BID     insulin glargine  20 Units Subcutaneous Nightly    atorvastatin  20 mg Oral Nightly    [Held by provider] metOLazone  2.5 mg Oral Daily    montelukast  10 mg Oral Daily    vitamin E  400 Units Oral Daily    insulin lispro  0-12 Units Subcutaneous TID     insulin lispro  0-6 Units Subcutaneous Nightly    sodium chloride flush  5-40 mL Intravenous 2 times per day    heparin (porcine)  5,000 Units Subcutaneous 3 times per day    cefepime  1,000 mg Intravenous Q24H    vancomycin (VANCOCIN) intermittent dosing (placeholder)   Other RX Placeholder     PRN Meds: glucose, dextrose, glucagon (rDNA), dextrose, sodium chloride flush, sodium chloride, ondansetron **OR** ondansetron, polyethylene glycol, acetaminophen **OR** acetaminophen    Labs:     Recent Labs     06/04/21  1553 06/05/21  1016 06/06/21  0501   WBC 5.9 3.8* 3.9*   HGB 10.1* 8.8* 8.4*   HCT 28.6* 24.9* 24.6*    213 224       Recent Labs     06/04/21  1553 06/05/21  1016 06/06/21  0501   * 129* 132   K 4.0 2.8* 2.5*   CL 90* 90* 92*   CO2 22 25 24   BUN 72* 64* 60*   CREATININE 8.2* 8.3* 7.8*   CALCIUM 8.0* 7.7* 7.5*   PHOS  --  4.9* 5.0*       Recent Labs     06/04/21  1553   PROT 6.1*   ALKPHOS 111   ALT 12   AST 18   BILITOT 0.3       Recent Labs     06/04/21  1553   INR 1.0       No results for input(s): CKTOTAL, TROPONINI in the last 72 hours.     Chronic labs:    Lab Results   Component Value Date    CHOL 158 09/10/2020    TRIG 43 09/10/2020    HDL 62 09/10/2020    LDLCALC 87 09/10/2020    TSH 2.860 09/10/2020    PSA 0.56 07/10/2017    INR 1.0 06/04/2021    LABA1C 10.3 (H) 06/05/2021 Radiology: REVIEWED DAILY    +++++++++++++++++++++++++++++++++++++++++++++++++  Veronica Clark MD  [facility] - [address] [place] [address]  +++++++++++++++++++++++++++++++++++++++++++++++++  NOTE: This report was transcribed using voice recognition software. Every effort was made to ensure accuracy; however, inadvertent computerized transcription errors may be present.

## 2021-06-07 NOTE — BRIEF OP NOTE
Brief Postoperative Note      Patient: Michelle Belle  YOB: 1965  MRN: [id number]    Date of Procedure: 6/7/2021    Pre-Op Diagnosis: renal failure    Post-Op Diagnosis: Same       Procedure(s):  REMOVAL OF PERITONEAL DIALYSIS CATHETER    CATHETER INSERTION HEMODIALYSIS    Surgeon(s):  MD Billie Reid MD    Assistant:  * No surgical staff found *    Anesthesia: Monitor Anesthesia Care    Estimated Blood Loss (mL): Minimal    Complications: None    Specimens:   ID Type Source Tests Collected by Time Destination   1 : PERITONEAL DIALYSIS CATHETER TIP Catheter Tip Catheter Tip CULTURE, TIP Britney Wells MD 6/7/2021 1106        Implants:  * No implants in log *      Drains: * No LDAs found *    Findings: Failed PD removed case turned over to vascular for Tesio placement    Electronically signed by Britney Wells MD on 6/7/2021 at 11:28 AM

## 2021-06-07 NOTE — PROGRESS NOTES
Department of Internal Medicine  Nephrology Progress Note    Events Reviewed. Patient seen on dialysis. SUBJECTIVE:  We are following Mr. Viky Martinez for Peritonitis and ESRD on PD. Reports no complaints. PHYSICAL EXAM:      Vitals:    VITALS:  BP (!) 163/69   Pulse 67   Temp 97.5 °F (36.4 °C) (Oral)   Resp 18   Ht 5' 8\" (1.727 m)   Wt 221 lb 1.9 oz (100.3 kg)   SpO2 98%   BMI 33.62 kg/m²   24HR INTAKE/OUTPUT:      Intake/Output Summary (Last 24 hours) at 6/7/2021 1433  Last data filed at 6/7/2021 1140  Gross per 24 hour   Intake 400 ml   Output 878 ml   Net -478 ml      Access: Right IJ vein tunneled dialysis catheter in place  Constitutional:  Well developed and nourished,in no acute distres  HEENT:  NC/AT, PERRL, supple no JVD  Respiratory: CTA bilaterally and breath sounds equal.  Cardiovascular/Edema: Regular rate and rhythm, normal heart sounds, without pathological murmurs, ectopy, gallops, or rubs. No edema  Gastrointestinal: soft, no organomegaly , tender, bowel sounds present  Neurologic:  Alert and Ox3, non focal  Skin:  Turgor normal    Scheduled Meds:   insulin lispro  8 Units Subcutaneous TID WC    vitamin D  50,000 Units Oral Weekly    bumetanide  2 mg Oral TID WC    b complex-C-folic acid  1 capsule Oral Daily    fluticasone  2 spray Each Nostril Daily    amLODIPine  5 mg Oral Daily    carvedilol  6.25 mg Oral BID WC    insulin glargine  20 Units Subcutaneous Nightly    atorvastatin  20 mg Oral Nightly    [Held by provider] metOLazone  2.5 mg Oral Daily    montelukast  10 mg Oral Daily    vitamin E  400 Units Oral Daily    insulin lispro  0-12 Units Subcutaneous TID WC    insulin lispro  0-6 Units Subcutaneous Nightly    sodium chloride flush  5-40 mL Intravenous 2 times per day    heparin (porcine)  5,000 Units Subcutaneous 3 times per day    cefepime  1,000 mg Intravenous Q24H    vancomycin (VANCOCIN) intermittent dosing (placeholder)   Other RX Placeholder Continuous Infusions:   dextrose      sodium chloride       PRN Meds:.glucose, dextrose, glucagon (rDNA), dextrose, sodium chloride flush, sodium chloride, ondansetron **OR** ondansetron, polyethylene glycol, acetaminophen **OR** acetaminophen    DATA:    CBC with Differential:    Lab Results   Component Value Date    WBC 3.9 06/06/2021    RBC 2.93 06/06/2021    HGB 8.4 06/06/2021    HCT 24.6 06/06/2021     06/06/2021    MCV 84.0 06/06/2021    MCH 28.7 06/06/2021    MCHC 34.1 06/06/2021    RDW 14.3 06/06/2021    LYMPHOPCT 14.7 06/06/2021    MONOPCT 11.2 06/06/2021    BASOPCT 0.5 06/06/2021    MONOSABS 0.44 06/06/2021    LYMPHSABS 0.58 06/06/2021    EOSABS 0.30 06/06/2021    BASOSABS 0.02 06/06/2021     CMP:    Lab Results   Component Value Date     06/06/2021    K 2.5 06/06/2021    CL 92 06/06/2021    CO2 24 06/06/2021    BUN 60 06/06/2021    CREATININE 7.8 06/06/2021    GFRAA 9 06/06/2021    LABGLOM 7 06/06/2021    GLUCOSE 366 06/06/2021    PROT 6.1 06/04/2021    LABALBU 3.3 06/04/2021    CALCIUM 7.5 06/06/2021    BILITOT 0.3 06/04/2021    ALKPHOS 111 06/04/2021    AST 18 06/04/2021    ALT 12 06/04/2021     Magnesium:    Lab Results   Component Value Date    MG 1.7 06/06/2021     Phosphorus:    Lab Results   Component Value Date    PHOS 5.0 06/06/2021     Radiology Review:     Chest XR 6/4/21   No acute process. BRIEF SUMMARY OF INITIAL CONSULT:    Briefly Mr. Krzysztof Ewing is a 49year old male with a past medical history of ESRD on Peritoneal dialysis through [facility], CHF, Type II DM with diabetic nephropathy, HTN, Hyperlipidemia who presented to the ER on 6/4/21 for abnormal cultures from his peritoneal dialysis catheter. Patient apparently has had recent peritonitis a month ago and he was treated with vancomycin and cefepime for 3 weeks after which his symptoms improved.  However, Patient again noticed increasing abdominal pain yesterday (6/3/21), intermittent fevers and he went to dialysis unit where they merlin the peritoneal fluid which was cloudy. admits to having some chills. Patient was given vancomycin and cefepime on 6/3/21 and he went back to dialysis center yesterday ( 6/4/21) when he got another dose of vancomycin and cefepime and he was admitted here for transition to hemodialysis at least for now. So far, his outpatient cultures have not grown any particular organism. He had dialysis last night. Patient on a long-term basis would like to be on peritoneal dialysis. He is awaiting renal transplant. ER lab work revealed WBC 5.9, Hgb 10.1, Sodium 129, BUN 72, Creatinine 8.2, and cultures were again obtained in the ER. Renal was consulted for ESRD on Peritoneal Dialysis and Peritonitis. IMPRESSION/RECOMMENDATIONS:       1. ESRD on peritoneal dialysis, to temporary switch to hemodialysis. Had right IJ tunnel catheter placement today. Patient seen on dialysis. 2. Recurrent PD catheter associated peritonitis, on Cefepime and Vancomycin, status post PD catheter removal earlier today. 3. Hypokalemia, 2/2 diuretics and potassium removal with dialysis. 4. HFpEF, echo 1/8/20- EF 55%    5. HTN, on amlodipine, carvedilol, doxazosin  6. MBD of CKD, binders on hold  7.  Anemia of chronic disease, start epoetin alpha    Plan:    · HD today x4 hours  · Epoetin alpha 5000 units 3 times a week  · Glucose control   · Obtain Iron panel, Vitamin D level in am   · Continue bumex to 2 mg po tid  · Monitor potassium level  · Arrange outpatient hemodialysis

## 2021-06-07 NOTE — PROGRESS NOTES
Pharmacy Consultation Note  (Antibiotic Dosing and Monitoring)    Initial consult date: 6/4/21  Consulting physician: Odalys  Drug: Vancomycin  Indication: Peritonitis      Age/  Gender Height Weight IBW Dosing weight  kg  Allergy Information   56 y.o./male 5' 8\" (172.7 cm) 200 lb (90.7 kg)     Ideal body weight: 68.4 kg (150 lb 12.7 oz)  Adjusted ideal body weight: 81.2 kg (178 lb 14.8 oz)  77  Patient has no known allergies. Other anti-infectives Start date Stop date   Cefepime 6/4                     Date  Tmax WBC ESRD UOP Drug/Dose Time   Given Level(s)   (Time) Comments   6/4 afebrile 5.9 PD -- No Vanco -- 23.3 @ 1553    6/5 afebrile 3.8 PD -- Vancomycin 750 mg IV x 1 dose 1700 Random level 18.7     6/6 afebrile 3.9 PD -- No Vanco -- Random level 22.4    6/7 afebrile 4.1 HD X 3 HR  Vancomycin 750 mg x 1 after HD  <Random level with> am labs in process    6/8       <random AM>          Intake/Output Summary (Last 24 hours) at 6/7/2021 0745  Last data filed at 6/7/2021 0641  Gross per 24 hour   Intake 200 ml   Output --   Net 200 ml       Average urine output:    Cultures:    Site Date Result                    Recent Labs     06/04/21  1553   BLOODCULT2 24 Hours no growth        Historical Cultures:  No results found for: ORG  Recent Labs     06/04/21  1553   BC 24 Hours no growth       Radiology:      Assessment:  · Pharmacy consulted to dose vancomycin for this 49 y.o. male for peritonitis. · Goal trough = 15-20 mcg/mL. · 6/5: Patient for PD cath removal and temporary HD cath to be placed. Random level 18.1 mcg/mL  · 6/6: Patient for PD cath removal and tesio placement on 6/7. Patient to start HD on 6/7. Random level today 22.4 mcg/mL. · 6/7: HD Central Access in place, HD X 3 HR    Plan:  · Vancomycin 750 mg x 1  · Will dose vancomycin by random levels and HD schedule   · Pharmacist will follow and monitor/adjust dosing as necessary if vancomycin is to continue.     Karson Colvin, PharmD 6/7/2021 7:45 AM

## 2021-06-08 LAB
ALBUMIN SERPL-MCNC: 3 G/DL (ref 3.5–5.2)
ALP BLD-CCNC: 98 U/L (ref 40–129)
ALT SERPL-CCNC: 12 U/L (ref 0–40)
ANION GAP SERPL CALCULATED.3IONS-SCNC: 10 MMOL/L (ref 7–16)
AST SERPL-CCNC: 19 U/L (ref 0–39)
BASOPHILS ABSOLUTE: 0.03 E9/L (ref 0–0.2)
BASOPHILS RELATIVE PERCENT: 0.5 % (ref 0–2)
BILIRUB SERPL-MCNC: 0.3 MG/DL (ref 0–1.2)
BILIRUBIN DIRECT: <0.2 MG/DL (ref 0–0.3)
BILIRUBIN, INDIRECT: ABNORMAL MG/DL (ref 0–1)
BUN BLDV-MCNC: 40 MG/DL (ref 6–20)
CALCIUM SERPL-MCNC: 8.1 MG/DL (ref 8.6–10.2)
CHLORIDE BLD-SCNC: 98 MMOL/L (ref 98–107)
CO2: 28 MMOL/L (ref 22–29)
CREAT SERPL-MCNC: 4.9 MG/DL (ref 0.7–1.2)
EOSINOPHILS ABSOLUTE: 0.29 E9/L (ref 0.05–0.5)
EOSINOPHILS RELATIVE PERCENT: 4.6 % (ref 0–6)
GFR AFRICAN AMERICAN: 15
GFR NON-AFRICAN AMERICAN: 12 ML/MIN/1.73
GLUCOSE BLD-MCNC: 82 MG/DL (ref 74–99)
HAV IGM SER IA-ACNC: NORMAL
HCT VFR BLD CALC: 27.4 % (ref 37–54)
HEMOGLOBIN: 9 G/DL (ref 12.5–16.5)
HEPATITIS B CORE IGM ANTIBODY: NORMAL
HEPATITIS B SURFACE ANTIGEN INTERPRETATION: NORMAL
HEPATITIS C ANTIBODY INTERPRETATION: NORMAL
HEPATITIS INTERPRETATION:: NORMAL
IMMATURE GRANULOCYTES #: 0.02 E9/L
IMMATURE GRANULOCYTES %: 0.3 % (ref 0–5)
LACTIC ACID: 0.8 MMOL/L (ref 0.5–2.2)
LIPASE: 51 U/L (ref 13–60)
LYMPHOCYTES ABSOLUTE: 0.91 E9/L (ref 1.5–4)
LYMPHOCYTES RELATIVE PERCENT: 14.5 % (ref 20–42)
MAGNESIUM: 1.6 MG/DL (ref 1.6–2.6)
MCH RBC QN AUTO: 28.1 PG (ref 26–35)
MCHC RBC AUTO-ENTMCNC: 32.8 % (ref 32–34.5)
MCV RBC AUTO: 85.6 FL (ref 80–99.9)
METER GLUCOSE: 109 MG/DL (ref 74–99)
METER GLUCOSE: 138 MG/DL (ref 74–99)
METER GLUCOSE: 207 MG/DL (ref 74–99)
METER GLUCOSE: 94 MG/DL (ref 74–99)
MONOCYTES ABSOLUTE: 0.67 E9/L (ref 0.1–0.95)
MONOCYTES RELATIVE PERCENT: 10.7 % (ref 2–12)
NEUTROPHILS ABSOLUTE: 4.36 E9/L (ref 1.8–7.3)
NEUTROPHILS RELATIVE PERCENT: 69.4 % (ref 43–80)
PDW BLD-RTO: 14.8 FL (ref 11.5–15)
PHOSPHORUS: 3.2 MG/DL (ref 2.5–4.5)
PLATELET # BLD: 252 E9/L (ref 130–450)
PMV BLD AUTO: 10.2 FL (ref 7–12)
POTASSIUM REFLEX MAGNESIUM: 3.1 MMOL/L (ref 3.5–5)
RBC # BLD: 3.2 E12/L (ref 3.8–5.8)
SODIUM BLD-SCNC: 136 MMOL/L (ref 132–146)
TOTAL PROTEIN: 5.5 G/DL (ref 6.4–8.3)
WBC # BLD: 6.3 E9/L (ref 4.5–11.5)

## 2021-06-08 PROCEDURE — 6370000000 HC RX 637 (ALT 250 FOR IP): Performed by: INTERNAL MEDICINE

## 2021-06-08 PROCEDURE — 6370000000 HC RX 637 (ALT 250 FOR IP): Performed by: NURSE PRACTITIONER

## 2021-06-08 PROCEDURE — 83690 ASSAY OF LIPASE: CPT

## 2021-06-08 PROCEDURE — 83605 ASSAY OF LACTIC ACID: CPT

## 2021-06-08 PROCEDURE — 80048 BASIC METABOLIC PNL TOTAL CA: CPT

## 2021-06-08 PROCEDURE — 6360000002 HC RX W HCPCS: Performed by: NURSE PRACTITIONER

## 2021-06-08 PROCEDURE — 6360000002 HC RX W HCPCS: Performed by: FAMILY MEDICINE

## 2021-06-08 PROCEDURE — 6370000000 HC RX 637 (ALT 250 FOR IP): Performed by: FAMILY MEDICINE

## 2021-06-08 PROCEDURE — 6360000002 HC RX W HCPCS: Performed by: INTERNAL MEDICINE

## 2021-06-08 PROCEDURE — 2580000003 HC RX 258: Performed by: INTERNAL MEDICINE

## 2021-06-08 PROCEDURE — 1200000000 HC SEMI PRIVATE

## 2021-06-08 PROCEDURE — 36415 COLL VENOUS BLD VENIPUNCTURE: CPT

## 2021-06-08 PROCEDURE — 83735 ASSAY OF MAGNESIUM: CPT

## 2021-06-08 PROCEDURE — 84100 ASSAY OF PHOSPHORUS: CPT

## 2021-06-08 PROCEDURE — 85025 COMPLETE CBC W/AUTO DIFF WBC: CPT

## 2021-06-08 PROCEDURE — 82962 GLUCOSE BLOOD TEST: CPT

## 2021-06-08 PROCEDURE — 80076 HEPATIC FUNCTION PANEL: CPT

## 2021-06-08 RX ORDER — MAGNESIUM SULFATE 1 G/100ML
1000 INJECTION INTRAVENOUS ONCE
Status: COMPLETED | OUTPATIENT
Start: 2021-06-08 | End: 2021-06-08

## 2021-06-08 RX ORDER — SIMETHICONE 80 MG
80 TABLET,CHEWABLE ORAL EVERY 6 HOURS PRN
Status: DISCONTINUED | OUTPATIENT
Start: 2021-06-08 | End: 2021-06-09 | Stop reason: HOSPADM

## 2021-06-08 RX ORDER — POTASSIUM CHLORIDE 20 MEQ/1
20 TABLET, EXTENDED RELEASE ORAL ONCE
Status: COMPLETED | OUTPATIENT
Start: 2021-06-08 | End: 2021-06-08

## 2021-06-08 RX ORDER — PANTOPRAZOLE SODIUM 40 MG/1
40 TABLET, DELAYED RELEASE ORAL
Status: DISCONTINUED | OUTPATIENT
Start: 2021-06-08 | End: 2021-06-09 | Stop reason: HOSPADM

## 2021-06-08 RX ADMIN — HEPARIN SODIUM 5000 UNITS: 10000 INJECTION INTRAVENOUS; SUBCUTANEOUS at 22:18

## 2021-06-08 RX ADMIN — ATORVASTATIN CALCIUM 20 MG: 20 TABLET, FILM COATED ORAL at 20:35

## 2021-06-08 RX ADMIN — INSULIN GLARGINE 20 UNITS: 100 INJECTION, SOLUTION SUBCUTANEOUS at 20:36

## 2021-06-08 RX ADMIN — BUMETANIDE 2 MG: 1 TABLET ORAL at 09:10

## 2021-06-08 RX ADMIN — MORPHINE SULFATE 2 MG: 2 INJECTION, SOLUTION INTRAMUSCULAR; INTRAVENOUS at 00:43

## 2021-06-08 RX ADMIN — Medication 10 ML: at 22:28

## 2021-06-08 RX ADMIN — HEPARIN SODIUM 5000 UNITS: 10000 INJECTION INTRAVENOUS; SUBCUTANEOUS at 14:44

## 2021-06-08 RX ADMIN — POTASSIUM CHLORIDE 20 MEQ: 1500 TABLET, EXTENDED RELEASE ORAL at 12:18

## 2021-06-08 RX ADMIN — MAGNESIUM SULFATE HEPTAHYDRATE 1000 MG: 1 INJECTION, SOLUTION INTRAVENOUS at 13:34

## 2021-06-08 RX ADMIN — Medication 10 ML: at 09:11

## 2021-06-08 RX ADMIN — BUMETANIDE 2 MG: 1 TABLET ORAL at 17:43

## 2021-06-08 RX ADMIN — MONTELUKAST SODIUM 10 MG: 10 TABLET ORAL at 09:10

## 2021-06-08 RX ADMIN — AMLODIPINE BESYLATE 5 MG: 5 TABLET ORAL at 09:09

## 2021-06-08 RX ADMIN — Medication 400 UNITS: at 09:10

## 2021-06-08 RX ADMIN — HEPARIN SODIUM 5000 UNITS: 10000 INJECTION INTRAVENOUS; SUBCUTANEOUS at 05:56

## 2021-06-08 RX ADMIN — NEPHROCAP 1 MG: 1 CAP ORAL at 09:09

## 2021-06-08 RX ADMIN — MORPHINE SULFATE 2 MG: 2 INJECTION, SOLUTION INTRAMUSCULAR; INTRAVENOUS at 05:56

## 2021-06-08 RX ADMIN — INSULIN LISPRO 2 UNITS: 100 INJECTION, SOLUTION INTRAVENOUS; SUBCUTANEOUS at 20:36

## 2021-06-08 RX ADMIN — CARVEDILOL 6.25 MG: 6.25 TABLET, FILM COATED ORAL at 09:10

## 2021-06-08 RX ADMIN — MORPHINE SULFATE 2 MG: 2 INJECTION, SOLUTION INTRAMUSCULAR; INTRAVENOUS at 10:10

## 2021-06-08 RX ADMIN — MORPHINE SULFATE 2 MG: 2 INJECTION, SOLUTION INTRAMUSCULAR; INTRAVENOUS at 14:43

## 2021-06-08 RX ADMIN — BUMETANIDE 2 MG: 1 TABLET ORAL at 13:33

## 2021-06-08 RX ADMIN — INSULIN LISPRO 8 UNITS: 100 INJECTION, SOLUTION INTRAVENOUS; SUBCUTANEOUS at 18:33

## 2021-06-08 RX ADMIN — CEFEPIME HYDROCHLORIDE 1000 MG: 1 INJECTION, POWDER, FOR SOLUTION INTRAMUSCULAR; INTRAVENOUS at 18:26

## 2021-06-08 RX ADMIN — MAGNESIUM HYDROXIDE/ALUMINUM HYDROXICE/SIMETHICONE: 120; 1200; 1200 SUSPENSION ORAL at 16:59

## 2021-06-08 RX ADMIN — SIMETHICONE 80 MG: 80 TABLET, CHEWABLE ORAL at 10:17

## 2021-06-08 RX ADMIN — PANTOPRAZOLE SODIUM 40 MG: 40 TABLET, DELAYED RELEASE ORAL at 17:49

## 2021-06-08 RX ADMIN — CARVEDILOL 6.25 MG: 6.25 TABLET, FILM COATED ORAL at 17:43

## 2021-06-08 RX ADMIN — INSULIN LISPRO 8 UNITS: 100 INJECTION, SOLUTION INTRAVENOUS; SUBCUTANEOUS at 09:10

## 2021-06-08 ASSESSMENT — PAIN DESCRIPTION - LOCATION
LOCATION: ABDOMEN;BACK
LOCATION: ABDOMEN;BACK

## 2021-06-08 ASSESSMENT — PAIN SCALES - GENERAL
PAINLEVEL_OUTOF10: 7
PAINLEVEL_OUTOF10: 0
PAINLEVEL_OUTOF10: 7
PAINLEVEL_OUTOF10: 6
PAINLEVEL_OUTOF10: 7
PAINLEVEL_OUTOF10: 0
PAINLEVEL_OUTOF10: 7
PAINLEVEL_OUTOF10: 0
PAINLEVEL_OUTOF10: 6
PAINLEVEL_OUTOF10: 3

## 2021-06-08 ASSESSMENT — PAIN DESCRIPTION - PAIN TYPE
TYPE: ACUTE PAIN
TYPE: ACUTE PAIN

## 2021-06-08 ASSESSMENT — PAIN DESCRIPTION - FREQUENCY
FREQUENCY: CONTINUOUS
FREQUENCY: CONTINUOUS

## 2021-06-08 ASSESSMENT — PAIN DESCRIPTION - DESCRIPTORS
DESCRIPTORS: ACHING
DESCRIPTORS: ACHING

## 2021-06-08 NOTE — PROGRESS NOTES
Department of Internal Medicine  Nephrology Progress Note    Events reviewed. SUBJECTIVE:  We are following Mr. Angela Lehman for Peritonitis and ESRD on PD. Reports no complaints. PHYSICAL EXAM:      Vitals:    VITALS:  BP (!) 147/67   Pulse 74   Temp 98.2 °F (36.8 °C) (Oral)   Resp 18   Ht 5' 8\" (1.727 m)   Wt 219 lb 12.5 oz (99.7 kg)   SpO2 97%   BMI 33.42 kg/m²   24HR INTAKE/OUTPUT:      Intake/Output Summary (Last 24 hours) at 6/8/2021 1140  Last data filed at 6/8/2021 3356  Gross per 24 hour   Intake 1000 ml   Output 2300 ml   Net -1300 ml      Access: Right IJ vein tunneled dialysis catheter in place  Constitutional:  Well developed and nourished,in no acute distres  HEENT:  NC/AT, PERRL, supple no JVD  Respiratory: CTA bilaterally and breath sounds equal.  Cardiovascular/Edema: Regular rate and rhythm, normal heart sounds, without pathological murmurs, ectopy, gallops, or rubs. No edema  Gastrointestinal: soft, no organomegaly , tender, bowel sounds present  Neurologic:  Alert and Ox3, non focal  Skin:  Turgor normal    Scheduled Meds:   epoetin tricia-epbx  5,000 Units Subcutaneous Once per day on Mon Wed Fri    insulin lispro  8 Units Subcutaneous TID     vitamin D  50,000 Units Oral Weekly    bumetanide  2 mg Oral TID     b complex-C-folic acid  1 capsule Oral Daily    fluticasone  2 spray Each Nostril Daily    amLODIPine  5 mg Oral Daily    carvedilol  6.25 mg Oral BID WC    insulin glargine  20 Units Subcutaneous Nightly    atorvastatin  20 mg Oral Nightly    [Held by provider] metOLazone  2.5 mg Oral Daily    montelukast  10 mg Oral Daily    vitamin E  400 Units Oral Daily    insulin lispro  0-12 Units Subcutaneous TID WC    insulin lispro  0-6 Units Subcutaneous Nightly    sodium chloride flush  5-40 mL Intravenous 2 times per day    heparin (porcine)  5,000 Units Subcutaneous 3 times per day    cefepime  1,000 mg Intravenous Q24H    vancomycin (VANCOCIN) intermittent dosing (placeholder)   Other RX Placeholder     Continuous Infusions:   dextrose      sodium chloride       PRN Meds:.simethicone, morphine, glucose, dextrose, glucagon (rDNA), dextrose, sodium chloride flush, sodium chloride, ondansetron **OR** ondansetron, polyethylene glycol, acetaminophen **OR** acetaminophen    DATA:    CBC with Differential:    Lab Results   Component Value Date    WBC 6.3 06/08/2021    RBC 3.20 06/08/2021    HGB 9.0 06/08/2021    HCT 27.4 06/08/2021     06/08/2021    MCV 85.6 06/08/2021    MCH 28.1 06/08/2021    MCHC 32.8 06/08/2021    RDW 14.8 06/08/2021    LYMPHOPCT 14.5 06/08/2021    MONOPCT 10.7 06/08/2021    BASOPCT 0.5 06/08/2021    MONOSABS 0.67 06/08/2021    LYMPHSABS 0.91 06/08/2021    EOSABS 0.29 06/08/2021    BASOSABS 0.03 06/08/2021     CMP:    Lab Results   Component Value Date     06/08/2021    K 3.1 06/08/2021    CL 98 06/08/2021    CO2 28 06/08/2021    BUN 40 06/08/2021    CREATININE 4.9 06/08/2021    GFRAA 15 06/08/2021    LABGLOM 12 06/08/2021    GLUCOSE 82 06/08/2021    PROT 6.1 06/04/2021    LABALBU 3.3 06/04/2021    CALCIUM 8.1 06/08/2021    BILITOT 0.3 06/04/2021    ALKPHOS 111 06/04/2021    AST 18 06/04/2021    ALT 12 06/04/2021     Magnesium:    Lab Results   Component Value Date    MG 1.6 06/08/2021     Phosphorus:    Lab Results   Component Value Date    PHOS 3.2 06/08/2021     Radiology Review:     Chest XR 6/4/21   No acute process. BRIEF SUMMARY OF INITIAL CONSULT:    Briefly Mr. Edinson Villalobos is a 49year old male with a past medical history of ESRD on Peritoneal dialysis through [facility], CHF, Type II DM with diabetic nephropathy, HTN, Hyperlipidemia who presented to the ER on 6/4/21 for abnormal cultures from his peritoneal dialysis catheter. Patient apparently has had recent peritonitis a month ago and he was treated with vancomycin and cefepime for 3 weeks after which his symptoms improved.  However, Patient again noticed increasing abdominal pain yesterday (6/3/21), intermittent fevers and he went to dialysis unit where they merlin the peritoneal fluid which was cloudy. admits to having some chills. Patient was given vancomycin and cefepime on 6/3/21 and he went back to dialysis center yesterday ( 6/4/21) when he got another dose of vancomycin and cefepime and he was admitted here for transition to hemodialysis at least for now. So far, his outpatient cultures have not grown any particular organism. He had dialysis last night. Patient on a long-term basis would like to be on peritoneal dialysis. He is awaiting renal transplant. ER lab work revealed WBC 5.9, Hgb 10.1, Sodium 129, BUN 72, Creatinine 8.2, and cultures were again obtained in the ER. Renal was consulted for ESRD on Peritoneal Dialysis and Peritonitis. IMPRESSION/RECOMMENDATIONS:       1. ESRD on peritoneal dialysis, to temporary switch to hemodialysis. Had right IJ tunnel catheter placement 6/7.   2. Recurrent PD catheter associated peritonitis, on cefepime and vancomycin, status post PD catheter removal.  3. Hypokalemia, secondary diuretics, potassium removal with dialysis, and hypomagnesemia. 4. Hypomagnesemia, secondary to diuretics. 5. Vitamin D deficiency, vitamin D 25 level 12, on ergocalciferol  6. HFpEF 55%, on carvedilol and Bumex  7. HTN, on amlodipine, carvedilol, doxazosin  8. MBD of CKD, binders on hold  9.  Anemia of chronic disease, on epoetin alpha    Plan:    · Continue epoetin alpha 5000 units 3 times a week  · Continue ergocalciferol 50,000 units PO weekly  · Continue bumex 2 mg PO TID  · Continue to monitor potassium level  · Replace potassium and magnesium   · Arrange outpatient hemodialysis  · Discharge planning    Electronically signed by DONOVAN Manuel CNP on 6/8/2021 at 11:49 AM

## 2021-06-08 NOTE — CARE COORDINATION
ID wrote scripts for antibiotics. Faxed scripts to [phone].    Serjio Tate, L.S.W.  [phone]

## 2021-06-08 NOTE — DISCHARGE SUMMARY
Hospitalist Discharge Summary    Patient ID: Fernando Whalen   Patient : 1965  Patient's PCP: Elbert De Dios DO    Admit Date: 2021   Admitting Physician: Martin Garcia MD    Discharge Date:  2021   Discharge Physician: Tomer Smith MD   Discharge Condition: Stable  Discharge Disposition: [facility] course in brief:  (Please refer to daily progress notes for a comprehensive review of the hospitalization by requesting medical records)    Briefly this is a 80-year-old male with end-stage renal disease on peritoneal dialysis who was admitted for concerns of peritonitis secondary to abdominal pain. Patient had vascular access placed for hemodialysis and removal of PD ports during hospitalization. IV antibiotics was managed by infectious disease. Patient is being discharged in stable condition with hemodialysis at home and antibiotics per ID recommendation    Discharge exam  BP (!) 148/51   Pulse 80   Temp 98.8 °F (37.1 °C) (Oral)   Resp 18   Ht 5' 8\" (1.727 m)   Wt 211 lb 10.3 oz (96 kg)   SpO2 98%   BMI 32.18 kg/m²     BP (!) 148/51   Pulse 80   Temp 98.8 °F (37.1 °C) (Oral)   Resp 18   Ht 5' 8\" (1.727 m)   Wt 211 lb 10.3 oz (96 kg)   SpO2 98%   BMI 32.18 kg/m²   Constitutional: oriented to person, place, and time,appears well-developed and well-nourished. HEENT:   Right Ear: External ear normal.   Left Ear: External ear normal.   Mouth/Throat: Oropharynx is clear and moist.   Eyes: Conjunctivae and EOM are normal. PERRLA. Neck: Normal range of motion. No thyromegaly present. No bruit. No JVD  Cardiovascular: Normal rate, regular rhythm, normal heart sounds. No murmurs appreciated  Pulmonary/Chest: Effort normal and breath sounds normal.  No rhonchi or rales appreciated  Abdominal: Soft. Bowel sounds are normal. Exhibits no distension. No tenderness. Musculoskeletal: Normal range of motion. Exhibits no edema.     deferred  Neurological:  is alert and oriented to person, place, and time. Motor and sensation grossly intact. Skin: Skin is warm and dry. No rashes, lesions. Psychiatric: has a normal mood and affect. Behavior is normal.       Consults:   IP CONSULT TO NEPHROLOGY  IP CONSULT TO VASCULAR SURGERY  IP CONSULT TO GENERAL SURGERY  IP CONSULT TO INTERNAL MEDICINE  IP CONSULT TO PHARMACY  IP CONSULT TO VASCULAR SURGERY  IP CONSULT TO GENERAL SURGERY  IP CONSULT TO INFECTIOUS DISEASES  IP CONSULT TO PHARMACY    Discharge Diagnoses:    PD related peritonitis  End-stage renal disease  Diabetes mellitus, uncontrolled, insulin-dependent  Hyponatremia  Chronic anemia  Hypertension    Discharge Instructions / Follow up:    Future Appointments   Date Time Provider Leon Christianson   7/7/2021  9:45 AM Pallavi Morse MD [facility]       Continued appropriate risk factor modification of blood pressure, diabetes and serum lipids will remain essential to reducing risk of future atherosclerotic development    Activity: activity as tolerated    Significant labs:  CBC:   Recent Labs     06/07/21  1425 06/08/21  0506 06/09/21  0618   WBC 4.1* 6.3 7.0   RBC 3.14* 3.20* 3.53*   HGB 9.1* 9.0* 10.3*   HCT 26.4* 27.4* 30.7*   MCV 84.1 85.6 87.0   RDW 14.6 14.8 15.0    252 279     BMP:   Recent Labs     06/07/21  1425 06/08/21  0506 06/09/21  0618    136 138   K 3.1* 3.1* 3.4*   CL 97* 98 97*   CO2 24 28 29   BUN 47* 40* 50*   CREATININE 6.2* 4.9* 5.8*   MG 2.0 1.6 1.9   PHOS 4.0 3.2 4.7*     LFT:  Recent Labs     06/08/21  1626   PROT 5.5*   ALKPHOS 98   ALT 12   AST 19   BILITOT 0.3   LIPASE 51     PT/INR: No results for input(s): INR, APTT in the last 72 hours. BNP: No results for input(s): BNP in the last 72 hours.   Hgb A1C:   Lab Results   Component Value Date    LABA1C 10.3 (H) 06/05/2021     Folate and B12:   Lab Results   Component Value Date    EOMVJHTT09 984 09/21/2015   ,   Lab Results   Component Value Date    FOLATE 15.0 05/28/2020     Thyroid Studies: Lab Results   Component Value Date    TSH 2.860 09/10/2020    O7MBKXW 92.16 04/24/2014    M5GDGCG 7.7 10/14/2014       Urinalysis:    Lab Results   Component Value Date    NITRU Negative 06/04/2021    WBCUA 1-3 06/04/2021    BACTERIA MANY 06/04/2021    RBCUA 2-5 06/04/2021    BLOODU SMALL 06/04/2021    SPECGRAV 1.015 06/04/2021    GLUCOSEU 500 06/04/2021       Imaging:  XR CHEST PORTABLE    Result Date: 6/7/2021  EXAMINATION: ONE XRAY VIEW OF THE CHEST 6/7/2021 6:52 pm COMPARISON: Chest radiograph June 4, 2021 HISTORY: ORDERING SYSTEM PROVIDED HISTORY: post op catheter insertion TECHNOLOGIST PROVIDED HISTORY: Reason for exam:->post op catheter insertion What reading provider will be dictating this exam?->CRC FINDINGS: Right IJ tunneled dialysis catheter terminates in the cavoatrial junction. Trachea is midline. Cardiac silhouette is enlarged but stable in size. No pneumothorax. No sizable pleural effusions. No focal consolidations. Right IJ tunneled dialysis catheter terminates in the cavoatrial junction. No pneumothorax. XR CHEST 1 VIEW    Result Date: 6/4/2021  EXAMINATION: ONE XRAY VIEW OF THE CHEST 6/4/2021 4:32 pm COMPARISON: None. HISTORY: ORDERING SYSTEM PROVIDED HISTORY: fever TECHNOLOGIST PROVIDED HISTORY: Reason for exam:->fever What reading provider will be dictating this exam?->CRC FINDINGS: The lungs are without acute focal process. There is no effusion or pneumothorax. The cardiomediastinal silhouette is without acute process. The osseous structures are without acute process. No acute process. FLUORO FOR SURGICAL PROCEDURES    Result Date: 6/7/2021  EXAMINATION: SPOT FLUOROSCOPIC IMAGES 6/7/2021 8:47 am TECHNIQUE: Fluoroscopy was provided by the radiology department for procedure. Radiologist was not present during examination. FLUOROSCOPY DOSE AND TYPE OR TIME AND EXPOSURES: 19.2 seconds. 3.36 mGy.  COMPARISON: One-view chest x-ray 06/04/2021 HISTORY: [address] HISTORY: hemodialysis cath insertion TECHNOLOGIST PROVIDED HISTORY: Reason for exam:->hemodialysis cath insertion What reading provider will be dictating this exam?->CRC Intraprocedural imaging. FINDINGS: 1 spot images of the chest were obtained. Image demonstrates interval placement of a large caliber right internal jugular central venous catheter. Intraprocedural fluoroscopic spot images as above. See separate procedure report for more information. Discharge Medications:      Medication List      START taking these medications    cefepime  infusion  Commonly known as: MAXIPIME  Compound per protocol  2 gm after each hemodialysis     FreeStyle Anabel 14 Day Sensor Misc  Use as directed     oxyCODONE-acetaminophen 2.5-325 MG per tablet  Commonly known as: Percocet  Take 1 tablet by mouth every 6 hours as needed for Pain for up to 3 days. pantoprazole 40 MG tablet  Commonly known as: Protonix  Take 1 tablet by mouth every morning (before breakfast)  Start taking on: Carlene 10, 2021     vancomycin  infusion  Commonly known as: VANCOCIN  Compound per protocol.   1000 mg on hemodialysis days        CHANGE how you take these medications    sodium bicarbonate 650 MG tablet  Take 1 tablet by mouth 3 times daily  What changed:   · when to take this  · reasons to take this        CONTINUE taking these medications    amLODIPine 5 MG tablet  Commonly known as: NORVASC  Take 1 tablet by mouth daily     APPLE CIDER VINEGAR DIET PO     bumetanide 2 MG tablet  Commonly known as: BUMEX  TAKE ONE TABLET BY MOUTH TWO TIMES A DAY     carvedilol 6.25 MG tablet  Commonly known as: COREG     Cinnamon 500 MG Caps     doxazosin 1 MG tablet  Commonly known as: CARDURA     fluticasone 50 MCG/ACT nasal spray  Commonly known as: FLONASE  2 sprays by Each Nostril route daily     * Insulin Syringe-Needle U-100 30G X 1/2\" 1 ML Misc  10 units units of Tresiba daily     * AIMSCO INS SYR .3CC/29GX0.5\" 29G X 1/2\" 0.3 ML Misc  Generic drug:

## 2021-06-08 NOTE — PROGRESS NOTES
Pharmacy Consultation Note  (Antibiotic Dosing and Monitoring)    Initial consult date: 6/4/21  Consulting physician: Odalys  Drug: Vancomycin  Indication: Peritonitis      Age/  Gender Height Weight IBW Dosing weight  kg  Allergy Information   56 y.o./male 5' 8\" (172.7 cm) 200 lb (90.7 kg)     Ideal body weight: 68.4 kg (150 lb 12.7 oz)  Adjusted ideal body weight: 80.9 kg (178 lb 6.2 oz)  77  Patient has no known allergies. Other anti-infectives Start date Stop date   Cefepime 6/4                     Date  Tmax WBC ESRD UOP Drug/Dose Time   Given Level(s)   (Time) Comments   6/4 afebrile 5.9 PD -- No Vanco -- 23.3 @ 1553    6/5 afebrile 3.8 PD -- Vancomycin 750 mg IV x 1 dose 1700 Random level 18.7     6/6 afebrile 3.9 PD -- No Vanco -- Random level 22.4    6/7 afebrile 4.1 HD X 3 HR  Vancomycin 750 mg x 1 after HD  [email or web address]    6/8 afebrile 6.3   No vancomycin      6/9       <AM random>          Intake/Output Summary (Last 24 hours) at 6/8/2021 0947  Last data filed at 6/8/2021 4839  Gross per 24 hour   Intake 1200 ml   Output 2310 ml   Net -1110 ml       Average urine output:    Cultures:    Site Date Result                    No results for input(s): Juan Nance in the last 72 hours. Historical Cultures:  Organism   Date Value Ref Range Status   06/03/2021 Micrococcus (A)  Final     No results for input(s): BC in the last 72 hours. Radiology:      Assessment:  · Pharmacy consulted to dose vancomycin for this 49 y.o. male for peritonitis. · Goal trough = 15-20 mcg/mL. · 6/5: Patient for PD cath removal and temporary HD cath to be placed. Random level 18.1 mcg/mL  · 6/6: Patient for PD cath removal and tesio placement on 6/7. Patient to start HD on 6/7. Random level today 22.4 mcg/mL.   · 6/7: HD Central Access in place, HD X 3 HR  · 6/8: MWF HD, no HD today    Plan:  · No vancomycin today, pre HD AM level 6/9  · Will dose vancomycin by random levels and HD schedule   · Pharmacist will follow and monitor/adjust dosing as necessary if vancomycin is to continue.     Michelle Sanz PharmD 6/8/2021 9:47 AM

## 2021-06-08 NOTE — PROGRESS NOTES
DAILY    Infusion Medications    dextrose      sodium chloride       Scheduled Medications    epoetin tricia-epbx  5,000 Units Subcutaneous Once per day on Mon Wed Fri    insulin lispro  8 Units Subcutaneous TID     vitamin D  50,000 Units Oral Weekly    bumetanide  2 mg Oral TID     b complex-C-folic acid  1 capsule Oral Daily    fluticasone  2 spray Each Nostril Daily    amLODIPine  5 mg Oral Daily    carvedilol  6.25 mg Oral BID     insulin glargine  20 Units Subcutaneous Nightly    atorvastatin  20 mg Oral Nightly    [Held by provider] metOLazone  2.5 mg Oral Daily    montelukast  10 mg Oral Daily    vitamin E  400 Units Oral Daily    insulin lispro  0-12 Units Subcutaneous TID     insulin lispro  0-6 Units Subcutaneous Nightly    sodium chloride flush  5-40 mL Intravenous 2 times per day    heparin (porcine)  5,000 Units Subcutaneous 3 times per day    cefepime  1,000 mg Intravenous Q24H    vancomycin (VANCOCIN) intermittent dosing (placeholder)   Other RX Placeholder     PRN Meds: morphine, glucose, dextrose, glucagon (rDNA), dextrose, sodium chloride flush, sodium chloride, ondansetron **OR** ondansetron, polyethylene glycol, acetaminophen **OR** acetaminophen    Labs:     Recent Labs     06/06/21  0501 06/07/21  1425 06/08/21  0506   WBC 3.9* 4.1* 6.3   HGB 8.4* 9.1* 9.0*   HCT 24.6* 26.4* 27.4*    264 252       Recent Labs     06/06/21  0501 06/07/21  1425 06/08/21  0506    139 136   K 2.5* 3.1* 3.1*   CL 92* 97* 98   CO2 24 24 28   BUN 60* 47* 40*   CREATININE 7.8* 6.2* 4.9*   CALCIUM 7.5* 7.9* 8.1*   PHOS 5.0* 4.0 3.2       No results for input(s): PROT, ALB, ALKPHOS, ALT, AST, BILITOT, AMYLASE, LIPASE in the last 72 hours. No results for input(s): INR in the last 72 hours. No results for input(s): Cherene Alegria in the last 72 hours.     Chronic labs:    Lab Results   Component Value Date    CHOL 158 09/10/2020    TRIG 43 09/10/2020    HDL 62 09/10/2020 LDLCALC 87 09/10/2020    TSH 2.860 09/10/2020    PSA 0.56 07/10/2017    INR 1.0 06/04/2021    LABA1C 10.3 (H) 06/05/2021       Radiology: REVIEWED DAILY    +++++++++++++++++++++++++++++++++++++++++++++++++  MD Shaheen Conley Physician - [address], [place]  +++++++++++++++++++++++++++++++++++++++++++++++++  NOTE: This report was transcribed using voice recognition software. Every effort was made to ensure accuracy; however, inadvertent computerized transcription errors may be present.

## 2021-06-08 NOTE — PROGRESS NOTES
[address] [place] [address] [facility]  [facility]  Progress Note    CC: abdominal pain   Face to face encounter   SUBJECTIVE:  Patient is tolerating medications. No reported adverse drug reactions. ROS: No nausea, vomiting, diarrhea. No rash. No fevers. Laying in bed. Abdomen tender    Wife at bedside- upset about medications are different from his home meds.     Medications:  Scheduled Meds:   magnesium sulfate  1,000 mg Intravenous Once    epoetin tricia-epbx  5,000 Units Subcutaneous Once per day on Mon Wed Fri    insulin lispro  8 Units Subcutaneous TID     vitamin D  50,000 Units Oral Weekly    bumetanide  2 mg Oral TID     b complex-C-folic acid  1 capsule Oral Daily    fluticasone  2 spray Each Nostril Daily    amLODIPine  5 mg Oral Daily    carvedilol  6.25 mg Oral BID     insulin glargine  20 Units Subcutaneous Nightly    atorvastatin  20 mg Oral Nightly    [Held by provider] metOLazone  2.5 mg Oral Daily    montelukast  10 mg Oral Daily    vitamin E  400 Units Oral Daily    insulin lispro  0-12 Units Subcutaneous TID     insulin lispro  0-6 Units Subcutaneous Nightly    sodium chloride flush  5-40 mL Intravenous 2 times per day    heparin (porcine)  5,000 Units Subcutaneous 3 times per day    cefepime  1,000 mg Intravenous Q24H    vancomycin (VANCOCIN) intermittent dosing (placeholder)   Other RX Placeholder     Continuous Infusions:   dextrose      sodium chloride       PRN Meds:simethicone, morphine, glucose, dextrose, glucagon (rDNA), dextrose, sodium chloride flush, sodium chloride, ondansetron **OR** ondansetron, polyethylene glycol, acetaminophen **OR** acetaminophen  OBJECTIVE:  Patient Vitals for the past 24 hrs:   BP Temp Temp src Pulse Resp SpO2 Weight   06/08/21 0815 (!) 147/67 98.2 °F (36.8 °C) Oral 74 18 97 % --   06/08/21 0258 -- -- -- -- -- -- 219 lb 12.5 oz (99.7 kg)   06/07/21 2115 (!) 147/67 98.5 °F (36.9 °C) Temporal 74 18 97 % --   06/07/21 1800 (!) 06/08/2021    CREATININE 4.9 06/08/2021    GFRAA 15 06/08/2021    LABGLOM 12 06/08/2021    GLUCOSE 82 06/08/2021    PROT 6.1 06/04/2021    LABALBU 3.3 06/04/2021    CALCIUM 8.1 06/08/2021    BILITOT 0.3 06/04/2021    ALKPHOS 111 06/04/2021    AST 18 06/04/2021    ALT 12 06/04/2021     Radiology:  Reviewed     Microbiology: Body fluid culture- no growth  Urine culture- no growth   Blood culture- no growth   Pd cath tip NGTD    ASSESSMENT:  · Peritonitis   · Abdominal pain  · ESRD  · Leukopenia     PLAN:  · Continue vancomycin 1 gm and cefepime 2g after HD for 2 more weeks for discharge   · Pharmacy dosing   · Check cultures  · Cultures neg at this time because of previous atb before cultures taken  · Monitor labs    Noel Strickland, APRN - CNP  1:44 PM  6/8/2021   Pt seen and examined. Above discussed agree with advanced practice nurse. Labs, cultures, and radiographs reviewed. Face to Face encounter occurred. Changes made as necessary.      Mehreen Melendez MD

## 2021-06-08 NOTE — PROGRESS NOTES
0815-Assessment complete and charted patient awake in bed with right chest dialysis cath in place. RLQ incision PÉREZ with surgical glue in place. 1010-Patient medicated for abdominal and back pain.

## 2021-06-08 NOTE — PLAN OF CARE
Problem: Pain:  Description: Pain management should include both nonpharmacologic and pharmacologic interventions. Goal: Pain level will decrease  Description: Pain level will decrease  Outcome: Ongoing     Problem: Pain:  Description: Pain management should include both nonpharmacologic and pharmacologic interventions. Goal: Control of acute pain  Description: Control of acute pain  Outcome: Ongoing     Problem: Pain:  Description: Pain management should include both nonpharmacologic and pharmacologic interventions.   Goal: Control of chronic pain  Description: Control of chronic pain  Outcome: Ongoing     Problem: Falls - Risk of:  Goal: Will remain free from falls  Description: Will remain free from falls  Outcome: Ongoing     Problem: Falls - Risk of:  Goal: Absence of physical injury  Description: Absence of physical injury  Outcome: Ongoing

## 2021-06-08 NOTE — CARE COORDINATION
Dr. Savanah Francois office said Pt should go to [facility] since Pt will be doing home Dialysis. Called [facility] ([phone]). Dr. Thomson called office yesterday to notify of Pt changing from Peritoneal to home hemodialysis. [facility] needs final  ID plan for IV antibiotics. Pt has a discharge order in. Needs to know if Pt is returning to work tomorrow. Will gather information and call back with requested information.    Peterson Reed, L.S.W.  [phone]

## 2021-06-09 VITALS
DIASTOLIC BLOOD PRESSURE: 51 MMHG | HEART RATE: 80 BPM | OXYGEN SATURATION: 98 % | BODY MASS INDEX: 32.08 KG/M2 | TEMPERATURE: 98.8 F | SYSTOLIC BLOOD PRESSURE: 148 MMHG | HEIGHT: 68 IN | RESPIRATION RATE: 18 BRPM | WEIGHT: 211.64 LBS

## 2021-06-09 LAB
ANION GAP SERPL CALCULATED.3IONS-SCNC: 12 MMOL/L (ref 7–16)
BASOPHILS ABSOLUTE: 0.03 E9/L (ref 0–0.2)
BASOPHILS RELATIVE PERCENT: 0.4 % (ref 0–2)
BLOOD CULTURE, ROUTINE: NORMAL
BUN BLDV-MCNC: 50 MG/DL (ref 6–20)
CALCIUM SERPL-MCNC: 8.8 MG/DL (ref 8.6–10.2)
CHLORIDE BLD-SCNC: 97 MMOL/L (ref 98–107)
CO2: 29 MMOL/L (ref 22–29)
CREAT SERPL-MCNC: 5.8 MG/DL (ref 0.7–1.2)
CULTURE, BLOOD 2: NORMAL
EOSINOPHILS ABSOLUTE: 0.26 E9/L (ref 0.05–0.5)
EOSINOPHILS RELATIVE PERCENT: 3.7 % (ref 0–6)
GFR AFRICAN AMERICAN: 12
GFR NON-AFRICAN AMERICAN: 10 ML/MIN/1.73
GLUCOSE BLD-MCNC: 89 MG/DL (ref 74–99)
HCT VFR BLD CALC: 30.7 % (ref 37–54)
HEMOGLOBIN: 10.3 G/DL (ref 12.5–16.5)
IMMATURE GRANULOCYTES #: 0.04 E9/L
IMMATURE GRANULOCYTES %: 0.6 % (ref 0–5)
LYMPHOCYTES ABSOLUTE: 1.12 E9/L (ref 1.5–4)
LYMPHOCYTES RELATIVE PERCENT: 16 % (ref 20–42)
MAGNESIUM: 1.9 MG/DL (ref 1.6–2.6)
MCH RBC QN AUTO: 29.2 PG (ref 26–35)
MCHC RBC AUTO-ENTMCNC: 33.6 % (ref 32–34.5)
MCV RBC AUTO: 87 FL (ref 80–99.9)
METER GLUCOSE: 109 MG/DL (ref 74–99)
METER GLUCOSE: 95 MG/DL (ref 74–99)
MONOCYTES ABSOLUTE: 0.7 E9/L (ref 0.1–0.95)
MONOCYTES RELATIVE PERCENT: 10 % (ref 2–12)
NEUTROPHILS ABSOLUTE: 4.84 E9/L (ref 1.8–7.3)
NEUTROPHILS RELATIVE PERCENT: 69.3 % (ref 43–80)
PDW BLD-RTO: 15 FL (ref 11.5–15)
PHOSPHORUS: 4.7 MG/DL (ref 2.5–4.5)
PLATELET # BLD: 279 E9/L (ref 130–450)
PMV BLD AUTO: 10.2 FL (ref 7–12)
POTASSIUM REFLEX MAGNESIUM: 3.4 MMOL/L (ref 3.5–5)
RBC # BLD: 3.53 E12/L (ref 3.8–5.8)
SODIUM BLD-SCNC: 138 MMOL/L (ref 132–146)
VANCOMYCIN RANDOM: 19.9 MCG/ML (ref 5–40)
WBC # BLD: 7 E9/L (ref 4.5–11.5)

## 2021-06-09 PROCEDURE — 6370000000 HC RX 637 (ALT 250 FOR IP): Performed by: INTERNAL MEDICINE

## 2021-06-09 PROCEDURE — 85025 COMPLETE CBC W/AUTO DIFF WBC: CPT

## 2021-06-09 PROCEDURE — 83735 ASSAY OF MAGNESIUM: CPT

## 2021-06-09 PROCEDURE — 6360000002 HC RX W HCPCS: Performed by: INTERNAL MEDICINE

## 2021-06-09 PROCEDURE — 36415 COLL VENOUS BLD VENIPUNCTURE: CPT

## 2021-06-09 PROCEDURE — 90935 HEMODIALYSIS ONE EVALUATION: CPT

## 2021-06-09 PROCEDURE — 84100 ASSAY OF PHOSPHORUS: CPT

## 2021-06-09 PROCEDURE — 80048 BASIC METABOLIC PNL TOTAL CA: CPT

## 2021-06-09 PROCEDURE — 2580000003 HC RX 258: Performed by: INTERNAL MEDICINE

## 2021-06-09 PROCEDURE — 80202 ASSAY OF VANCOMYCIN: CPT

## 2021-06-09 PROCEDURE — 82962 GLUCOSE BLOOD TEST: CPT

## 2021-06-09 PROCEDURE — 6370000000 HC RX 637 (ALT 250 FOR IP): Performed by: FAMILY MEDICINE

## 2021-06-09 RX ORDER — PANTOPRAZOLE SODIUM 40 MG/1
40 TABLET, DELAYED RELEASE ORAL
Qty: 30 TABLET | Refills: 0 | Status: SHIPPED | OUTPATIENT
Start: 2021-06-10

## 2021-06-09 RX ORDER — OXYCODONE AND ACETAMINOPHEN 2.5; 325 MG/1; MG/1
1 TABLET ORAL EVERY 6 HOURS PRN
Qty: 12 TABLET | Refills: 0 | Status: SHIPPED | OUTPATIENT
Start: 2021-06-09 | End: 2021-06-12

## 2021-06-09 RX ADMIN — HEPARIN SODIUM 5000 UNITS: 10000 INJECTION INTRAVENOUS; SUBCUTANEOUS at 13:04

## 2021-06-09 RX ADMIN — NEPHROCAP 1 MG: 1 CAP ORAL at 10:56

## 2021-06-09 RX ADMIN — INSULIN LISPRO 8 UNITS: 100 INJECTION, SOLUTION INTRAVENOUS; SUBCUTANEOUS at 12:33

## 2021-06-09 RX ADMIN — EPOETIN ALFA-EPBX 5000 UNITS: 10000 INJECTION, SOLUTION INTRAVENOUS; SUBCUTANEOUS at 10:56

## 2021-06-09 RX ADMIN — BUMETANIDE 2 MG: 1 TABLET ORAL at 10:56

## 2021-06-09 RX ADMIN — PANTOPRAZOLE SODIUM 40 MG: 40 TABLET, DELAYED RELEASE ORAL at 06:00

## 2021-06-09 RX ADMIN — CEFEPIME HYDROCHLORIDE 1000 MG: 1 INJECTION, POWDER, FOR SOLUTION INTRAMUSCULAR; INTRAVENOUS at 15:58

## 2021-06-09 RX ADMIN — VANCOMYCIN HYDROCHLORIDE 500 MG: 500 INJECTION, POWDER, LYOPHILIZED, FOR SOLUTION INTRAVENOUS at 14:41

## 2021-06-09 RX ADMIN — HEPARIN SODIUM 5000 UNITS: 10000 INJECTION INTRAVENOUS; SUBCUTANEOUS at 06:01

## 2021-06-09 RX ADMIN — Medication 400 UNITS: at 10:56

## 2021-06-09 RX ADMIN — AMLODIPINE BESYLATE 5 MG: 5 TABLET ORAL at 10:56

## 2021-06-09 RX ADMIN — MONTELUKAST SODIUM 10 MG: 10 TABLET ORAL at 10:56

## 2021-06-09 ASSESSMENT — PAIN SCALES - GENERAL: PAINLEVEL_OUTOF10: 0

## 2021-06-09 NOTE — PROGRESS NOTES
Pharmacy Consultation Note  (Antibiotic Dosing and Monitoring)    Initial consult date: 6/4/21  Consulting physician: Odalys  Drug: Vancomycin  Indication: Peritonitis      Age/  Gender Height Weight IBW Dosing weight  kg  Allergy Information   56 y.o./male 5' 8\" (172.7 cm) 200 lb (90.7 kg)     Ideal body weight: 68.4 kg (150 lb 12.7 oz)  Adjusted ideal body weight: 79.9 kg (176 lb 3.1 oz)  77  Patient has no known allergies. Other anti-infectives Start date Stop date   Cefepime 6/4                     Date  Tmax WBC ESRD UOP Drug/Dose Time   Given Level(s)   (Time) Comments   6/4 afebrile 5.9 PD -- No Vanco -- 23.3 @ 1553    6/5 afebrile 3.8 PD -- Vancomycin 750 mg IV x 1 dose 1700 Random level 18.7     6/6 afebrile 3.9 PD -- No Vanco -- Random level 22.4    6/7 afebrile 4.1 HD X 3 HR  Vancomycin 750 mg x 1 after HD  [email or web address]    6/8 afebrile 6.3   No vancomycin      6/9 afebrile 7 HD X 3 HR  Vancomycin 500 mg x 1 after HD  [email or web address]          Intake/Output Summary (Last 24 hours) at 6/9/2021 0753  Last data filed at 6/9/2021 0553  Gross per 24 hour   Intake --   Output 1000 ml   Net -1000 ml       Average urine output:    Cultures:    Site Date Result                    No results for input(s): Елена Hipps in the last 72 hours. Historical Cultures:  Organism   Date Value Ref Range Status   06/03/2021 Micrococcus (A)  Final     No results for input(s): BC in the last 72 hours. Radiology:      Assessment:  · Pharmacy consulted to dose vancomycin for this 49 y.o. male for peritonitis. · Goal trough = 15-20 mcg/mL. · 6/5: Patient for PD cath removal and temporary HD cath to be placed. Random level 18.1 mcg/mL  · 6/6: Patient for PD cath removal and tesio placement on 6/7. Patient to start HD on 6/7. Random level today 22.4 mcg/mL.   · 6/7: HD Central Access in place, HD X 3 HR  · 6/8: MWF HD, no HD today  · 6/9: HD X 3 HR    Plan:  · Vancomycin 500 mg IV x 1 after HD  · Will dose vancomycin by random levels and HD schedule   · Pharmacist will follow and monitor/adjust dosing as necessary if vancomycin is to continue.     Lachelle Marin PharmEUNICE 6/9/2021 7:53 AM

## 2021-06-09 NOTE — ADT AUTH CERT
Systemic or Infectious Condition [facility] - Care Day 5 (6/8/2021) by Samuel Hernandez RN       Review Status Review Entered   Completed 6/9/2021 15:09      Criteria Review      Care Day: 5 Care Date: 6/8/2021 Level of Care: Inpatient Floor    Guideline Day 3    Level Of Care    ( ) * Activity level acceptable    Clinical Status    ( ) * Hemodynamic stability    (X) * Respiratory status acceptable    6/9/2021 3:09 PM EDT by Abby Prabhakar      RR 18    ( ) * Neurologic status acceptable    (X) * Temperature status acceptable    6/9/2021 3:09 PM EDT by Abby Livers      T 98.2    ( ) * No infection, or status acceptable    ( ) * No neutropenia, or status acceptable    ( ) * Special infection or injury situations absent    ( ) * Electrolyte status acceptable    ( ) * General Discharge Criteria met    Interventions    ( ) * Intake acceptable    ( ) * No inpatient interventions needed    6/9/2021 3:09 PM EDT by Abby Prabhakar      MAXIPIME IV    * Milestone   Additional Notes   6/8/2021   MEDICATIONS:   aluminum & magnesium hydroxide-simethicone (MAALOX) 30 mL, lidocaine viscous hcl (XYLOCAINE) 5 mL (GI COCKTAIL)    Freq: ONCE Route: PO      amLODIPine (NORVASC) tablet 5 mg    Dose: 5 mg   Freq: DAILY Route: PO      atorvastatin (LIPITOR) tablet 20 mg    Dose: 20 mg   Freq: NIGHTLY Route: PO      b complex-C-folic acid (NEPHROCAPS) capsule 1 mg    Dose: 1 capsule   Freq: DAILY Route: PO      bumetanide (BUMEX) tablet 2 mg    Dose: 2 mg   Freq: 3 TIMES DAILY WITH MEALS Route: PO      carvedilol (COREG) tablet 6.25 mg    Dose: 6.25 mg   Freq: 2 TIMES DAILY WITH MEALS Route: PO      cefepime (MAXIPIME) 1000 mg IVPB minibag    Dose: 1,000 mg   Freq: EVERY 24 HOURS Route: IV      fluticasone (FLONASE) 50 MCG/ACT nasal spray 2 spray    Dose: 2 spray   Freq: DAILY Route: EACH Nostril      heparin (porcine) injection 5,000 Units    Dose: 5,000 Units   Freq: 3 times per day Route: SC      insulin glargine (LANTUS) injection vial 20 Units Dose: 20 Units   Freq: NIGHTLY Route: SC      insulin lispro (HUMALOG) injection vial 0-12 Units    Dose: 0-12 Units   Freq: 3 TIMES DAILY WITH MEALS Route: SC      insulin lispro (HUMALOG) injection vial 0-6 Units    Dose: 0-6 Units   Freq: NIGHTLY Route: SC      insulin lispro (HUMALOG) injection vial 8 Units    Dose: 8 Units   Freq: 3 TIMES DAILY WITH MEALS Route: SC      magnesium sulfate 1000 mg in dextrose 5% 100 mL IVPB    Dose: 1,000 mg   Freq: ONCE Route: IV      montelukast (SINGULAIR) tablet 10 mg    Dose: 10 mg   Freq: DAILY Route: PO      pantoprazole (PROTONIX) tablet 40 mg    Dose: 40 mg   Freq: DAILY BEFORE BREAKFAST Route: PO      potassium chloride (KLOR-CON M) extended release tablet 20 mEq    Dose: 20 mEq   Freq: ONCE Route: PO      vitamin E capsule 400 Units    Dose: 400 Units   Freq: DAILY Route: PO      morphine (PF) injection 2 mg    Dose: 2 mg   Freq: EVERY 4 HOURS PRN Route: IV X4      simethicone (MYLICON) chewable tablet 80 mg    Dose: 80 mg   Freq: EVERY 6 HOURS PRN Route: PO X1      LABS:   6/8/2021 05:06   Potassium: 3.1 (L)   BUN: 40 (H)   Creatinine: 4.9 (H)   Calcium: 8.1 (L)   RBC: 3.20 (L)   Hemoglobin Quant: 9.0 (L)   Hematocrit: 27.4 (L)   Lymphocyte %: 14.5 (L)   Lymphocytes Absolute: 0.91 (L)      6/8/2021 12:20   Meter Glucose: 109 (H)      6/8/2021 16:26   Total Protein: 5.5 (L)   Albumin: 3.0 (L)      6/8/2021 17:45   Meter Glucose: 138 (H)      6/8/2021 20:28   Meter Glucose: 207 (H)      * * INFECTIOUS DISEASE * *    ASSESSMENT:   · Peritonitis    · Abdominal pain   · ESRD   · Leukopenia        PLAN:   · Continue vancomycin 1 gm and cefepime 2g after HD for 2 more weeks for discharge    · Pharmacy dosing    · Check cultures   · Cultures neg at this time because of previous atb before cultures taken   · Monitor labs      * * FAMILY MEDICINE * *    ASSESSMENT:       PD related peritonitis   End-stage renal disease   Diabetes mellitus, uncontrolled, insulin-dependent

## 2021-06-09 NOTE — FLOWSHEET NOTE
06/09/21 1020   Vital Signs   /62   Temp 98.4 °F (36.9 °C)   Pulse 70   Weight 211 lb 10.3 oz (96 kg)   Weight Method Bed scale   Percent Weight Change -1.23   Post-Hemodialysis Assessment   Post-Treatment Procedures Blood returned;Catheter capped, clamped and heparinized x 2 ports   Machine Disinfection Process Acid/Vinegar Clean;Heat Disinfect; Exterior Machine Disinfection   Rinseback Volume (ml) 300 ml   Total Liters Processed (l/min) 50.4 l/min   Dialyzer Clearance Lightly streaked   Duration of Treatment (minutes) 180 minutes   Heparin amount administered during treatment (units) 0 units   Hemodialysis Intake (ml) 300 ml   Hemodialysis Output (ml) 1300 ml   NET Removed (ml) 1000 ml   Tolerated Treatment Good   Patient Response to Treatment tolerated well, blood returnd cath care per policy/procedure, lines flushed, heparin instilled, ports capped

## 2021-06-09 NOTE — PROGRESS NOTES
Department of Internal Medicine  Nephrology Progress Note    Events reviewed. Seen on HD. SUBJECTIVE:  We are following Mr. Mauro Wyatt for Peritonitis and ESRD on PD. Reports no complaints. PHYSICAL EXAM:      Vitals:    VITALS:  BP (!) 166/78   Pulse 80   Temp 98.8 °F (37.1 °C) (Oral)   Resp 18   Ht 5' 8\" (1.727 m)   Wt 211 lb 10.3 oz (96 kg)   SpO2 98%   BMI 32.18 kg/m²   24HR INTAKE/OUTPUT:      Intake/Output Summary (Last 24 hours) at 6/9/2021 1152  Last data filed at 6/9/2021 1020  Gross per 24 hour   Intake 300 ml   Output 2300 ml   Net -2000 ml      Access: Right IJ vein tunneled dialysis catheter in place  Constitutional:  Well developed and nourished,in no acute distres  HEENT:  NC/AT, PERRL, supple no JVD  Respiratory: CTA bilaterally and breath sounds equal.  Cardiovascular/Edema: Regular rate and rhythm, normal heart sounds, without pathological murmurs, ectopy, gallops, or rubs. No edema  Gastrointestinal: soft, no organomegaly , tender, bowel sounds present  Neurologic:  Alert and Ox3, non focal  Skin:  Turgor normal    Scheduled Meds:   pantoprazole  40 mg Oral QAM AC    epoetin tricia-epbx  5,000 Units Subcutaneous Once per day on Mon Wed Fri    insulin lispro  8 Units Subcutaneous TID     vitamin D  50,000 Units Oral Weekly    bumetanide  2 mg Oral TID     b complex-C-folic acid  1 capsule Oral Daily    fluticasone  2 spray Each Nostril Daily    amLODIPine  5 mg Oral Daily    carvedilol  6.25 mg Oral BID     insulin glargine  20 Units Subcutaneous Nightly    atorvastatin  20 mg Oral Nightly    [Held by provider] metOLazone  2.5 mg Oral Daily    montelukast  10 mg Oral Daily    vitamin E  400 Units Oral Daily    insulin lispro  0-12 Units Subcutaneous TID WC    insulin lispro  0-6 Units Subcutaneous Nightly    sodium chloride flush  5-40 mL Intravenous 2 times per day    heparin (porcine)  5,000 Units Subcutaneous 3 times per day    cefepime  1,000 mg Intravenous Q24H    vancomycin (VANCOCIN) intermittent dosing (placeholder)   Other RX Placeholder     Continuous Infusions:   dextrose      sodium chloride       PRN Meds:.simethicone, morphine, glucose, dextrose, glucagon (rDNA), dextrose, sodium chloride flush, sodium chloride, ondansetron **OR** ondansetron, polyethylene glycol, acetaminophen **OR** acetaminophen    DATA:    CBC with Differential:    Lab Results   Component Value Date    WBC 7.0 06/09/2021    RBC 3.53 06/09/2021    HGB 10.3 06/09/2021    HCT 30.7 06/09/2021     06/09/2021    MCV 87.0 06/09/2021    MCH 29.2 06/09/2021    MCHC 33.6 06/09/2021    RDW 15.0 06/09/2021    LYMPHOPCT 16.0 06/09/2021    MONOPCT 10.0 06/09/2021    BASOPCT 0.4 06/09/2021    MONOSABS 0.70 06/09/2021    LYMPHSABS 1.12 06/09/2021    EOSABS 0.26 06/09/2021    BASOSABS 0.03 06/09/2021     CMP:    Lab Results   Component Value Date     06/09/2021    K 3.4 06/09/2021    CL 97 06/09/2021    CO2 29 06/09/2021    BUN 50 06/09/2021    CREATININE 5.8 06/09/2021    GFRAA 12 06/09/2021    LABGLOM 10 06/09/2021    GLUCOSE 89 06/09/2021    PROT 5.5 06/08/2021    LABALBU 3.0 06/08/2021    CALCIUM 8.8 06/09/2021    BILITOT 0.3 06/08/2021    ALKPHOS 98 06/08/2021    AST 19 06/08/2021    ALT 12 06/08/2021     Magnesium:    Lab Results   Component Value Date    MG 1.9 06/09/2021     Phosphorus:    Lab Results   Component Value Date    PHOS 4.7 06/09/2021     Radiology Review:     Chest XR 6/4/21   No acute process. BRIEF SUMMARY OF INITIAL CONSULT:    Briefly Mr. Denise Chamberlain is a 49year old male with a past medical history of ESRD on Peritoneal dialysis through [facility], CHF, Type II DM with diabetic nephropathy, HTN, Hyperlipidemia who presented to the ER on 6/4/21 for abnormal cultures from his peritoneal dialysis catheter.  Patient apparently has had recent peritonitis a month ago and he was treated with vancomycin and cefepime for 3 weeks after which his symptoms improved. However, Patient again noticed increasing abdominal pain yesterday (6/3/21), intermittent fevers and he went to dialysis unit where they merlin the peritoneal fluid which was cloudy. admits to having some chills. Patient was given vancomycin and cefepime on 6/3/21 and he went back to dialysis center yesterday ( 6/4/21) when he got another dose of vancomycin and cefepime and he was admitted here for transition to hemodialysis at least for now. So far, his outpatient cultures have not grown any particular organism. He had dialysis last night. Patient on a long-term basis would like to be on peritoneal dialysis. He is awaiting renal transplant. ER lab work revealed WBC 5.9, Hgb 10.1, Sodium 129, BUN 72, Creatinine 8.2, and cultures were again obtained in the ER. Renal was consulted for ESRD on Peritoneal Dialysis and Peritonitis. IMPRESSION/RECOMMENDATIONS:       1. ESRD on peritoneal dialysis, to temporary switch to hemodialysis. Had right IJ tunneled catheter placement 6/7. Seen on HD, tolerating well. 2. Recurrent PD catheter associated peritonitis, on cefepime and vancomycin, status post PD catheter removal.  3. Hypokalemia, secondary diuretics, potassium removal with dialysis, and hypomagnesemia. 4. Hypomagnesemia, secondary to diuretics. 5. Vitamin D deficiency, vitamin D 25 level 12, on ergocalciferol  6. HFpEF 55%, on carvedilol and Bumex  7. HTN, on amlodipine, carvedilol, doxazosin  8. MBD of CKD, binders on hold  9.  Anemia of chronic disease, on epoetin alpha    Plan:    · Continue epoetin alpha 5000 units 3 times a week  · Continue ergocalciferol 50,000 units PO weekly  · Continue bumex 2 mg PO TID  · Continue to monitor potassium level  · Outpatient hemodialysis set up   · Discharge planning    Electronically signed by DONOVAN Lisa CNP on 6/9/2021 at 11:56 AM

## 2021-06-09 NOTE — PLAN OF CARE
Problem: Falls - Risk of:  Goal: Will remain free from falls  Description: Will remain free from falls  6/9/2021 1128 by Renny Hairston RN  Outcome: Met This Shift     Problem: Falls - Risk of:  Goal: Absence of physical injury  Description: Absence of physical injury  6/9/2021 1128 by Renny Hairston RN  Outcome: Met This Shift

## 2021-06-10 ENCOUNTER — TELEPHONE (OUTPATIENT)
Dept: FAMILY MEDICINE CLINIC | Age: 56
End: 2021-06-10

## 2021-06-10 LAB — CULTURE CATHETER TIP: NORMAL

## 2021-06-10 NOTE — TELEPHONE ENCOUNTER
[facility] Transitions Initial Follow Up Call    Outreach made within 2 business days of discharge: Yes    Patient: Edelmira Mejia Patient : 1965   MRN: <[id number]>  Reason for Admission: There are no discharge diagnoses documented for the most recent discharge. Discharge Date: 21       Spoke with: Suzie Brown    Discharge department/facility: Home     TCM Interactive Patient Contact:  Was patient able to fill all prescriptions: No: Pharmacy does not gilberto dose that low - patient was told message sent to doctor. Was patient instructed to bring all medications to the follow-up visit: Yes  Is patient taking all medications as directed in the discharge summary? Unable to get pain medication  Does patient understand their discharge instructions: Yes  Does patient have questions or concerns that need addressed prior to 7-14 day follow up office visit: no    Unable to schedule within time frame due to Dr. Earlene Gallego availability. Message sent to office staff.      Follow Up  Future Appointments   Date Time Provider Leon Christianson   2021  9:45 AM Wilfredo Nance MD [facility]/MED [address] [place] [address],[address]

## 2021-06-15 NOTE — TELEPHONE ENCOUNTER
He will need to follow up with who wrote for the medication. This will not be on that we write.  If he needs pain management we can set up

## 2021-07-06 ENCOUNTER — TELEPHONE (OUTPATIENT)
Dept: VASCULAR SURGERY | Age: 56
End: 2021-07-06

## 2021-07-07 ENCOUNTER — OFFICE VISIT (OUTPATIENT)
Dept: VASCULAR SURGERY | Age: 56
End: 2021-07-07
Payer: COMMERCIAL

## 2021-07-07 VITALS
DIASTOLIC BLOOD PRESSURE: 70 MMHG | HEIGHT: 68 IN | BODY MASS INDEX: 30.31 KG/M2 | WEIGHT: 200 LBS | SYSTOLIC BLOOD PRESSURE: 120 MMHG

## 2021-07-07 DIAGNOSIS — N18.6 STAGE 5 CHRONIC KIDNEY DISEASE ON CHRONIC DIALYSIS (HCC): Primary | ICD-10-CM

## 2021-07-07 DIAGNOSIS — Z99.2 STAGE 5 CHRONIC KIDNEY DISEASE ON CHRONIC DIALYSIS (HCC): Primary | ICD-10-CM

## 2021-07-07 PROCEDURE — 3017F COLORECTAL CA SCREEN DOC REV: CPT | Performed by: SURGERY

## 2021-07-07 PROCEDURE — 99213 OFFICE O/P EST LOW 20 MIN: CPT | Performed by: SURGERY

## 2021-07-07 PROCEDURE — G8417 CALC BMI ABV UP PARAM F/U: HCPCS | Performed by: SURGERY

## 2021-07-07 PROCEDURE — 1036F TOBACCO NON-USER: CPT | Performed by: SURGERY

## 2021-07-07 PROCEDURE — 1111F DSCHRG MED/CURRENT MED MERGE: CPT | Performed by: SURGERY

## 2021-07-07 PROCEDURE — G8427 DOCREV CUR MEDS BY ELIG CLIN: HCPCS | Performed by: SURGERY

## 2021-07-07 NOTE — PROGRESS NOTES
Vascular Surgery Outpatient Progress Note      Chief Complaint   Patient presents with    Pre-op Exam     cath       HISTORY OF PRESENT ILLNESS:                The patient is a 49 y.o. male who returns for follow-up evaluation of end-stage renal disease. He had his PD catheter removed. He has a tunneled catheter. Denies any chest pain shortness of breath or discomfort. He Bill has really no abdominal pain or discomfort. Denies any fever or chills. He is dialyzing successfully through his right tunneled catheter. He presents with no vein mapping    Past Medical History:        Diagnosis Date    Congestive heart disease ([address] 6/5/2020    Diabetes mellitus ([facility] [address]     Hyperlipidemia     Hypertension     Type 2 diabetes mellitus with diabetic nephropathy, with long-term current use of insulin ([facility] [address] 1/22/2020     Past Surgical History:        Procedure Laterality Date    CATHETER REMOVAL N/A 6/7/2021    REMOVAL OF PERITONEAL DIALYSIS CATHETER   performed by Naima De Santiago MD at [address] [id number] [address] N/A 11/30/2020    CATHETER INSERTION PERITONEAL DIALYSIS LAPAROSCOPIC performed by Naima De Santiago MD at [address] [place] [address] 6/7/2021    CATHETER INSERTION HEMODIALYSIS performed by Nilsa Godoy MD at [address] [place] [address]     Current Medications:   Prior to Admission medications    Medication Sig Start Date End Date Taking?  Authorizing Provider   pantoprazole (PROTONIX) 40 MG tablet Take 1 tablet by mouth every morning (before breakfast) 6/10/21  Yes Azar Leone MD   Continuous Blood Gluc Sensor (FREESTYLE RUBI 14 DAY SENSOR) MISC Use as directed 6/5/21  Yes Azar Leone MD   carvedilol (COREG) 6.25 MG tablet Take 6.25 mg by mouth 2 times daily (with meals)   Yes Historical Provider, MD   lovastatin (MEVACOR) 40 MG tablet TAKE ONE TABLET BY MOUTH DAILY 1/11/21  Yes Jodie Brasher DO   montelukast (SINGULAIR) 10 MG tablet TAKE ONE TABLET BY MOUTH DAILY 1/11/21  Yes Evelia Alcantara DO   bumetanide (BUMEX) 2 MG tablet TAKE ONE TABLET BY MOUTH TWO TIMES A DAY 12/30/20  Yes Evelia Alcantara DO   insulin detemir (LEVEMIR) 100 UNIT/ML injection vial Inject 20 Units into the skin nightly 12/8/20  Yes Evelia Alcantara DO   sildenafil (VIAGRA) 100 MG tablet TAKE ONE TABLET BY MOUTH AS NEEDED FOR ERECTILE DYSFUNCTION 10/27/20  Yes Evelia Alcantara DO   vitamin D (ERGOCALCIFEROL) 1.25 MG (32232 UT) CAPS capsule TAKE 1 CAPSULE BY MOUTH TWICE A WEEK 9/8/20  Yes Evelia Alcantara DO   ipratropium (ATROVENT) 0.06 % nasal spray 2 sprays by Nasal route 3 times daily 9/8/20 9/8/21 Yes Evelia Alcantara DO   fluticasone Cassia Redhead) 50 MCG/ACT nasal spray 2 sprays by Each Nostril route daily 9/8/20  Yes Evelia Alcantara DO   Insulin Syringe-Needle U-100 (AIMSC[employer] INS SYR .3CC/29GX0.5\") 29G X 1/2\" 0.3 ML MISC 1 each by Does not apply route daily 8/13/20  Yes Evelia Alcantara DO   Misc Natural Products (APPLE CIDER VINEGAR DIET PO) Take by mouth   Yes Historical Provider, MD   Zinc Sulfate (ZINC 15 PO) Take by mouth   Yes Historical Provider, MD   doxazosin (CARDURA) 1 MG tablet Take 1 mg by mouth nightly  3/5/20  Yes Historical Provider, MD   amLODIPine (NORVASC) 5 MG tablet Take 1 tablet by mouth daily 1/10/20  Yes Natan Mayes DO   sodium bicarbonate 650 MG tablet Take 1 tablet by mouth 3 times daily  Patient taking differently: Take 650 mg by mouth 3 times daily as needed  1/10/20  Yes Natan Mayes DO   Acetaminophen (TYLENOL) 325 MG CAPS Take 650 mg by mouth as needed (FOR PAIN PER PT.)   Yes Historical Provider, MD   vitamin E 400 UNIT capsule Take 400 Units by mouth daily   Yes Historical Provider, MD   Insulin Syringe-Needle U-100 30G X 1/2\" 1 ML MISC 10 units units of Tresiba daily 10/22/19  Yes Ej Spaulding, DO   Cinnamon 500 MG CAPS Take 1,000 mg by mouth 2 times daily. Yes Historical Provider, MD     Allergies:  Patient has no known allergies.     Social History Socioeconomic History    Marital status:      Spouse name: Not on file    Number of children: Not on file    Years of education: Not on file    Highest education level: Not on file   Occupational History    Not on file   Tobacco Use    Smoking status: Former Smoker     Types: Cigars    Smokeless tobacco: Never Used   Vaping Use    Vaping Use: Never used   Substance and Sexual Activity    Alcohol use: Yes     Alcohol/week: 0.0 standard drinks     Comment: socially    Drug use: No    Sexual activity: Yes     Partners: Female   Other Topics Concern    Not on file   Social History Narrative    Not on file     Social Determinants of Health     Financial Resource Strain:     Difficulty of Paying Living Expenses:    Food Insecurity:     Worried About Running Out of Food in the Last Year:     [address] in the Last Year:    Transportation Needs:     Lack of Transportation (Medical):  Lack of Transportation (Non-Medical):    Physical Activity:     Days of Exercise per Week:     Minutes of Exercise per Session:    Stress:     Feeling of Stress :    Social Connections:     Frequency of Communication with Friends and Family:     Frequency of Social Gatherings with Friends and Family:     Attends Religion Services:     Active Member of Clubs or Organizations:     Attends Club or Organization Meetings:     Marital Status:    Intimate Partner Violence:     Fear of Current or Ex-Partner:     Emotionally Abused:     Physically Abused:     Sexually Abused:         Family History   Problem Relation Age of Onset    Heart Disease Mother        REVIEW OF SYSTEMS:    Constitutional: Negative for activity change, appetite change, chills, diaphoresis, fatigue, fever and unexpected weight change. HEENT: Negative for congestion, ear discharge, ear pain, hearing loss, nosebleeds, rhinorrhea, sinus pain, sore throat, tinnitus, trouble swallowing and voice change.     Eyes: Negative for photophobia, pain, discharge, redness, itching and visual disturbance. Respiratory: Negative for apnea, cough, chest tightness, shortness of breath and wheezing. Cardiovascular: Negative for chest pain, palpitations and leg swelling. Gastrointestinal: Negative for abdominal distention, abdominal pain, blood in stool, constipation, diarrhea, nausea and vomiting. Endocrine: Negative for cold intolerance, heat intolerance, polydipsia, polyphagia and polyuria. Genitourinary: Negative for decreased urine volume, difficulty urinating, dysuria, frequency, hematuria and urgency. Musculoskeletal: Negative for arthralgias, back pain, gait problem, joint swelling and neck pain. Skin: Negative for color change, pallor, rash and wound. Allergic/Immunologic: Negative for environmental allergies, food allergies and immunocompromised state. Neurological: Negative for dizziness, syncope, facial asymmetry, speech difficulty, weakness, light-headedness, numbness and headaches. Hematological: Negative for adenopathy. Does not bruise/bleed easily. Psychiatric/Behavioral: Negative for agitation, confusion, sleep disturbance and suicidal ideas. The patient is not nervous/anxious.             PHYSICAL EXAM:  Vitals:    07/07/21 0948   BP: 120/70     General Appearance: alert and oriented to person, place and time, well developed and well- nourished, in no acute distress  Skin: warm and dry, no rash or erythema  Head: normocephalic and atraumatic  Eyes: extraocular eye movements intact, conjunctivae normal  ENT: external ear and ear canal normal bilaterally, nose without deformity  Pulmonary/Chest: clear to auscultation bilaterally- no wheezes, rales or rhonchi, normal air movement, no respiratory distress  Cardiovascular: normal rate, regular rhythm, normal S1 and S2, no murmurs, no carotid bruits  Abdomen: soft, non-tender, non-distended, normal bowel sounds, no masses or organomegaly  Musculoskeletal: normal range of motion, no joint swelling, deformity or tenderness  Neurologic: no cranial nerve deficit, gait, coordination and speech normal  Extremities: Bilateral palpable brachial radial pulses. I did a bedside ultrasound the cephalic vein is fairly nicely dilated all the way down the wrist.  However his artery is small. There is some calcific disease but it is pulsatile. His ulnar is also pulsatile. His Giorgi's test is unremarkable. The cephalic vein measures a little over 0.3 mm in size    Problem List Items Addressed This Visit     Stage 5 chronic kidney disease on chronic dialysis ([facility] [address] - Primary          #1 end-stage renal disease requiring arteriovenous access. He can try to do home dialysis he is here to discuss the placement of an access in his left arm. We will get formal vein mapping. I discussed risk benefits and alternatives with him see below    Risk benefits and alternatives were discussed with the patient including but not limited to bleeding, infection, arterial venous nerve injury, arterial steal, venous hypertension, sensorimotor disturbance or loss, maintenance of the access, need for additional access, loss of the access, if an AV graft needs to be used risk of infection and/or removal, distal embolization, wound complications, limb loss and/or death the patient understands and wishes to proceed. No follow-ups on file.

## 2021-07-07 NOTE — OP NOTE
Operative Note      Patient: Prince Awad  YOB: 1965  MRN: [id number]      DATE OF PROCEDURE: 6/7/2021    SURGEON: Jaqui Paz MD     ASSISTANT: None     PREOPERATIVE DIAGNOSIS: Chronic renal failure peritoneal dialysis     POSTOPERATIVE DIAGNOSIS: Same    OPERATION: Insertion of right internal jugular vein tunneled hemodialysis catheter (52360), fluoroscopy (25944-95)     ANESTHESIA: MAC and local     ESTIMATED BLOOD LOSS: Minimal.     COMPLICATIONS: None    DESCRIPTION OF PROCEDURE: The patient was identified and the procedure was confirmed. The neck and chest were prepped and draped in the usual sterile fashion. Next, 1% lidocaine mixed with 0.25% Marcaine was used for local anesthesia. The right internal jugular vein was percutaneously entered and a guidewire was advanced into the superior vena cava under fluoroscopic guidance. A small incision was made around the wire. The catheter was pulled through a subcutaneous tunnel from an inferior chest stab incision to the neck incision. The dilators were passed over the wire followed by the introducer. The catheter was passed through the introducer and the tip was positioned in the superior vena cava right atrial junction under fluoroscopic guidance. Both lumens were noted to withdrawal and flush blood easily and were flushed with heparinized saline solution. They catheter was secured to the skin with nylon sutures and the neck incision was approximated with an interrupted Vicryl suture. Sterile dressings were applied to the incisions in the operating room. Needle, sponge, and instrument counts were reported as correct times two. The patient tolerated the procedure and was transferred to the recovery area in satisfactory condition.        Specimens:   ID Type Source Tests Collected by Time Destination   1 : PERITONEAL DIALYSIS CATHETER TIP Catheter Tip Catheter Tip CULTURE, TIP Emerson Roberson MD 6/7/2021 1106        Implants:  * No implants in log *          Electronically signed by Ned Lr MD on 7/7/2021 at 10:11 AM

## 2021-07-13 ENCOUNTER — OFFICE VISIT (OUTPATIENT)
Dept: FAMILY MEDICINE CLINIC | Age: 56
End: 2021-07-13
Payer: COMMERCIAL

## 2021-07-13 VITALS
BODY MASS INDEX: 30.92 KG/M2 | RESPIRATION RATE: 18 BRPM | DIASTOLIC BLOOD PRESSURE: 80 MMHG | SYSTOLIC BLOOD PRESSURE: 136 MMHG | HEIGHT: 68 IN | OXYGEN SATURATION: 98 % | HEART RATE: 68 BPM | WEIGHT: 204 LBS

## 2021-07-13 DIAGNOSIS — Z79.4 TYPE 2 DIABETES MELLITUS WITH DIABETIC NEPHROPATHY, WITH LONG-TERM CURRENT USE OF INSULIN (HCC): ICD-10-CM

## 2021-07-13 DIAGNOSIS — N18.6 ESRD (END STAGE RENAL DISEASE) ON DIALYSIS (HCC): ICD-10-CM

## 2021-07-13 DIAGNOSIS — E11.65 TYPE 2 DIABETES MELLITUS WITH HYPERGLYCEMIA, WITHOUT LONG-TERM CURRENT USE OF INSULIN (HCC): Primary | ICD-10-CM

## 2021-07-13 DIAGNOSIS — E11.21 TYPE 2 DIABETES MELLITUS WITH DIABETIC NEPHROPATHY, WITHOUT LONG-TERM CURRENT USE OF INSULIN (HCC): ICD-10-CM

## 2021-07-13 DIAGNOSIS — E11.21 TYPE 2 DIABETES MELLITUS WITH DIABETIC NEPHROPATHY, WITH LONG-TERM CURRENT USE OF INSULIN (HCC): ICD-10-CM

## 2021-07-13 DIAGNOSIS — Z99.2 ESRD (END STAGE RENAL DISEASE) ON DIALYSIS (HCC): ICD-10-CM

## 2021-07-13 PROCEDURE — 99214 OFFICE O/P EST MOD 30 MIN: CPT | Performed by: FAMILY MEDICINE

## 2021-07-13 PROCEDURE — 1111F DSCHRG MED/CURRENT MED MERGE: CPT | Performed by: FAMILY MEDICINE

## 2021-07-13 RX ORDER — FLASH GLUCOSE SCANNING READER
EACH MISCELLANEOUS
Qty: 2 EACH | Refills: 5 | Status: SHIPPED | OUTPATIENT
Start: 2021-07-13

## 2021-07-13 RX ORDER — PEN NEEDLE, DIABETIC 31 GX5/16"
1 NEEDLE, DISPOSABLE MISCELLANEOUS 3 TIMES DAILY
Qty: 100 EACH | Refills: 3 | Status: SHIPPED | OUTPATIENT
Start: 2021-07-13

## 2021-07-13 RX ORDER — INSULIN DETEMIR 100 [IU]/ML
40 INJECTION, SOLUTION SUBCUTANEOUS NIGHTLY
Qty: 5 PEN | Refills: 3 | Status: SHIPPED | OUTPATIENT
Start: 2021-07-13 | End: 2021-10-17 | Stop reason: SDUPTHER

## 2021-07-13 RX ORDER — FLASH GLUCOSE SENSOR
KIT MISCELLANEOUS
Qty: 2 EACH | Refills: 5 | Status: SHIPPED | OUTPATIENT
Start: 2021-07-13

## 2021-07-13 RX ORDER — INSULIN ASPART 100 [IU]/ML
10 INJECTION, SOLUTION INTRAVENOUS; SUBCUTANEOUS
Qty: 5 PEN | Refills: 5 | Status: SHIPPED | OUTPATIENT
Start: 2021-07-13 | End: 2021-10-17 | Stop reason: SDUPTHER

## 2021-07-13 ASSESSMENT — PATIENT HEALTH QUESTIONNAIRE - PHQ9
SUM OF ALL RESPONSES TO PHQ QUESTIONS 1-9: 0
SUM OF ALL RESPONSES TO PHQ9 QUESTIONS 1 & 2: 0
SUM OF ALL RESPONSES TO PHQ QUESTIONS 1-9: 0
1. LITTLE INTEREST OR PLEASURE IN DOING THINGS: 0
2. FEELING DOWN, DEPRESSED OR HOPELESS: 0
SUM OF ALL RESPONSES TO PHQ QUESTIONS 1-9: 0

## 2021-07-16 NOTE — PROGRESS NOTES
Post-Discharge Transitional Care Management Services or Hospital Follow Up      France Rutledge   YOB: 1965    Date of Office Visit:  7/13/2021  Date of Hospital Admission: 6/4/21  Date of Hospital Discharge: 6/9/21  Risk of hospital readmission (high >=14%. Medium >=10%) :Readmission Risk Score: 22      Care management risk score Rising risk (score 2-5) and Complex Care (Scores >=6): 3     Non face to face  following discharge, date last encounter closed (first attempt may have been earlier): *No documented post hospital discharge outreach found in the last 14 days    Call initiated 2 business days of discharge: *No response recorded in the last 14 days    Patient Active Problem List   Diagnosis    Type 2 diabetes mellitus without complication ([address]    Type 2 diabetes mellitus with hyperglycemia, without long-term current use of insulin ([address]    Renal insufficiency    Mixed hyperlipidemia    Benign essential HTN    Chronic kidney disease, stage IV (severe) ([address]    Anemia in CKD (chronic kidney disease)    Peritonitis ([address]    Stage 5 chronic kidney disease on chronic dialysis ([address]       No Known Allergies    Medications listed as ordered at the time of discharge from [facility] Medication Instructions KARMEN:    Printed on:07/16/21 1630   Medication Information                      Acetaminophen (TYLENOL) 325 MG CAPS  Take 650 mg by mouth as needed (FOR PAIN PER PT.)             amLODIPine (NORVASC) 5 MG tablet  Take 1 tablet by mouth daily             bumetanide (BUMEX) 2 MG tablet  TAKE ONE TABLET BY MOUTH TWO TIMES A DAY             carvedilol (COREG) 6.25 MG tablet  Take 6.25 mg by mouth 2 times daily (with meals)             Cinnamon 500 MG CAPS  Take 1,000 mg by mouth 2 times daily.              Continuous Blood Gluc  (FREESTYLE RUBI 2 READER) ARON  As directed             Continuous Blood Gluc Sensor (FREESTYLE RUBI 14 DAY SENSOR) MISC  Use as directed Continuous Blood Gluc Sensor (FREESTYLE RUBI 14 DAY SENSOR) MISC  As directed             doxazosin (CARDURA) 1 MG tablet  Take 1 mg by mouth nightly              fluticasone (FLONASE) 50 MCG/ACT nasal spray  2 sprays by Each Nostril route daily             insulin aspart (NOVOLOG FLEXPEN) 100 UNIT/ML injection pen  Inject 10 Units into the skin 3 times daily (before meals)             insulin detemir (LEVEMIR FLEXTOUCH) 100 UNIT/ML injection pen  Inject 40 Units into the skin nightly             insulin detemir (LEVEMIR) 100 UNIT/ML injection vial  Inject 20 Units into the skin nightly             Insulin Pen Needle (MEIJER PEN NEEDLES) 31G X 8 MM MISC  1 each by Does not apply route 3 times daily             Insulin Syringe-Needle U-100 (AIMSCO INS SYR .3CC/29GX0.5\") 29G X 1/2\" 0.3 ML MISC  1 each by Does not apply route daily             Insulin Syringe-Needle U-100 30G X 1/2\" 1 ML MISC  10 units units of Tresiba daily             ipratropium (ATROVENT) 0.06 % nasal spray  2 sprays by Nasal route 3 times daily             lovastatin (MEVACOR) 40 MG tablet  TAKE ONE TABLET BY MOUTH DAILY             Misc Natural Products (APPLE CIDER VINEGAR DIET PO)  Take by mouth             montelukast (SINGULAIR) 10 MG tablet  TAKE ONE TABLET BY MOUTH DAILY             pantoprazole (PROTONIX) 40 MG tablet  Take 1 tablet by mouth every morning (before breakfast)             sildenafil (VIAGRA) 100 MG tablet  TAKE ONE TABLET BY MOUTH AS NEEDED FOR ERECTILE DYSFUNCTION             sodium bicarbonate 650 MG tablet  Take 1 tablet by mouth 3 times daily             vitamin D (ERGOCALCIFEROL) 1.25 MG (55151 UT) CAPS capsule  TAKE 1 CAPSULE BY MOUTH TWICE A WEEK             vitamin E 400 UNIT capsule  Take 400 Units by mouth daily             Zinc Sulfate (ZINC 15 PO)  Take by mouth                   Medications marked \"taking\" at this time  Outpatient Medications Marked as Taking for the 7/13/21 encounter (Office Visit) with Umu Lewis, DO   Medication Sig Dispense Refill    insulin detemir (LEVEMIR FLEXTOUCH) 100 UNIT/ML injection pen Inject 40 Units into the skin nightly 5 pen 3    insulin aspart (NOVOLOG FLEXPEN) 100 UNIT/ML injection pen Inject 10 Units into the skin 3 times daily (before meals) 5 pen 5    Continuous Blood Gluc Sensor (FREESTYLE RUBI 14 DAY SENSOR) MISC As directed 2 each 5    Continuous Blood Gluc  (FREESTYLE RUBI 2 READER) ARON As directed 2 each 5    Insulin Pen Needle (MEIJER PEN NEEDLES) 31G X 8 MM MISC 1 each by Does not apply route 3 times daily 100 each 3    pantoprazole (PROTONIX) 40 MG tablet Take 1 tablet by mouth every morning (before breakfast) 30 tablet 0    Continuous Blood Gluc Sensor (FREESTYLE RUBI 14 DAY SENSOR) MISC Use as directed 2 each 0    carvedilol (COREG) 6.25 MG tablet Take 6.25 mg by mouth 2 times daily (with meals)      lovastatin (MEVACOR) 40 MG tablet TAKE ONE TABLET BY MOUTH DAILY 30 tablet 0    montelukast (SINGULAIR) 10 MG tablet TAKE ONE TABLET BY MOUTH DAILY 30 tablet 0    bumetanide (BUMEX) 2 MG tablet TAKE ONE TABLET BY MOUTH TWO TIMES A DAY 30 tablet 0    insulin detemir (LEVEMIR) 100 UNIT/ML injection vial Inject 20 Units into the skin nightly 3 vial 5    sildenafil (VIAGRA) 100 MG tablet TAKE ONE TABLET BY MOUTH AS NEEDED FOR ERECTILE DYSFUNCTION 10 tablet 0    vitamin D (ERGOCALCIFEROL) 1.25 MG (11142 UT) CAPS capsule TAKE 1 CAPSULE BY MOUTH TWICE A WEEK 27 capsule 3    ipratropium (ATROVENT) 0.06 % nasal spray 2 sprays by Nasal route 3 times daily 1 Bottle 3    fluticasone (FLONASE) 50 MCG/ACT nasal spray 2 sprays by Each Nostril route daily 3 Bottle 5    Insulin Syringe-Needle U-100 (AIMSCO INS SYR .3CC/29GX0.5\") 29G X 1/2\" 0.3 ML MISC 1 each by Does not apply route daily 100 each 3    Misc Natural Products (APPLE CIDER VINEGAR DIET PO) Take by mouth      Zinc Sulfate (ZINC 15 PO) Take by mouth      doxazosin (CARDURA) 1 MG tablet Take 1 mg by mouth nightly       amLODIPine (NORVASC) 5 MG tablet Take 1 tablet by mouth daily 30 tablet 1    sodium bicarbonate 650 MG tablet Take 1 tablet by mouth 3 times daily (Patient taking differently: Take 650 mg by mouth 3 times daily as needed ) 90 tablet 1    Acetaminophen (TYLENOL) 325 MG CAPS Take 650 mg by mouth as needed (FOR PAIN PER PT.)      vitamin E 400 UNIT capsule Take 400 Units by mouth daily      Insulin Syringe-Needle U-100 30G X 1/2\" 1 ML MISC 10 units units of Tresiba daily 100 each 11    Cinnamon 500 MG CAPS Take 1,000 mg by mouth 2 times daily. Medications patient taking as of now reconciled against medications ordered at time of hospital discharge: Yes    Chief Complaint   Patient presents with   [address] from Hospital       History of Present illness - Follow up of Hospital diagnosis(es): ESRD, diabetes    Inpatient course: Discharge summary reviewed- see chart. Interval history/Current status: improved    A comprehensive review of systems was negative except for what was noted in the HPI. Vitals:    07/13/21 1411   BP: 136/80   Site: Left Upper Arm   Position: Sitting   Pulse: 68   Resp: 18   SpO2: 98%   Weight: 204 lb (92.5 kg)   Height: 5' 8\" (1.727 m)     Body mass index is 31.02 kg/m².    Wt Readings from Last 3 Encounters:   07/13/21 204 lb (92.5 kg)   07/07/21 200 lb (90.7 kg)   06/09/21 211 lb 10.3 oz (96 kg)     BP Readings from Last 3 Encounters:   07/13/21 136/80   07/07/21 120/70   06/09/21 (!) 148/51        Physical Exam:  General Appearance: alert and oriented to person, place and time, well developed and well- nourished, in no acute distress  Skin: warm and dry, no rash or erythema  Head: normocephalic and atraumatic  Eyes: pupils equal, round, and reactive to light, extraocular eye movements intact, conjunctivae normal  ENT: tympanic membrane, external ear and ear canal normal bilaterally, nose without deformity, nasal mucosa and turbinates normal without polyps  Neck: supple and non-tender without mass, no thyromegaly or thyroid nodules, no cervical lymphadenopathy  Pulmonary/Chest: clear to auscultation bilaterally- no wheezes, rales or rhonchi, normal air movement, no respiratory distress  Cardiovascular: normal rate, regular rhythm, normal S1 and S2, no murmurs, rubs, clicks, or gallops, distal pulses intact, no carotid bruits  Abdomen: soft, non-tender, non-distended, normal bowel sounds, no masses or organomegaly  Extremities: no cyanosis, clubbing or edema  Musculoskeletal: normal range of motion, no joint swelling, deformity or tenderness  Neurologic: reflexes normal and symmetric, no cranial nerve deficit, gait, coordination and speech normal    Assessment/Plan:  1. Type 2 diabetes mellitus with hyperglycemia, without long-term current use of insulin (HCC)    - insulin detemir (LEVEMIR FLEXTOUCH) 100 UNIT/ML injection pen; Inject 40 Units into the skin nightly  Dispense: 5 pen; Refill: 3  - insulin aspart (NOVOLOG FLEXPEN) 100 UNIT/ML injection pen; Inject 10 Units into the skin 3 times daily (before meals)  Dispense: 5 pen; Refill: 5  - Continuous Blood Gluc Sensor (FREESTYLE RUBI 14 DAY SENSOR) MISC; As directed  Dispense: 2 each; Refill: 5  - Continuous Blood Gluc  (FREESTYLE RUBI 2 READER) ARON; As directed  Dispense: 2 each; Refill: 5  - Insulin Pen Needle (MEIJER PEN NEEDLES) 31G X 8 MM MISC; 1 each by Does not apply route 3 times daily  Dispense: 100 each; Refill: 3  - Comprehensive Metabolic Panel; Future  - Hemoglobin A1C; Future  - Lipid Panel; Future  - MI DISCHARGE MEDS RECONCILED W/ CURRENT OUTPATIENT MED LIST    2. ESRD (end stage renal disease) on dialysis ([facility] [address]    -  DIABETES FOOT EXAM  - Continuous Blood Gluc Sensor (FREESTYLE RUBI 14 DAY SENSOR) MISC; As directed  Dispense: 2 each; Refill: 5  - Continuous Blood Gluc  (FREESTYLE RUBI 2 READER) ARON; As directed  Dispense: 2 each;  Refill: 5  - Comprehensive Metabolic Panel; Future  - Hemoglobin A1C; Future  - Lipid Panel; Future  - PA DISCHARGE MEDS RECONCILED W/ CURRENT OUTPATIENT MED LIST    3. Type 2 diabetes mellitus with diabetic nephropathy, with long-term current use of insulin (HCC)    - insulin detemir (LEVEMIR FLEXTOUCH) 100 UNIT/ML injection pen; Inject 40 Units into the skin nightly  Dispense: 5 pen; Refill: 3  - insulin aspart (NOVOLOG FLEXPEN) 100 UNIT/ML injection pen; Inject 10 Units into the skin 3 times daily (before meals)  Dispense: 5 pen; Refill: 5  - Continuous Blood Gluc Sensor (FREESTYLE RUBI 14 DAY SENSOR) MISC; As directed  Dispense: 2 each; Refill: 5  - Continuous Blood Gluc  (FREESTYLE RUBI 2 READER) ARON; As directed  Dispense: 2 each; Refill: 5  - Comprehensive Metabolic Panel; Future  - Hemoglobin A1C; Future  - Lipid Panel; Future  - PA DISCHARGE MEDS RECONCILED W/ CURRENT OUTPATIENT MED LIST    4. Type 2 diabetes mellitus with diabetic nephropathy, without long-term current use of insulin (HCC)    - insulin detemir (LEVEMIR FLEXTOUCH) 100 UNIT/ML injection pen; Inject 40 Units into the skin nightly  Dispense: 5 pen; Refill: 3  - insulin aspart (NOVOLOG FLEXPEN) 100 UNIT/ML injection pen; Inject 10 Units into the skin 3 times daily (before meals)  Dispense: 5 pen; Refill: 5  - Continuous Blood Gluc Sensor (FREESTYLE RUBI 14 DAY SENSOR) MISC; As directed  Dispense: 2 each; Refill: 5  - Continuous Blood Gluc  (FREESTYLE RUBI 2 READER) ARON; As directed  Dispense: 2 each; Refill: 5  - Comprehensive Metabolic Panel; Future  - Hemoglobin A1C; Future  - Lipid Panel;  Future  - PA DISCHARGE MEDS RECONCILED W/ CURRENT OUTPATIENT MED LIST        Medical Decision Making: moderate complexity

## 2021-07-20 ENCOUNTER — TELEPHONE (OUTPATIENT)
Dept: FAMILY MEDICINE CLINIC | Age: 56
End: 2021-07-20

## 2021-07-20 NOTE — TELEPHONE ENCOUNTER
[facility] called regarding RX for Continuous Blood Glucose Sensor Freestyle [place] 14 Day Sensor and Continuous Blood Glucose  [facility] 2 [place])  which do not match.   Please clarify if order should be for   [facility] 14 Day Sensor (and [place]) or  [facility] 2 [place] (and Sensor)    Last seen 7/13/2021  Next appt 10/13/2021

## 2021-07-21 NOTE — TELEPHONE ENCOUNTER
Both are available and covered. Dr. Jesse Rooney ordered the [facility] 14 Day Sensor 6/5/21, but patient never picked it up.

## 2021-07-22 ENCOUNTER — TELEPHONE (OUTPATIENT)
Dept: VASCULAR SURGERY | Age: 56
End: 2021-07-22

## 2021-07-22 DIAGNOSIS — M79.89 LEFT ARM SWELLING: Primary | ICD-10-CM

## 2021-07-22 DIAGNOSIS — Z01.818 PRE-OP EVALUATION: ICD-10-CM

## 2021-08-05 ENCOUNTER — HOSPITAL ENCOUNTER (OUTPATIENT)
Dept: ULTRASOUND IMAGING | Age: 56
Discharge: HOME | End: 2021-08-07
Payer: COMMERCIAL

## 2021-08-05 DIAGNOSIS — Z01.818 PRE-OP EVALUATION: ICD-10-CM

## 2021-08-05 DIAGNOSIS — M79.89 LEFT ARM SWELLING: ICD-10-CM

## 2021-08-05 PROCEDURE — 93971 EXTREMITY STUDY: CPT | Performed by: RADIOLOGY

## 2021-08-05 PROCEDURE — 93971 EXTREMITY STUDY: CPT

## 2021-09-07 ENCOUNTER — TELEPHONE (OUTPATIENT)
Dept: VASCULAR SURGERY | Age: 56
End: 2021-09-07

## 2021-09-08 ENCOUNTER — OFFICE VISIT (OUTPATIENT)
Dept: VASCULAR SURGERY | Age: 56
End: 2021-09-08
Payer: COMMERCIAL

## 2021-09-08 VITALS — SYSTOLIC BLOOD PRESSURE: 126 MMHG | DIASTOLIC BLOOD PRESSURE: 74 MMHG

## 2021-09-08 DIAGNOSIS — N18.6 STAGE 5 CHRONIC KIDNEY DISEASE ON CHRONIC DIALYSIS (HCC): Primary | ICD-10-CM

## 2021-09-08 DIAGNOSIS — Z99.2 STAGE 5 CHRONIC KIDNEY DISEASE ON CHRONIC DIALYSIS (HCC): Primary | ICD-10-CM

## 2021-09-08 PROCEDURE — 99213 OFFICE O/P EST LOW 20 MIN: CPT | Performed by: SURGERY

## 2021-09-08 NOTE — PROGRESS NOTES
Vascular Surgery Outpatient Progress Note      Chief Complaint   Patient presents with    Circulatory Problem     Home dialysis, days vary. Patient is right handed. HISTORY OF PRESENT ILLNESS:                The patient is a 49 y.o. male who returns for follow-up evaluation of end-stage renal disease. He had his PD catheter removed. He has a tunneled catheter. Denies any chest pain shortness of breath or discomfort. He Bill has really no abdominal pain or discomfort. Denies any fever or chills. He is dialyzing successfully through his right tunneled catheter. He presents with vein mapping showing adequate left cephalic and basilic veins. Past Medical History:        Diagnosis Date    Congestive heart disease ([address] 6/5/2020    Diabetes mellitus ([facility]     Hyperlipidemia     Hypertension     Type 2 diabetes mellitus with diabetic nephropathy, with long-term current use of insulin ([facility] 1/22/2020     Past Surgical History:        Procedure Laterality Date    CATHETER REMOVAL N/A 6/7/2021    REMOVAL OF PERITONEAL DIALYSIS CATHETER   performed by Delray Dakin, MD at [address] [id number] [address] N/A 11/30/2020    CATHETER INSERTION PERITONEAL DIALYSIS LAPAROSCOPIC performed by Delray Dakin, MD at [address] [facility] 6/7/2021    CATHETER INSERTION HEMODIALYSIS performed by Kashif Gunn MD at [address]     Current Medications:   Prior to Admission medications    Medication Sig Start Date End Date Taking?  Authorizing Provider   insulin detemir (LEVEMIR FLEXTOUCH) 100 UNIT/ML injection pen Inject 40 Units into the skin nightly 7/13/21  Yes Mic Alfred, DO   insulin aspart (NOVOLOG FLEXPEN) 100 UNIT/ML injection pen Inject 10 Units into the skin 3 times daily (before meals) 7/13/21  Yes Mic Alfred, DO   Continuous Blood Gluc Sensor (FREESTYLE RUBI 14 DAY SENSOR) MISC As directed 7/13/21  Yes Mic Alfred, DO   Continuous Blood Gluc  (FREESTYLE RUBI 2 READER) ARON As directed 7/13/21  Yes Елена Shin DO   Insulin Pen Needle (MEIJER PEN NEEDLES) 31G X 8 MM MISC 1 each by Does not apply route 3 times daily 7/13/21  Yes Елена Shin DO   pantoprazole (PROTONIX) 40 MG tablet Take 1 tablet by mouth every morning (before breakfast) 6/10/21  Yes Alon Cueto MD   Continuous Blood Gluc Sensor (FREESTYLE RUBI 14 DAY SENSOR) MISC Use as directed 6/5/21  Yes Alon Cueto MD   carvedilol (COREG) 6.25 MG tablet Take 6.25 mg by mouth 2 times daily (with meals)   Yes Historical Provider, MD   lovastatin (MEVACOR) 40 MG tablet TAKE ONE TABLET BY MOUTH DAILY 1/11/21  Yes Елена Shin DO   montelukast (SINGULAIR) 10 MG tablet TAKE ONE TABLET BY MOUTH DAILY 1/11/21  Yes Елена Shin DO   bumetanide (BUMEX) 2 MG tablet TAKE ONE TABLET BY MOUTH TWO TIMES A DAY 12/30/20  Yes Елена Shin DO   insulin detemir (LEVEMIR) 100 UNIT/ML injection vial Inject 20 Units into the skin nightly 12/8/20  Yes Елена Shin DO   Insulin Syringe-Needle U-100 ([employer]IM[employer] INS SYR .3CC/29GX0.5\") 29G X 1/2\" 0.3 ML MISC 1 each by Does not apply route daily 8/13/20  Yes Елена Shin DO   Misc Natural Products (APPLE CIDER VINEGAR DIET PO) Take by mouth   Yes Historical Provider, MD   Zinc Sulfate (ZINC 15 PO) Take by mouth   Yes Historical Provider, MD   amLODIPine (NORVASC) 5 MG tablet Take 1 tablet by mouth daily 1/10/20  Yes Natan Mayes, DO   Acetaminophen (TYLENOL) 325 MG CAPS Take 650 mg by mouth as needed (FOR PAIN PER PT.)   Yes Historical Provider, MD   vitamin E 400 UNIT capsule Take 400 Units by mouth daily   Yes Historical Provider, MD   Insulin Syringe-Needle U-100 30G X 1/2\" 1 ML MISC 10 units units of Tresiba daily 10/22/19  Yes Ej Spaulding, DO   Cinnamon 500 MG CAPS Take 1,000 mg by mouth 2 times daily.    Yes Historical Provider, MD   vitamin D (ERGOCALCIFEROL) 1.25 MG (81064 UT) CAPS capsule TAKE ONE CAPSULE BY MOUTH TWICE A WEEK 9/9/21   Wilfrido Aj DO   sildenafil (VIAGRA) 100 MG tablet TAKE ONE TABLET BY MOUTH AS NEEDED FOR ERECTILE DYSFUNCTION  Patient not taking: Reported on 9/8/2021 10/27/20   Wilfrido Aj DO   fluticasone [facility]) 50 MCG/ACT nasal spray 2 sprays by Each Nostril route daily  Patient not taking: Reported on 9/8/2021 9/8/20   Wilfrido Aj DO   sodium bicarbonate 650 MG tablet Take 1 tablet by mouth 3 times daily  Patient not taking: Reported on 9/8/2021 1/10/20   Samara Mayes DO     Allergies:  Patient has no known allergies. Social History     Socioeconomic History    Marital status:      Spouse name: Not on file    Number of children: Not on file    Years of education: Not on file    Highest education level: Not on file   Occupational History    Not on file   Tobacco Use    Smoking status: Former Smoker     Types: Cigars    Smokeless tobacco: Never Used   Vaping Use    Vaping Use: Never used   Substance and Sexual Activity    Alcohol use: Yes     Alcohol/week: 0.0 standard drinks     Comment: socially    Drug use: No    Sexual activity: Yes     Partners: Female   Other Topics Concern    Not on file   Social History Narrative    Not on file     Social Determinants of Health     Financial Resource Strain:     Difficulty of Paying Living Expenses:    Food Insecurity:     Worried About Running Out of Food in the Last Year:     [address] in the Last Year:    Transportation Needs:     Lack of Transportation (Medical):      Lack of Transportation (Non-Medical):    Physical Activity:     Days of Exercise per Week:     Minutes of Exercise per Session:    Stress:     Feeling of Stress :    Social Connections:     Frequency of Communication with Friends and Family:     Frequency of Social Gatherings with Friends and Family:     Attends Sabianist Services:     Active Member of Clubs or Organizations:     Attends Club or Organization Meetings:     Marital Status: Intimate Partner Violence:     Fear of Current or Ex-Partner:     Emotionally Abused:     Physically Abused:     Sexually Abused:         Family History   Problem Relation Age of Onset    Heart Disease Mother        REVIEW OF SYSTEMS (New symptoms):    Eyes:      Blurred vision:  No [x]/Yes []               Diplopia:   No [x]/Yes []               Vision loss:       No [x]/Yes []   Ears, nose, throat:             Hearing loss:    No [x]/Yes []      Vertigo:   No [x]/Yes []                       Swallowing problem:  No [x]/Yes []               Nose bleeds:   No [x]/Yes []      Voice hoarseness:  No [x]/Yes []  Respiratory:             Cough:   No [x]/Yes []      Pleuritic chest pain:  No [x]/Yes []                        Dyspnea:   No [x]/Yes []      Wheezing:   No [x]/Yes []  Cardiovascular:             Angina:   No [x]/Yes []      Palpitations:   No [x]/Yes []          Claudication:    No [x]/Yes []      Leg swelling:   No [x]/Yes []  Gastrointestinal:             Nausea or vomiting:  No [x]/Yes []               Abdominal pain:  No [x]/Yes []                     Intestinal bleeding: No [x]/Yes []  Musculoskeletal:             Leg pain:   No [x]/Yes []      Back pain:   No [x]/Yes []                    Weakness:   No [x]/Yes []  Neurologic:             Numbness:   No [x]/Yes []      Paralysis:   No [x]/Yes []                       Headaches:   No [x]/Yes []  Hematologic, lymphatic:   Anemia:   No [x]/Yes []              Bleeding or bruising:  No [x]/Yes []              Fevers or chills: No [x]/Yes []  Endocrine:             Temp intolerance:   No [x]/Yes []                       Polydipsia, polyuria:  No [x]/Yes []  Skin:              Rash:    No [x]/Yes []      Ulcers:   No [x]/Yes []              Abnorm pigment: No [x]/Yes []  :              Frequency/urgency:  No [x]/Yes []      Hematuria:    No [x]/Yes []                      Incontinence:    No [x]/Yes []    PHYSICAL EXAM:  Vitals:    09/08/21 1140 BP: 126/74     General Appearance: alert and oriented to person, place and time, in no acute distress, well developed and well- nourished  Neurologic: no cranial nerve deficit, speech normal  Head: normocephalic and atraumatic  Eyes: extraocular eye movements intact, conjunctivae normal  ENT: external ear and ear canal normal bilaterally, nose without deformity, no carotid bruits  Pulmonary/Chest: normal air movement, no respiratory distress, R tunneled catheter present  Cardiovascular: normal rate, regular rhythm, no murmur  Abdomen: non-distended, no masses  Musculoskeletal: no joint deformity or tenderness  Extremities: no leg edema bilaterally  Skin: warm and dry, no rash or erythema    PULSE EXAM      Right      Left   Brachial     Radial 3 3   Femoral     Popliteal     Dorsalis Pedis     Posterior Tibial     (3=normal, 2=diminished, 1=barely palpable, 4=widened)      Patient MRN: [id number]   : 1965   Age:  56 years   Gender: Male   Order Date: 2021 3:01 PM   Exam: US UPPER EXTREMITY UNILATERAL VEIN MAPPING   Number of Images: 15 views   Indication:  M79.89 Left arm swelling    Left upper extremity vein mapping, evaluation prior to dialysis access   What reading provider will be dictating this exam?->[facility]   Comparison: None.       Findings:   Measurements on the left;       Cephalic proximal: 5  mm,  to Cephalic distal: 5.4 mm,   Basilic proximal: 5.4  mm,  to basilic distal: 5.6 mm,   Radial proximal: 4.8  mm,  to radial distal: 4.2 mm,   Ulnar proximal: 3.4  mm,  to ulnar distal: 1.5 mm,               Impression   Upper survey venous system as described, no DVT identified                   Problem List Items Addressed This Visit     Stage 5 chronic kidney disease on chronic dialysis ([address] - Primary          Primo Sky is a 48 yo male who presents for AVF creation    - Ultrasound of LUE veins was performed in office and patient was noted to have adequate cephalic and basilic veins.  - Plan for left AVF creation, likely radiocephalic. Will schedule as soon as is convenient for patient. Electronically signed by Thor Jack MD on 9/16/2021 at 9:06 AM      Patient was seen and examined. Agree with above. Patient was examined at the bedside. All questions were answered according to the patient satisfaction. He understands and wishes to proceed    Risk benefits and alternatives were discussed with the patient including but not limited to bleeding, infection, arterial venous nerve injury, arterial steal, venous hypertension, sensorimotor disturbance or loss, maintenance of the access, need for additional access, loss of the access, if an AV graft needs to be used risk of infection and/or removal, distal embolization, wound complications, limb loss and/or death the patient understands and wishes to proceed. Edward Mcdaniel follow-ups on file.

## 2021-09-16 ENCOUNTER — TELEPHONE (OUTPATIENT)
Dept: FAMILY MEDICINE CLINIC | Age: 56
End: 2021-09-16

## 2021-09-16 NOTE — TELEPHONE ENCOUNTER
----- Message from Laura Viera sent at 9/16/2021  2:37 PM EDT -----  Subject: Appointment Request    Reason for Call: Semi-Routine Cough, Cold Symptoms    QUESTIONS  Type of Appointment? Established Patient  Reason for appointment request? No appointments available during search  Additional Information for Provider? Pt called in and stated has Nasal   congestion, and want to be seen has not been going on for more then five   days but want to be seen . Screened red  ---------------------------------------------------------------------------  --------------  CALL BACK INFO  What is the best way for the office to contact you? OK to leave message on   voicemail  Preferred Call Back Phone Number? [phone]  ---------------------------------------------------------------------------  --------------  SCRIPT ANSWERS  Relationship to Patient? Self  Are you currently unable to finish sentences due to any difficulty   breathing? No  Are you unable to swallow liquids? No  Are you having fevers (100.4 or greater), chills, or sweats? No  Do you have COPD, asthma or a chronic lung condition? No  Have your symptoms been present for more than 5 days? No  Have you recently (14 days) been seen by a provider for this issue? No  Have you been diagnosed with, awaiting test results for, or told that you   are suspected of having COVID-19 (Coronavirus)? (If patient has tested   negative or was tested as a requirement for work, school, or travel and   not based on symptoms, answer no)? No  Within the past two weeks have you developed any of the following symptoms   (answer no if symptoms have been present longer than 2 weeks or began   more than 2 weeks ago)? Fever or Chills, Cough, Shortness of breath or   difficulty breathing, Loss of taste or smell, Sore throat, Nasal   congestion, Sneezing or runny nose, Fatigue or generalized body aches   (answer no if pain is specific to a body part e.g. back pain), Diarrhea,   Headache?  Yes

## 2021-09-16 NOTE — TELEPHONE ENCOUNTER
Called patient and notified  Is no in until Monday to come to walk in clinic tomorrow sometime between 8:30 - 4

## 2021-09-17 ENCOUNTER — OFFICE VISIT (OUTPATIENT)
Dept: FAMILY MEDICINE CLINIC | Age: 56
End: 2021-09-17
Payer: COMMERCIAL

## 2021-09-17 VITALS
TEMPERATURE: 97.4 F | SYSTOLIC BLOOD PRESSURE: 143 MMHG | OXYGEN SATURATION: 96 % | RESPIRATION RATE: 17 BRPM | DIASTOLIC BLOOD PRESSURE: 73 MMHG | WEIGHT: 204 LBS | HEART RATE: 66 BPM | BODY MASS INDEX: 30.92 KG/M2 | HEIGHT: 68 IN

## 2021-09-17 DIAGNOSIS — J40 SINOBRONCHITIS: Primary | ICD-10-CM

## 2021-09-17 DIAGNOSIS — J32.9 SINOBRONCHITIS: Primary | ICD-10-CM

## 2021-09-17 LAB — S PYO AG THROAT QL: POSITIVE

## 2021-09-17 PROCEDURE — 87880 STREP A ASSAY W/OPTIC: CPT | Performed by: NURSE PRACTITIONER

## 2021-09-17 PROCEDURE — 99213 OFFICE O/P EST LOW 20 MIN: CPT | Performed by: NURSE PRACTITIONER

## 2021-09-17 RX ORDER — AZITHROMYCIN 250 MG/1
250 TABLET, FILM COATED ORAL SEE ADMIN INSTRUCTIONS
Qty: 6 TABLET | Refills: 0 | Status: SHIPPED | OUTPATIENT
Start: 2021-09-17 | End: 2021-09-22

## 2021-09-17 RX ORDER — BROMPHENIRAMINE MALEATE, PSEUDOEPHEDRINE HYDROCHLORIDE, AND DEXTROMETHORPHAN HYDROBROMIDE 2; 30; 10 MG/5ML; MG/5ML; MG/5ML
10 SYRUP ORAL 4 TIMES DAILY PRN
Qty: 120 ML | Refills: 0 | Status: SHIPPED
Start: 2021-09-17 | End: 2021-10-13 | Stop reason: ALTCHOICE

## 2021-09-17 RX ORDER — ALBUTEROL SULFATE 90 UG/1
2 AEROSOL, METERED RESPIRATORY (INHALATION) EVERY 6 HOURS PRN
Qty: 18 G | Refills: 0 | Status: SHIPPED | OUTPATIENT
Start: 2021-09-17 | End: 2021-10-17 | Stop reason: SDUPTHER

## 2021-09-17 SDOH — ECONOMIC STABILITY: FOOD INSECURITY: WITHIN THE PAST 12 MONTHS, THE FOOD YOU BOUGHT JUST DIDN'T LAST AND YOU DIDN'T HAVE MONEY TO GET MORE.: NEVER TRUE

## 2021-09-17 SDOH — ECONOMIC STABILITY: FOOD INSECURITY: WITHIN THE PAST 12 MONTHS, YOU WORRIED THAT YOUR FOOD WOULD RUN OUT BEFORE YOU GOT MONEY TO BUY MORE.: NEVER TRUE

## 2021-09-17 ASSESSMENT — SOCIAL DETERMINANTS OF HEALTH (SDOH): HOW HARD IS IT FOR YOU TO PAY FOR THE VERY BASICS LIKE FOOD, HOUSING, MEDICAL CARE, AND HEATING?: NOT HARD AT ALL

## 2021-09-17 NOTE — PROGRESS NOTES
Chief Complaint       Sinus Problem (sinus drainage and congestion started few days ago )    History of Present Illness   Source of history provided by:  patient. Nba Mahmood is a 49 y.o. old male presenting to the walk in clinic for evaluation of sinus pressure, minor chest pain, nasal congestion, discolored nasal drainage, bilateral ear pressure, mild productive cough and sore throat x 2-3 days. Has been taking no OTC for relief. Denies any fever, chills, wheezing, SOB, or GI symptoms. Denies any hx of asthma, COPD, or tobacco use. Denies any contact with any individuals with known COVID-19 infection or under investigation for COVID-19 infection. Pt been vaccinated for COVID-19. ROS    Unless otherwise stated in this report or unable to obtain because of the patient's clinical or mental status as evidenced by the medical record, this patients's positive and negative responses for Review of Systems, constitutional, psych, eyes, ENT, cardiovascular, respiratory, gastrointestinal, neurological, genitourinary, musculoskeletal, integument systems and systems related to the presenting problem are either stated in the preceding or were not pertinent or were negative for the symptoms and/or complaints related to the medical problem. Past Medical History:  has a past medical history of Congestive heart disease ([address] Diabetes mellitus ([address] Hyperlipidemia, Hypertension, and Type 2 diabetes mellitus with diabetic nephropathy, with long-term current use of insulin ([address] Past Surgical History:  has a past surgical history that includes Tonsillectomy; Dialysis Catheter Insertion (N/A, 11/30/2020); Catheter Removal (N/A, 6/7/2021); and vascular surgery (Right, 6/7/2021). Social History:  reports that he quit smoking about 2 years ago. His smoking use included cigars. He has a 5.50 pack-year smoking history. He has never used smokeless tobacco. He reports current alcohol use.  He reports that he does not use drugs. Family History: family history includes Heart Disease in his mother. Allergies: Patient has no known allergies. Physical Exam         VS:  BP (!) 143/73   Pulse 66   Temp 97.4 °F (36.3 °C) (Temporal)   Resp 17   Ht 5' 8\" (1.727 m)   Wt 204 lb (92.5 kg)   SpO2 96%   BMI 31.02 kg/m²    Oxygen Saturation Interpretation: Normal.    Constitutional:  Alert, development consistent with age. Head: Moderate TTP over the ethmoid sinuses. Ears:  External Ears: Bilateral pinna normal. TMs visualized  without erythema or perforation bilaterally. Canals normal bilaterally without swelling or exudate  Nose:  Moderate congestion of the nasal mucosa. There is injection to middle turbinates bilaterally. Throat: Moderate posterior pharyngeal erythema with mild post nasal drip present. No exudate or tonsillar hypertrophy noted. Neck:  Supple. There is left-sided anterior cervical adenopathy. Lungs: CTAB without wheezes, rales, or rhonchi  Heart:  Regular rate and rhythm, normal heart sounds, without pathological murmurs, ectopy, gallops, or rubs. Skin:  Normal turgor. Warm, dry, without visible rash. Neurological:  Alert and oriented. Motor functions intact. Responds to verbal commands. Lab / Imaging Results   (All laboratory and radiology results have been personally reviewed by myself)  Labs:  No results found for this visit on 09/17/21. Imaging: All Radiology results interpreted by Radiologist unless otherwise noted. Assessment / Plan     Impression(s):  Ned Arevalo was seen today for sinus problem. Diagnoses and all orders for this visit:    Sinobronchitis  -     albuterol sulfate  (90 Base) MCG/ACT inhaler; Inhale 2 puffs into the lungs every 6 hours as needed for Wheezing or Shortness of Breath  -     azithromycin (ZITHROMAX) 250 MG tablet;  Take 1 tablet by mouth See Admin Instructions for 5 days 500mg on day 1 followed by 250mg on days 2 - 5  -

## 2021-10-13 ENCOUNTER — OFFICE VISIT (OUTPATIENT)
Dept: FAMILY MEDICINE CLINIC | Age: 56
End: 2021-10-13
Payer: COMMERCIAL

## 2021-10-13 VITALS
HEART RATE: 81 BPM | OXYGEN SATURATION: 97 % | BODY MASS INDEX: 31.98 KG/M2 | RESPIRATION RATE: 18 BRPM | DIASTOLIC BLOOD PRESSURE: 78 MMHG | HEIGHT: 68 IN | SYSTOLIC BLOOD PRESSURE: 124 MMHG | WEIGHT: 211 LBS

## 2021-10-13 DIAGNOSIS — N28.9 RENAL INSUFFICIENCY: ICD-10-CM

## 2021-10-13 DIAGNOSIS — J40 SINOBRONCHITIS: ICD-10-CM

## 2021-10-13 DIAGNOSIS — E11.9 TYPE 2 DIABETES MELLITUS WITHOUT COMPLICATION, WITH LONG-TERM CURRENT USE OF INSULIN (HCC): ICD-10-CM

## 2021-10-13 DIAGNOSIS — E55.9 VITAMIN D DEFICIENCY: ICD-10-CM

## 2021-10-13 DIAGNOSIS — N18.6 STAGE 5 CHRONIC KIDNEY DISEASE ON CHRONIC DIALYSIS (HCC): ICD-10-CM

## 2021-10-13 DIAGNOSIS — I10 BENIGN ESSENTIAL HTN: ICD-10-CM

## 2021-10-13 DIAGNOSIS — E11.21 TYPE 2 DIABETES MELLITUS WITH DIABETIC NEPHROPATHY, WITHOUT LONG-TERM CURRENT USE OF INSULIN (HCC): ICD-10-CM

## 2021-10-13 DIAGNOSIS — Z23 NEED FOR INFLUENZA VACCINATION: ICD-10-CM

## 2021-10-13 DIAGNOSIS — J32.9 SINOBRONCHITIS: ICD-10-CM

## 2021-10-13 DIAGNOSIS — Z23 NEED FOR PNEUMOCOCCAL VACCINATION: ICD-10-CM

## 2021-10-13 DIAGNOSIS — E78.2 MIXED HYPERLIPIDEMIA: ICD-10-CM

## 2021-10-13 DIAGNOSIS — Z99.2 STAGE 5 CHRONIC KIDNEY DISEASE ON CHRONIC DIALYSIS (HCC): ICD-10-CM

## 2021-10-13 DIAGNOSIS — E11.65 TYPE 2 DIABETES MELLITUS WITH HYPERGLYCEMIA, WITHOUT LONG-TERM CURRENT USE OF INSULIN (HCC): Primary | ICD-10-CM

## 2021-10-13 DIAGNOSIS — Z23 NEED FOR SHINGLES VACCINE: ICD-10-CM

## 2021-10-13 DIAGNOSIS — Z79.4 TYPE 2 DIABETES MELLITUS WITH DIABETIC NEPHROPATHY, WITH LONG-TERM CURRENT USE OF INSULIN (HCC): ICD-10-CM

## 2021-10-13 DIAGNOSIS — E11.21 TYPE 2 DIABETES MELLITUS WITH DIABETIC NEPHROPATHY, WITH LONG-TERM CURRENT USE OF INSULIN (HCC): ICD-10-CM

## 2021-10-13 DIAGNOSIS — Z79.4 TYPE 2 DIABETES MELLITUS WITHOUT COMPLICATION, WITH LONG-TERM CURRENT USE OF INSULIN (HCC): ICD-10-CM

## 2021-10-13 LAB — HBA1C MFR BLD: 7.5 %

## 2021-10-13 PROCEDURE — 3051F HG A1C>EQUAL 7.0%<8.0%: CPT | Performed by: FAMILY MEDICINE

## 2021-10-13 PROCEDURE — G8482 FLU IMMUNIZE ORDER/ADMIN: HCPCS | Performed by: FAMILY MEDICINE

## 2021-10-13 PROCEDURE — G0009 ADMIN PNEUMOCOCCAL VACCINE: HCPCS | Performed by: FAMILY MEDICINE

## 2021-10-13 PROCEDURE — 3017F COLORECTAL CA SCREEN DOC REV: CPT | Performed by: FAMILY MEDICINE

## 2021-10-13 PROCEDURE — G8427 DOCREV CUR MEDS BY ELIG CLIN: HCPCS | Performed by: FAMILY MEDICINE

## 2021-10-13 PROCEDURE — 90732 PPSV23 VACC 2 YRS+ SUBQ/IM: CPT | Performed by: FAMILY MEDICINE

## 2021-10-13 PROCEDURE — 90674 CCIIV4 VAC NO PRSV 0.5 ML IM: CPT | Performed by: FAMILY MEDICINE

## 2021-10-13 PROCEDURE — 99214 OFFICE O/P EST MOD 30 MIN: CPT | Performed by: FAMILY MEDICINE

## 2021-10-13 PROCEDURE — 1036F TOBACCO NON-USER: CPT | Performed by: FAMILY MEDICINE

## 2021-10-13 PROCEDURE — G0008 ADMIN INFLUENZA VIRUS VAC: HCPCS | Performed by: FAMILY MEDICINE

## 2021-10-13 PROCEDURE — 2022F DILAT RTA XM EVC RTNOPTHY: CPT | Performed by: FAMILY MEDICINE

## 2021-10-13 PROCEDURE — G8417 CALC BMI ABV UP PARAM F/U: HCPCS | Performed by: FAMILY MEDICINE

## 2021-10-13 PROCEDURE — 83036 HEMOGLOBIN GLYCOSYLATED A1C: CPT | Performed by: FAMILY MEDICINE

## 2021-10-13 RX ORDER — METOLAZONE 5 MG/1
TABLET ORAL
COMMUNITY
Start: 2021-09-30

## 2021-10-13 RX ORDER — ZOSTER VACCINE RECOMBINANT, ADJUVANTED 50 MCG/0.5
0.5 KIT INTRAMUSCULAR SEE ADMIN INSTRUCTIONS
Qty: 0.5 ML | Refills: 0 | Status: SHIPPED | OUTPATIENT
Start: 2021-10-13 | End: 2022-04-11

## 2021-10-13 NOTE — PROGRESS NOTES
Heidy Garcia is a 49 y.o. male  . Subjective: Following up with renal.  Doing well overall. No leg swelling. No CP or SOB. Will have blood work performed with next dialysis blood work. Review of Systems   Constitutional: Positive for fatigue. Negative for activity change, appetite change, chills, diaphoresis, fever and unexpected weight change. HENT: Negative for congestion, dental problem, drooling, ear discharge, ear pain, facial swelling, hearing loss, mouth sores, nosebleeds, postnasal drip, rhinorrhea, sinus pressure, sneezing, sore throat, tinnitus, trouble swallowing and voice change. Eyes: Negative for photophobia, pain, discharge, redness, itching and visual disturbance. Respiratory: Negative for apnea, cough, choking, chest tightness, shortness of breath, wheezing and stridor. Cardiovascular: Negative for chest pain, palpitations and leg swelling. Gastrointestinal: Negative for abdominal distention, abdominal pain, anal bleeding, blood in stool, constipation, diarrhea, nausea, rectal pain and vomiting. Endocrine: Negative for cold intolerance, heat intolerance, polydipsia, polyphagia and polyuria. Genitourinary: Negative for decreased urine volume, difficulty urinating, discharge, dysuria, enuresis, flank pain, frequency, genital sores, hematuria, penile pain, penile swelling, scrotal swelling, testicular pain and urgency. Musculoskeletal: Negative for arthralgias, back pain, gait problem, joint swelling, myalgias, neck pain and neck stiffness. Skin: Negative for color change, pallor, rash and wound. Allergic/Immunologic: Negative for environmental allergies, food allergies and immunocompromised state. Neurological: Negative for dizziness, tremors, seizures, syncope, facial asymmetry, speech difficulty, weakness, light-headedness, numbness and headaches. Hematological: Negative for adenopathy. Does not bruise/bleed easily.    Psychiatric/Behavioral: Negative for agitation, behavioral problems, confusion, decreased concentration, dysphoric mood, hallucinations, self-injury, sleep disturbance and suicidal ideas. The patient is not nervous/anxious and is not hyperactive. Past Medical History:   Diagnosis Date    Congestive heart disease ([facility] 2020    Diabetes mellitus ([facility]     Hyperlipidemia     Hypertension     Type 2 diabetes mellitus with diabetic nephropathy, with long-term current use of insulin ([facility] 2020       Social History     Socioeconomic History    Marital status:      Spouse name: Not on file    Number of children: Not on file    Years of education: Not on file    Highest education level: Not on file   Occupational History    Not on file   Tobacco Use    Smoking status: Former Smoker     Packs/day: 0.50     Years: 11.00     Pack years: 5.50     Types: Cigars     Quit date: 2019     Years since quittin.0    Smokeless tobacco: Never Used   Vaping Use    Vaping Use: Never used   Substance and Sexual Activity    Alcohol use: Yes     Alcohol/week: 0.0 standard drinks     Comment: socially    Drug use: No    Sexual activity: Yes     Partners: Female   Other Topics Concern    Not on file   Social History Narrative    Not on file     Social Determinants of Health     Financial Resource Strain: Low Risk     Difficulty of Paying Living Expenses: Not hard at all   Food Insecurity: No Food Insecurity    Worried About [address] in the Last Year: Never true    [address] in the Last Year: Never true   Transportation Needs:     Lack of Transportation (Medical):      Lack of Transportation (Non-Medical):    Physical Activity:     Days of Exercise per Week:     Minutes of Exercise per Session:    Stress:     Feeling of Stress :    Social Connections:     Frequency of Communication with Friends and Family:     Frequency of Social Gatherings with Friends and Family:     Attends [employer] Services:     Active Member of Clubs or Organizations:     Attends Club or Organization Meetings:     Marital Status:    Intimate Partner Violence:     Fear of Current or Ex-Partner:     Emotionally Abused:     Physically Abused:     Sexually Abused:        Family History   Problem Relation Age of Onset    Heart Disease Mother        Current Outpatient Medications on File Prior to Visit   Medication Sig Dispense Refill    metOLazone (ZAROXOLYN) 5 MG tablet TAKE ONE TABLET BY MOUTH TWO TIMES A DAY      albuterol sulfate  (90 Base) MCG/ACT inhaler Inhale 2 puffs into the lungs every 6 hours as needed for Wheezing or Shortness of Breath 18 g 0    vitamin D (ERGOCALCIFEROL) 1.25 MG (97165 UT) CAPS capsule TAKE ONE CAPSULE BY MOUTH TWICE A WEEK 27 capsule 5    insulin detemir (LEVEMIR FLEXTOUCH) 100 UNIT/ML injection pen Inject 40 Units into the skin nightly 5 pen 3    insulin aspart (NOVOLOG FLEXPEN) 100 UNIT/ML injection pen Inject 10 Units into the skin 3 times daily (before meals) 5 pen 5    Continuous Blood Gluc Sensor (FREESTYLE RUBI 14 DAY SENSOR) MISC As directed 2 each 5    Continuous Blood Gluc  (FREESTYLE RUBI 2 READER) ARON As directed 2 each 5    Insulin Pen Needle (MEIJER PEN NEEDLES) 31G X 8 MM MISC 1 each by Does not apply route 3 times daily 100 each 3    pantoprazole (PROTONIX) 40 MG tablet Take 1 tablet by mouth every morning (before breakfast) 30 tablet 0    carvedilol (COREG) 6.25 MG tablet Take 6.25 mg by mouth 2 times daily (with meals)      lovastatin (MEVACOR) 40 MG tablet TAKE ONE TABLET BY MOUTH DAILY 30 tablet 0    montelukast (SINGULAIR) 10 MG tablet TAKE ONE TABLET BY MOUTH DAILY 30 tablet 0    bumetanide (BUMEX) 2 MG tablet TAKE ONE TABLET BY MOUTH TWO TIMES A DAY 30 tablet 0    insulin detemir (LEVEMIR) 100 UNIT/ML injection vial Inject 20 Units into the skin nightly 3 vial 5    sildenafil (VIAGRA) 100 MG tablet TAKE ONE TABLET BY MOUTH AS NEEDED FOR ERECTILE DYSFUNCTION 10 tablet 0    fluticasone (FLONASE) 50 MCG/ACT nasal spray 2 sprays by Each Nostril route daily 3 Bottle 5    Insulin Syringe-Needle U-100 (AIMSCO INS SYR .3CC/29GX0.5\") 29G X 1/2\" 0.3 ML MISC 1 each by Does not apply route daily 100 each 3    Misc Natural Products (APPLE CIDER VINEGAR DIET PO) Take by mouth      Zinc Sulfate (ZINC 15 PO) Take by mouth      amLODIPine (NORVASC) 5 MG tablet Take 1 tablet by mouth daily 30 tablet 1    sodium bicarbonate 650 MG tablet Take 1 tablet by mouth 3 times daily 90 tablet 1    Acetaminophen (TYLENOL) 325 MG CAPS Take 650 mg by mouth as needed (FOR PAIN PER PT.)      vitamin E 400 UNIT capsule Take 400 Units by mouth daily      Insulin Syringe-Needle U-100 30G X 1/2\" 1 ML MISC 10 units units of Tresiba daily 100 each 11    Cinnamon 500 MG CAPS Take 1,000 mg by mouth 2 times daily. No current facility-administered medications on file prior to visit. No Known Allergies    I have reviewed his allergies, medications, problem list, medical,social and family history and have updated as needed in the electronic medical record. Objective:     Physical Exam  Vitals and nursing note reviewed. Constitutional:       General: He is not in acute distress. Appearance: He is well-developed. He is not diaphoretic. HENT:      Head: Normocephalic and atraumatic. Right Ear: External ear normal.      Left Ear: External ear normal.      Nose: Nose normal.      Mouth/Throat:      Pharynx: No oropharyngeal exudate. Eyes:      General: No scleral icterus. Right eye: No discharge. Left eye: No discharge. Conjunctiva/sclera: Conjunctivae normal.      Pupils: Pupils are equal, round, and reactive to light. Neck:      Thyroid: No thyromegaly. Vascular: No JVD. Trachea: No tracheal deviation. Cardiovascular:      Rate and Rhythm: Normal rate and regular rhythm. Heart sounds: Normal heart sounds. No murmur heard.    No friction rub. No gallop. Pulmonary:      Effort: Pulmonary effort is normal. No respiratory distress. Breath sounds: Normal breath sounds. No stridor. No wheezing or rales. Chest:      Chest wall: No tenderness. Abdominal:      General: Bowel sounds are normal. There is no distension. Palpations: Abdomen is soft. There is no mass. Tenderness: There is no abdominal tenderness. There is no guarding or rebound. Genitourinary:     Comments: Deferred by patient  Musculoskeletal:         General: No tenderness. Normal range of motion. Cervical back: Normal range of motion and neck supple. Lymphadenopathy:      Cervical: No cervical adenopathy. Skin:     General: Skin is warm and dry. Coloration: Skin is not pale. Findings: No erythema or rash. Neurological:      Mental Status: He is alert and oriented to person, place, and time. Cranial Nerves: No cranial nerve deficit. Motor: No abnormal muscle tone. Coordination: Coordination normal.      Deep Tendon Reflexes: Reflexes are normal and symmetric. Reflexes normal.   Psychiatric:         Behavior: Behavior normal.         Thought Content: Thought content normal.         Judgment: Judgment normal.         Assessment / Plan:   Dru Wallace was seen today for 3 month follow-up. Diagnoses and all orders for this visit:    Type 2 diabetes mellitus with hyperglycemia, without long-term current use of insulin ([facility])  -     POCT glycosylated hemoglobin (Hb A1C)  -     TSH without Reflex; Future  -     Hemoglobin A1C; Future  -     Lipid Panel; Future  -     T4, Free; Future  -     Comprehensive Metabolic Panel; Future  -     CBC Auto Differential; Future  -     insulin detemir (LEVEMIR FLEXTOUCH) 100 UNIT/ML injection pen; Inject 40 Units into the skin nightly  -     insulin aspart (NOVOLOG FLEXPEN) 100 UNIT/ML injection pen;  Inject 10 Units into the skin 3 times daily (before meals)    Type 2 diabetes mellitus without complication, with long-term current use of insulin (H[facility])  -     TSH without Reflex; Future  -     Hemoglobin A1C; Future  -     Lipid Panel; Future  -     T4, Free; Future  -     Comprehensive Metabolic Panel; Future  -     CBC Auto Differential; Future    Stage 5 chronic kidney disease on chronic dialysis (HCC)  -     TSH without Reflex; Future  -     Hemoglobin A1C; Future  -     Lipid Panel; Future  -     T4, Free; Future  -     Comprehensive Metabolic Panel; Future  -     CBC Auto Differential; Future    Mixed hyperlipidemia  -     TSH without Reflex; Future  -     Hemoglobin A1C; Future  -     Lipid Panel; Future  -     T4, Free; Future  -     Comprehensive Metabolic Panel; Future  -     CBC Auto Differential; Future    Need for influenza vaccination  -     INFLUENZA, MDCK QUADV, 2 YRS AND OLDER, IM, PF, PREFILL SYR OR SDV, 0.5ML (FLUCELVAX QUADV, PF)    Need for pneumococcal vaccination  -     PNEUMOVAX 23 subcutaneous/IM (Pneumococcal polysaccharide vaccine 23-valent >= 1yo)    Need for shingles vaccine  -     zoster recombinant adjuvanted vaccine (SHINGRIX) 50 MCG/0.5ML SUSR injection; Inject 0.5 mLs into the muscle See Admin Instructions 1 dose now and repeat in 2-6 months    Sinobronchitis  -     albuterol sulfate  (90 Base) MCG/ACT inhaler; Inhale 2 puffs into the lungs every 6 hours as needed for Wheezing or Shortness of Breath    Type 2 diabetes mellitus with diabetic nephropathy, with long-term current use of insulin ([facility])  -     insulin detemir (LEVEMIR FLEXTOUCH) 100 UNIT/ML injection pen; Inject 40 Units into the skin nightly  -     insulin aspart (NOVOLOG FLEXPEN) 100 UNIT/ML injection pen; Inject 10 Units into the skin 3 times daily (before meals)    Type 2 diabetes mellitus with diabetic nephropathy, without long-term current use of insulin ([facility])  -     insulin detemir (LEVEMIR FLEXTOUCH) 100 UNIT/ML injection pen;  Inject 40 Units into the skin nightly  -     insulin aspart (NOVOLOG FLEXPEN) 100 UNIT/ML injection pen; Inject 10 Units into the skin 3 times daily (before meals)  -     lovastatin (MEVACOR) 40 MG tablet; TAKE ONE TABLET BY MOUTH DAILY    Benign essential HTN  -     carvedilol (COREG) 6.25 MG tablet; Take 1 tablet by mouth 2 times daily (with meals)  -     lovastatin (MEVACOR) 40 MG tablet; TAKE ONE TABLET BY MOUTH DAILY  -     amLODIPine (NORVASC) 5 MG tablet; Take 1 tablet by mouth daily    Vitamin D deficiency  -     lovastatin (MEVACOR) 40 MG tablet; TAKE ONE TABLET BY MOUTH DAILY    Renal insufficiency  -     lovastatin (MEVACOR) 40 MG tablet; TAKE ONE TABLET BY MOUTH DAILY        Reviewed healthmaintenance report. Patient is aware of deficiencies and suggested preventative tests.

## 2021-10-17 RX ORDER — INSULIN ASPART 100 [IU]/ML
10 INJECTION, SOLUTION INTRAVENOUS; SUBCUTANEOUS
Qty: 5 PEN | Refills: 5
Start: 2021-10-17 | End: 2022-02-01 | Stop reason: SDUPTHER

## 2021-10-17 RX ORDER — LOVASTATIN 40 MG/1
TABLET ORAL
Qty: 30 TABLET | Refills: 5
Start: 2021-10-17 | End: 2022-03-29 | Stop reason: SDUPTHER

## 2021-10-17 RX ORDER — ALBUTEROL SULFATE 90 UG/1
2 AEROSOL, METERED RESPIRATORY (INHALATION) EVERY 6 HOURS PRN
Qty: 18 G | Refills: 5
Start: 2021-10-17

## 2021-10-17 RX ORDER — INSULIN DETEMIR 100 [IU]/ML
40 INJECTION, SOLUTION SUBCUTANEOUS NIGHTLY
Qty: 5 PEN | Refills: 3
Start: 2021-10-17 | End: 2022-02-01 | Stop reason: SDUPTHER

## 2021-10-17 RX ORDER — AMLODIPINE BESYLATE 5 MG/1
5 TABLET ORAL DAILY
Qty: 30 TABLET | Refills: 1 | Status: SHIPPED | OUTPATIENT
Start: 2021-10-17

## 2021-10-17 RX ORDER — CARVEDILOL 6.25 MG/1
6.25 TABLET ORAL 2 TIMES DAILY WITH MEALS
Qty: 60 TABLET | Refills: 5
Start: 2021-10-17

## 2021-10-17 ASSESSMENT — ENCOUNTER SYMPTOMS
CHOKING: 0
APNEA: 0
STRIDOR: 0
ABDOMINAL PAIN: 0
EYE ITCHING: 0
DIARRHEA: 0
BLOOD IN STOOL: 0
EYE PAIN: 0
FACIAL SWELLING: 0
WHEEZING: 0
BACK PAIN: 0
SINUS PRESSURE: 0
RECTAL PAIN: 0
EYE DISCHARGE: 0
PHOTOPHOBIA: 0
RHINORRHEA: 0
ANAL BLEEDING: 0
SORE THROAT: 0
COLOR CHANGE: 0
COUGH: 0
ABDOMINAL DISTENTION: 0
CHEST TIGHTNESS: 0
EYE REDNESS: 0
VOICE CHANGE: 0
CONSTIPATION: 0
TROUBLE SWALLOWING: 0
VOMITING: 0
NAUSEA: 0
SHORTNESS OF BREATH: 0

## 2021-10-21 PROBLEM — R78.81 BACTEREMIA: Status: ACTIVE | Noted: 2021-10-21

## 2021-10-24 ENCOUNTER — HOSPITAL ENCOUNTER (INPATIENT)
Age: 56
LOS: 5 days | Discharge: HOME | DRG: 252 | End: 2021-10-29
Attending: FAMILY MEDICINE | Admitting: FAMILY MEDICINE
Payer: COMMERCIAL

## 2021-10-24 DIAGNOSIS — Z49.02 ENCOUNTER FOR PERITONEAL DIALYSIS CATHETER INSERTION (HCC): ICD-10-CM

## 2021-10-24 LAB
ALBUMIN SERPL-MCNC: 2.9 G/DL (ref 3.5–5.2)
ALP BLD-CCNC: 145 U/L (ref 40–129)
ALT SERPL-CCNC: 29 U/L (ref 0–40)
ANION GAP SERPL CALCULATED.3IONS-SCNC: 18 MMOL/L (ref 7–16)
ANISOCYTOSIS: ABNORMAL
AST SERPL-CCNC: 10 U/L (ref 0–39)
BASOPHILS ABSOLUTE: 0.11 E9/L (ref 0–0.2)
BASOPHILS RELATIVE PERCENT: 0.9 % (ref 0–2)
BILIRUB SERPL-MCNC: 0.2 MG/DL (ref 0–1.2)
BUN BLDV-MCNC: 63 MG/DL (ref 6–20)
CALCIUM SERPL-MCNC: 8.2 MG/DL (ref 8.6–10.2)
CHLORIDE BLD-SCNC: 89 MMOL/L (ref 98–107)
CO2: 22 MMOL/L (ref 22–29)
CREAT SERPL-MCNC: 8.6 MG/DL (ref 0.7–1.2)
EOSINOPHILS ABSOLUTE: 0.42 E9/L (ref 0.05–0.5)
EOSINOPHILS RELATIVE PERCENT: 3.5 % (ref 0–6)
GFR AFRICAN AMERICAN: 8
GFR NON-AFRICAN AMERICAN: 6 ML/MIN/1.73
GLUCOSE BLD-MCNC: 232 MG/DL (ref 74–99)
HBA1C MFR BLD: 7.3 % (ref 4–5.6)
HCT VFR BLD CALC: 27.9 % (ref 37–54)
HEMOGLOBIN: 9.1 G/DL (ref 12.5–16.5)
LACTIC ACID: 1 MMOL/L (ref 0.5–2.2)
LYMPHOCYTES ABSOLUTE: 0.24 E9/L (ref 1.5–4)
LYMPHOCYTES RELATIVE PERCENT: 1.7 % (ref 20–42)
MAGNESIUM: 2.1 MG/DL (ref 1.6–2.6)
MCH RBC QN AUTO: 28.2 PG (ref 26–35)
MCHC RBC AUTO-ENTMCNC: 32.6 % (ref 32–34.5)
MCV RBC AUTO: 86.4 FL (ref 80–99.9)
METER GLUCOSE: 238 MG/DL (ref 74–99)
MONOCYTES ABSOLUTE: 0.48 E9/L (ref 0.1–0.95)
MONOCYTES RELATIVE PERCENT: 3.5 % (ref 2–12)
NEUTROPHILS ABSOLUTE: 10.8 E9/L (ref 1.8–7.3)
NEUTROPHILS RELATIVE PERCENT: 90.4 % (ref 43–80)
PDW BLD-RTO: 15 FL (ref 11.5–15)
PLATELET # BLD: 134 E9/L (ref 130–450)
PMV BLD AUTO: 11.4 FL (ref 7–12)
POIKILOCYTES: ABNORMAL
POLYCHROMASIA: ABNORMAL
POTASSIUM SERPL-SCNC: 3.7 MMOL/L (ref 3.5–5)
PROCALCITONIN: >100 NG/ML (ref 0–0.08)
RBC # BLD: 3.23 E12/L (ref 3.8–5.8)
SODIUM BLD-SCNC: 129 MMOL/L (ref 132–146)
TEAR DROP CELLS: ABNORMAL
TOTAL PROTEIN: 5.5 G/DL (ref 6.4–8.3)
WBC # BLD: 12 E9/L (ref 4.5–11.5)

## 2021-10-24 PROCEDURE — 6360000002 HC RX W HCPCS: Performed by: FAMILY MEDICINE

## 2021-10-24 PROCEDURE — 87040 BLOOD CULTURE FOR BACTERIA: CPT

## 2021-10-24 PROCEDURE — 2060000000 HC ICU INTERMEDIATE R&B

## 2021-10-24 PROCEDURE — 85025 COMPLETE CBC W/AUTO DIFF WBC: CPT

## 2021-10-24 PROCEDURE — 84145 PROCALCITONIN (PCT): CPT

## 2021-10-24 PROCEDURE — 80053 COMPREHEN METABOLIC PANEL: CPT

## 2021-10-24 PROCEDURE — 6370000000 HC RX 637 (ALT 250 FOR IP): Performed by: FAMILY MEDICINE

## 2021-10-24 PROCEDURE — 83036 HEMOGLOBIN GLYCOSYLATED A1C: CPT

## 2021-10-24 PROCEDURE — 82962 GLUCOSE BLOOD TEST: CPT

## 2021-10-24 PROCEDURE — 83605 ASSAY OF LACTIC ACID: CPT

## 2021-10-24 PROCEDURE — 36415 COLL VENOUS BLD VENIPUNCTURE: CPT

## 2021-10-24 PROCEDURE — 2580000003 HC RX 258: Performed by: FAMILY MEDICINE

## 2021-10-24 PROCEDURE — 83735 ASSAY OF MAGNESIUM: CPT

## 2021-10-24 RX ORDER — ALBUTEROL SULFATE 90 UG/1
2 AEROSOL, METERED RESPIRATORY (INHALATION) EVERY 6 HOURS PRN
Status: DISCONTINUED | OUTPATIENT
Start: 2021-10-24 | End: 2021-10-24 | Stop reason: CLARIF

## 2021-10-24 RX ORDER — SODIUM CHLORIDE 9 MG/ML
25 INJECTION, SOLUTION INTRAVENOUS PRN
Status: DISCONTINUED | OUTPATIENT
Start: 2021-10-24 | End: 2021-10-29 | Stop reason: HOSPADM

## 2021-10-24 RX ORDER — METOLAZONE 5 MG/1
5 TABLET ORAL 2 TIMES DAILY
Status: DISCONTINUED | OUTPATIENT
Start: 2021-10-24 | End: 2021-10-29 | Stop reason: HOSPADM

## 2021-10-24 RX ORDER — INSULIN GLARGINE 100 [IU]/ML
0.25 INJECTION, SOLUTION SUBCUTANEOUS NIGHTLY
Status: DISCONTINUED | OUTPATIENT
Start: 2021-10-24 | End: 2021-10-29 | Stop reason: HOSPADM

## 2021-10-24 RX ORDER — PANTOPRAZOLE SODIUM 40 MG/1
40 TABLET, DELAYED RELEASE ORAL
Status: DISCONTINUED | OUTPATIENT
Start: 2021-10-25 | End: 2021-10-29 | Stop reason: HOSPADM

## 2021-10-24 RX ORDER — SEVELAMER CARBONATE 800 MG/1
1600 TABLET, FILM COATED ORAL
Status: DISCONTINUED | OUTPATIENT
Start: 2021-10-24 | End: 2021-10-29 | Stop reason: HOSPADM

## 2021-10-24 RX ORDER — CARVEDILOL 6.25 MG/1
6.25 TABLET ORAL 2 TIMES DAILY WITH MEALS
Status: DISCONTINUED | OUTPATIENT
Start: 2021-10-24 | End: 2021-10-29 | Stop reason: HOSPADM

## 2021-10-24 RX ORDER — ONDANSETRON 2 MG/ML
4 INJECTION INTRAMUSCULAR; INTRAVENOUS EVERY 6 HOURS PRN
Status: DISCONTINUED | OUTPATIENT
Start: 2021-10-24 | End: 2021-10-29 | Stop reason: HOSPADM

## 2021-10-24 RX ORDER — ALBUTEROL SULFATE 2.5 MG/3ML
2.5 SOLUTION RESPIRATORY (INHALATION) EVERY 6 HOURS PRN
Status: DISCONTINUED | OUTPATIENT
Start: 2021-10-24 | End: 2021-10-29 | Stop reason: HOSPADM

## 2021-10-24 RX ORDER — CEFTRIAXONE 2 G/1
INJECTION, POWDER, FOR SOLUTION INTRAMUSCULAR; INTRAVENOUS DAILY
Status: ON HOLD | COMMUNITY
End: 2021-10-29 | Stop reason: HOSPADM

## 2021-10-24 RX ORDER — MONTELUKAST SODIUM 10 MG/1
10 TABLET ORAL NIGHTLY
Status: DISCONTINUED | OUTPATIENT
Start: 2021-10-24 | End: 2021-10-29 | Stop reason: HOSPADM

## 2021-10-24 RX ORDER — SODIUM CHLORIDE 0.9 % (FLUSH) 0.9 %
10 SYRINGE (ML) INJECTION PRN
Status: DISCONTINUED | OUTPATIENT
Start: 2021-10-24 | End: 2021-10-29 | Stop reason: HOSPADM

## 2021-10-24 RX ORDER — ACETAMINOPHEN 650 MG/1
650 SUPPOSITORY RECTAL EVERY 6 HOURS PRN
Status: DISCONTINUED | OUTPATIENT
Start: 2021-10-24 | End: 2021-10-29 | Stop reason: HOSPADM

## 2021-10-24 RX ORDER — DEXTROSE MONOHYDRATE 25 G/50ML
12.5 INJECTION, SOLUTION INTRAVENOUS PRN
Status: DISCONTINUED | OUTPATIENT
Start: 2021-10-24 | End: 2021-10-29 | Stop reason: HOSPADM

## 2021-10-24 RX ORDER — BUMETANIDE 1 MG/1
2 TABLET ORAL 2 TIMES DAILY
Status: DISCONTINUED | OUTPATIENT
Start: 2021-10-24 | End: 2021-10-29 | Stop reason: HOSPADM

## 2021-10-24 RX ORDER — DEXTROSE MONOHYDRATE 50 MG/ML
100 INJECTION, SOLUTION INTRAVENOUS PRN
Status: DISCONTINUED | OUTPATIENT
Start: 2021-10-24 | End: 2021-10-29 | Stop reason: HOSPADM

## 2021-10-24 RX ORDER — PROMETHAZINE HYDROCHLORIDE 25 MG/1
12.5 TABLET ORAL EVERY 6 HOURS PRN
Status: DISCONTINUED | OUTPATIENT
Start: 2021-10-24 | End: 2021-10-29 | Stop reason: HOSPADM

## 2021-10-24 RX ORDER — NICOTINE POLACRILEX 4 MG
15 LOZENGE BUCCAL PRN
Status: DISCONTINUED | OUTPATIENT
Start: 2021-10-24 | End: 2021-10-29 | Stop reason: HOSPADM

## 2021-10-24 RX ORDER — SODIUM BICARBONATE 650 MG/1
650 TABLET ORAL 3 TIMES DAILY
Status: DISCONTINUED | OUTPATIENT
Start: 2021-10-24 | End: 2021-10-26

## 2021-10-24 RX ORDER — FLUTICASONE PROPIONATE 50 MCG
2 SPRAY, SUSPENSION (ML) NASAL DAILY
Status: DISCONTINUED | OUTPATIENT
Start: 2021-10-25 | End: 2021-10-29 | Stop reason: HOSPADM

## 2021-10-24 RX ORDER — SEVELAMER CARBONATE 800 MG/1
2 TABLET, FILM COATED ORAL
COMMUNITY

## 2021-10-24 RX ORDER — POLYETHYLENE GLYCOL 3350 17 G/17G
17 POWDER, FOR SOLUTION ORAL DAILY PRN
Status: DISCONTINUED | OUTPATIENT
Start: 2021-10-24 | End: 2021-10-29 | Stop reason: HOSPADM

## 2021-10-24 RX ORDER — ACETAMINOPHEN 325 MG/1
650 TABLET ORAL EVERY 6 HOURS PRN
Status: DISCONTINUED | OUTPATIENT
Start: 2021-10-24 | End: 2021-10-29 | Stop reason: HOSPADM

## 2021-10-24 RX ORDER — HEPARIN SODIUM 10000 [USP'U]/ML
5000 INJECTION, SOLUTION INTRAVENOUS; SUBCUTANEOUS EVERY 8 HOURS
Status: DISCONTINUED | OUTPATIENT
Start: 2021-10-24 | End: 2021-10-29 | Stop reason: HOSPADM

## 2021-10-24 RX ORDER — AMLODIPINE BESYLATE 5 MG/1
5 TABLET ORAL DAILY
Status: DISCONTINUED | OUTPATIENT
Start: 2021-10-24 | End: 2021-10-29 | Stop reason: HOSPADM

## 2021-10-24 RX ORDER — SODIUM CHLORIDE 0.9 % (FLUSH) 0.9 %
10 SYRINGE (ML) INJECTION EVERY 12 HOURS SCHEDULED
Status: DISCONTINUED | OUTPATIENT
Start: 2021-10-24 | End: 2021-10-29 | Stop reason: HOSPADM

## 2021-10-24 RX ORDER — ATORVASTATIN CALCIUM 10 MG/1
10 TABLET, FILM COATED ORAL DAILY
Refills: 5 | Status: DISCONTINUED | OUTPATIENT
Start: 2021-10-25 | End: 2021-10-29 | Stop reason: HOSPADM

## 2021-10-24 RX ADMIN — MONTELUKAST SODIUM 10 MG: 10 TABLET ORAL at 22:49

## 2021-10-24 RX ADMIN — METOLAZONE 5 MG: 5 TABLET ORAL at 22:48

## 2021-10-24 RX ADMIN — BUMETANIDE 2 MG: 1 TABLET ORAL at 22:45

## 2021-10-24 RX ADMIN — AMLODIPINE BESYLATE 5 MG: 5 TABLET ORAL at 22:45

## 2021-10-24 RX ADMIN — CARVEDILOL 6.25 MG: 6.25 TABLET, FILM COATED ORAL at 22:46

## 2021-10-24 RX ADMIN — SEVELAMER CARBONATE 1600 MG: 800 TABLET, FILM COATED ORAL at 22:49

## 2021-10-24 RX ADMIN — HEPARIN SODIUM 5000 UNITS: 10000 INJECTION INTRAVENOUS; SUBCUTANEOUS at 22:44

## 2021-10-24 RX ADMIN — Medication 10 ML: at 22:50

## 2021-10-24 RX ADMIN — INSULIN GLARGINE 24 UNITS: 100 INJECTION, SOLUTION SUBCUTANEOUS at 22:44

## 2021-10-24 RX ADMIN — SODIUM BICARBONATE 650 MG: 650 TABLET ORAL at 22:49

## 2021-10-24 RX ADMIN — PIPERACILLIN AND TAZOBACTAM 3375 MG: 3; .375 INJECTION, POWDER, LYOPHILIZED, FOR SOLUTION INTRAVENOUS at 22:49

## 2021-10-24 ASSESSMENT — PAIN SCALES - GENERAL
PAINLEVEL_OUTOF10: 0
PAINLEVEL_OUTOF10: 0

## 2021-10-24 NOTE — H&P
Authorizing Provider   sevelamer (RENVELA) 800 MG tablet Take 2 tablets by mouth 3 times daily (with meals)    Yes Historical Provider, MD   cefTRIAXone sodium 2 g SOLR Infuse intravenously daily   Yes Historical Provider, MD   albuterol sulfate  (90 Base) MCG/ACT inhaler Inhale 2 puffs into the lungs every 6 hours as needed for Wheezing or Shortness of Breath 10/17/21   Pamler Esters, DO   insulin detemir (LEVEMIR FLEXTOUCH) 100 UNIT/ML injection pen Inject 40 Units into the skin nightly 10/17/21   Palmer Esters, DO   insulin aspart (NOVOLOG FLEXPEN) 100 UNIT/ML injection pen Inject 10 Units into the skin 3 times daily (before meals) 10/17/21   Palmer Esters, DO   carvedilol (COREG) 6.25 MG tablet Take 1 tablet by mouth 2 times daily (with meals) 10/17/21   Palmer Esters, DO   lovastatin (MEVACOR) 40 MG tablet TAKE ONE TABLET BY MOUTH DAILY 10/17/21   Palmer Esters, DO   amLODIPine (NORVASC) 5 MG tablet Take 1 tablet by mouth daily 10/17/21   Palmer Esters, DO   metOLazone (ZAROXOLYN) 5 MG tablet TAKE ONE TABLET BY MOUTH TWO TIMES A DAY 9/30/21   Historical Provider, MD   zoster recombinant adjuvanted vaccine [facility]) 50 MCG/0.5ML SUSR injection Inject 0.5 mLs into the muscle See Admin Instructions 1 dose now and repeat in 2-6 months 10/13/21 4/11/22  Palmer Esters, DO   vitamin D (ERGOCALCIFEROL) 1.25 MG (16932 UT) CAPS capsule TAKE ONE CAPSULE BY MOUTH TWICE A WEEK 9/9/21   Palmer Esters, DO   Continuous Blood Gluc Sensor (FREESTYLE RUBI 14 DAY SENSOR) MISC As directed 7/13/21   Palmer Esters, DO   Continuous Blood Gluc  (FREESTYLE RUBI 2 READER) ARON As directed 7/13/21   Palmer Esters, DO   Insulin Pen Needle (MEIJER PEN NEEDLES) 31G X 8 MM MISC 1 each by Does not apply route 3 times daily 7/13/21   Palmer Esters, DO   pantoprazole (PROTONIX) 40 MG tablet Take 1 tablet by mouth every morning (before breakfast) 6/10/21   VelindMD alberto De La Cruz (SINGULAIR) 10 MG tablet TAKE ONE TABLET BY MOUTH DAILY 1/11/21   Marcia Poet, DO   bumetanide ([address]) 2 MG tablet TAKE ONE TABLET BY MOUTH TWO TIMES A DAY  Patient taking differently: 2 mg 3 times daily TAKE ONE TABLET BY MOUTH TWO TIMES A DAY 12/30/20   Marcia Poet, DO   insulin detemir (LEVEMIR) 100 UNIT/ML injection vial Inject 20 Units into the skin nightly 12/8/20   Marcia Poet, DO   sildenafil (VIAGRA) 100 MG tablet TAKE ONE TABLET BY MOUTH AS NEEDED FOR ERECTILE DYSFUNCTION 10/27/20   Marcia Poet, DO   fluticasone [facility]) 50 MCG/ACT nasal spray 2 sprays by Each Nostril route daily 9/8/20   Marcia Poet, DO   Insulin Syringe-Needle U-100 (AIMSCO INS SYR .3CC/29GX0.5\") 29G X 1/2\" 0.3 ML MISC 1 each by Does not apply route daily 8/13/20   Marcia Poet, DO   Misc Natural Products (APPLE CIDER VINEGAR DIET PO) Take by mouth    Historical Provider, MD   Zinc Sulfate (ZINC 15 PO) Take by mouth    Historical Provider, MD   sodium bicarbonate 650 MG tablet Take 1 tablet by mouth 3 times daily 1/10/20   Terisa [place] Bisel, DO   Acetaminophen (TYLENOL) 325 MG CAPS Take 650 mg by mouth as needed (FOR PAIN PER PT.)    Historical Provider, MD   vitamin E 400 UNIT capsule Take 400 Units by mouth daily    Historical Provider, MD   Insulin Syringe-Needle U-100 30G X 1/2\" 1 ML MISC 10 units units of Tresiba daily 10/22/19   Marcia Poet, DO   Cinnamon 500 MG CAPS Take 1,000 mg by mouth 2 times daily. Historical Provider, MD         Allergies:  Patient has no known allergies. Social History:    TOBACCO:   reports that he quit smoking about 2 years ago. His smoking use included cigars. He has a 5.50 pack-year smoking history. He has never used smokeless tobacco.  ETOH:   reports current alcohol use. Family History:    Reviewed in detail and negative for DM, CAD, Cancer, CVA.  Positive as follows\"      Problem Relation Age of Onset    Heart Disease Mother        REVIEW OF SYSTEMS:   Pertinent positives as noted in the HPI. All other systems reviewed and negative. PHYSICAL EXAM:  BP (!) 161/66   Pulse 90   Temp 98.3 °F (36.8 °C) (Oral)   Resp 18   Ht 5' 8\" (1.727 m)   Wt 210 lb (95.3 kg)   SpO2 95%   BMI 31.93 kg/m²   General appearance: No apparent distress, appears stated age and cooperative. HEENT: Normal cephalic, atraumatic without obvious deformity. Pupils equal, round, and reactive to light. Extra ocular muscles intact. Conjunctivae/corneas clear. Neck: Supple, with full range of motion. No jugular venous distention. Trachea midline. Respiratory: Clear to auscultation bilaterally  Cardiovascular: Regular rate and rhythm  Abdomen: Soft, nontender, nondistended  Musculoskeletal: No clubbing, cyanosis, edema of bilateral lower extremities. Brisk capillary refill. Skin: Normal skin color. No rashes or lesions. Neurologic:  Neurovascularly intact without any focal sensory/motor deficits. Cranial nerves: II-XII intact, grossly non-focal.    CBC:   No results for input(s): WBC, RBC, HGB, HCT, MCV, RDW, PLT in the last 72 hours. BMP: No results for input(s): NA, K, CL, CO2, BUN, CREATININE, MG, PHOS in the last 72 hours. Invalid input(s): CA  LFT:  No results for input(s): PROT, ALB, ALKPHOS, ALT, AST, BILITOT, AMYLASE, LIPASE in the last 72 hours. CE:  No results for input(s): Priscilla Carman in the last 72 hours. PT/INR: No results for input(s): INR, APTT in the last 72 hours. BNP: No results for input(s): BNP in the last 72 hours.   ESR:   Lab Results   Component Value Date    SEDRATE 75 (H) 06/06/2021     CRP:   Lab Results   Component Value Date    CRP 2.6 (H) 06/06/2021     D Dimer: No results found for: DDIMER   Folate and B12:   Lab Results   Component Value Date    [id number] 761 09/21/2015   ,   Lab Results   Component Value Date    FOLATE 15.0 05/28/2020     Lactic Acid:   Lab Results   Component Value Date    LACTA 0.8 06/08/2021     Thyroid Studies:   Lab Results   Component Value Date however, inadvertent computerized transcription errors may be present.

## 2021-10-25 LAB
ALBUMIN SERPL-MCNC: 3 G/DL (ref 3.5–5.2)
ALP BLD-CCNC: 118 U/L (ref 40–129)
ALT SERPL-CCNC: 13 U/L (ref 0–40)
ANION GAP SERPL CALCULATED.3IONS-SCNC: 16 MMOL/L (ref 7–16)
ANISOCYTOSIS: ABNORMAL
AST SERPL-CCNC: 9 U/L (ref 0–39)
BASOPHILS ABSOLUTE: 0.04 E9/L (ref 0–0.2)
BASOPHILS RELATIVE PERCENT: 0.4 % (ref 0–2)
BILIRUB SERPL-MCNC: 0.2 MG/DL (ref 0–1.2)
BUN BLDV-MCNC: 70 MG/DL (ref 6–20)
CALCIUM SERPL-MCNC: 8.3 MG/DL (ref 8.6–10.2)
CHLORIDE BLD-SCNC: 91 MMOL/L (ref 98–107)
CO2: 22 MMOL/L (ref 22–29)
CREAT SERPL-MCNC: 9.1 MG/DL (ref 0.7–1.2)
EOSINOPHILS ABSOLUTE: 0.56 E9/L (ref 0.05–0.5)
EOSINOPHILS RELATIVE PERCENT: 5.1 % (ref 0–6)
GFR AFRICAN AMERICAN: 7
GFR NON-AFRICAN AMERICAN: 6 ML/MIN/1.73
GLUCOSE BLD-MCNC: 214 MG/DL (ref 74–99)
HCT VFR BLD CALC: 26.8 % (ref 37–54)
HEMOGLOBIN: 8.8 G/DL (ref 12.5–16.5)
IMMATURE GRANULOCYTES #: 0.14 E9/L
IMMATURE GRANULOCYTES %: 1.3 % (ref 0–5)
LYMPHOCYTES ABSOLUTE: 0.51 E9/L (ref 1.5–4)
LYMPHOCYTES RELATIVE PERCENT: 4.7 % (ref 20–42)
MAGNESIUM: 2.2 MG/DL (ref 1.6–2.6)
MCH RBC QN AUTO: 28 PG (ref 26–35)
MCHC RBC AUTO-ENTMCNC: 32.8 % (ref 32–34.5)
MCV RBC AUTO: 85.4 FL (ref 80–99.9)
METER GLUCOSE: 162 MG/DL (ref 74–99)
METER GLUCOSE: 205 MG/DL (ref 74–99)
METER GLUCOSE: 209 MG/DL (ref 74–99)
METER GLUCOSE: 234 MG/DL (ref 74–99)
MONOCYTES ABSOLUTE: 0.87 E9/L (ref 0.1–0.95)
MONOCYTES RELATIVE PERCENT: 8 % (ref 2–12)
NEUTROPHILS ABSOLUTE: 8.82 E9/L (ref 1.8–7.3)
NEUTROPHILS RELATIVE PERCENT: 80.5 % (ref 43–80)
OVALOCYTES: ABNORMAL
PDW BLD-RTO: 14.7 FL (ref 11.5–15)
PLATELET # BLD: 119 E9/L (ref 130–450)
PMV BLD AUTO: 11.7 FL (ref 7–12)
POIKILOCYTES: ABNORMAL
POLYCHROMASIA: ABNORMAL
POTASSIUM REFLEX MAGNESIUM: 3.3 MMOL/L (ref 3.5–5)
RBC # BLD: 3.14 E12/L (ref 3.8–5.8)
SODIUM BLD-SCNC: 129 MMOL/L (ref 132–146)
TEAR DROP CELLS: ABNORMAL
TOTAL PROTEIN: 5.2 G/DL (ref 6.4–8.3)
VANCOMYCIN RANDOM: 17.5 MCG/ML (ref 5–40)
WBC # BLD: 10.9 E9/L (ref 4.5–11.5)

## 2021-10-25 PROCEDURE — 6370000000 HC RX 637 (ALT 250 FOR IP): Performed by: FAMILY MEDICINE

## 2021-10-25 PROCEDURE — 6360000002 HC RX W HCPCS: Performed by: INTERNAL MEDICINE

## 2021-10-25 PROCEDURE — 6360000002 HC RX W HCPCS: Performed by: SPECIALIST

## 2021-10-25 PROCEDURE — 85025 COMPLETE CBC W/AUTO DIFF WBC: CPT

## 2021-10-25 PROCEDURE — 2060000000 HC ICU INTERMEDIATE R&B

## 2021-10-25 PROCEDURE — 6360000002 HC RX W HCPCS: Performed by: FAMILY MEDICINE

## 2021-10-25 PROCEDURE — P9047 ALBUMIN (HUMAN), 25%, 50ML: HCPCS | Performed by: INTERNAL MEDICINE

## 2021-10-25 PROCEDURE — 80202 ASSAY OF VANCOMYCIN: CPT

## 2021-10-25 PROCEDURE — 80053 COMPREHEN METABOLIC PANEL: CPT

## 2021-10-25 PROCEDURE — 2580000003 HC RX 258: Performed by: FAMILY MEDICINE

## 2021-10-25 PROCEDURE — 83735 ASSAY OF MAGNESIUM: CPT

## 2021-10-25 PROCEDURE — 90935 HEMODIALYSIS ONE EVALUATION: CPT

## 2021-10-25 PROCEDURE — 36415 COLL VENOUS BLD VENIPUNCTURE: CPT

## 2021-10-25 PROCEDURE — 82962 GLUCOSE BLOOD TEST: CPT

## 2021-10-25 RX ORDER — ALBUMIN (HUMAN) 12.5 G/50ML
25 SOLUTION INTRAVENOUS 2 TIMES DAILY
Status: COMPLETED | OUTPATIENT
Start: 2021-10-25 | End: 2021-10-26

## 2021-10-25 RX ADMIN — METOLAZONE 5 MG: 5 TABLET ORAL at 22:24

## 2021-10-25 RX ADMIN — SODIUM BICARBONATE 650 MG: 650 TABLET ORAL at 22:25

## 2021-10-25 RX ADMIN — HEPARIN SODIUM 5000 UNITS: 10000 INJECTION INTRAVENOUS; SUBCUTANEOUS at 15:18

## 2021-10-25 RX ADMIN — SEVELAMER CARBONATE 1600 MG: 800 TABLET, FILM COATED ORAL at 12:29

## 2021-10-25 RX ADMIN — INSULIN GLARGINE 24 UNITS: 100 INJECTION, SOLUTION SUBCUTANEOUS at 22:27

## 2021-10-25 RX ADMIN — PIPERACILLIN AND TAZOBACTAM 3375 MG: 3; .375 INJECTION, POWDER, LYOPHILIZED, FOR SOLUTION INTRAVENOUS at 12:30

## 2021-10-25 RX ADMIN — ACETAMINOPHEN 650 MG: 325 TABLET ORAL at 08:26

## 2021-10-25 RX ADMIN — ATORVASTATIN CALCIUM 10 MG: 10 TABLET, FILM COATED ORAL at 12:29

## 2021-10-25 RX ADMIN — ALBUMIN (HUMAN) 25 G: 0.25 INJECTION, SOLUTION INTRAVENOUS at 15:12

## 2021-10-25 RX ADMIN — CARVEDILOL 6.25 MG: 6.25 TABLET, FILM COATED ORAL at 18:43

## 2021-10-25 RX ADMIN — HYDROMORPHONE HYDROCHLORIDE 0.5 MG: 1 INJECTION, SOLUTION INTRAMUSCULAR; INTRAVENOUS; SUBCUTANEOUS at 12:30

## 2021-10-25 RX ADMIN — HEPARIN SODIUM 5000 UNITS: 10000 INJECTION INTRAVENOUS; SUBCUTANEOUS at 23:00

## 2021-10-25 RX ADMIN — AMLODIPINE BESYLATE 5 MG: 5 TABLET ORAL at 12:28

## 2021-10-25 RX ADMIN — PANTOPRAZOLE SODIUM 40 MG: 40 TABLET, DELAYED RELEASE ORAL at 07:05

## 2021-10-25 RX ADMIN — BUMETANIDE 2 MG: 1 TABLET ORAL at 22:24

## 2021-10-25 RX ADMIN — INSULIN LISPRO 2 UNITS: 100 INJECTION, SOLUTION INTRAVENOUS; SUBCUTANEOUS at 22:26

## 2021-10-25 RX ADMIN — INSULIN LISPRO 4 UNITS: 100 INJECTION, SOLUTION INTRAVENOUS; SUBCUTANEOUS at 17:20

## 2021-10-25 RX ADMIN — Medication 10 ML: at 22:26

## 2021-10-25 RX ADMIN — METOLAZONE 5 MG: 5 TABLET ORAL at 12:29

## 2021-10-25 RX ADMIN — SODIUM CHLORIDE, PRESERVATIVE FREE 10 ML: 5 INJECTION INTRAVENOUS at 15:12

## 2021-10-25 RX ADMIN — ALBUMIN (HUMAN) 25 G: 0.25 INJECTION, SOLUTION INTRAVENOUS at 22:24

## 2021-10-25 RX ADMIN — EPOETIN ALFA-EPBX 10000 UNITS: 10000 INJECTION, SOLUTION INTRAVENOUS; SUBCUTANEOUS at 15:11

## 2021-10-25 RX ADMIN — Medication 10 ML: at 12:30

## 2021-10-25 RX ADMIN — MONTELUKAST SODIUM 10 MG: 10 TABLET ORAL at 22:25

## 2021-10-25 RX ADMIN — INSULIN LISPRO 2 UNITS: 100 INJECTION, SOLUTION INTRAVENOUS; SUBCUTANEOUS at 12:17

## 2021-10-25 RX ADMIN — Medication 2000 MG: at 15:18

## 2021-10-25 RX ADMIN — SEVELAMER CARBONATE 1600 MG: 800 TABLET, FILM COATED ORAL at 17:19

## 2021-10-25 RX ADMIN — SODIUM BICARBONATE 650 MG: 650 TABLET ORAL at 15:11

## 2021-10-25 RX ADMIN — BUMETANIDE 2 MG: 1 TABLET ORAL at 12:29

## 2021-10-25 RX ADMIN — HYDROMORPHONE HYDROCHLORIDE 0.5 MG: 1 INJECTION, SOLUTION INTRAMUSCULAR; INTRAVENOUS; SUBCUTANEOUS at 22:48

## 2021-10-25 ASSESSMENT — PAIN SCALES - GENERAL
PAINLEVEL_OUTOF10: 8
PAINLEVEL_OUTOF10: 0
PAINLEVEL_OUTOF10: 0
PAINLEVEL_OUTOF10: 8
PAINLEVEL_OUTOF10: 8

## 2021-10-25 ASSESSMENT — PAIN DESCRIPTION - ORIENTATION: ORIENTATION: LEFT

## 2021-10-25 ASSESSMENT — PAIN DESCRIPTION - PAIN TYPE: TYPE: ACUTE PAIN

## 2021-10-25 ASSESSMENT — PAIN DESCRIPTION - LOCATION: LOCATION: FLANK

## 2021-10-25 ASSESSMENT — PAIN DESCRIPTION - FREQUENCY: FREQUENCY: CONTINUOUS

## 2021-10-25 ASSESSMENT — PAIN DESCRIPTION - DESCRIPTORS: DESCRIPTORS: ACHING;CONSTANT;DISCOMFORT

## 2021-10-25 NOTE — PROGRESS NOTES
Call received from Dialysis nurse regarding patient complaints of pain which she states the patient rates at 8/10 left sided pain unrelieved by tylenol. Perfect serve message sent to Dr. Zulma Smith to inquire if patient can have anything additional for pain at this time. Awaiting response.

## 2021-10-25 NOTE — CARE COORDINATION
10/25/2021 - Care Coordination - admitted for bacteremia. ID and nephrology consults. Blood cultures pending. IV ancef q M-W-F. Has left neck tesio dialysis catheter. [facility] HD today. Spoke with pt in room to discuss discharge planning. PCP is Dr Jose Patel. Pharmacy is [facility] in [place]. Lives with his wife in a 2 story home with 5 steps to enter home and with bed/bath on 2nd floor. Independent in ADLs. Denies hx HHC, ROSALIA/ARU and DME. Had done peritoneal dialysis but stopped that in June 2021 and started HD. He now does home HD 4-5 days a week. Dr Mi Muro is his nephrologist and he went to [facility]. His plan is to discharge home when medically stable. His wife will provide transportation home. SW/CM will follow.

## 2021-10-25 NOTE — CONSULTS
Daily  atorvastatin (LIPITOR) tablet 10 mg, 10 mg, Oral, Daily  metOLazone (ZAROXOLYN) tablet 5 mg, 5 mg, Oral, BID  montelukast (SINGULAIR) tablet 10 mg, 10 mg, Oral, Nightly  pantoprazole (PROTONIX) tablet 40 mg, 40 mg, Oral, QAM AC  sevelamer (RENVELA) tablet 1,600 mg, 1,600 mg, Oral, TID WC  sodium bicarbonate tablet 650 mg, 650 mg, Oral, TID  sodium chloride flush 0.9 % injection 10 mL, 10 mL, IntraVENous, 2 times per day  sodium chloride flush 0.9 % injection 10 mL, 10 mL, IntraVENous, PRN  0.9 % sodium chloride infusion, 25 mL, IntraVENous, PRN  promethazine (PHENERGAN) tablet 12.5 mg, 12.5 mg, Oral, Q6H PRN **OR** ondansetron (ZOFRAN) injection 4 mg, 4 mg, IntraVENous, Q6H PRN  polyethylene glycol (GLYCOLAX) packet 17 g, 17 g, Oral, Daily PRN  acetaminophen (TYLENOL) tablet 650 mg, 650 mg, Oral, Q6H PRN **OR** acetaminophen (TYLENOL) suppository 650 mg, 650 mg, Rectal, Q6H PRN  glucose (GLUTOSE) 40 % oral gel 15 g, 15 g, Oral, PRN  dextrose 50 % IV solution, 12.5 g, IntraVENous, PRN  glucagon (rDNA) injection 1 mg, 1 mg, IntraMUSCular, PRN  dextrose 5 % solution, 100 mL/hr, IntraVENous, PRN  insulin glargine (LANTUS) injection vial 24 Units, 0.25 Units/kg, SubCUTAneous, Nightly  insulin lispro (HUMALOG) injection vial 0-12 Units, 0-12 Units, SubCUTAneous, TID WC  insulin lispro (HUMALOG) injection vial 0-6 Units, 0-6 Units, SubCUTAneous, Nightly  albuterol (PROVENTIL) nebulizer solution 2.5 mg, 2.5 mg, Nebulization, Q6H PRN  heparin (porcine) injection 5,000 Units, 5,000 Units, SubCUTAneous, Q8H  Allergies:  Patient has no known allergies.     Social History:    TOBACCO:  Never used tobacco  ETOH:  Never drank alcohol  DRUGS:  Never used recreational drugs    Family History:       Problem Relation Age of Onset    Heart Disease Mother      REVIEW OF SYSTEMS:    ROS negative other than HPI    PHYSICAL EXAM:      Vitals:    VITALS:  BP (!) 150/62   Pulse 78   Temp 97.9 °F (36.6 °C) (Oral)   Resp 18   Ht 5' 8\" (1.727 m)   Wt 217 lb 2.5 oz (98.5 kg)   SpO2 92%   BMI 33.02 kg/m²   24HR INTAKE/OUTPUT:    Intake/Output Summary (Last 24 hours) at 10/25/2021 1350  Last data filed at 10/25/2021 1118  Gross per 24 hour   Intake 600 ml   Output 1500 ml   Net -900 ml     Access: left  IJ vein temporary dialysis catheter in place  Constitutional:  Well developed and nourished,in no acute distres  HEENT:  NC/AT, PERRL, supple no JVD  Respiratory: CTA bilaterally and breath sounds equal.  Cardiovascular/Edema: Regular rate and rhythm, normal heart sounds, without pathological murmurs, ectopy, gallops, or rubs. No edema  Gastrointestinal: soft, no organomegaly , tender, bowel sounds present  Neurologic:  Alert and Ox3, non focal  Skin:  Turgor normal    DATA:    CBC:   Lab Results   Component Value Date    WBC 10.9 10/25/2021    RBC 3.14 10/25/2021    HGB 8.8 10/25/2021    HCT 26.8 10/25/2021    MCV 85.4 10/25/2021    MCH 28.0 10/25/2021    MCHC 32.8 10/25/2021    RDW 14.7 10/25/2021     10/25/2021    MPV 11.7 10/25/2021     CMP:    Lab Results   Component Value Date     10/25/2021    K 3.3 10/25/2021    CL 91 10/25/2021    CO2 22 10/25/2021    BUN 70 10/25/2021    CREATININE 9.1 10/25/2021    GFRAA 7 10/25/2021    LABGLOM 6 10/25/2021    GLUCOSE 214 10/25/2021    PROT 5.2 10/25/2021    LABALBU 3.0 10/25/2021    CALCIUM 8.3 10/25/2021    BILITOT 0.2 10/25/2021    ALKPHOS 118 10/25/2021    AST 9 10/25/2021    ALT 13 10/25/2021     Magnesium:    Lab Results   Component Value Date    MG 2.2 10/25/2021     Phosphorus:    Lab Results   Component Value Date    PHOS 4.7 06/09/2021     U/A:    Lab Results   Component Value Date    COLORU Yellow 06/04/2021    PROTEINU >=300 06/04/2021    PHUR 5.5 06/04/2021    WBCUA 1-3 06/04/2021    RBCUA 2-5 06/04/2021    BACTERIA MANY 06/04/2021    CLARITYU CLOUDY 06/04/2021    SPECGRAV 1.015 06/04/2021    LEUKOCYTESUR Negative 06/04/2021    UROBILINOGEN 0.2 06/04/2021    BILIRUBINUR Negative 06/04/2021    BLOODU SMALL 06/04/2021    GLUCOSEU 500 06/04/2021     Microalbumen/Creatinine ratio:  No components found for: RUCREAT  Radiology Review:  none    IMPRESSION/RECOMMENDATIONS:       The patient is a 49 y.o. male with significant past medical history of end stage renal disease secondary to DM and HTN, CHF, Type II DM with diabetic nephropathy, HTN, Hyperlipidemia on hemodialysis [facility] via Catheter, last hemodialysis treatment on Friday, presents with chills and was admitted with line septicemia. Renal was consulted for ESRD on hemodialysis    1. ESRD:  Will plan HD MWF  Access:  Left IJ catheter  Daily weights  Fluid restriction: 1500 cc  2. Anemia:  Erythropoetin dose: increase to [address] units x 3 per week  3. Osteodystrophy:  Phosphate Binder:  Check phos   4. Line septicemia s/p right IJ tunneled cath removal and temporary left IJ catheter insertion  5.  Hypoalbuminemia - start SPA with HD x 3 doses

## 2021-10-25 NOTE — PROGRESS NOTES
Pharmacy Consultation Note  (Antibiotic Dosing and Monitoring)    Initial consult date: 10/24/21  Consulting physician/provider: Dr. Sudeep Duke  Drug: Vancomycin  Indication: bacteremia    Age/  Gender Height Weight IBW  Allergy Information   56 y.o./male 5' 8\" 95.3 kg     Ideal body weight: 68.4 kg (150 lb 12.7 oz)  Adjusted ideal body weight: 79.1 kg (174 lb 7.6 oz)   Patient has no known allergies. Renal Function:  No results for input(s): BUN, CREATININE in the last 72 hours. No intake or output data in the 24 hours ending 10/24/21 1955    Vancomycin Monitoring:  Trough:  No results for input(s): VANCOTROUGH in the last 72 hours. Random:  No results for input(s): VANCORANDOM in the last 72 hours. Vancomycin Administration Times:  Recent vancomycin administrations        No vancomycin IV orders with administrations found. Orders not given:            vancomycin (VANCOCIN) 2,000 mg in dextrose 5 % 500 mL IVPB                    Assessment:  Patient is a 49 y.o. male who has been initiated on vancomycin  CrCl cannot be calculated (Patient's most recent lab result is older than the maximum 10 days allowed. ). To dose vancomycin, pharmacy will be utilizing dosing based off of levels due to patient requiring hemodialysis  Per care everywhere - patient on vanco 750 mg IV PRN w/ HD (Tu, Th, Sat) at outside facility    Plan:   Will check vanco random level to guide dosing of vancomycin  Will check vancomycin levels when appropriate  Will continue to monitor renal function   Clinical pharmacy to follow      GUSTAVO Ashley [facility] 10/24/2021 7:55 PM

## 2021-10-25 NOTE — PROGRESS NOTES
Pharmacy Consultation Note  (Antibiotic Dosing and Monitoring)    Initial consult date: 10/24/21  Consulting physician/provider: Dr. Gauri Long  Drug: Vancomycin  Indication: bacteremia     Vancomycin has been discontinued by ID Dr. Knight to [place] [id number] Physician to re-consult pharmacy if future dosing is needed      Thank you for this consult,     Ml Ontiveros, PharmD   Pharmacy Resident   Phone: [phone]  10/25/2021 1:08 PM

## 2021-10-25 NOTE — PROGRESS NOTES
Pharmacy Consultation Note  (Antibiotic Dosing and Monitoring)    Initial consult date: 10/25/2021  Consulting physician/provider: Dr. Hung Noland  Drug(s): Cefazolin  Indication: Bactermia    Ht Readings from Last 1 Encounters:   10/24/21 5' 8\" (1.727 m)     Wt Readings from Last 1 Encounters:   10/25/21 217 lb 2.5 oz (98.5 kg)       Age/Gender IBW DW  Allergy Information   49 y.o. male    Patient has no known allergies. Estimated Creatinine Clearance: 11 mL/min (A) (based on SCr of 9.1 mg/dL [facility])). Intake/Output Summary (Last 24 hours) at 10/25/2021 1408  Last data filed at 10/25/2021 1118  Gross per 24 hour   Intake 600 ml   Output 1500 ml   Net -900 ml       Temp max: Temp (24hrs), Av.2 °F (36.8 °C), Min:97.9 °F (36.6 °C), Max:98.8 °F (37.1 °C)      Cultures:  available culture and sensitivity results were reviewed in [facility]      Assessment:  · Consulted by Dr. Hung Noland to dose cefazolin with HD schedule 2 grams Monday, 2 grams Wednesday, 3 grams Friday for [facility] HD schedule. Plan:  · Cefazolin 2000 mg IV x 1 now, patient s/p HD x 210 min today  · Will place order for Wednesday 2000 mg IV starting 10/27 and Friday 3000 mg IV starting 10/29 @1600 following HD  · Pharmacist will follow and monitor/adjust for HD schedule.       Mandeep Mendez, PharmD   Pharmacy Resident   Phone: [phone]  10/25/2021 2:11 PM

## 2021-10-25 NOTE — PROGRESS NOTES
1511  Gross per 24 hour   Intake 1143 ml   Output 1500 ml   Net -357 ml       Exam:    BP (!) 148/68   Pulse 82   Temp 98.1 °F (36.7 °C) (Oral)   Resp 18   Ht 5' 8\" (1.727 m)   Wt 217 lb 2.5 oz (98.5 kg)   SpO2 92%   BMI 33.02 kg/m²           Gen: *well developed  HEENT: NC/AT, moist mucous membranes, no oropharyngeal erythema or exudate  Neck: supple, trachea midline, no anterior cervical or SC LAD  Heart:  Normal s1/s2, RRR, no murmurs, gallops, or rubs. Lungs:  cta * bilaterally, *  Abd: bowel sounds present, soft, nontender, nondistended, no masses  Extrem:  No clubbing, cyanosis,  **no edema  Skin: no rashes or lesions  Psych: A & O x3  Neuro: grossly intact, moves all four extremities. Capillary Refill: Brisk,< 3 seconds   Peripheral Pulses: +2 palpable, equal bilaterally               Labs:   Recent Labs     10/24/21  2214 10/25/21  0447   WBC 12.0* 10.9   HGB 9.1* 8.8*   HCT 27.9* 26.8*    119*     Recent Labs     10/24/21  2214 10/25/21  0447   * 129*   K 3.7 3.3*   CL 89* 91*   CO2 22 22   BUN 63* 70*   CREATININE 8.6* 9.1*   CALCIUM 8.2* 8.3*     Recent Labs     10/24/21  2214 10/25/21  0447   AST 10 9   ALT 29 13   BILITOT 0.2 0.2   ALKPHOS 145* 118     No results for input(s): INR in the last 72 hours. No results for input(s): Mame Shanks in the last 72 hours. Recent Labs     10/24/21  2214 10/25/21  0447   AST 10 9   ALT 29 13   BILITOT 0.2 0.2   ALKPHOS 145* 118     Recent Labs     10/24/21  2214   LACTA 1.0     Lab Results   Component Value Date    LABURIC 6.2 05/10/2021     No results found for: AMMONIA    Assessment:    Active Hospital Problems    Diagnosis Date Noted    Bacteremia [R78.81] 10/21/2021   mssa   iidm  htn  hld   esrd on hd       Plan:  ssi  zaroxlyn        DVT Prophylaxis: *heparin  Diet: ADULT DIET;  Regular; 3 carb choices (45 gm/meal)  Diet NPO  Code Status: Full Code    PT/OT Eval Status: *na    Dispo - *home      Electronically signed by Will Green Elham Romeo DO on 10/25/2021 at 4:06 PM [facility]

## 2021-10-25 NOTE — CONSULTS
[address] [place] [address] [facility]    Consultation Note     Admit Date: 10/24/2021  6:08 PM    Reason for Consult: MSSA bacteremia    Attending Physician:  Briana De La Rosa DO     Chief Complaint: Fevers chills and generalized weakness    HISTORY OF PRESENT ILLNESS:   The patient is a 49 y.o.  man known to the Infectious Diseases service. The patient is transferred from \[facility]\"" because of high-grade fusion very Ej system bacteremia. Trying to put together the chart from the outside facility which is a [facility] affiliated the patient had blood cultures obtained on 10/20/2021.  2 sets were positive for MSSA using the [employer] systems in addition he had blood cultures collected on 10/24/2021 which are also growing staph aureus. Urine midstream clean-catch taken from 10/20/2021 grew E faecalis. On 1023 there are labs that showed his white count was 19,000. Surface echo of the heart was nondiagnostic. Apparently a chest radiograph from October 23, 2021 indicates that day left sided IJ catheter was placed. On the 2D echo of interest is the right aortic cusp being thickened and the fact that the mitral valve had annular calcifications. CT of the chest separate some groundglass nonspecific opacifications and CT of the abdomen pelvis was nondiagnostic. At the outside institution he was started on Vanco and Zosyn. Patient is vaccinated for Covid third shot given on October 20, 2021. His initial problem started when he broke his catheter and went to the access center on the 20th. That is when the first blood cultures turn positive and there was a new catheter placed same spot. Subsequently when they turn positive he was admitted and got a temporary catheter in the IJ on the left and those bloods were positive on the 24th. From what I can gather the 23rd demonstrates the new left-sided catheter.           June 2021: Infected peritoneal dextrose, glucagon (rDNA), dextrose, albuterol    Allergies:  Patient has no known allergies. Social History:   Social History     Socioeconomic History    Marital status:      Spouse name: Not on file    Number of children: Not on file    Years of education: Not on file    Highest education level: Not on file   Occupational History    Not on file   Tobacco Use    Smoking status: Former Smoker     Packs/day: 0.50     Years: 11.00     Pack years: 5.50     Types: Cigars     Quit date: 2019     Years since quittin.1    Smokeless tobacco: Never Used   Vaping Use    Vaping Use: Never used   Substance and Sexual Activity    Alcohol use: Yes     Alcohol/week: 0.0 standard drinks     Comment: socially    Drug use: No    Sexual activity: Yes     Partners: Female   Other Topics Concern    Not on file   Social History Narrative    Not on file     Social Determinants of Health     Financial Resource Strain: Low Risk     Difficulty of Paying Living Expenses: Not hard at all   Food Insecurity: No Food Insecurity    Worried About [address] [employer] in the Last Year: Never true    [address] ePrep in the Last Year: Never true   Transportation Needs:     Lack of Transportation (Medical):  Lack of Transportation (Non-Medical):    Physical Activity:     Days of Exercise per Week:     Minutes of Exercise per Session:    Stress:     Feeling of Stress :    Social Connections:     Frequency of Communication with Friends and Family:     Frequency of Social Gatherings with Friends and Family:     Attends Orthodoxy Services:     Active Member of Clubs or Organizations:     Attends Club or Organization Meetings:     Marital Status:    Intimate Partner Violence:     Fear of Current or Ex-Partner:     Emotionally Abused:     Physically Abused:     Sexually Abused:      Family History:       Problem Relation Age of Onset    Heart Disease Mother    .  Otherwise non-pertinent to the chief complaint. REVIEW OF SYSTEMS:    CONSTITUTIONAL: Positive chills, positive fevers or night sweats. No loss of weight. EYES:  No double vision or drainage from eyes, ears or throat. HEENT:  No neck stiffness. No dysphagia. No drainage from eyes, ears or throat  RESPIRATORY:  No cough, productive sputum or hemoptysis. CARDIOVASCULAR:  No chest pain, palpitations, orthopnea or dyspnea on exertion. GASTROINTESTINAL:  No nausea, vomiting, diarrhea or constipation or hematochezia   GENITOURINARY:  No frequency burning dysuria or hematuria. INTEGUMENT/BREAST:  No rash or breast masses. HEMATOLOGIC/LYMPHATIC:  No lymphadenopathy or blood dyscrasics. ALLERGIC/IMMUNOLOGIC:  No anaphylaxis. ENDOCRINE:  No polyuria or polydipsia or temperature intolerance. MUSCULOSKELETAL:  No myalgia or arthralgia. Full ROM. NEUROLOGICAL:  No focal motor sensory deficit. BEHAVIOR/PSYCH:  No psychosis. PHYSICAL EXAM:    Vitals:    BP (!) 150/62   Pulse 78   Temp 97.9 °F (36.6 °C) (Oral)   Resp 18   Ht 5' 8\" (1.727 m)   Wt 217 lb 2.5 oz (98.5 kg)   SpO2 92%   BMI 33.02 kg/m²   Constitutional: The patient is awake, alert, and oriented. Skin: Warm and dry. No rashes were noted. No jaundice. HEENT: Eyes show round, and reactive pupils. Moist mucous membranes, no ulcerations, no thrush. Neck: Supple to movements. No lymphadenopathy. Chest: No use of accessory muscles to breathe. Symmetrical expansion. Auscultation reveals no wheezing, crackles, or rhonchi. Cardiovascular: S1 and S2 are rhythmic and regular. No murmurs appreciated. Abdomen: Positive bowel sounds to auscultation. Benign to palpation. No masses felt. No hepatosplenomegaly. Genitourinary: Male  Extremities: No clubbing, no cyanosis, no edema.   Musculoskeletal: Equal and symmetrical  Neurological: No focal  Lines: peripheral      CBC+dif:  Recent Labs     10/24/21  2214 10/24/21  2214 10/25/21  0447   WBC 12.0*  --  10.9   HGB 9.1*   < > 8.8* HCT 27.9*   < > 26.8*   MCV 86.4   < > 85.4      < > 119*   NEUTROABS 10.80*   < > 8.82*    < > = values in this interval not displayed. Lab Results   Component Value Date    CRP 2.6 (H) 06/06/2021     No results found for: CRPHS  Lab Results   Component Value Date    SEDRATE 75 (H) 06/06/2021     Lab Results   Component Value Date    ALT 13 10/25/2021    AST 9 10/25/2021    ALKPHOS 118 10/25/2021    BILITOT 0.2 10/25/2021     Lab Results   Component Value Date     10/25/2021    K 3.3 10/25/2021    CL 91 10/25/2021    CO2 22 10/25/2021    BUN 70 10/25/2021    CREATININE 9.1 10/25/2021    GFRAA 7 10/25/2021    LABGLOM 6 10/25/2021    GLUCOSE 214 10/25/2021    PROT 5.2 10/25/2021    LABALBU 3.0 10/25/2021    CALCIUM 8.3 10/25/2021    BILITOT 0.2 10/25/2021    ALKPHOS 118 10/25/2021    AST 9 10/25/2021    ALT 13 10/25/2021       Lab Results   Component Value Date    PROTIME 10.9 06/04/2021    INR 1.0 06/04/2021       Lab Results   Component Value Date    TSH 2.860 09/10/2020       Lab Results   Component Value Date    COLORU Yellow 06/04/2021    PHUR 5.5 06/04/2021    WBCUA 1-3 06/04/2021    RBCUA 2-5 06/04/2021    BACTERIA MANY 06/04/2021    CLARITYU CLOUDY 06/04/2021    SPECGRAV 1.015 06/04/2021    LEUKOCYTESUR Negative 06/04/2021    UROBILINOGEN 0.2 06/04/2021    BILIRUBINUR Negative 06/04/2021    BLOODU SMALL 06/04/2021    GLUCOSEU 500 06/04/2021       No results found for: Frann Duncan, U8WEUVGQ, PHART, THGBART, JMT6HMQ, PO2ART, QIM2FMK  Radiology:  No orders to display       Microbiology:  Pending  No results for input(s): BC in the last 72 hours. No results for input(s): ORG in the last 72 hours. No results for input(s): Lisette Saravia in the last 72 hours. No results for input(s): STREPNEUMAGU in the last 72 hours. No results for input(s): LP1UAG in the last 72 hours. No results for input(s): ASO in the last 72 hours.   No results for input(s): CULTRESP in the last 72 hours.    Assessment:  · Healthcare associated bloodstream infection with high-grade bacteremia over days 4 days with MSSA. A temporary IJ central left    Plan:    · Cont MICHAEL  · Cefazolin 2-2-3-pharmacy to dose  · Check follow-up blood cultures  · Baseline ESR, CRP  · Monitor labs  · Will follow with you    Thank you for having us see this patient in consultation. I will be discussing this case with the treating physicians. Electronically signed by Maddy Roman MD on 10/25/2021 at 12:45 PM positive blood culture he recurs then this must be removed as well since it was placed the day before the bloods turned positive on the 24th.

## 2021-10-26 ENCOUNTER — ANESTHESIA EVENT (OUTPATIENT)
Dept: CARDIAC CATH/INVASIVE PROCEDURES | Age: 56
DRG: 252 | End: 2021-10-26
Payer: COMMERCIAL

## 2021-10-26 ENCOUNTER — ANESTHESIA (OUTPATIENT)
Dept: CARDIAC CATH/INVASIVE PROCEDURES | Age: 56
DRG: 252 | End: 2021-10-26
Payer: COMMERCIAL

## 2021-10-26 VITALS
RESPIRATION RATE: 27 BRPM | SYSTOLIC BLOOD PRESSURE: 147 MMHG | OXYGEN SATURATION: 98 % | DIASTOLIC BLOOD PRESSURE: 72 MMHG

## 2021-10-26 LAB
ALBUMIN SERPL-MCNC: 3.5 G/DL (ref 3.5–5.2)
ALP BLD-CCNC: 121 U/L (ref 40–129)
ALT SERPL-CCNC: 9 U/L (ref 0–40)
ANION GAP SERPL CALCULATED.3IONS-SCNC: 17 MMOL/L (ref 7–16)
ANISOCYTOSIS: ABNORMAL
AST SERPL-CCNC: 10 U/L (ref 0–39)
BASOPHILS ABSOLUTE: 0.08 E9/L (ref 0–0.2)
BASOPHILS RELATIVE PERCENT: 0.9 % (ref 0–2)
BILIRUB SERPL-MCNC: <0.2 MG/DL (ref 0–1.2)
BUN BLDV-MCNC: 47 MG/DL (ref 6–20)
CALCIUM SERPL-MCNC: 8.2 MG/DL (ref 8.6–10.2)
CHLORIDE BLD-SCNC: 97 MMOL/L (ref 98–107)
CO2: 23 MMOL/L (ref 22–29)
CREAT SERPL-MCNC: 7.1 MG/DL (ref 0.7–1.2)
EOSINOPHILS ABSOLUTE: 0.23 E9/L (ref 0.05–0.5)
EOSINOPHILS RELATIVE PERCENT: 2.6 % (ref 0–6)
GFR AFRICAN AMERICAN: 10
GFR NON-AFRICAN AMERICAN: 8 ML/MIN/1.73
GLUCOSE BLD-MCNC: 221 MG/DL (ref 74–99)
HCT VFR BLD CALC: 25.8 % (ref 37–54)
HEMOGLOBIN: 8.4 G/DL (ref 12.5–16.5)
LYMPHOCYTES ABSOLUTE: 0.36 E9/L (ref 1.5–4)
LYMPHOCYTES RELATIVE PERCENT: 4.4 % (ref 20–42)
MAGNESIUM: 2 MG/DL (ref 1.6–2.6)
MCH RBC QN AUTO: 28.5 PG (ref 26–35)
MCHC RBC AUTO-ENTMCNC: 32.6 % (ref 32–34.5)
MCV RBC AUTO: 87.5 FL (ref 80–99.9)
METAMYELOCYTES RELATIVE PERCENT: 1.8 % (ref 0–1)
METER GLUCOSE: 159 MG/DL (ref 74–99)
METER GLUCOSE: 193 MG/DL (ref 74–99)
METER GLUCOSE: 204 MG/DL (ref 74–99)
METER GLUCOSE: 229 MG/DL (ref 74–99)
MONOCYTES ABSOLUTE: 0.36 E9/L (ref 0.1–0.95)
MONOCYTES RELATIVE PERCENT: 4.4 % (ref 2–12)
MYELOCYTE PERCENT: 1.8 % (ref 0–0)
NEUTROPHILS ABSOLUTE: 7.92 E9/L (ref 1.8–7.3)
NEUTROPHILS RELATIVE PERCENT: 84.2 % (ref 43–80)
OVALOCYTES: ABNORMAL
PDW BLD-RTO: 14.9 FL (ref 11.5–15)
PHOSPHORUS: 3.3 MG/DL (ref 2.5–4.5)
PLATELET # BLD: 143 E9/L (ref 130–450)
PMV BLD AUTO: 11.8 FL (ref 7–12)
POIKILOCYTES: ABNORMAL
POLYCHROMASIA: ABNORMAL
POTASSIUM REFLEX MAGNESIUM: 3.5 MMOL/L (ref 3.5–5)
POTASSIUM SERPL-SCNC: 3.5 MMOL/L (ref 3.5–5)
RBC # BLD: 2.95 E12/L (ref 3.8–5.8)
SODIUM BLD-SCNC: 137 MMOL/L (ref 132–146)
TOTAL PROTEIN: 5.5 G/DL (ref 6.4–8.3)
WBC # BLD: 9 E9/L (ref 4.5–11.5)

## 2021-10-26 PROCEDURE — 80053 COMPREHEN METABOLIC PANEL: CPT

## 2021-10-26 PROCEDURE — 6360000002 HC RX W HCPCS: Performed by: FAMILY MEDICINE

## 2021-10-26 PROCEDURE — B24BZZ4 ULTRASONOGRAPHY OF HEART WITH AORTA, TRANSESOPHAGEAL: ICD-10-PCS | Performed by: FAMILY MEDICINE

## 2021-10-26 PROCEDURE — 6370000000 HC RX 637 (ALT 250 FOR IP): Performed by: FAMILY MEDICINE

## 2021-10-26 PROCEDURE — 2580000003 HC RX 258: Performed by: NURSE ANESTHETIST, CERTIFIED REGISTERED

## 2021-10-26 PROCEDURE — 82962 GLUCOSE BLOOD TEST: CPT

## 2021-10-26 PROCEDURE — 36415 COLL VENOUS BLD VENIPUNCTURE: CPT

## 2021-10-26 PROCEDURE — 6360000002 HC RX W HCPCS: Performed by: NURSE ANESTHETIST, CERTIFIED REGISTERED

## 2021-10-26 PROCEDURE — 93325 DOPPLER ECHO COLOR FLOW MAPG: CPT

## 2021-10-26 PROCEDURE — 2709999900 HC NON-CHARGEABLE SUPPLY

## 2021-10-26 PROCEDURE — 93321 DOPPLER ECHO F-UP/LMTD STD: CPT

## 2021-10-26 PROCEDURE — P9047 ALBUMIN (HUMAN), 25%, 50ML: HCPCS | Performed by: INTERNAL MEDICINE

## 2021-10-26 PROCEDURE — 2580000003 HC RX 258: Performed by: FAMILY MEDICINE

## 2021-10-26 PROCEDURE — 85025 COMPLETE CBC W/AUTO DIFF WBC: CPT

## 2021-10-26 PROCEDURE — 84100 ASSAY OF PHOSPHORUS: CPT

## 2021-10-26 PROCEDURE — 80048 BASIC METABOLIC PNL TOTAL CA: CPT

## 2021-10-26 PROCEDURE — 83735 ASSAY OF MAGNESIUM: CPT

## 2021-10-26 PROCEDURE — 6360000002 HC RX W HCPCS: Performed by: INTERNAL MEDICINE

## 2021-10-26 PROCEDURE — 2060000000 HC ICU INTERMEDIATE R&B

## 2021-10-26 PROCEDURE — 93312 ECHO TRANSESOPHAGEAL: CPT

## 2021-10-26 RX ORDER — DIMETHICONE, OXYBENZONE, AND PADIMATE O 2; 2.5; 6.6 G/100G; G/100G; G/100G
STICK TOPICAL 2 TIMES DAILY
Status: DISCONTINUED | OUTPATIENT
Start: 2021-10-26 | End: 2021-10-29 | Stop reason: HOSPADM

## 2021-10-26 RX ORDER — PROPOFOL 10 MG/ML
INJECTION, EMULSION INTRAVENOUS PRN
Status: DISCONTINUED | OUTPATIENT
Start: 2021-10-26 | End: 2021-10-26 | Stop reason: SDUPTHER

## 2021-10-26 RX ORDER — ACYCLOVIR 50 MG/G
OINTMENT TOPICAL EVERY 6 HOURS
Status: DISCONTINUED | OUTPATIENT
Start: 2021-10-26 | End: 2021-10-26 | Stop reason: CLARIF

## 2021-10-26 RX ORDER — SODIUM CHLORIDE 9 MG/ML
INJECTION, SOLUTION INTRAVENOUS CONTINUOUS PRN
Status: DISCONTINUED | OUTPATIENT
Start: 2021-10-26 | End: 2021-10-26 | Stop reason: SDUPTHER

## 2021-10-26 RX ADMIN — INSULIN GLARGINE 24 UNITS: 100 INJECTION, SOLUTION SUBCUTANEOUS at 21:19

## 2021-10-26 RX ADMIN — INSULIN LISPRO 4 UNITS: 100 INJECTION, SOLUTION INTRAVENOUS; SUBCUTANEOUS at 08:27

## 2021-10-26 RX ADMIN — Medication: at 14:59

## 2021-10-26 RX ADMIN — METOLAZONE 5 MG: 5 TABLET ORAL at 08:26

## 2021-10-26 RX ADMIN — MONTELUKAST SODIUM 10 MG: 10 TABLET ORAL at 21:19

## 2021-10-26 RX ADMIN — HEPARIN SODIUM 5000 UNITS: 10000 INJECTION INTRAVENOUS; SUBCUTANEOUS at 14:59

## 2021-10-26 RX ADMIN — INSULIN LISPRO 2 UNITS: 100 INJECTION, SOLUTION INTRAVENOUS; SUBCUTANEOUS at 21:20

## 2021-10-26 RX ADMIN — ACETAMINOPHEN 650 MG: 325 TABLET ORAL at 16:04

## 2021-10-26 RX ADMIN — CARVEDILOL 6.25 MG: 6.25 TABLET, FILM COATED ORAL at 08:26

## 2021-10-26 RX ADMIN — CARVEDILOL 6.25 MG: 6.25 TABLET, FILM COATED ORAL at 18:04

## 2021-10-26 RX ADMIN — AMLODIPINE BESYLATE 5 MG: 5 TABLET ORAL at 08:26

## 2021-10-26 RX ADMIN — INSULIN LISPRO 2 UNITS: 100 INJECTION, SOLUTION INTRAVENOUS; SUBCUTANEOUS at 18:05

## 2021-10-26 RX ADMIN — ATORVASTATIN CALCIUM 10 MG: 10 TABLET, FILM COATED ORAL at 08:26

## 2021-10-26 RX ADMIN — FLUTICASONE PROPIONATE 2 SPRAY: 50 SPRAY, METERED NASAL at 08:26

## 2021-10-26 RX ADMIN — INSULIN LISPRO 2 UNITS: 100 INJECTION, SOLUTION INTRAVENOUS; SUBCUTANEOUS at 12:23

## 2021-10-26 RX ADMIN — ALBUMIN (HUMAN) 25 G: 0.25 INJECTION, SOLUTION INTRAVENOUS at 08:26

## 2021-10-26 RX ADMIN — PROPOFOL 240 MG: 10 INJECTION, EMULSION INTRAVENOUS at 11:17

## 2021-10-26 RX ADMIN — SEVELAMER CARBONATE 1600 MG: 800 TABLET, FILM COATED ORAL at 18:03

## 2021-10-26 RX ADMIN — SODIUM CHLORIDE: 9 INJECTION, SOLUTION INTRAVENOUS at 11:13

## 2021-10-26 RX ADMIN — SODIUM BICARBONATE 650 MG: 650 TABLET ORAL at 08:26

## 2021-10-26 RX ADMIN — Medication 10 ML: at 21:18

## 2021-10-26 RX ADMIN — BUMETANIDE 2 MG: 1 TABLET ORAL at 21:19

## 2021-10-26 RX ADMIN — SODIUM BICARBONATE 650 MG: 650 TABLET ORAL at 14:59

## 2021-10-26 RX ADMIN — BUMETANIDE 2 MG: 1 TABLET ORAL at 08:26

## 2021-10-26 RX ADMIN — Medication: at 21:23

## 2021-10-26 RX ADMIN — SEVELAMER CARBONATE 1600 MG: 800 TABLET, FILM COATED ORAL at 12:23

## 2021-10-26 RX ADMIN — METOLAZONE 5 MG: 5 TABLET ORAL at 21:19

## 2021-10-26 RX ADMIN — SEVELAMER CARBONATE 1600 MG: 800 TABLET, FILM COATED ORAL at 08:26

## 2021-10-26 RX ADMIN — HEPARIN SODIUM 5000 UNITS: 10000 INJECTION INTRAVENOUS; SUBCUTANEOUS at 22:08

## 2021-10-26 RX ADMIN — Medication 10 ML: at 08:26

## 2021-10-26 ASSESSMENT — PAIN SCALES - GENERAL
PAINLEVEL_OUTOF10: 0
PAINLEVEL_OUTOF10: 0
PAINLEVEL_OUTOF10: 6

## 2021-10-26 ASSESSMENT — LIFESTYLE VARIABLES: SMOKING_STATUS: 0

## 2021-10-26 NOTE — PLAN OF CARE
Problem: Falls - Risk of:  Goal: Will remain free from falls  Description: Will remain free from falls  10/26/2021 0245 by Reza Lazaro RN  Outcome: Met This Shift  10/25/2021 1824 by Yi Carranza RN  Outcome: Met This Shift     Problem: Falls - Risk of:  Goal: Absence of physical injury  Description: Absence of physical injury  10/26/2021 0245 by Reza Lazaro RN  Outcome: Met This Shift  10/25/2021 1824 by Yi Carranza RN  Outcome: Met This Shift

## 2021-10-26 NOTE — PROGRESS NOTES
Hospitalist Progress Note      SYNOPSIS: Patient admitted on 10/24/2021 for fevers / chills. HCAP with high grade bacteremia with MSSA      SUBJECTIVE:    Patient seen and examined  Records reviewed. AFebrile / 24 hours  Pleasant    Stable overnight. No other overnight issues reported. Temp (24hrs), Av.4 °F (36.9 °C), Min:98.1 °F (36.7 °C), Max:98.7 °F (37.1 °C)    DIET: ADULT DIET; Regular; 3 carb choices (45 gm/meal)  CODE: Full Code    Intake/Output Summary (Last 24 hours) at 10/26/2021 1232  Last data filed at 10/26/2021 1129  Gross per 24 hour   Intake 1293 ml   Output 0 ml   Net 1293 ml       OBJECTIVE:    BP (!) 161/62   Pulse 80   Temp 98.7 °F (37.1 °C) (Oral)   Resp 18   Ht 5' 8\" (1.727 m)   Wt 217 lb 2.5 oz (98.5 kg)   SpO2 (!) 88%   BMI 33.02 kg/m²     General appearance: No apparent distress, appears stated age and cooperative. Herpes oralis lower lip  HEENT:  Conjunctivae/corneas clear. Neck: Supple. No jugular venous distention. left  IJ vein temporary dialysis catheter in place  Respiratory: Clear to auscultation bilaterally, normal respiratory effort  Cardiovascular: Regular rate rhythm, normal S1-S2  Abdomen: Soft, nontender, nondistended  Musculoskeletal: No clubbing, cyanosis, no bilateral lower extremity edema. Brisk capillary refill. Skin:  No rashes  on visible skin  Neurologic: awake, alert and following commands     ASSESSMENT:    MSSA bacteremia  ESRD on home HD  Recurrent PD related peritonitis  Vit D deficiency  MBD of CKD  HFpEF  ACD  DM type 1  HLD  Herpes oralis        PLAN:    MICHAEL without vegtation; IV Ancef; follow blood cultures n- ID appreciated  Nephrology consulted.  Epo  CCB, BB, Bumex + Zaroxolyn, Albumin  PPI  24 lantus + SSI, DM well controlled as evidenced by A1c  Zovirax topical x lip    DISPOSITION: Discharge planning - anticipate DC tomorrow    Medications:  REVIEWED DAILY    Infusion Medications    sodium chloride      dextrose       Scheduled Medications    epoetin tricia-epbx  10,000 Units SubCUTAneous Once per day on Mon Wed Fri    [START ON 10/27/2021] ceFAZolin  2,000 mg IntraVENous Once per day on Mon Wed    [START ON 10/29/2021] ceFAZolin  3,000 mg IntraVENous Once per day on Fri    amLODIPine  5 mg Oral Daily    bumetanide  2 mg Oral BID    carvedilol  6.25 mg Oral BID WC    fluticasone  2 spray Each Nostril Daily    atorvastatin  10 mg Oral Daily    metOLazone  5 mg Oral BID    montelukast  10 mg Oral Nightly    pantoprazole  40 mg Oral QAM AC    sevelamer  1,600 mg Oral TID WC    sodium bicarbonate  650 mg Oral TID    sodium chloride flush  10 mL IntraVENous 2 times per day    insulin glargine  0.25 Units/kg SubCUTAneous Nightly    insulin lispro  0-12 Units SubCUTAneous TID WC    insulin lispro  0-6 Units SubCUTAneous Nightly    heparin (porcine)  5,000 Units SubCUTAneous Q8H     PRN Meds: HYDROmorphone, sodium chloride flush, sodium chloride, promethazine **OR** ondansetron, polyethylene glycol, acetaminophen **OR** acetaminophen, glucose, dextrose, glucagon (rDNA), dextrose, albuterol    Labs:     Recent Labs     10/24/21  2214 10/25/21  0447 10/26/21  0411   WBC 12.0* 10.9 9.0   HGB 9.1* 8.8* 8.4*   HCT 27.9* 26.8* 25.8*    119* 143       Recent Labs     10/24/21  2214 10/25/21  0447 10/26/21  0411   * 129* 137   K 3.7 3.3* 3.5  3.5   CL 89* 91* 97*   CO2 22 22 23   BUN 63* 70* 47*   CREATININE 8.6* 9.1* 7.1*   CALCIUM 8.2* 8.3* 8.2*   PHOS  --   --  3.3       Recent Labs     10/24/21  2214 10/25/21  0447 10/26/21  0411   PROT 5.5* 5.2* 5.5*   ALKPHOS 145* 118 121   ALT 29 13 9   AST 10 9 10   BILITOT 0.2 0.2 <0.2       No results for input(s): INR in the last 72 hours. No results for input(s): Priscilla Nasreen in the last 72 hours.     Chronic labs:    Lab Results   Component Value Date    CHOL 158 09/10/2020    TRIG 43 09/10/2020    HDL 62 09/10/2020    LDLCALC 87 09/10/2020    TSH 2.860 09/10/2020    PSA 0.56 07/10/2017    INR 1.0 06/04/2021    LABA1C 7.3 (H) 10/24/2021       Radiology: REVIEWED DAILY    +++++++++++++++++++++++++++++++++++++++++++++++++  Nona Blackmon MD  [facility] - [address] [place] [address]  +++++++++++++++++++++++++++++++++++++++++++++++++  NOTE: This report was transcribed using voice recognition software. Every effort was made to ensure accuracy; however, inadvertent computerized transcription errors may be present.

## 2021-10-26 NOTE — PROGRESS NOTES
[address] [place] [address] [facility]  Progress Note      Chief complaint: MSSA bacteremia    SUBJECTIVE:  Patient is tolerating medications. No reported adverse drug reactions. No nausea, vomiting, diarrhea. Review of systems:  As stated above in the chief complaint, otherwise negative. Medications:  Scheduled Meds:   epoetin tricia-epbx  10,000 Units SubCUTAneous Once per day on     [START ON 10/27/2021] ceFAZolin  2,000 mg IntraVENous Once per day on     [START ON 10/29/2021] ceFAZolin  3,000 mg IntraVENous Once per day on Fri    amLODIPine  5 mg Oral Daily    bumetanide  2 mg Oral BID    carvedilol  6.25 mg Oral BID WC    fluticasone  2 spray Each Nostril Daily    atorvastatin  10 mg Oral Daily    metOLazone  5 mg Oral BID    montelukast  10 mg Oral Nightly    pantoprazole  40 mg Oral QAM AC    sevelamer  1,600 mg Oral TID WC    sodium bicarbonate  650 mg Oral TID    sodium chloride flush  10 mL IntraVENous 2 times per day    insulin glargine  0.25 Units/kg SubCUTAneous Nightly    insulin lispro  0-12 Units SubCUTAneous TID WC    insulin lispro  0-6 Units SubCUTAneous Nightly    heparin (porcine)  5,000 Units SubCUTAneous Q8H     Continuous Infusions:   sodium chloride      dextrose       PRN Meds:HYDROmorphone, sodium chloride flush, sodium chloride, promethazine **OR** ondansetron, polyethylene glycol, acetaminophen **OR** acetaminophen, glucose, dextrose, glucagon (rDNA), dextrose, albuterol    OBJECTIVE:  BP (!) 161/62   Pulse 80   Temp 98.7 °F (37.1 °C) (Oral)   Resp 18   Ht 5' 8\" (1.727 m)   Wt 217 lb 2.5 oz (98.5 kg)   SpO2 (!) 88%   BMI 33.02 kg/m²   Temp  Av.4 °F (36.9 °C)  Min: 98.1 °F (36.7 °C)  Max: 98.7 °F (37.1 °C)  Constitutional: The patient is awake, alert, and oriented. Skin: Warm and dry. No rashes were noted. HEENT: Round and reactive pupils. Moist mucous membranes. No ulcerations or thrush.   Neck: Supple to movements. Chest: No use of accessory muscles to breathe. Symmetrical expansion. No wheezing, crackles or rhonchi. Cardiovascular: S1 and S2 are rhythmic and regular. No murmurs appreciated. Abdomen: Positive bowel sounds to auscultation. Benign to palpation. No masses felt. No hepatosplenomegaly. Genitourinary: Male  Extremities: No clubbing, no cyanosis, no edema.   Lines: peripheral    Laboratory and Tests Review:  Lab Results   Component Value Date    WBC 9.0 10/26/2021    WBC 10.9 10/25/2021    WBC 12.0 (H) 10/24/2021    HGB 8.4 (L) 10/26/2021    HCT 25.8 (L) 10/26/2021    MCV 87.5 10/26/2021     10/26/2021     Lab Results   Component Value Date    NEUTROABS 7.92 (H) 10/26/2021    NEUTROABS 8.82 (H) 10/25/2021    NEUTROABS 10.80 (H) 10/24/2021     No results found for: CRPHS  Lab Results   Component Value Date    ALT 9 10/26/2021    AST 10 10/26/2021    ALKPHOS 121 10/26/2021    BILITOT <0.2 10/26/2021     Lab Results   Component Value Date     10/26/2021    K 3.5 10/26/2021    K 3.5 10/26/2021    CL 97 10/26/2021    CO2 23 10/26/2021    BUN 47 10/26/2021    CREATININE 7.1 10/26/2021    CREATININE 9.1 10/25/2021    CREATININE 8.6 10/24/2021    GFRAA 10 10/26/2021    LABGLOM 8 10/26/2021    GLUCOSE 221 10/26/2021    PROT 5.5 10/26/2021    LABALBU 3.5 10/26/2021    CALCIUM 8.2 10/26/2021    BILITOT <0.2 10/26/2021    ALKPHOS 121 10/26/2021    AST 10 10/26/2021    ALT 9 10/26/2021     Lab Results   Component Value Date    CRP 2.6 (H) 06/06/2021     Lab Results   Component Value Date    SEDRATE 75 (H) 06/06/2021     Radiology:  Reviewed    Microbiology:   Lab Results   Component Value Date    BC 24 Hours no growth 10/24/2021    BC 5 Days no growth 08/20/2021    BC 5 Days no growth 06/04/2021    ORG Micrococcus 06/03/2021     Lab Results   Component Value Date    BLOODCULT2 24 Hours no growth 10/24/2021    BLOODCULT2 5 Days no growth 06/04/2021    BLOODCULT2 5 Days- no growth 01/06/2020    ORG

## 2021-10-26 NOTE — CARE COORDINATION
10/26/2021 -  MSSA bacteremia with 2 sets of positive blood cultures. ID and nephrology following. For MICHAEL today. IV ancef continued. HD M-W-F. Discharge plan is to return home when medically stable. SW/CM will follow.

## 2021-10-26 NOTE — PROGRESS NOTES
Pharmacy Note    Eva Hernandez was ordered Acyclovir ointment 5%. Per the [address], this medication is non-formulary and not stocked by pharmacy for the reason indicated below. The medication can be reordered at discharge.        Medications that lack necessity during an acute hospital stay:           -  acyclovir topical cream/ointment orders for herpes labialis (cold sores)    Mayuri Villalta PharmD 10/26/2021 9:50 AM

## 2021-10-26 NOTE — ANESTHESIA PRE PROCEDURE
Department of Anesthesiology  Preprocedure Note       Name:  Adán Engle   Age:  49 y.o.  :  1965                                          MRN:  [id number]         Date:  10/26/2021      Surgeon: Cardiology    Procedure: MICHAEL    Medications prior to admission:   Prior to Admission medications    Medication Sig Start Date End Date Taking?  Authorizing Provider   sevelamer (RENVELA) 800 MG tablet Take 2 tablets by mouth 3 times daily (with meals)    Yes Historical Provider, MD   cefTRIAXone sodium 2 g SOLR Infuse intravenously daily   Yes Historical Provider, MD   albuterol sulfate  (90 Base) MCG/ACT inhaler Inhale 2 puffs into the lungs every 6 hours as needed for Wheezing or Shortness of Breath 10/17/21   Kulwinder Hassan DO   insulin detemir (LEVEMIR FLEXTOUCH) 100 UNIT/ML injection pen Inject 40 Units into the skin nightly 10/17/21   Kulwinder Hassan DO   insulin aspart (NOVOLOG FLEXPEN) 100 UNIT/ML injection pen Inject 10 Units into the skin 3 times daily (before meals) 10/17/21   Kulwinder Hassan DO   carvedilol (COREG) 6.25 MG tablet Take 1 tablet by mouth 2 times daily (with meals) 10/17/21   Kulwinder Hassan DO   lovastatin (MEVACOR) 40 MG tablet TAKE ONE TABLET BY MOUTH DAILY 10/17/21   Kulwinder aHssan DO   amLODIPine (NORVASC) 5 MG tablet Take 1 tablet by mouth daily 10/17/21   Kulwinder Hassan DO   metOLazone (ZAROXOLYN) 5 MG tablet TAKE ONE TABLET BY MOUTH TWO TIMES A DAY 21   Historical Provider, MD   zoster recombinant adjuvanted vaccine [facility]) 50 MCG/0.5ML SUSR injection Inject 0.5 mLs into the muscle See Admin Instructions 1 dose now and repeat in 2-6 months 10/13/21 4/11/22  Kulwinder Hassan DO   vitamin D (ERGOCALCIFEROL) 1.25 MG (99349 UT) CAPS capsule TAKE ONE CAPSULE BY MOUTH TWICE A WEEK 21   Kulwinder Hassan DO   Continuous Blood Gluc Sensor (FREESTYLE RUBI 14 DAY SENSOR) MISC As directed 21   Kulwinder Hassan DO   Continuous Blood Gluc  (Cristobal Muhammad 2 READER) ARON As directed 7/13/21   Katelynn Kathleen DO   Insulin Pen Needle (MEIJER PEN NEEDLES) 31G X 8 MM MISC 1 each by Does not apply route 3 times daily 7/13/21   Katelynn Kathleen DO   pantoprazole (PROTONIX) 40 MG tablet Take 1 tablet by mouth every morning (before breakfast) 6/10/21   Mare Mayo MD   montelukast (SINGULAIR) 10 MG tablet TAKE ONE TABLET BY MOUTH DAILY 1/11/21   Katelynn Kathleen DO   bumetanide (BUMEX) 2 MG tablet TAKE ONE TABLET BY MOUTH TWO TIMES A DAY  Patient taking differently: 2 mg 3 times daily TAKE ONE TABLET BY MOUTH TWO TIMES A DAY 12/30/20   Katelynn Kathleen DO   insulin detemir (LEVEMIR) 100 UNIT/ML injection vial Inject 20 Units into the skin nightly 12/8/20   Katelynn Kathleen DO   sildenafil (VIAGRA) 100 MG tablet TAKE ONE TABLET BY MOUTH AS NEEDED FOR ERECTILE DYSFUNCTION 10/27/20   Katelynn Kathleen DO   fluticasone (FLONASE) 50 MCG/ACT nasal spray 2 sprays by Each Nostril route daily 9/8/20   Katelynn Kathleen DO   Insulin Syringe-Needle U-100 (AIMSCO INS SYR .3CC/29GX0.5\") 29G X 1/2\" 0.3 ML MISC 1 each by Does not apply route daily 8/13/20   Katelynn Kathleen DO   Misc Natural Products (APPLE CIDER VINEGAR DIET PO) Take by mouth    Historical Provider, MD   Zinc Sulfate (ZINC 15 PO) Take by mouth    Historical Provider, MD   sodium bicarbonate 650 MG tablet Take 1 tablet by mouth 3 times daily 1/10/20   Lanjohnathon Schulz BiselDO   Acetaminophen (TYLENOL) 325 MG CAPS Take 650 mg by mouth as needed (FOR PAIN PER PT.)    Historical Provider, MD   vitamin E 400 UNIT capsule Take 400 Units by mouth daily    Historical Provider, MD   Insulin Syringe-Needle U-100 30G X 1/2\" 1 ML MISC 10 units units of Tresiba daily 10/22/19   Katelynn Kathleen DO   Cinnamon 500 MG CAPS Take 1,000 mg by mouth 2 times daily.     Historical Provider, MD       Current medications:    Current Facility-Administered Medications   Medication Dose Route Frequency Provider Last Rate Last Admin    HYDROmorphone (PHENERGAN) tablet 12.5 mg  12.5 mg Oral Q6H PRN Wallene Haff, DO        Or    ondansetron [facility]) injection 4 mg  4 mg IntraVENous Q6H PRN Wallene Haff, DO        polyethylene glycol (GLYCOLAX) packet 17 g  17 g Oral Daily PRN Wallene Haff, DO        acetaminophen (TYLENOL) tablet 650 mg  650 mg Oral Q6H PRN Wallene Haff, DO   650 mg at 10/25/21 9303    Or    acetaminophen (TYLENOL) suppository 650 mg  650 mg Rectal Q6H PRN Wallene Haff, DO        glucose (GLUTOSE) 40 % oral gel 15 g  15 g Oral PRN Wallene Haff, DO        dextrose 50 % IV solution  12.5 g IntraVENous PRN Wallene Haff, DO        glucagon (rDNA) injection 1 mg  1 mg IntraMUSCular PRN Wallene Haff, DO        dextrose 5 % solution  100 mL/hr IntraVENous PRN Wallene Haff, DO        insulin glargine (LANTUS) injection vial 24 Units  0.25 Units/kg SubCUTAneous Nightly Wallene Haff, DO   24 Units at 10/25/21 2227    insulin lispro (HUMALOG) injection vial 0-12 Units  0-12 Units SubCUTAneous TID WC Wallene Haff, DO   4 Units at 10/26/21 0827    insulin lispro (HUMALOG) injection vial 0-6 Units  0-6 Units SubCUTAneous Nightly Wallene Haff, DO   2 Units at 10/25/21 2226    albuterol (PROVENTIL) nebulizer solution 2.5 mg  2.5 mg Nebulization Q6H PRN Wallene Haff, DO        heparin (porcine) injection 5,000 Units  5,000 Units SubCUTAneous Q8H Wallene Haff, DO   5,000 Units at 10/25/21 2300       Allergies:  No Known Allergies    Problem List:    Patient Active Problem List   Diagnosis Code    Type 2 diabetes mellitus without complication ([facility] K32.5    Type 2 diabetes mellitus with hyperglycemia, without long-term current use of insulin (HCC) E11.65    Renal insufficiency N28.9    Mixed hyperlipidemia E78.2    Benign essential HTN I10    Chronic kidney disease, stage IV (severe) (HCC) N18.4    Anemia in CKD (chronic kidney disease) N18.9, D63.1    Peritonitis (HCC) K65.9    Stage 5 chronic kidney disease on chronic dialysis ([facility] N18.6, Z99.2    Bacteremia R78.81       Past Medical History:        Diagnosis Date    [facility] 2020    Diabetes mellitus ([facility]     Hyperlipidemia     Hypertension     Type 2 diabetes mellitus with diabetic nephropathy, with long-term current use of insulin ([facility] 2020       Past Surgical History:        Procedure Laterality Date    CATHETER REMOVAL N/A 2021    REMOVAL OF PERITONEAL DIALYSIS CATHETER   performed by Roshni Bautista MD at [address] [facility] [address] N/A 2020    CATHETER INSERTION PERITONEAL DIALYSIS LAPAROSCOPIC performed by Roshni Bautista MD at [address] [facility] 2021    CATHETER INSERTION HEMODIALYSIS performed by Lucinda Ortiz MD at [address] [place] [address] History:    Social History     Tobacco Use    Smoking status: Former Smoker     Packs/day: 0.50     Years: 11.00     Pack years: 5.50     Types: Cigars     Quit date: 2019     Years since quittin.1    Smokeless tobacco: Never Used   Substance Use Topics    Alcohol use:  Yes     Alcohol/week: 0.0 standard drinks     Comment: socially                                Counseling given: Not Answered      Vital Signs (Current):   Vitals:    10/25/21 1509 10/25/21 2220 10/26/21 0635 10/26/21 0814   BP: (!) 148/68 (!) 158/64  (!) 161/62   Pulse: 82 88  80   Resp: 18 16  18   Temp: 36.7 °C (98.1 °F) 36.9 °C (98.5 °F)  37.1 °C (98.7 °F)   TempSrc: Oral Oral  Oral   SpO2: 92% 94% 92% (!) 88%   Weight:       Height:                                                  BP Readings from Last 3 Encounters:   10/26/21 (!) 161/62   10/13/21 124/78   21 (!) 143/73       NPO Status: Time of last liquid consumption:                         Time of last solid consumption:                         Date of last liquid consumption: 10/25/21                        Date of last solid food consumption: 10/25/21    BMI:   Wt Readings from Last 3 Encounters:   10/25/21 217 lb 2.5 oz (98.5 kg)   10/13/21 211 lb (95.7 kg)   09/17/21 204 lb (92.5 kg)     Body mass index is 33.02 kg/m². CBC:   Lab Results   Component Value Date    WBC 9.0 10/26/2021    RBC 2.95 10/26/2021    HGB 8.4 10/26/2021    HCT 25.8 10/26/2021    MCV 87.5 10/26/2021    RDW 14.9 10/26/2021     10/26/2021       CMP:   Lab Results   Component Value Date     10/26/2021    K 3.5 10/26/2021    K 3.5 10/26/2021    CL 97 10/26/2021    CO2 23 10/26/2021    BUN 47 10/26/2021    CREATININE 7.1 10/26/2021    GFRAA 10 10/26/2021    LABGLOM 8 10/26/2021    GLUCOSE 221 10/26/2021    PROT 5.5 10/26/2021    CALCIUM 8.2 10/26/2021    BILITOT <0.2 10/26/2021    ALKPHOS 121 10/26/2021    AST 10 10/26/2021    ALT 9 10/26/2021       POC Tests: No results for input(s): POCGLU, POCNA, POCK, POCCL, POCBUN, POCHEMO, POCHCT in the last 72 hours. Coags:   Lab Results   Component Value Date    PROTIME 10.9 06/04/2021    INR 1.0 06/04/2021    APTT 25.6 06/04/2021       HCG (If Applicable): No results found for: PREGTESTUR, PREGSERUM, HCG, HCGQUANT     ABGs: No results found for: PHART, PO2ART, DVL4XHM, MTJ0IWE, BEART, X7WDDDZT     Type & Screen (If Applicable):  No results found for: LABABO, LABRH    Drug/Infectious Status (If Applicable):  No results found for: HIV, HEPCAB    COVID-19 Screening (If Applicable):   Lab Results   Component Value Date    COVID19 Not Detected 11/25/2020           Anesthesia Evaluation  Patient summary reviewed and Nursing notes reviewed no history of anesthetic complications:   Airway: Mallampati: III  TM distance: >3 FB   Neck ROM: full  Mouth opening: < 3 FB Dental: normal exam         Pulmonary: breath sounds clear to auscultation  (+) decreased breath sounds,      (-) not a current smoker (Quit 2019)                          ROS comment: Patient states O2sat dropped while asleep and was put on oxygen via NC.     Cardiovascular:    (+) hypertension:, CHF:, hyperlipidemia      ECG reviewed  Rhythm: regular  Rate: normal  Echocardiogram reviewed               ROS comment: Presented to [facility] ED 10/20 with fever, chills and generalized weakness after Covid vaccine. Patient has ESRD and had temp catheter exchanged the day prior. Labs obtained at the facility were notable for WBC 19.3, hemoglobin 9.3 creatinine 10.11, sodium 129, glucose 322. Blood cultures 10/20 and 10/24 grew out MSSA. Patient was admitted to hospital there, started on vancomycin and Zosyn, and transferred to [facility] 10/24 for further evaluation and treatment.     6/4/21 ECG  Sinus rhythm HR 77  Normal ECG  No previous ECGs available         Neuro/Psych:   Negative Neuro/Psych ROS              GI/Hepatic/Renal:   (+) GERD (Occasional, no current symptoms.):, renal disease (HD M-W-F. Last HD yesterday 10/26. Peritneal dialyysis catheter not used for 1 year.): ESRD,          ROS comment: Line septicemia s/p right IJ tunneled cath removal and temporary left IJ catheter insertion. Endo/Other:    (+) DiabetesType II DM, using insulin, blood dyscrasia: anemia:., .                 Abdominal:   (+) obese,     Abdomen: soft. Vascular: negative vascular ROS. Other Findings:      1/8/20 TTE  Summary   Mildly dilated left ventricle. Severe left ventricular concentric hypertrophy noted. Ejection fraction is visually estimated at 55%. No evidence of left ventricular mass or thrombus noted. No regional wall motion abnormalities seen. The left atrium is mildly dilated. Interatrial septum appears intact. No evidence of thrombus within left atrium. Mildly dilated right ventricle. No evidence of a thrombus in the right ventricle. Mildly enlarged right atrium size. Mild mitral regurgitation is present. No mitral valve stenosis present. Physiologic and/or trace tricuspid regurgitation. RVSP is 47.29 mmHg. Pulmonary hypertension is mild to moderate .    Regular

## 2021-10-26 NOTE — PROGRESS NOTES
Department of Internal Medicine  Nephrology Progress Note    Events reviewed. SUBJECTIVE: We are following Mr. Esteban Hebert for ESRD on HD. He reports no complaints. PHYSICAL EXAM:      Vitals:    VITALS:  BP (!) 161/62   Pulse 80   Temp 98.7 °F (37.1 °C) (Oral)   Resp 18   Ht 5' 8\" (1.727 m)   Wt 217 lb 2.5 oz (98.5 kg)   SpO2 (!) 88%   BMI 33.02 kg/m²   24HR INTAKE/OUTPUT:      Intake/Output Summary (Last 24 hours) at 10/26/2021 1444  Last data filed at 10/26/2021 1129  Gross per 24 hour   Intake 1293 ml   Output 0 ml   Net 1293 ml     Access: left  IJ vein temporary dialysis catheter in place  Constitutional:  Well developed and nourished,in no acute distres  HEENT:  NC/AT, PERRL, supple no JVD  Respiratory: CTA bilaterally and breath sounds equal.  Cardiovascular/Edema: Regular rate and rhythm, normal heart sounds, without pathological murmurs, ectopy, gallops, or rubs. No edema  Gastrointestinal: soft, no organomegaly , tender, bowel sounds present  Neurologic:  Alert and Ox3, non focal  Skin:  Turgor normal  Scheduled Meds:   medicated lip balm   Topical BID    epoetin tricia-epbx  10,000 Units SubCUTAneous Once per day on Mon Wed Fri    [START ON 10/27/2021] ceFAZolin  2,000 mg IntraVENous Once per day on Mon Wed    [START ON 10/29/2021] ceFAZolin  3,000 mg IntraVENous Once per day on Fri    amLODIPine  5 mg Oral Daily    bumetanide  2 mg Oral BID    carvedilol  6.25 mg Oral BID WC    fluticasone  2 spray Each Nostril Daily    atorvastatin  10 mg Oral Daily    metOLazone  5 mg Oral BID    montelukast  10 mg Oral Nightly    pantoprazole  40 mg Oral QAM AC    sevelamer  1,600 mg Oral TID WC    sodium bicarbonate  650 mg Oral TID    sodium chloride flush  10 mL IntraVENous 2 times per day    insulin glargine  0.25 Units/kg SubCUTAneous Nightly    insulin lispro  0-12 Units SubCUTAneous TID WC    insulin lispro  0-6 Units SubCUTAneous Nightly    heparin (porcine)  5,000 Units SubCUTAneous Q8H     Continuous Infusions:   sodium chloride      dextrose       PRN Meds:. HYDROmorphone, sodium chloride flush, sodium chloride, promethazine **OR** ondansetron, polyethylene glycol, acetaminophen **OR** acetaminophen, glucose, dextrose, glucagon (rDNA), dextrose, albuterol    DATA:    CBC:   Lab Results   Component Value Date    WBC 9.0 10/26/2021    RBC 2.95 10/26/2021    HGB 8.4 10/26/2021    HCT 25.8 10/26/2021    MCV 87.5 10/26/2021    MCH 28.5 10/26/2021    MCHC 32.6 10/26/2021    RDW 14.9 10/26/2021     10/26/2021    MPV 11.8 10/26/2021     CMP:    Lab Results   Component Value Date     10/26/2021    K 3.5 10/26/2021    K 3.5 10/26/2021    CL 97 10/26/2021    CO2 23 10/26/2021    BUN 47 10/26/2021    CREATININE 7.1 10/26/2021    GFRAA 10 10/26/2021    LABGLOM 8 10/26/2021    GLUCOSE 221 10/26/2021    PROT 5.5 10/26/2021    LABALBU 3.5 10/26/2021    CALCIUM 8.2 10/26/2021    BILITOT <0.2 10/26/2021    ALKPHOS 121 10/26/2021    AST 10 10/26/2021    ALT 9 10/26/2021     Magnesium:    Lab Results   Component Value Date    MG 2.0 10/26/2021     Phosphorus:    Lab Results   Component Value Date    PHOS 3.3 10/26/2021     U/A:    Lab Results   Component Value Date    COLORU Yellow 06/04/2021    PROTEINU >=300 06/04/2021    PHUR 5.5 06/04/2021    WBCUA 1-3 06/04/2021    RBCUA 2-5 06/04/2021    BACTERIA MANY 06/04/2021    CLARITYU CLOUDY 06/04/2021    SPECGRAV 1.015 06/04/2021    LEUKOCYTESUR Negative 06/04/2021    UROBILINOGEN 0.2 06/04/2021    BILIRUBINUR Negative 06/04/2021    BLOODU SMALL 06/04/2021    GLUCOSEU 500 06/04/2021     Microalbumen/Creatinine ratio:  No components found for: RUCREAT  Radiology Review:  none     BRIEF SUMMARY OF INITIAL CONSULT:     The patient is a 49 y.o. male with significant past medical history of end stage renal disease secondary to DM and HTN (was previously on PD now on Home HD), CHF, Type II DM with diabetic nephropathy, HTN, and Hyperlipidemia, last hemodialysis treatment on Friday, presents with chills and was admitted with line septicemia. Patient had a broken tessio that was replaced at the access center. 24 hrs later patient went to work where he began to experience chills, diaphoresis and acute confusion. EMS was called and he was taken to a hospital in [place] where his tunneled line was removed and a LIJ temporary catheter was placed. We are consulted for ESRD on hemodialysis    Problems resolved. · Hypoalbuminemia - start SPA with HD x 3 doses    IMPRESSION/RECOMMENDATIONS:     1. ESRD on Home hemodialysis, will dialysis MWF while inpatient. For dialysis tomorrow  2. MSSA bacteremia s/p right IJ tunneled cath removal and temporary left IJ catheter insertion. On IV ancef  3. Type II, on insulin  4. HTN, on amlodipine and carvedilol  5. Anemia:  Continue Erythropoetin 72460 units x 3 per week  6. 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  HLD, on statin    Plan:    · HD tomorrow and three times per week MWF  · Monitor labs daily  · Continue Bumex 2 mg po bid  · Continue metolazone 5 mg po bid  · Discontinue bicarbonate  · Continue 1.5 L fluid restriction  · For MICHAEL today  · For tunneled dialysis catheter when ok with ID  · Ionized calcium in am  · Monitor phosphorus levels daily      Electronically signed by DONOVAN Giraldo CNP on 10/26/2021 at 2:49 PM

## 2021-10-27 LAB
ALBUMIN SERPL-MCNC: 4.1 G/DL (ref 3.5–5.2)
ALP BLD-CCNC: 134 U/L (ref 40–129)
ALT SERPL-CCNC: 5 U/L (ref 0–40)
ANION GAP SERPL CALCULATED.3IONS-SCNC: 17 MMOL/L (ref 7–16)
ANISOCYTOSIS: ABNORMAL
AST SERPL-CCNC: 13 U/L (ref 0–39)
BASOPHILS ABSOLUTE: 0 E9/L (ref 0–0.2)
BASOPHILS RELATIVE PERCENT: 0.7 % (ref 0–2)
BILIRUB SERPL-MCNC: 0.3 MG/DL (ref 0–1.2)
BUN BLDV-MCNC: 51 MG/DL (ref 6–20)
CALCIUM IONIZED: 1.15 MMOL/L (ref 1.15–1.33)
CALCIUM SERPL-MCNC: 8.5 MG/DL (ref 8.6–10.2)
CHLORIDE BLD-SCNC: 92 MMOL/L (ref 98–107)
CO2: 23 MMOL/L (ref 22–29)
CREAT SERPL-MCNC: 8.2 MG/DL (ref 0.7–1.2)
EOSINOPHILS ABSOLUTE: 0.4 E9/L (ref 0.05–0.5)
EOSINOPHILS RELATIVE PERCENT: 4.4 % (ref 0–6)
GFR AFRICAN AMERICAN: 8
GFR NON-AFRICAN AMERICAN: 7 ML/MIN/1.73
GLUCOSE BLD-MCNC: 126 MG/DL (ref 74–99)
HCT VFR BLD CALC: 30 % (ref 37–54)
HEMOGLOBIN: 9.7 G/DL (ref 12.5–16.5)
LYMPHOCYTES ABSOLUTE: 0.55 E9/L (ref 1.5–4)
LYMPHOCYTES RELATIVE PERCENT: 6.2 % (ref 20–42)
MCH RBC QN AUTO: 28 PG (ref 26–35)
MCHC RBC AUTO-ENTMCNC: 32.3 % (ref 32–34.5)
MCV RBC AUTO: 86.5 FL (ref 80–99.9)
METAMYELOCYTES RELATIVE PERCENT: 4.4 % (ref 0–1)
METER GLUCOSE: 109 MG/DL (ref 74–99)
METER GLUCOSE: 215 MG/DL (ref 74–99)
METER GLUCOSE: 231 MG/DL (ref 74–99)
MONOCYTES ABSOLUTE: 0.64 E9/L (ref 0.1–0.95)
MONOCYTES RELATIVE PERCENT: 7.1 % (ref 2–12)
MYELOCYTE PERCENT: 0.9 % (ref 0–0)
NEUTROPHILS ABSOLUTE: 7.46 E9/L (ref 1.8–7.3)
NEUTROPHILS RELATIVE PERCENT: 77 % (ref 43–80)
OVALOCYTES: ABNORMAL
PDW BLD-RTO: 14.9 FL (ref 11.5–15)
PHOSPHORUS: 3.3 MG/DL (ref 2.5–4.5)
PLATELET # BLD: 230 E9/L (ref 130–450)
PMV BLD AUTO: 11.4 FL (ref 7–12)
POIKILOCYTES: ABNORMAL
POLYCHROMASIA: ABNORMAL
POTASSIUM REFLEX MAGNESIUM: 3.6 MMOL/L (ref 3.5–5)
RBC # BLD: 3.47 E12/L (ref 3.8–5.8)
SODIUM BLD-SCNC: 132 MMOL/L (ref 132–146)
TOTAL PROTEIN: 5.7 G/DL (ref 6.4–8.3)
WBC # BLD: 9.1 E9/L (ref 4.5–11.5)

## 2021-10-27 PROCEDURE — 80053 COMPREHEN METABOLIC PANEL: CPT

## 2021-10-27 PROCEDURE — 6360000002 HC RX W HCPCS: Performed by: SPECIALIST

## 2021-10-27 PROCEDURE — 2700000000 HC OXYGEN THERAPY PER DAY

## 2021-10-27 PROCEDURE — 2060000000 HC ICU INTERMEDIATE R&B

## 2021-10-27 PROCEDURE — 6360000002 HC RX W HCPCS: Performed by: INTERNAL MEDICINE

## 2021-10-27 PROCEDURE — 6370000000 HC RX 637 (ALT 250 FOR IP): Performed by: FAMILY MEDICINE

## 2021-10-27 PROCEDURE — 2580000003 HC RX 258: Performed by: FAMILY MEDICINE

## 2021-10-27 PROCEDURE — 82330 ASSAY OF CALCIUM: CPT

## 2021-10-27 PROCEDURE — 90935 HEMODIALYSIS ONE EVALUATION: CPT

## 2021-10-27 PROCEDURE — 36415 COLL VENOUS BLD VENIPUNCTURE: CPT

## 2021-10-27 PROCEDURE — 82962 GLUCOSE BLOOD TEST: CPT

## 2021-10-27 PROCEDURE — 84100 ASSAY OF PHOSPHORUS: CPT

## 2021-10-27 PROCEDURE — 5A1D70Z PERFORMANCE OF URINARY FILTRATION, INTERMITTENT, LESS THAN 6 HOURS PER DAY: ICD-10-PCS | Performed by: FAMILY MEDICINE

## 2021-10-27 PROCEDURE — 85025 COMPLETE CBC W/AUTO DIFF WBC: CPT

## 2021-10-27 PROCEDURE — 6360000002 HC RX W HCPCS: Performed by: FAMILY MEDICINE

## 2021-10-27 RX ADMIN — INSULIN GLARGINE 24 UNITS: 100 INJECTION, SOLUTION SUBCUTANEOUS at 20:48

## 2021-10-27 RX ADMIN — CARVEDILOL 6.25 MG: 6.25 TABLET, FILM COATED ORAL at 12:13

## 2021-10-27 RX ADMIN — METOLAZONE 5 MG: 5 TABLET ORAL at 12:13

## 2021-10-27 RX ADMIN — Medication: at 12:12

## 2021-10-27 RX ADMIN — Medication 2000 MG: at 16:53

## 2021-10-27 RX ADMIN — INSULIN LISPRO 4 UNITS: 100 INJECTION, SOLUTION INTRAVENOUS; SUBCUTANEOUS at 16:54

## 2021-10-27 RX ADMIN — Medication 10 ML: at 20:48

## 2021-10-27 RX ADMIN — HYDROMORPHONE HYDROCHLORIDE 0.5 MG: 1 INJECTION, SOLUTION INTRAMUSCULAR; INTRAVENOUS; SUBCUTANEOUS at 04:13

## 2021-10-27 RX ADMIN — ATORVASTATIN CALCIUM 10 MG: 10 TABLET, FILM COATED ORAL at 12:12

## 2021-10-27 RX ADMIN — BUMETANIDE 2 MG: 1 TABLET ORAL at 20:48

## 2021-10-27 RX ADMIN — HEPARIN SODIUM 5000 UNITS: 10000 INJECTION INTRAVENOUS; SUBCUTANEOUS at 14:48

## 2021-10-27 RX ADMIN — MONTELUKAST SODIUM 10 MG: 10 TABLET ORAL at 20:48

## 2021-10-27 RX ADMIN — PANTOPRAZOLE SODIUM 40 MG: 40 TABLET, DELAYED RELEASE ORAL at 05:47

## 2021-10-27 RX ADMIN — HYDROMORPHONE HYDROCHLORIDE 0.5 MG: 1 INJECTION, SOLUTION INTRAMUSCULAR; INTRAVENOUS; SUBCUTANEOUS at 23:49

## 2021-10-27 RX ADMIN — FLUTICASONE PROPIONATE 2 SPRAY: 50 SPRAY, METERED NASAL at 12:11

## 2021-10-27 RX ADMIN — AMLODIPINE BESYLATE 5 MG: 5 TABLET ORAL at 12:12

## 2021-10-27 RX ADMIN — EPOETIN ALFA-EPBX 10000 UNITS: 10000 INJECTION, SOLUTION INTRAVENOUS; SUBCUTANEOUS at 12:12

## 2021-10-27 RX ADMIN — Medication 10 ML: at 12:13

## 2021-10-27 RX ADMIN — Medication: at 20:49

## 2021-10-27 RX ADMIN — INSULIN LISPRO 2 UNITS: 100 INJECTION, SOLUTION INTRAVENOUS; SUBCUTANEOUS at 20:48

## 2021-10-27 RX ADMIN — SEVELAMER CARBONATE 1600 MG: 800 TABLET, FILM COATED ORAL at 16:52

## 2021-10-27 RX ADMIN — HEPARIN SODIUM 5000 UNITS: 10000 INJECTION INTRAVENOUS; SUBCUTANEOUS at 05:47

## 2021-10-27 RX ADMIN — HEPARIN SODIUM 5000 UNITS: 10000 INJECTION INTRAVENOUS; SUBCUTANEOUS at 21:32

## 2021-10-27 RX ADMIN — BUMETANIDE 2 MG: 1 TABLET ORAL at 12:12

## 2021-10-27 RX ADMIN — SEVELAMER CARBONATE 1600 MG: 800 TABLET, FILM COATED ORAL at 12:12

## 2021-10-27 RX ADMIN — METOLAZONE 5 MG: 5 TABLET ORAL at 20:48

## 2021-10-27 RX ADMIN — CARVEDILOL 6.25 MG: 6.25 TABLET, FILM COATED ORAL at 16:53

## 2021-10-27 RX ADMIN — SODIUM CHLORIDE, PRESERVATIVE FREE 10 ML: 5 INJECTION INTRAVENOUS at 16:53

## 2021-10-27 ASSESSMENT — PAIN SCALES - GENERAL
PAINLEVEL_OUTOF10: 0
PAINLEVEL_OUTOF10: 8
PAINLEVEL_OUTOF10: 0
PAINLEVEL_OUTOF10: 0
PAINLEVEL_OUTOF10: 7
PAINLEVEL_OUTOF10: 0

## 2021-10-27 NOTE — PROGRESS NOTES
Department of Internal Medicine  Nephrology Progress Note    Events reviewed. See on dialysis, tolerating well. SUBJECTIVE: We are following Mr. Dacia Krueger for ESRD on HD. He reports no complaints. PHYSICAL EXAM:      Vitals:    VITALS:  BP (!) 167/80   Pulse 72   Temp 98.6 °F (37 °C)   Resp 16   Ht 5' 8\" (1.727 m)   Wt 212 lb 1.3 oz (96.2 kg)   SpO2 95%   BMI 32.25 kg/m²   24HR INTAKE/OUTPUT:      Intake/Output Summary (Last 24 hours) at 10/27/2021 1306  Last data filed at 10/27/2021 1110  Gross per 24 hour   Intake 120 ml   Output 2165 ml   Net -2045 ml     Access: left  IJ vein temporary dialysis catheter in place  Constitutional:  Well developed and nourished,in no acute distres  HEENT:  NC/AT, PERRL, supple no JVD  Respiratory: CTA bilaterally and breath sounds equal.  Cardiovascular/Edema: Regular rate and rhythm, normal heart sounds, without pathological murmurs, ectopy, gallops, or rubs. No edema  Gastrointestinal: soft, no organomegaly , tender, bowel sounds present  Neurologic:  Alert and Ox3, non focal  Skin:  Turgor normal  Scheduled Meds:   medicated lip balm   Topical BID    epoetin tricia-epbx  10,000 Units SubCUTAneous Once per day on Mon Wed Fri    ceFAZolin  2,000 mg IntraVENous Once per day on Mon Wed    [START ON 10/29/2021] ceFAZolin  3,000 mg IntraVENous Once per day on Fri    amLODIPine  5 mg Oral Daily    bumetanide  2 mg Oral BID    carvedilol  6.25 mg Oral BID WC    fluticasone  2 spray Each Nostril Daily    atorvastatin  10 mg Oral Daily    metOLazone  5 mg Oral BID    montelukast  10 mg Oral Nightly    pantoprazole  40 mg Oral QAM AC    sevelamer  1,600 mg Oral TID WC    sodium chloride flush  10 mL IntraVENous 2 times per day    insulin glargine  0.25 Units/kg SubCUTAneous Nightly    insulin lispro  0-12 Units SubCUTAneous TID WC    insulin lispro  0-6 Units SubCUTAneous Nightly    heparin (porcine)  5,000 Units SubCUTAneous Q8H     Continuous Infusions:   sodium chloride      dextrose       PRN Meds:. HYDROmorphone, sodium chloride flush, sodium chloride, promethazine **OR** ondansetron, polyethylene glycol, acetaminophen **OR** acetaminophen, glucose, dextrose, glucagon (rDNA), dextrose, albuterol    DATA:    CBC:   Lab Results   Component Value Date    WBC 9.1 10/27/2021    RBC 3.47 10/27/2021    HGB 9.7 10/27/2021    HCT 30.0 10/27/2021    MCV 86.5 10/27/2021    MCH 28.0 10/27/2021    MCHC 32.3 10/27/2021    RDW 14.9 10/27/2021     10/27/2021    MPV 11.4 10/27/2021     CMP:    Lab Results   Component Value Date     10/27/2021    K 3.6 10/27/2021    CL 92 10/27/2021    CO2 23 10/27/2021    BUN 51 10/27/2021    CREATININE 8.2 10/27/2021    GFRAA 8 10/27/2021    LABGLOM 7 10/27/2021    GLUCOSE 126 10/27/2021    PROT 5.7 10/27/2021    LABALBU 4.1 10/27/2021    CALCIUM 8.5 10/27/2021    BILITOT 0.3 10/27/2021    ALKPHOS 134 10/27/2021    AST 13 10/27/2021    ALT 5 10/27/2021     Magnesium:    Lab Results   Component Value Date    MG 2.0 10/26/2021     Phosphorus:    Lab Results   Component Value Date    PHOS 3.3 10/27/2021     U/A:    Lab Results   Component Value Date    COLORU Yellow 06/04/2021    PROTEINU >=300 06/04/2021    PHUR 5.5 06/04/2021    WBCUA 1-3 06/04/2021    RBCUA 2-5 06/04/2021    BACTERIA MANY 06/04/2021    CLARITYU CLOUDY 06/04/2021    SPECGRAV 1.015 06/04/2021    LEUKOCYTESUR Negative 06/04/2021    UROBILINOGEN 0.2 06/04/2021    BILIRUBINUR Negative 06/04/2021    BLOODU SMALL 06/04/2021    GLUCOSEU 500 06/04/2021     Microalbumen/Creatinine ratio:  No components found for: RUCREAT  Radiology Review:  none     BRIEF SUMMARY OF INITIAL CONSULT:     The patient is a 49 y.o. male with significant past medical history of end stage renal disease secondary to DM and HTN (was previously on PD now on Home HD), CHF, Type II DM with diabetic nephropathy, HTN, and Hyperlipidemia, last hemodialysis treatment on Friday, presents with chills and was admitted with line septicemia. Patient had a broken tessio that was replaced at the access center. 24 hrs later patient went to work where he began to experience chills, diaphoresis and acute confusion. EMS was called and he was taken to a hospital in [place] where his tunneled line was removed and a LIJ temporary catheter was placed. We are consulted for ESRD on hemodialysis    Problems resolved. · Hypoalbuminemia - start SPA with HD x 3 doses    IMPRESSION/RECOMMENDATIONS:     1. ESRD on Home hemodialysis, will dialysis MWF while inpatient. For dialysis tomorrow  2. MSSA bacteremia s/p right IJ tunneled cath removal and temporary left IJ catheter insertion. S/p MICHAEL without vegetation. On IV ancef MWF  3. Type II, on insulin  4. HTN, on amlodipine and carvedilol  5. Anemia:  Continue Erythropoetin 32319 units x 3 per week  6. MBD of CKD:  On sevelamer, monitor phosphorus levels  7.  HLD, on statin    Plan:    · HD today and three times per week MWF  · Continue to monitor labs daily  · Continue Bumex 2 mg po bid  · Continue metolazone 5 mg po bid  · Continue 1.5 L fluid restriction  · For tunneled dialysis catheter when ok with ID  · Continue to monitor phosphorus levels daily      Electronically signed by DONOVAN Mayer CNP on 10/27/2021 at 1:06 PM

## 2021-10-27 NOTE — FLOWSHEET NOTE
10/27/21 1110   Vital Signs   BP (!) 167/80   Temp 98.6 °F (37 °C)   Pulse 72   Resp 16   Weight 212 lb 1.3 oz (96.2 kg)   Weight Method Bed scale   Percent Weight Change -1.64   Post-Hemodialysis Assessment   Post-Treatment Procedures Blood returned;Catheter capped, clamped and heparinized x 2 ports   Machine Disinfection Process Exterior Machine Disinfection   Rinseback Volume (ml) 300 ml   Total Liters Processed (l/min) 51 l/min   Dialyzer Clearance Lightly streaked   Duration of Treatment (minutes) 210 minutes   Hemodialysis Output (ml) 2165 ml   NET Removed (ml) 1865 ml   Tolerated Treatment Good   Patient Response to Treatment tolerated well, blood returned, cath care per policy/procedure, lines flushed, heparin instilled, ports capped

## 2021-10-27 NOTE — PROGRESS NOTES
Hospitalist Progress Note      SYNOPSIS: Patient admitted on 10/24/2021 for fevers / chills. HCAP with high grade bacteremia with MSSA      SUBJECTIVE:    Patient seen and examined  Records reviewed. Seen in HD  No complaints  Pleasant  MICHAEL negative, patient notified    Stable overnight. No other overnight issues reported. Temp (24hrs), Av.4 °F (36.9 °C), Min:98.1 °F (36.7 °C), Max:98.6 °F (37 °C)    DIET: ADULT DIET; Regular; 3 carb choices (45 gm/meal)  CODE: Full Code    Intake/Output Summary (Last 24 hours) at 10/27/2021 0931  Last data filed at 10/26/2021 2256  Gross per 24 hour   Intake 270 ml   Output    Net 270 ml       OBJECTIVE:    BP (!) 172/84   Pulse 70   Temp 98.6 °F (37 °C)   Resp 16   Ht 5' 8\" (1.727 m)   Wt 215 lb 9.8 oz (97.8 kg)   SpO2 95%   BMI 32.78 kg/m²     General appearance: No apparent distress, appears stated age and cooperative. Herpes oralis lower lip  HEENT:  Conjunctivae/corneas clear. Neck: Supple. No jugular venous distention. left  IJ vein temporary dialysis catheter in place  Respiratory: Clear to auscultation bilaterally, normal respiratory effort  Cardiovascular: Regular rate rhythm, normal S1-S2  Abdomen: Soft, nontender, nondistended  Musculoskeletal: No clubbing, cyanosis, no bilateral lower extremity edema. Brisk capillary refill. Skin:  No rashes  on visible skin  Neurologic: awake, alert and following commands     ASSESSMENT:    MSSA bacteremia  ESRD on home HD  Recurrent PD related peritonitis  Vit D deficiency  MBD of CKD  HFpEF  ACD  DM type 1  HLD  Herpes oralis        PLAN:    MICHAEL without vegtation; IV Ancef; follow blood cultures n- ID appreciated - may need TDC removed and tunneled cath inserted. Will defer to ID / Nephro  Nephrology consulted.  Epo, HD.  CCB, BB, Bumex + Zaroxolyn, Albumin  PPI  24 lantus + SSI, DM well controlled as evidenced by A1c  Zovirax topical x lip - not on formulary - Blistex    DISPOSITION: TBD    Medications: REVIEWED DAILY    Infusion Medications    sodium chloride      dextrose       Scheduled Medications    medicated lip balm   Topical BID    epoetin tricia-epbx  10,000 Units SubCUTAneous Once per day on Mon Wed Fri    ceFAZolin  2,000 mg IntraVENous Once per day on Mon Wed    [START ON 10/29/2021] ceFAZolin  3,000 mg IntraVENous Once per day on Fri    amLODIPine  5 mg Oral Daily    bumetanide  2 mg Oral BID    carvedilol  6.25 mg Oral BID WC    fluticasone  2 spray Each Nostril Daily    atorvastatin  10 mg Oral Daily    metOLazone  5 mg Oral BID    montelukast  10 mg Oral Nightly    pantoprazole  40 mg Oral QAM AC    sevelamer  1,600 mg Oral TID WC    sodium chloride flush  10 mL IntraVENous 2 times per day    insulin glargine  0.25 Units/kg SubCUTAneous Nightly    insulin lispro  0-12 Units SubCUTAneous TID WC    insulin lispro  0-6 Units SubCUTAneous Nightly    heparin (porcine)  5,000 Units SubCUTAneous Q8H     PRN Meds: HYDROmorphone, sodium chloride flush, sodium chloride, promethazine **OR** ondansetron, polyethylene glycol, acetaminophen **OR** acetaminophen, glucose, dextrose, glucagon (rDNA), dextrose, albuterol    Labs:     Recent Labs     10/25/21  0447 10/26/21  0411 10/27/21  0326   WBC 10.9 9.0 9.1   HGB 8.8* 8.4* 9.7*   HCT 26.8* 25.8* 30.0*   * 143 230       Recent Labs     10/25/21  0447 10/25/21  0447 10/26/21  0411 10/27/21  0326   *  --  137 132   K 3.3*   < > 3.5  3.5 3.6   CL 91*  --  97* 92*   CO2 22  --  23 23   BUN 70*  --  47* 51*   CREATININE 9.1*  --  7.1* 8.2*   CALCIUM 8.3*  --  8.2* 8.5*   PHOS  --   --  3.3 3.3    < > = values in this interval not displayed. Recent Labs     10/25/21  0447 10/26/21  0411 10/27/21  0326   PROT 5.2* 5.5* 5.7*   ALKPHOS 118 121 134*   ALT 13 9 5   AST 9 10 13   BILITOT 0.2 <0.2 0.3       No results for input(s): INR in the last 72 hours. No results for input(s): Emely Mould in the last 72 hours.     Chronic

## 2021-10-27 NOTE — CARE COORDINATION
10/27/2021 - ID and nephrology following. HD being done today. MICHAEL done yesterday. IV ancef q M-W-F. Has left IJ vein temporary dialysis catheter. Will be for tunneled dialysis catheter when ok with ID. Discharge plan remains home when medically stable. XAVI/SUSAN will follow.

## 2021-10-27 NOTE — PROGRESS NOTES
[address] [place] [address] [facility]  Progress Note      Chief complaint: MSSA bacteremia    SUBJECTIVE:  Patient is tolerating medications. No reported adverse drug reactions. No nausea, vomiting, diarrhea. Family at bedside  Awake and alert  Review of systems:  As stated above in the chief complaint, otherwise negative. Medications:  Scheduled Meds:   medicated lip balm   Topical BID    epoetin tricia-epbx  10,000 Units SubCUTAneous Once per day on     ceFAZolin  2,000 mg IntraVENous Once per day on     [START ON 10/29/2021] ceFAZolin  3,000 mg IntraVENous Once per day on Fri    amLODIPine  5 mg Oral Daily    bumetanide  2 mg Oral BID    carvedilol  6.25 mg Oral BID WC    fluticasone  2 spray Each Nostril Daily    atorvastatin  10 mg Oral Daily    metOLazone  5 mg Oral BID    montelukast  10 mg Oral Nightly    pantoprazole  40 mg Oral QAM AC    sevelamer  1,600 mg Oral TID WC    sodium chloride flush  10 mL IntraVENous 2 times per day    insulin glargine  0.25 Units/kg SubCUTAneous Nightly    insulin lispro  0-12 Units SubCUTAneous TID WC    insulin lispro  0-6 Units SubCUTAneous Nightly    heparin (porcine)  5,000 Units SubCUTAneous Q8H     Continuous Infusions:   sodium chloride      dextrose       PRN Meds:HYDROmorphone, sodium chloride flush, sodium chloride, promethazine **OR** ondansetron, polyethylene glycol, acetaminophen **OR** acetaminophen, glucose, dextrose, glucagon (rDNA), dextrose, albuterol    OBJECTIVE:  BP (!) 138/104   Pulse 87   Temp 98.4 °F (36.9 °C) (Oral)   Resp 16   Ht 5' 8\" (1.727 m)   Wt 212 lb 1.3 oz (96.2 kg)   SpO2 97%   BMI 32.25 kg/m²   Temp  Av.5 °F (36.9 °C)  Min: 98.1 °F (36.7 °C)  Max: 98.6 °F (37 °C)  Constitutional: The patient is awake, alert, and oriented. Skin: Warm and dry. No rashes were noted. HEENT: Round and reactive pupils. Moist mucous membranes. No ulcerations or thrush.   Neck: Supple to movements. Chest: No use of accessory muscles to breathe. Symmetrical expansion. No wheezing, crackles or rhonchi. Cardiovascular: S1 and S2 are rhythmic and regular. No murmurs appreciated. Abdomen: Positive bowel sounds to auscultation. Benign to palpation. No masses felt. No hepatosplenomegaly. Genitourinary: Male  Extremities: No clubbing, no cyanosis, no edema.   Lines: peripheral    Laboratory and Tests Review:  Lab Results   Component Value Date    WBC 9.1 10/27/2021    WBC 9.0 10/26/2021    WBC 10.9 10/25/2021    HGB 9.7 (L) 10/27/2021    HCT 30.0 (L) 10/27/2021    MCV 86.5 10/27/2021     10/27/2021     Lab Results   Component Value Date    NEUTROABS 7.46 (H) 10/27/2021    NEUTROABS 7.92 (H) 10/26/2021    NEUTROABS 8.82 (H) 10/25/2021     No results found for: CRPHS  Lab Results   Component Value Date    ALT 5 10/27/2021    AST 13 10/27/2021    ALKPHOS 134 (H) 10/27/2021    BILITOT 0.3 10/27/2021     Lab Results   Component Value Date     10/27/2021    K 3.6 10/27/2021    CL 92 10/27/2021    CO2 23 10/27/2021    BUN 51 10/27/2021    CREATININE 8.2 10/27/2021    CREATININE 7.1 10/26/2021    CREATININE 9.1 10/25/2021    GFRAA 8 10/27/2021    LABGLOM 7 10/27/2021    GLUCOSE 126 10/27/2021    PROT 5.7 10/27/2021    LABALBU 4.1 10/27/2021    CALCIUM 8.5 10/27/2021    BILITOT 0.3 10/27/2021    ALKPHOS 134 10/27/2021    AST 13 10/27/2021    ALT 5 10/27/2021     Lab Results   Component Value Date    CRP 2.6 (H) 06/06/2021     Lab Results   Component Value Date    SEDRATE 75 (H) 06/06/2021     Radiology:  Reviewed    Microbiology:   Lab Results   Component Value Date    BC 24 Hours no growth 10/24/2021    BC 5 Days no growth 08/20/2021    BC 5 Days no growth 06/04/2021    ORG Micrococcus 06/03/2021     Lab Results   Component Value Date    BLOODCULT2 24 Hours no growth 10/24/2021    BLOODCULT2 5 Days no growth 06/04/2021    BLOODCULT2 5 Days- no growth 01/06/2020    ORG Micrococcus 06/03/2021 No results found for: WNDABS  Smear, Respiratory   Date Value Ref Range Status   01/07/2020   Final    Group 6: <25 PMN's/LPF and <25 Epithelial cells/LPF  Rare Polymorphonuclear leukocytes  Epithelial cells not seen  Rare Gram positive cocci  Rare Gram positive coccobacillary organisms  Rare Gram variable rods       No results found for: MPNEUMO, CLAMYDCU, LABLEGI, AFBCX, FUNGSM, LABFUNG  CULTURE, RESPIRATORY   Date Value Ref Range Status   01/07/2020 Oral Pharyngeal Karla present  Final     Culture Catheter Tip   Date Value Ref Range Status   06/07/2021 Growth not present  Final     Body Fluid Culture, Sterile   Date Value Ref Range Status   06/04/2021 Growth not present  Final     No results found for: CXSURG  Creatinine Ur POCT   Date Value Ref Range Status   05/02/2019 100  Final     Urine Culture, Routine   Date Value Ref Range Status   06/04/2021 Growth not present  Final     No results found for: [address]     ASSESSMENT:  · MSSA bacteremia Healthcare associated bloodstream infection with 2 sets of blood cultures positive last set after the temporary catheter was placed    PLAN:  · MICHAEL negative for vegetation  · Continue Ancef 2-2-3 x6 weeks  · Okay for a tunneled catheter-blood cultures remain negative  · Monitor labs    Merlyn Friend MD  4:11 PM  10/27/2021

## 2021-10-28 ENCOUNTER — ANESTHESIA EVENT (OUTPATIENT)
Dept: OPERATING ROOM | Age: 56
DRG: 252 | End: 2021-10-28
Payer: COMMERCIAL

## 2021-10-28 PROBLEM — Z99.2 ENCOUNTER REGARDING VASCULAR ACCESS FOR DIALYSIS FOR END-STAGE RENAL DISEASE (HCC): Status: ACTIVE | Noted: 2021-10-28

## 2021-10-28 PROBLEM — N18.6 ENCOUNTER REGARDING VASCULAR ACCESS FOR DIALYSIS FOR END-STAGE RENAL DISEASE (HCC): Status: ACTIVE | Noted: 2021-10-28

## 2021-10-28 LAB
ALBUMIN SERPL-MCNC: 3.6 G/DL (ref 3.5–5.2)
ALP BLD-CCNC: 107 U/L (ref 40–129)
ALT SERPL-CCNC: <5 U/L (ref 0–40)
ANION GAP SERPL CALCULATED.3IONS-SCNC: 13 MMOL/L (ref 7–16)
ANISOCYTOSIS: ABNORMAL
AST SERPL-CCNC: 11 U/L (ref 0–39)
ATYPICAL LYMPHOCYTE RELATIVE PERCENT: 0.9 % (ref 0–4)
BASOPHILS ABSOLUTE: 0.06 E9/L (ref 0–0.2)
BASOPHILS RELATIVE PERCENT: 0.9 % (ref 0–2)
BILIRUB SERPL-MCNC: <0.2 MG/DL (ref 0–1.2)
BUN BLDV-MCNC: 33 MG/DL (ref 6–20)
CALCIUM SERPL-MCNC: 8.1 MG/DL (ref 8.6–10.2)
CHLORIDE BLD-SCNC: 98 MMOL/L (ref 98–107)
CO2: 24 MMOL/L (ref 22–29)
CREAT SERPL-MCNC: 6.4 MG/DL (ref 0.7–1.2)
EOSINOPHILS ABSOLUTE: 0.67 E9/L (ref 0.05–0.5)
EOSINOPHILS RELATIVE PERCENT: 9.6 % (ref 0–6)
GFR AFRICAN AMERICAN: 11
GFR NON-AFRICAN AMERICAN: 9 ML/MIN/1.73
GLUCOSE BLD-MCNC: 118 MG/DL (ref 74–99)
HCT VFR BLD CALC: 27.1 % (ref 37–54)
HEMOGLOBIN: 8.9 G/DL (ref 12.5–16.5)
LYMPHOCYTES ABSOLUTE: 0.56 E9/L (ref 1.5–4)
LYMPHOCYTES RELATIVE PERCENT: 7 % (ref 20–42)
MCH RBC QN AUTO: 28.7 PG (ref 26–35)
MCHC RBC AUTO-ENTMCNC: 32.8 % (ref 32–34.5)
MCV RBC AUTO: 87.4 FL (ref 80–99.9)
METER GLUCOSE: 169 MG/DL (ref 74–99)
METER GLUCOSE: 181 MG/DL (ref 74–99)
METER GLUCOSE: 211 MG/DL (ref 74–99)
MONOCYTES ABSOLUTE: 0.49 E9/L (ref 0.1–0.95)
MONOCYTES RELATIVE PERCENT: 7 % (ref 2–12)
MYELOCYTE PERCENT: 1.7 % (ref 0–0)
NEUTROPHILS ABSOLUTE: 5.25 E9/L (ref 1.8–7.3)
NEUTROPHILS RELATIVE PERCENT: 73 % (ref 43–80)
OVALOCYTES: ABNORMAL
PDW BLD-RTO: 15.4 FL (ref 11.5–15)
PHOSPHORUS: 2.7 MG/DL (ref 2.5–4.5)
PLATELET # BLD: 246 E9/L (ref 130–450)
PMV BLD AUTO: 10.6 FL (ref 7–12)
POIKILOCYTES: ABNORMAL
POLYCHROMASIA: ABNORMAL
POTASSIUM REFLEX MAGNESIUM: 3.8 MMOL/L (ref 3.5–5)
RBC # BLD: 3.1 E12/L (ref 3.8–5.8)
SODIUM BLD-SCNC: 135 MMOL/L (ref 132–146)
TOTAL PROTEIN: 5.4 G/DL (ref 6.4–8.3)
WBC # BLD: 7 E9/L (ref 4.5–11.5)

## 2021-10-28 PROCEDURE — 2060000000 HC ICU INTERMEDIATE R&B

## 2021-10-28 PROCEDURE — 80053 COMPREHEN METABOLIC PANEL: CPT

## 2021-10-28 PROCEDURE — 85025 COMPLETE CBC W/AUTO DIFF WBC: CPT

## 2021-10-28 PROCEDURE — 36415 COLL VENOUS BLD VENIPUNCTURE: CPT

## 2021-10-28 PROCEDURE — 6360000002 HC RX W HCPCS: Performed by: FAMILY MEDICINE

## 2021-10-28 PROCEDURE — 99221 1ST HOSP IP/OBS SF/LOW 40: CPT | Performed by: SURGERY

## 2021-10-28 PROCEDURE — 6370000000 HC RX 637 (ALT 250 FOR IP): Performed by: FAMILY MEDICINE

## 2021-10-28 PROCEDURE — 82962 GLUCOSE BLOOD TEST: CPT

## 2021-10-28 PROCEDURE — 84100 ASSAY OF PHOSPHORUS: CPT

## 2021-10-28 PROCEDURE — 2580000003 HC RX 258: Performed by: FAMILY MEDICINE

## 2021-10-28 RX ADMIN — CARVEDILOL 6.25 MG: 6.25 TABLET, FILM COATED ORAL at 16:10

## 2021-10-28 RX ADMIN — INSULIN LISPRO 4 UNITS: 100 INJECTION, SOLUTION INTRAVENOUS; SUBCUTANEOUS at 12:12

## 2021-10-28 RX ADMIN — INSULIN GLARGINE 12 UNITS: 100 INJECTION, SOLUTION SUBCUTANEOUS at 21:37

## 2021-10-28 RX ADMIN — CARVEDILOL 6.25 MG: 6.25 TABLET, FILM COATED ORAL at 10:21

## 2021-10-28 RX ADMIN — BUMETANIDE 2 MG: 1 TABLET ORAL at 21:21

## 2021-10-28 RX ADMIN — BUMETANIDE 2 MG: 1 TABLET ORAL at 10:20

## 2021-10-28 RX ADMIN — Medication: at 10:25

## 2021-10-28 RX ADMIN — PANTOPRAZOLE SODIUM 40 MG: 40 TABLET, DELAYED RELEASE ORAL at 06:09

## 2021-10-28 RX ADMIN — HEPARIN SODIUM 5000 UNITS: 10000 INJECTION INTRAVENOUS; SUBCUTANEOUS at 06:09

## 2021-10-28 RX ADMIN — INSULIN LISPRO 2 UNITS: 100 INJECTION, SOLUTION INTRAVENOUS; SUBCUTANEOUS at 16:10

## 2021-10-28 RX ADMIN — METOLAZONE 5 MG: 5 TABLET ORAL at 10:21

## 2021-10-28 RX ADMIN — SEVELAMER CARBONATE 1600 MG: 800 TABLET, FILM COATED ORAL at 12:04

## 2021-10-28 RX ADMIN — AMLODIPINE BESYLATE 5 MG: 5 TABLET ORAL at 10:20

## 2021-10-28 RX ADMIN — MONTELUKAST SODIUM 10 MG: 10 TABLET ORAL at 21:21

## 2021-10-28 RX ADMIN — SEVELAMER CARBONATE 1600 MG: 800 TABLET, FILM COATED ORAL at 10:21

## 2021-10-28 RX ADMIN — Medication 10 ML: at 10:25

## 2021-10-28 RX ADMIN — Medication 10 ML: at 21:21

## 2021-10-28 RX ADMIN — FLUTICASONE PROPIONATE 2 SPRAY: 50 SPRAY, METERED NASAL at 10:25

## 2021-10-28 RX ADMIN — INSULIN LISPRO 1 UNITS: 100 INJECTION, SOLUTION INTRAVENOUS; SUBCUTANEOUS at 21:21

## 2021-10-28 RX ADMIN — HEPARIN SODIUM 5000 UNITS: 10000 INJECTION INTRAVENOUS; SUBCUTANEOUS at 14:15

## 2021-10-28 RX ADMIN — Medication: at 22:04

## 2021-10-28 RX ADMIN — ATORVASTATIN CALCIUM 10 MG: 10 TABLET, FILM COATED ORAL at 10:20

## 2021-10-28 RX ADMIN — METOLAZONE 5 MG: 5 TABLET ORAL at 21:21

## 2021-10-28 ASSESSMENT — PAIN SCALES - GENERAL
PAINLEVEL_OUTOF10: 0
PAINLEVEL_OUTOF10: 0

## 2021-10-28 ASSESSMENT — LIFESTYLE VARIABLES: SMOKING_STATUS: 0

## 2021-10-28 NOTE — CONSULTS
Vascular Surgery Consultation Note    Reason for Consult:  Hemodialysis access    HPI :    This is a 49 y.o. male who is admitted to the hospital for treatment of MSSA bacteremia. Patient is known to our service and was evaluated last month by Dr. Shania Patricia for arm access creation for hemodialysis. He had a tunneled hemodialysis catheter placed in 6/20/2021 when he failed peritoneal dialysis. Patient was in [place] for work and notes that he became feverish and chilled and was admitted to their hospital.  His tunneled hemodialysis catheter was removed and a left neck temporary hemodialysis catheter was placed and patient was transferred here to be \"closer to family\". Infectious disease has evaluated the patient, he has been treated with abx, and he is now cleared for insertion of tunneled hemodialysis catheter. Patient is right-handed. He denies any history of pacer defibrillator. He has no history of DVTs.     ROS : Negative if blank [], Positive if [x]  General Vascular   [] Fevers [] Claudication (Blocks)   [] Chills [] Rest Pain   [] Weight Loss [] Tissue Loss   [] Chest Pain [] Clotting Disorder    [] SOB at rest [x] Leg Swelling   [] SOB with exertion [] DVT/PE      [] Nausea    [] Vomitting [] Stroke/TIA   [] Abdominal Pain [] Focal weakness   [] Melena [] Slurred Speech   [] Hematochezia [] Vision Changes   [] Hematuria    [] Dysuria    [] Wears Glasses/Contacts    [] Blindness       [] Difficulty swallowing        Past Medical History:   Diagnosis Date    Congestive heart disease ([facility] [address] 6/5/2020    Diabetes mellitus ([facility] [address]     Hyperlipidemia     Hypertension     Type 2 diabetes mellitus with diabetic nephropathy, with long-term current use of insulin ([facility] [address] 1/22/2020        Past Surgical History:   Procedure Laterality Date    CATHETER REMOVAL N/A 6/7/2021    REMOVAL OF PERITONEAL DIALYSIS CATHETER   performed by Kenan Carr MD at [facility] N/A 11/30/2020 CATHETER INSERTION PERITONEAL DIALYSIS LAPAROSCOPIC performed by Roshni Bautista MD at [address] [facility] Right 2021    CATHETER INSERTION HEMODIALYSIS performed by Lucinda Ortiz MD at [address]     Current Medications:    sodium chloride      dextrose        HYDROmorphone, sodium chloride flush, sodium chloride, promethazine **OR** ondansetron, polyethylene glycol, acetaminophen **OR** acetaminophen, glucose, dextrose, glucagon (rDNA), dextrose, albuterol    medicated lip balm   Topical BID    epoetin tricia-epbx  10,000 Units SubCUTAneous Once per day on     ceFAZolin  2,000 mg IntraVENous Once per day on     [START ON 10/29/2021] ceFAZolin  3,000 mg IntraVENous Once per day on Fri    amLODIPine  5 mg Oral Daily    bumetanide  2 mg Oral BID    carvedilol  6.25 mg Oral BID WC    fluticasone  2 spray Each Nostril Daily    atorvastatin  10 mg Oral Daily    metOLazone  5 mg Oral BID    montelukast  10 mg Oral Nightly    pantoprazole  40 mg Oral QAM AC    [Held by provider] sevelamer  1,600 mg Oral TID WC    sodium chloride flush  10 mL IntraVENous 2 times per day    insulin glargine  0.25 Units/kg SubCUTAneous Nightly    insulin lispro  0-12 Units SubCUTAneous TID WC    insulin lispro  0-6 Units SubCUTAneous Nightly    heparin (porcine)  5,000 Units SubCUTAneous Q8H        Allergies:  Patient has no known allergies. Social History     Socioeconomic History    Marital status:      Spouse name: Not on file    Number of children: Not on file    Years of education: Not on file    Highest education level: Not on file   Occupational History    Not on file   Tobacco Use    Smoking status: Former Smoker     Packs/day: 0.50     Years: 11.00     Pack years: 5.50     Types: Cigars     Quit date: 2019     Years since quittin.1    Smokeless tobacco: Never Used   Vaping Use    Vaping Use: Never used   Substance and Sexual Activity    Alcohol use:  Yes Alcohol/week: 0.0 standard drinks     Comment: socially    Drug use: No    Sexual activity: Yes     Partners: Female   Other Topics Concern    Not on file   Social History Narrative    Not on file     Social Determinants of Health     Financial Resource Strain: Low Risk     Difficulty of Paying Living Expenses: Not hard at all   Food Insecurity: No Food Insecurity    Worried About [address] in the Last Year: Never true    [facility] [employer] in the Last Year: Never true   Transportation Needs:     Lack of Transportation (Medical):     Lack of Transportation (Non-Medical):    Physical Activity:     Days of Exercise per Week:     Minutes of Exercise per Session:    Stress:     Feeling of Stress :    Social Connections:     Frequency of Communication with Friends and Family:     Frequency of Social Gatherings with Friends and Family:     Attends Mu-ism Services:      Active Member of Clubs or Organizations:     Attends Club or Organization Meetings:     Marital Status:    Intimate Partner Violence:     Fear of Current or Ex-Partner:     Emotionally Abused:     Physically Abused:     Sexually Abused:         Family History   Problem Relation Age of Onset    Heart Disease Mother        PHYSICAL EXAM:    BP (!) 150/60   Pulse 87   Temp 98.4 °F (36.9 °C) (Oral)   Resp 16   Ht 5' 8\" (1.727 m)   Wt 212 lb 1.3 oz (96.2 kg)   SpO2 96%   BMI 32.25 kg/m²   CONSTITUTIONAL:  awake, alert, cooperative, no apparent distress, and appears stated age  EYES:  lids and lashes normal, sclera clear and conjunctiva normal  ENT:  normocepalic, without obvious abnormality, external ears without lesions  NECK:  supple, symmetrical, trachea midline, L sided temporary HD catheter present  HEMATOLOGIC/LYMPHATICS:  no cervical lymphadenopathy  LUNGS:  no increased work of breathing, good air exchange  CARDIOVASCULAR:  S1S2  ABDOMEN:  soft, non-distended, non-tender  SKIN:  no rashes  EXTREMITIES:   R UE Swelling absent Incisions absent       5/5 Strength  L UE Swelling absent Incisions absent       5/5 Strength  R LE Edema present  Incisions absent    No wounds present  L LE Edema present  Incisions absent    No wounds present  R brachial 2 L brachial 2   R radial 2 L radial 2   R femoral  L femoral    R popliteal  L popliteal    R posterior tibial  L posterior tibial    R dorsalis pedis 1 L dorsalis pedis 0-1     LABS:    Lab Results   Component Value Date    WBC 7.0 10/28/2021    HGB 8.9 (L) 10/28/2021    HCT 27.1 (L) 10/28/2021     10/28/2021    PROTIME 10.9 06/04/2021    INR 1.0 06/04/2021    K 3.8 10/28/2021    BUN 33 (H) 10/28/2021    CREATININE 6.4 (H) 10/28/2021       RADIOLOGY: Pertinent vascular imaging reviewed    Assesment/Plan  ESRD requiring HD  Patient currently dialyzing through a left neck temporary hemodialysis catheter without issue. Infectious disease has cleared him for insertion of a tunneled hemodialysis catheter. I discussed the procedure with the patient including the risk, benefits alternatives and he would like to proceed. He has signed the treatment consent. I asked him to remain n.p.o. after midnight tonight. Plan discussed with Dr. Becky Orona PA-C    Pt seen and examined  Will proceed with tunn catheter  I reviewed the procedure with the patient and family as available. I discussed the procedure, risks, benefits, complications, and alternatives of the procedure. They understand and consent.   All questions were answered    Laverne Daniel MD

## 2021-10-28 NOTE — PROGRESS NOTES
Hospitalist Progress Note      SYNOPSIS: Patient admitted on 10/24/2021 for fevers / chills. HCAP with high grade bacteremia with MSSA      SUBJECTIVE:    Patient seen and examined  Records reviewed. Seen in room, sitting  Up in chair  Feeling much better    Stable overnight. No other overnight issues reported. Temp (24hrs), Av.6 °F (37 °C), Min:98.4 °F (36.9 °C), Max:98.9 °F (37.2 °C)    DIET: ADULT DIET; Regular; 3 carb choices (45 gm/meal); 1500 ml  Diet NPO  CODE: Full Code  No intake or output data in the 24 hours ending 10/28/21 1156    OBJECTIVE:    BP (!) 150/60   Pulse 87   Temp 98.4 °F (36.9 °C) (Oral)   Resp 16   Ht 5' 8\" (1.727 m)   Wt 212 lb 1.3 oz (96.2 kg)   SpO2 96%   BMI 32.25 kg/m²     General appearance: No apparent distress, appears stated age and cooperative. Herpes oralis lower lip  HEENT:  Conjunctivae/corneas clear. Neck: Supple. No jugular venous distention. left  IJ vein temporary dialysis catheter in place  Respiratory: Clear to auscultation bilaterally, normal respiratory effort  Cardiovascular: Regular rate rhythm, normal S1-S2  Abdomen: Soft, nontender, nondistended  Musculoskeletal: No clubbing, cyanosis, no bilateral lower extremity edema. Brisk capillary refill. Skin:  No rashes  on visible skin  Neurologic: awake, alert and following commands     ASSESSMENT:    MSSA bacteremia  ESRD on home HD  Recurrent PD related peritonitis  Vit D deficiency  MBD of CKD  HFpEF  ACD  DM type 1  HLD  Herpes oralis        PLAN:    MICHAEL without vegtation; IV Ancef; ID ok with insertion of tunnelled HD catheter - vascular consulted  Nephrology consulted.  Epo, HD.  CCB, BB, Bumex + Zaroxolyn, Albumin  PPI  24 lantus + SSI, DM well controlled as evidenced by A1c  Zovirax topical x lip - not on formulary - Blistex    DISPOSITION: TBD    Medications:  REVIEWED DAILY    Infusion Medications    sodium chloride      dextrose       Scheduled Medications    medicated lip balm   Topical BID    epoetin tricia-epbx  10,000 Units SubCUTAneous Once per day on Mon Wed Fri    ceFAZolin  2,000 mg IntraVENous Once per day on Mon Wed    [START ON 10/29/2021] ceFAZolin  3,000 mg IntraVENous Once per day on Fri    amLODIPine  5 mg Oral Daily    bumetanide  2 mg Oral BID    carvedilol  6.25 mg Oral BID WC    fluticasone  2 spray Each Nostril Daily    atorvastatin  10 mg Oral Daily    metOLazone  5 mg Oral BID    montelukast  10 mg Oral Nightly    pantoprazole  40 mg Oral QAM AC    sevelamer  1,600 mg Oral TID WC    sodium chloride flush  10 mL IntraVENous 2 times per day    insulin glargine  0.25 Units/kg SubCUTAneous Nightly    insulin lispro  0-12 Units SubCUTAneous TID WC    insulin lispro  0-6 Units SubCUTAneous Nightly    heparin (porcine)  5,000 Units SubCUTAneous Q8H     PRN Meds: HYDROmorphone, sodium chloride flush, sodium chloride, promethazine **OR** ondansetron, polyethylene glycol, acetaminophen **OR** acetaminophen, glucose, dextrose, glucagon (rDNA), dextrose, albuterol    Labs:     Recent Labs     10/26/21  0411 10/27/21  0326 10/28/21  0349   WBC 9.0 9.1 7.0   HGB 8.4* 9.7* 8.9*   HCT 25.8* 30.0* 27.1*    230 246       Recent Labs     10/26/21  0411 10/27/21  0326 10/28/21  0349    132 135   K 3.5  3.5 3.6 3.8   CL 97* 92* 98   CO2 23 23 24   BUN 47* 51* 33*   CREATININE 7.1* 8.2* 6.4*   CALCIUM 8.2* 8.5* 8.1*   PHOS 3.3 3.3 2.7       Recent Labs     10/26/21  0411 10/27/21  0326 10/28/21  0349   PROT 5.5* 5.7* 5.4*   ALKPHOS 121 134* 107   ALT 9 5 <5   AST 10 13 11   BILITOT <0.2 0.3 <0.2       No results for input(s): INR in the last 72 hours. No results for input(s): Magdalene Cheung in the last 72 hours.     Chronic labs:    Lab Results   Component Value Date    CHOL 158 09/10/2020    TRIG 43 09/10/2020    HDL 62 09/10/2020    LDLCALC 87 09/10/2020    TSH 2.860 09/10/2020    PSA 0.56 07/10/2017    INR 1.0 06/04/2021    LABA1C 7.3 (H) 10/24/2021 Radiology: REVIEWED DAILY    +++++++++++++++++++++++++++++++++++++++++++++++++  Domenic Scott MD  [facility] - [address] [place] [address]  +++++++++++++++++++++++++++++++++++++++++++++++++  NOTE: This report was transcribed using voice recognition software. Every effort was made to ensure accuracy; however, inadvertent computerized transcription errors may be present.

## 2021-10-28 NOTE — PROGRESS NOTES
Department of Internal Medicine  Nephrology Progress Note    Events reviewed. For dialysis tomorrow. SUBJECTIVE: We are following Mr. Clinton Woodard for ESRD on HD. He reports no complaints. PHYSICAL EXAM:      Vitals:    VITALS:  BP (!) 150/60   Pulse 87   Temp 98.4 °F (36.9 °C) (Oral)   Resp 16   Ht 5' 8\" (1.727 m)   Wt 212 lb 1.3 oz (96.2 kg)   SpO2 96%   BMI 32.25 kg/m²   24HR INTAKE/OUTPUT:    No intake or output data in the 24 hours ending 10/28/21 1318  Access: left  IJ vein temporary dialysis catheter in place  Constitutional:  Well developed and nourished,in no acute distres  HEENT:  NC/AT, PERRL, supple no JVD  Respiratory: CTA bilaterally and breath sounds equal.  Cardiovascular/Edema: Regular rate and rhythm, normal heart sounds, without pathological murmurs, ectopy, gallops, or rubs. No edema  Gastrointestinal: soft, no organomegaly , tender, bowel sounds present  Neurologic:  Alert and Ox3, non focal  Skin:  Turgor normal  Scheduled Meds:   medicated lip balm   Topical BID    epoetin tricia-epbx  10,000 Units SubCUTAneous Once per day on Mon Wed Fri    ceFAZolin  2,000 mg IntraVENous Once per day on Mon Wed    [START ON 10/29/2021] ceFAZolin  3,000 mg IntraVENous Once per day on Fri    amLODIPine  5 mg Oral Daily    bumetanide  2 mg Oral BID    carvedilol  6.25 mg Oral BID WC    fluticasone  2 spray Each Nostril Daily    atorvastatin  10 mg Oral Daily    metOLazone  5 mg Oral BID    montelukast  10 mg Oral Nightly    pantoprazole  40 mg Oral QAM AC    sevelamer  1,600 mg Oral TID WC    sodium chloride flush  10 mL IntraVENous 2 times per day    insulin glargine  0.25 Units/kg SubCUTAneous Nightly    insulin lispro  0-12 Units SubCUTAneous TID WC    insulin lispro  0-6 Units SubCUTAneous Nightly    heparin (porcine)  5,000 Units SubCUTAneous Q8H     Continuous Infusions:   sodium chloride      dextrose       PRN Meds:. HYDROmorphone, sodium chloride flush, sodium chloride, promethazine **OR** ondansetron, polyethylene glycol, acetaminophen **OR** acetaminophen, glucose, dextrose, glucagon (rDNA), dextrose, albuterol    DATA:    CBC:   Lab Results   Component Value Date    WBC 7.0 10/28/2021    RBC 3.10 10/28/2021    HGB 8.9 10/28/2021    HCT 27.1 10/28/2021    MCV 87.4 10/28/2021    MCH 28.7 10/28/2021    MCHC 32.8 10/28/2021    RDW 15.4 10/28/2021     10/28/2021    MPV 10.6 10/28/2021     CMP:    Lab Results   Component Value Date     10/28/2021    K 3.8 10/28/2021    CL 98 10/28/2021    CO2 24 10/28/2021    BUN 33 10/28/2021    CREATININE 6.4 10/28/2021    GFRAA 11 10/28/2021    LABGLOM 9 10/28/2021    GLUCOSE 118 10/28/2021    PROT 5.4 10/28/2021    LABALBU 3.6 10/28/2021    CALCIUM 8.1 10/28/2021    BILITOT <0.2 10/28/2021    ALKPHOS 107 10/28/2021    AST 11 10/28/2021    ALT <5 10/28/2021     Magnesium:    Lab Results   Component Value Date    MG 2.0 10/26/2021     Phosphorus:    Lab Results   Component Value Date    PHOS 2.7 10/28/2021     U/A:    Lab Results   Component Value Date    COLORU Yellow 06/04/2021    PROTEINU >=300 06/04/2021    PHUR 5.5 06/04/2021    WBCUA 1-3 06/04/2021    RBCUA 2-5 06/04/2021    BACTERIA MANY 06/04/2021    CLARITYU CLOUDY 06/04/2021    SPECGRAV 1.015 06/04/2021    LEUKOCYTESUR Negative 06/04/2021    UROBILINOGEN 0.2 06/04/2021    BILIRUBINUR Negative 06/04/2021    BLOODU SMALL 06/04/2021    GLUCOSEU 500 06/04/2021     Microalbumen/Creatinine ratio:  No components found for: RUCREAT  Radiology Review:  none     BRIEF SUMMARY OF INITIAL CONSULT:     The patient is a 49 y.o. male with significant past medical history of end stage renal disease secondary to DM and HTN (was previously on PD now on Home HD), CHF, Type II DM with diabetic nephropathy, HTN, and Hyperlipidemia, last hemodialysis treatment on Friday, presents with chills and was admitted with line septicemia. Patient had a broken tessio that was replaced at the access center. 24 hrs later patient went to work where he began to experience chills, diaphoresis and acute confusion. EMS was called and he was taken to a hospital in [place] where his tunneled line was removed and a LIJ temporary catheter was placed. We are consulted for ESRD on hemodialysis    Problems resolved. · Hypoalbuminemia - start SPA with HD x 3 doses    IMPRESSION/RECOMMENDATIONS:     1. ESRD on Home hemodialysis, will dialysis MWF while inpatient. For dialysis tomorrow  2. MSSA bacteremia s/p right IJ tunneled cath removal and temporary left IJ catheter insertion. S/p MICHAEL without vegetation. On IV ancef MWF. 3. Type II, on insulin  4. HTN, on amlodipine and carvedilol  5. Anemia:  Continue Erythropoetin 47259 units x 3 per week  6. MBD of CKD:  Phosphorus 2.7 today. Hold sevelamer for now and monitor phosphorus levels daily.   7. HLD, on statin    Plan:    · HD times per week MWF  · Continue to monitor labs daily  · Continue Bumex 2 mg po bid  · Continue metolazone 5 mg po bid  · Continue 1.5 L fluid restriction  · Tunneled dialysis catheter placement ok'd by ID  · Vascular consulted for tunneled dialysis catheter placement  · Hold sevelamer for now and monitor phosphorus levels daily      Electronically signed by DONOVAN Leos CNP on 10/28/2021 at 1:18 PM

## 2021-10-28 NOTE — CARE COORDINATION
10/28/2021 - Nephrology and ID following. Vascular surgery consulted for new dialysis tunneled catheter. Pt NPO after midnight. Spoke with patient - [facility] had [facility] to [facility] originally started on [facility] before he started doing home HD. He is aware he may have to go to the dialysis center for HD if IV antibiotics are continued once he is discharged. Lorna Earl at [facility] [phone] and faxed her the IV ancef orders. She will let us know when pt dialysis will be at discharge. His plan is to discharge home when medically stable. His wife can provide transportation home tomorrow. SW/CM will follow.

## 2021-10-28 NOTE — PROGRESS NOTES
[address] [place] [address] [facility]  Progress Note      Chief complaint: MSSA bacteremia    SUBJECTIVE:  Patient is tolerating medications. No reported adverse drug reactions. No nausea, vomiting, diarrhea. Up in chair  Review of systems:  As stated above in the chief complaint, otherwise negative. Medications:  Scheduled Meds:   medicated lip balm   Topical BID    epoetin tricia-epbx  10,000 Units SubCUTAneous Once per day on     ceFAZolin  2,000 mg IntraVENous Once per day on     [START ON 10/29/2021] ceFAZolin  3,000 mg IntraVENous Once per day on Fri    amLODIPine  5 mg Oral Daily    bumetanide  2 mg Oral BID    carvedilol  6.25 mg Oral BID WC    fluticasone  2 spray Each Nostril Daily    atorvastatin  10 mg Oral Daily    metOLazone  5 mg Oral BID    montelukast  10 mg Oral Nightly    pantoprazole  40 mg Oral QAM AC    sevelamer  1,600 mg Oral TID WC    sodium chloride flush  10 mL IntraVENous 2 times per day    insulin glargine  0.25 Units/kg SubCUTAneous Nightly    insulin lispro  0-12 Units SubCUTAneous TID WC    insulin lispro  0-6 Units SubCUTAneous Nightly    heparin (porcine)  5,000 Units SubCUTAneous Q8H     Continuous Infusions:   sodium chloride      dextrose       PRN Meds:HYDROmorphone, sodium chloride flush, sodium chloride, promethazine **OR** ondansetron, polyethylene glycol, acetaminophen **OR** acetaminophen, glucose, dextrose, glucagon (rDNA), dextrose, albuterol    OBJECTIVE:  BP (!) 150/60   Pulse 87   Temp 98.4 °F (36.9 °C) (Oral)   Resp 16   Ht 5' 8\" (1.727 m)   Wt 212 lb 1.3 oz (96.2 kg)   SpO2 96%   BMI 32.25 kg/m²   Temp  Av.6 °F (37 °C)  Min: 98.4 °F (36.9 °C)  Max: 98.9 °F (37.2 °C)  Constitutional: The patient is awake, alert, and oriented. Skin: Warm and dry. No rashes were noted. HEENT: Round and reactive pupils. Moist mucous membranes. No ulcerations or thrush. Neck: Supple to movements.    Chest: No use of accessory muscles to breathe. Symmetrical expansion. No wheezing, crackles or rhonchi. Cardiovascular: S1 and S2 are rhythmic and regular. No murmurs appreciated. Abdomen: Positive bowel sounds to auscultation. Benign to palpation. No masses felt. No hepatosplenomegaly. Genitourinary: Male  Extremities: No clubbing, no cyanosis, no edema.   Lines: peripheral    Laboratory and Tests Review:  Lab Results   Component Value Date    WBC 7.0 10/28/2021    WBC 9.1 10/27/2021    WBC 9.0 10/26/2021    HGB 8.9 (L) 10/28/2021    HCT 27.1 (L) 10/28/2021    MCV 87.4 10/28/2021     10/28/2021     Lab Results   Component Value Date    NEUTROABS 5.25 10/28/2021    NEUTROABS 7.46 (H) 10/27/2021    NEUTROABS 7.92 (H) 10/26/2021     No results found for: CRPHS  Lab Results   Component Value Date    ALT <5 10/28/2021    AST 11 10/28/2021    ALKPHOS 107 10/28/2021    BILITOT <0.2 10/28/2021     Lab Results   Component Value Date     10/28/2021    K 3.8 10/28/2021    CL 98 10/28/2021    CO2 24 10/28/2021    BUN 33 10/28/2021    CREATININE 6.4 10/28/2021    CREATININE 8.2 10/27/2021    CREATININE 7.1 10/26/2021    GFRAA 11 10/28/2021    LABGLOM 9 10/28/2021    GLUCOSE 118 10/28/2021    PROT 5.4 10/28/2021    LABALBU 3.6 10/28/2021    CALCIUM 8.1 10/28/2021    BILITOT <0.2 10/28/2021    ALKPHOS 107 10/28/2021    AST 11 10/28/2021    ALT <5 10/28/2021     Lab Results   Component Value Date    CRP 2.6 (H) 06/06/2021     Lab Results   Component Value Date    SEDRATE 75 (H) 06/06/2021     Radiology:  Reviewed    Microbiology:   Lab Results   Component Value Date    BC 24 Hours no growth 10/24/2021    BC 5 Days no growth 08/20/2021    BC 5 Days no growth 06/04/2021    ORG Micrococcus 06/03/2021     Lab Results   Component Value Date    BLOODCULT2 24 Hours no growth 10/24/2021    BLOODCULT2 5 Days no growth 06/04/2021    BLOODCULT2 5 Days- no growth 01/06/2020    ORG Micrococcus 06/03/2021     No results found for: WNDABS  Smear, Respiratory   Date Value Ref Range Status   01/07/2020   Final    Group 6: <25 PMN's/LPF and <25 Epithelial cells/LPF  Rare Polymorphonuclear leukocytes  Epithelial cells not seen  Rare Gram positive cocci  Rare Gram positive coccobacillary organisms  Rare Gram variable rods       No results found for: MPNEUMO, CLAMYDCU, LABLEGI, AFBCX, FUNGSM, LABFUNG  CULTURE, RESPIRATORY   Date Value Ref Range Status   01/07/2020 Oral Pharyngeal Karla present  Final     Culture Catheter Tip   Date Value Ref Range Status   06/07/2021 Growth not present  Final     Body Fluid Culture, Sterile   Date Value Ref Range Status   06/04/2021 Growth not present  Final     No results found for: CXSURG  Creatinine Ur POCT   Date Value Ref Range Status   05/02/2019 100  Final     Urine Culture, Routine   Date Value Ref Range Status   06/04/2021 Growth not present  Final     No results found for: [facility]    ASSESSMENT:  · MSSA bacteremia Healthcare associated bloodstream infection with 2 sets of blood cultures positive last set after the temporary catheter was placed    PLAN:  · MICHAEL negative for vegetation  · Continue Ancef 2-2-3 x6 weeks  · Okay for a tunneled catheter-blood cultures remain negative  · Monitor labs    DONOVAN Hughes - CNP  11:50 AM  10/28/2021     Pt seen and examined. Above discussed agree with advanced practice nurse. Labs, cultures, and radiographs reviewed. Face to Face encounter occurred. Changes made as necessary.      Claudia Pope MD

## 2021-10-28 NOTE — PLAN OF CARE
Problem: Falls - Risk of:  Goal: Will remain free from falls  Description: Will remain free from falls  Outcome: Met This Shift  Goal: Absence of physical injury  Description: Absence of physical injury  Outcome: Met This Shift     Problem: Pain:  Goal: Pain level will decrease  Description: Pain level will decrease  Outcome: Met This Shift  Goal: Control of acute pain  Description: Control of acute pain  Outcome: Met This Shift  Goal: Control of chronic pain  Description: Control of chronic pain  Outcome: Met This Shift     Problem: Gas Exchange - Impaired:  Goal: Levels of oxygenation will improve  Description: Levels of oxygenation will improve  Outcome: Met This Shift     Problem: Infection, Septic Shock:  Goal: Will show no infection signs and symptoms  Description: Will show no infection signs and symptoms  Outcome: Ongoing     Problem: Serum Glucose Level - Abnormal:  Goal: Ability to maintain appropriate glucose levels will improve  Description: Ability to maintain appropriate glucose levels will improve  Outcome: Ongoing

## 2021-10-28 NOTE — ANESTHESIA PRE PROCEDURE
READER) ARON As directed 7/13/21   Sheila Lara DO   Insulin Pen Needle (MEIJER PEN NEEDLES) 31G X 8 MM MISC 1 each by Does not apply route 3 times daily 7/13/21   Sheila Lara DO   pantoprazole (PROTONIX) 40 MG tablet Take 1 tablet by mouth every morning (before breakfast) 6/10/21   Anayeli Eric MD   montelukast (SINGULAIR) 10 MG tablet TAKE ONE TABLET BY MOUTH DAILY 1/11/21   Sheila Lara DO   bumetanide (BUMEX) 2 MG tablet TAKE ONE TABLET BY MOUTH TWO TIMES A DAY  Patient taking differently: 2 mg 3 times daily TAKE ONE TABLET BY MOUTH TWO TIMES A DAY 12/30/20   Sheila Lara DO   insulin detemir (LEVEMIR) 100 UNIT/ML injection vial Inject 20 Units into the skin nightly 12/8/20   Sheila Lara DO   sildenafil (VIAGRA) 100 MG tablet TAKE ONE TABLET BY MOUTH AS NEEDED FOR ERECTILE DYSFUNCTION 10/27/20   Sheila Lara DO   fluticasone (FLONASE) 50 MCG/ACT nasal spray 2 sprays by Each Nostril route daily 9/8/20   Sheila Lara DO   Insulin Syringe-Needle U-100 (AIMSC[employer] INS SYR .3CC/29GX0.5\") 29G X 1/2\" 0.3 ML MISC 1 each by Does not apply route daily 8/13/20   Sheila Lara DO   Misc Natural Products (APPLE CIDER VINEGAR DIET PO) Take by mouth    Historical Provider, MD   Zinc Sulfate (ZINC 15 PO) Take by mouth    Historical Provider, MD   sodium bicarbonate 650 MG tablet Take 1 tablet by mouth 3 times daily 1/10/20   Tori Mayes DO   Acetaminophen (TYLENOL) 325 MG CAPS Take 650 mg by mouth as needed (FOR PAIN PER PT.)    Historical Provider, MD   vitamin E 400 UNIT capsule Take 400 Units by mouth daily    Historical Provider, MD   Insulin Syringe-Needle U-100 30G X 1/2\" 1 ML MISC 10 units units of Tresiba daily 10/22/19   Sheila Lara DO   Cinnamon 500 MG CAPS Take 1,000 mg by mouth 2 times daily. Historical Provider, MD       Current medications:    No current facility-administered medications for this visit. No current outpatient medications on file. Facility-Administered Medications Ordered in Other Visits   Medication Dose Route Frequency Provider Last Rate Last Admin    medicated lip balm (BLISTEX/CARMEX) stick   Topical BID Jose A Hernandez MD   Given at 10/28/21 1025    HYDROmorphone (DILAUDID) injection 0.5 mg  0.5 mg IntraVENous Q4H PRN Ankita Brain, DO   0.5 mg at 10/27/21 2349    epoetin tricia-epbx (RETACRIT) injection 10,000 Units  10,000 Units SubCUTAneous Once per day on Mon Wed Fri Lindsay Bunn MD   10,000 Units at 10/27/21 1212    ceFAZolin (ANCEF) 2000 mg in sterile water 20 mL IV syringe  2,000 mg IntraVENous Once per day on Mon Wed Peewee Valladares MD   2,000 mg at 10/27/21 1653    [START ON 10/29/2021] ceFAZolin (ANCEF) 3,000 mg in dextrose 5 % 100 mL IVPB  3,000 mg IntraVENous Once per day on Fri Peewee Valladares MD        amLODIPine (NORVASC) tablet 5 mg  5 mg Oral Daily Marycruz Carver, DO   5 mg at 10/28/21 1020    bumetanide (BUMEX) tablet 2 mg  2 mg Oral BID Marycruz Carver, DO   2 mg at 10/28/21 1020    carvedilol (COREG) tablet 6.25 mg  6.25 mg Oral BID  Marycruz Carver, DO   6.25 mg at 10/28/21 1021    fluticasone (FLONASE) 50 MCG/ACT nasal spray 2 spray  2 spray Each Nostril Daily Marycruz Carver, DO   2 spray at 10/28/21 1025    atorvastatin (LIPITOR) tablet 10 mg  10 mg Oral Daily Marycruz Carver, DO   10 mg at 10/28/21 1020    metOLazone (ZAROXOLYN) tablet 5 mg  5 mg Oral BID Marycruz Carver, DO   5 mg at 10/28/21 1021    montelukast (SINGULAIR) tablet 10 mg  10 mg Oral Nightly Marycruz Carver, DO   10 mg at 10/27/21 2048    pantoprazole (PROTONIX) tablet 40 mg  40 mg Oral QAM  Marycruz Carver, DO   40 mg at 10/28/21 6487    sevelamer (RENVELA) tablet 1,600 mg  1,600 mg Oral TID  Marycruz Carver DO   1,600 mg at 10/28/21 1021    sodium chloride flush 0.9 % injection 10 mL  10 mL IntraVENous 2 times per day Marycruz Carver DO   10 mL at 10/28/21 1025    sodium chloride flush 0.9 % injection 10 mL  10 mL IntraVENous PRN Veronica Upton, DO   10 mL at 10/27/21 1653    0.9 % sodium chloride infusion  25 mL IntraVENous PRN Veronica Upton, DO        promethazine (PHENERGAN) tablet 12.5 mg  12.5 mg Oral Q6H PRN Veronica Upton, DO        Or    ondansetron [facility]) injection 4 mg  4 mg IntraVENous Q6H PRN Veronica Won, DO        polyethylene glycol (GLYCOLAX) packet 17 g  17 g Oral Daily PRN Veronica Upton, DO        acetaminophen (TYLENOL) tablet 650 mg  650 mg Oral Q6H PRN Veronica Aparicioari, DO   650 mg at 10/26/21 1604    Or    acetaminophen (TYLENOL) suppository 650 mg  650 mg Rectal Q6H PRN Veronica Upton, DO        glucose (GLUTOSE) 40 % oral gel 15 g  15 g Oral PRN Veronica Upton, DO        dextrose 50 % IV solution  12.5 g IntraVENous PRN Veronica Upton, DO        glucagon (rDNA) injection 1 mg  1 mg IntraMUSCular PRN Veronica Upton, DO        dextrose 5 % solution  100 mL/hr IntraVENous PRN Veronica Upton, DO        insulin glargine (LANTUS) injection vial 24 Units  0.25 Units/kg SubCUTAneous Nightly Veronica Won, DO   24 Units at 10/27/21 2048    insulin lispro (HUMALOG) injection vial 0-12 Units  0-12 Units SubCUTAneous TID WC Veronica Aparicioari, DO   4 Units at 10/27/21 1654    insulin lispro (HUMALOG) injection vial 0-6 Units  0-6 Units SubCUTAneous Nightly Veronica Aparicioari, DO   2 Units at 10/27/21 2048    albuterol (PROVENTIL) nebulizer solution 2.5 mg  2.5 mg Nebulization Q6H PRN Veronica Upton, DO        heparin (porcine) injection 5,000 Units  5,000 Units SubCUTAneous Q8H Veronica Aparicioari, DO   5,000 Units at 10/28/21 0609       Allergies:  No Known Allergies    Problem List:    Patient Active Problem List   Diagnosis Code    Type 2 diabetes mellitus without complication ([facility]) K85.9    Type 2 diabetes mellitus with hyperglycemia, without long-term current use of insulin ([facility]) E11.65    Renal insufficiency N28.9    Mixed hyperlipidemia E78.2    Benign essential HTN I10    Chronic kidney disease, stage IV (severe) ([facility]) N18.4    Anemia in CKD (chronic kidney disease) N18.9, D63.1    Peritonitis (HCC) K65.9    Stage 5 chronic kidney disease on chronic dialysis (HCC) N18.6, Z99.2    Bacteremia R78.81       Past Medical History:        Diagnosis Date    Congestive heart disease ([facility] 2020    [facility]     Hyperlipidemia     Hypertension     Type 2 diabetes mellitus with diabetic nephropathy, with long-term current use of insulin ([facility] 2020       Past Surgical History:        Procedure Laterality Date    CATHETER REMOVAL N/A 2021    REMOVAL OF PERITONEAL DIALYSIS CATHETER   performed by Salbador Finch MD at [address] [address] [address] N/A 2020    CATHETER INSERTION PERITONEAL DIALYSIS LAPAROSCOPIC performed by Salbador Finch MD at [address] [facility] 2021    CATHETER INSERTION HEMODIALYSIS performed by Ralu Ivory MD at [address] History:    Social History     Tobacco Use    Smoking status: Former Smoker     Packs/day: 0.50     Years: 11.00     Pack years: 5.50     Types: Cigars     Quit date: 2019     Years since quittin.1    Smokeless tobacco: Never Used   Substance Use Topics    Alcohol use: Yes     Alcohol/week: 0.0 standard drinks     Comment: socially                                Counseling given: Not Answered      Vital Signs (Current): There were no vitals filed for this visit.                                            BP Readings from Last 3 Encounters:   10/28/21 (!) 150/60   10/26/21 (!) 147/72   10/13/21 124/78       NPO Status:  PT instructed to remain NPO at midnight                                                                               BMI:   Wt Readings from Last 3 Encounters:   10/27/21 212 lb 1.3 oz (96.2 kg)   10/13/21 211 lb (95.7 kg)   21 204 lb (92.5 kg)     There is no height or weight on file to calculate BMI.    CBC:   Lab Results   Component Value Date    WBC 7.0 10/28/2021    RBC 3.10 10/28/2021    HGB 8.9 10/28/2021    HCT 27.1 10/28/2021    MCV 87.4 10/28/2021    RDW 15.4 10/28/2021     10/28/2021       CMP:   Lab Results   Component Value Date     10/28/2021    K 3.8 10/28/2021    CL 98 10/28/2021    CO2 24 10/28/2021    BUN 33 10/28/2021    CREATININE 6.4 10/28/2021    GFRAA 11 10/28/2021    LABGLOM 9 10/28/2021    GLUCOSE 118 10/28/2021    PROT 5.4 10/28/2021    CALCIUM 8.1 10/28/2021    BILITOT <0.2 10/28/2021    ALKPHOS 107 10/28/2021    AST 11 10/28/2021    ALT <5 10/28/2021       POC Tests: No results for input(s): POCGLU, POCNA, POCK, POCCL, POCBUN, POCHEMO, POCHCT in the last 72 hours. Coags:   Lab Results   Component Value Date    PROTIME 10.9 06/04/2021    INR 1.0 06/04/2021    APTT 25.6 06/04/2021       HCG (If Applicable): No results found for: PREGTESTUR, PREGSERUM, HCG, HCGQUANT     ABGs: No results found for: PHART, PO2ART, TFB0VSV, OUR8COH, BEART, T0UXWUEG     Type & Screen (If Applicable):  No results found for: LABABO, LABRH    Drug/Infectious Status (If Applicable):  No results found for: HIV, HEPCAB    COVID-19 Screening (If Applicable):   Lab Results   Component Value Date    COVID19 Not Detected 11/25/2020     EKG 6/4/21  Sinus rhythm  Normal ECG  No previous ECGs available  Confirmed by Oksana Carmen ([id number]) on 6/5/2021 5:08:42 AM    ECHO 1/8/20      Summary   Mildly dilated left ventricle. Severe left ventricular concentric hypertrophy noted. Ejection fraction is visually estimated at 55%. No evidence of left ventricular mass or thrombus noted. No regional wall motion abnormalities seen. The left atrium is mildly dilated. Interatrial septum appears intact. No evidence of thrombus within left atrium. Mildly dilated right ventricle. No evidence of a thrombus in the right ventricle. Mildly enlarged right atrium size. Mild mitral regurgitation is present. No mitral valve stenosis present.    Physiologic and/or trace tricuspid regurgitation. RVSP is 47.29 mmHg. Pulmonary hypertension is mild to moderate . Regular rhythm. Anesthesia Evaluation  Patient summary reviewed and Nursing notes reviewed no history of anesthetic complications:   Airway: Mallampati: II  TM distance: >3 FB   Neck ROM: full  Mouth opening: > = 3 FB Dental:      Comment: PT denies any missing or loose teeth    Pulmonary:   (+) decreased breath sounds,      (-) not a current smoker (Quit 2019 )                          ROS comment: Patient states O2sat dropped while asleep and was put on oxygen via NC. Cardiovascular:  Exercise tolerance: good (>4 METS),   (+) hypertension:, CHF:, hyperlipidemia      ECG reviewed  Rhythm: regular  Rate: normal  Echocardiogram reviewed         Beta Blocker:  Dose within 24 Hrs      ROS comment: Presented to [facility] ED 10/20 with fever, chills and generalized weakness after Covid vaccine. Patient has ESRD and had temp catheter exchanged the day prior. Labs obtained at the facility were notable for WBC 19.3, hemoglobin 9.3 creatinine 10.11, sodium 129, glucose 322. Blood cultures 10/20 and 10/24 grew out MSSA. Patient was admitted to hospital there, started on vancomycin and Zosyn, and transferred to [facility] 10/24 for further evaluation and treatment.     6/4/21 ECG  Sinus rhythm HR 77  Normal ECG  No previous ECGs available         Neuro/Psych:   Negative Neuro/Psych ROS              GI/Hepatic/Renal:   (+) GERD (Occasional, no current symptoms.): well controlled, renal disease (HD M-W-F. Last HD yesterday 10/27. Peritneal dialyysis catheter not used for 1 year. ):,          ROS comment: Line septicemia s/p right IJ tunneled cath removal and temporary left IJ catheter insertion. Endo/Other:    (+) DiabetesType II DM, using insulin, blood dyscrasia: anemia:., .                 Abdominal:   (+) obese,     Abdomen: soft. Vascular: negative vascular ROS.          Other Findings:      1/8/20 TTE  Summary   Mildly dilated left ventricle. Severe left ventricular concentric hypertrophy noted. Ejection fraction is visually estimated at 55%. No evidence of left ventricular mass or thrombus noted. No regional wall motion abnormalities seen. The left atrium is mildly dilated. Interatrial septum appears intact. No evidence of thrombus within left atrium. Mildly dilated right ventricle. No evidence of a thrombus in the right ventricle. Mildly enlarged right atrium size. Mild mitral regurgitation is present. No mitral valve stenosis present. Physiologic and/or trace tricuspid regurgitation. RVSP is 47.29 mmHg. Pulmonary hypertension is mild to moderate . Regular rhythm. Anesthesia Plan      MAC     ASA 4     ([id number])  Induction: intravenous. Anesthetic plan and risks discussed with patient. Use of blood products discussed with patient whom consented to blood products. Plan discussed with attending and CRNA. Valeria Begum RN   10/28/2021      Pt seen, examined, chart reviewed, plan discussed.   Lizet Adam MD  10/28/2021  2:42 PM

## 2021-10-29 ENCOUNTER — ANESTHESIA (OUTPATIENT)
Dept: OPERATING ROOM | Age: 56
DRG: 252 | End: 2021-10-29
Payer: COMMERCIAL

## 2021-10-29 ENCOUNTER — APPOINTMENT (OUTPATIENT)
Dept: GENERAL RADIOLOGY | Age: 56
DRG: 252 | End: 2021-10-29
Attending: FAMILY MEDICINE
Payer: COMMERCIAL

## 2021-10-29 VITALS
RESPIRATION RATE: 16 BRPM | WEIGHT: 212.52 LBS | HEIGHT: 68 IN | OXYGEN SATURATION: 100 % | BODY MASS INDEX: 32.21 KG/M2 | SYSTOLIC BLOOD PRESSURE: 151 MMHG | HEART RATE: 92 BPM | TEMPERATURE: 97.9 F | DIASTOLIC BLOOD PRESSURE: 67 MMHG

## 2021-10-29 VITALS — OXYGEN SATURATION: 95 % | DIASTOLIC BLOOD PRESSURE: 60 MMHG | SYSTOLIC BLOOD PRESSURE: 118 MMHG

## 2021-10-29 LAB
ALBUMIN SERPL-MCNC: 3.7 G/DL (ref 3.5–5.2)
ALP BLD-CCNC: 118 U/L (ref 40–129)
ALT SERPL-CCNC: <5 U/L (ref 0–40)
ANION GAP SERPL CALCULATED.3IONS-SCNC: 18 MMOL/L (ref 7–16)
ANISOCYTOSIS: ABNORMAL
AST SERPL-CCNC: 12 U/L (ref 0–39)
BASOPHILIC STIPPLING: ABNORMAL
BASOPHILS ABSOLUTE: 0 E9/L (ref 0–0.2)
BASOPHILS RELATIVE PERCENT: 0.5 % (ref 0–2)
BILIRUB SERPL-MCNC: 0.3 MG/DL (ref 0–1.2)
BLOOD CULTURE, ROUTINE: NORMAL
BUN BLDV-MCNC: 44 MG/DL (ref 6–20)
CALCIUM SERPL-MCNC: 8.5 MG/DL (ref 8.6–10.2)
CHLORIDE BLD-SCNC: 96 MMOL/L (ref 98–107)
CO2: 21 MMOL/L (ref 22–29)
CREAT SERPL-MCNC: 8.3 MG/DL (ref 0.7–1.2)
CULTURE, BLOOD 2: NORMAL
EOSINOPHILS ABSOLUTE: 0.14 E9/L (ref 0.05–0.5)
EOSINOPHILS RELATIVE PERCENT: 1.7 % (ref 0–6)
GFR AFRICAN AMERICAN: 8
GFR NON-AFRICAN AMERICAN: 7 ML/MIN/1.73
GLUCOSE BLD-MCNC: 128 MG/DL (ref 74–99)
HCT VFR BLD CALC: 28.5 % (ref 37–54)
HEMOGLOBIN: 9 G/DL (ref 12.5–16.5)
HYPOCHROMIA: ABNORMAL
LYMPHOCYTES ABSOLUTE: 0.65 E9/L (ref 1.5–4)
LYMPHOCYTES RELATIVE PERCENT: 7.8 % (ref 20–42)
MCH RBC QN AUTO: 27.3 PG (ref 26–35)
MCHC RBC AUTO-ENTMCNC: 31.6 % (ref 32–34.5)
MCV RBC AUTO: 86.4 FL (ref 80–99.9)
METAMYELOCYTES RELATIVE PERCENT: 3.5 % (ref 0–1)
METER GLUCOSE: 128 MG/DL (ref 74–99)
MONOCYTES ABSOLUTE: 0.49 E9/L (ref 0.1–0.95)
MONOCYTES RELATIVE PERCENT: 6.1 % (ref 2–12)
NEUTROPHILS ABSOLUTE: 6.8 E9/L (ref 1.8–7.3)
NEUTROPHILS RELATIVE PERCENT: 80.9 % (ref 43–80)
OVALOCYTES: ABNORMAL
PDW BLD-RTO: 15.7 FL (ref 11.5–15)
PHOSPHORUS: 2.9 MG/DL (ref 2.5–4.5)
PLATELET # BLD: 341 E9/L (ref 130–450)
PMV BLD AUTO: 10.2 FL (ref 7–12)
POIKILOCYTES: ABNORMAL
POLYCHROMASIA: ABNORMAL
POTASSIUM REFLEX MAGNESIUM: 3.7 MMOL/L (ref 3.5–5)
RBC # BLD: 3.3 E12/L (ref 3.8–5.8)
SODIUM BLD-SCNC: 135 MMOL/L (ref 132–146)
STOMATOCYTES: ABNORMAL
TARGET CELLS: ABNORMAL
TEAR DROP CELLS: ABNORMAL
TOTAL PROTEIN: 5.8 G/DL (ref 6.4–8.3)
WBC # BLD: 8.1 E9/L (ref 4.5–11.5)

## 2021-10-29 PROCEDURE — 77001 FLUOROGUIDE FOR VEIN DEVICE: CPT | Performed by: SURGERY

## 2021-10-29 PROCEDURE — 3700000000 HC ANESTHESIA ATTENDED CARE: Performed by: SURGERY

## 2021-10-29 PROCEDURE — 6360000002 HC RX W HCPCS: Performed by: NURSE ANESTHETIST, CERTIFIED REGISTERED

## 2021-10-29 PROCEDURE — 2580000003 HC RX 258: Performed by: NURSE ANESTHETIST, CERTIFIED REGISTERED

## 2021-10-29 PROCEDURE — 3600000013 HC SURGERY LEVEL 3 ADDTL 15MIN: Performed by: SURGERY

## 2021-10-29 PROCEDURE — 90935 HEMODIALYSIS ONE EVALUATION: CPT

## 2021-10-29 PROCEDURE — 2500000003 HC RX 250 WO HCPCS: Performed by: NURSE ANESTHETIST, CERTIFIED REGISTERED

## 2021-10-29 PROCEDURE — 71045 X-RAY EXAM CHEST 1 VIEW: CPT

## 2021-10-29 PROCEDURE — 3600000003 HC SURGERY LEVEL 3 BASE: Performed by: SURGERY

## 2021-10-29 PROCEDURE — 6360000002 HC RX W HCPCS: Performed by: SURGERY

## 2021-10-29 PROCEDURE — 82962 GLUCOSE BLOOD TEST: CPT

## 2021-10-29 PROCEDURE — 6370000000 HC RX 637 (ALT 250 FOR IP): Performed by: FAMILY MEDICINE

## 2021-10-29 PROCEDURE — 2580000003 HC RX 258: Performed by: SPECIALIST

## 2021-10-29 PROCEDURE — 80053 COMPREHEN METABOLIC PANEL: CPT

## 2021-10-29 PROCEDURE — 0JH63XZ INSERTION OF TUNNELED VASCULAR ACCESS DEVICE INTO CHEST SUBCUTANEOUS TISSUE AND FASCIA, PERCUTANEOUS APPROACH: ICD-10-PCS | Performed by: SURGERY

## 2021-10-29 PROCEDURE — 6360000002 HC RX W HCPCS: Performed by: SPECIALIST

## 2021-10-29 PROCEDURE — C1894 INTRO/SHEATH, NON-LASER: HCPCS | Performed by: SURGERY

## 2021-10-29 PROCEDURE — 36558 INSERT TUNNELED CV CATH: CPT | Performed by: SURGERY

## 2021-10-29 PROCEDURE — 7100000001 HC PACU RECOVERY - ADDTL 15 MIN: Performed by: SURGERY

## 2021-10-29 PROCEDURE — 0JPT3XZ REMOVAL OF TUNNELED VASCULAR ACCESS DEVICE FROM TRUNK SUBCUTANEOUS TISSUE AND FASCIA, PERCUTANEOUS APPROACH: ICD-10-PCS | Performed by: SURGERY

## 2021-10-29 PROCEDURE — 6360000002 HC RX W HCPCS: Performed by: INTERNAL MEDICINE

## 2021-10-29 PROCEDURE — 2580000003 HC RX 258: Performed by: SURGERY

## 2021-10-29 PROCEDURE — 05PY03Z REMOVAL OF INFUSION DEVICE FROM UPPER VEIN, OPEN APPROACH: ICD-10-PCS | Performed by: SURGERY

## 2021-10-29 PROCEDURE — 2500000003 HC RX 250 WO HCPCS: Performed by: SURGERY

## 2021-10-29 PROCEDURE — 76937 US GUIDE VASCULAR ACCESS: CPT | Performed by: SURGERY

## 2021-10-29 PROCEDURE — 84100 ASSAY OF PHOSPHORUS: CPT

## 2021-10-29 PROCEDURE — 7100000000 HC PACU RECOVERY - FIRST 15 MIN: Performed by: SURGERY

## 2021-10-29 PROCEDURE — 02H633Z INSERTION OF INFUSION DEVICE INTO RIGHT ATRIUM, PERCUTANEOUS APPROACH: ICD-10-PCS | Performed by: SURGERY

## 2021-10-29 PROCEDURE — C1881 DIALYSIS ACCESS SYSTEM: HCPCS | Performed by: SURGERY

## 2021-10-29 PROCEDURE — 85025 COMPLETE CBC W/AUTO DIFF WBC: CPT

## 2021-10-29 PROCEDURE — 36415 COLL VENOUS BLD VENIPUNCTURE: CPT

## 2021-10-29 PROCEDURE — 2709999900 HC NON-CHARGEABLE SUPPLY: Performed by: SURGERY

## 2021-10-29 PROCEDURE — 3700000001 HC ADD 15 MINUTES (ANESTHESIA): Performed by: SURGERY

## 2021-10-29 PROCEDURE — C1769 GUIDE WIRE: HCPCS | Performed by: SURGERY

## 2021-10-29 RX ORDER — PROPOFOL 10 MG/ML
INJECTION, EMULSION INTRAVENOUS PRN
Status: DISCONTINUED | OUTPATIENT
Start: 2021-10-29 | End: 2021-10-29 | Stop reason: SDUPTHER

## 2021-10-29 RX ORDER — SODIUM CHLORIDE 9 MG/ML
INJECTION, SOLUTION INTRAVENOUS CONTINUOUS PRN
Status: DISCONTINUED | OUTPATIENT
Start: 2021-10-29 | End: 2021-10-29 | Stop reason: SDUPTHER

## 2021-10-29 RX ORDER — FENTANYL CITRATE 50 UG/ML
INJECTION, SOLUTION INTRAMUSCULAR; INTRAVENOUS PRN
Status: DISCONTINUED | OUTPATIENT
Start: 2021-10-29 | End: 2021-10-29 | Stop reason: SDUPTHER

## 2021-10-29 RX ORDER — LIDOCAINE HYDROCHLORIDE 10 MG/ML
INJECTION, SOLUTION EPIDURAL; INFILTRATION; INTRACAUDAL; PERINEURAL PRN
Status: DISCONTINUED | OUTPATIENT
Start: 2021-10-29 | End: 2021-10-29 | Stop reason: SDUPTHER

## 2021-10-29 RX ORDER — HEPARIN SODIUM (PORCINE) LOCK FLUSH IV SOLN 100 UNIT/ML 100 UNIT/ML
SOLUTION INTRAVENOUS PRN
Status: DISCONTINUED | OUTPATIENT
Start: 2021-10-29 | End: 2021-10-29 | Stop reason: ALTCHOICE

## 2021-10-29 RX ORDER — MIDAZOLAM HYDROCHLORIDE 1 MG/ML
INJECTION INTRAMUSCULAR; INTRAVENOUS PRN
Status: DISCONTINUED | OUTPATIENT
Start: 2021-10-29 | End: 2021-10-29 | Stop reason: SDUPTHER

## 2021-10-29 RX ADMIN — FENTANYL CITRATE 50 MCG: 50 INJECTION, SOLUTION INTRAMUSCULAR; INTRAVENOUS at 08:42

## 2021-10-29 RX ADMIN — ATORVASTATIN CALCIUM 10 MG: 10 TABLET, FILM COATED ORAL at 16:00

## 2021-10-29 RX ADMIN — FENTANYL CITRATE 50 MCG: 50 INJECTION, SOLUTION INTRAMUSCULAR; INTRAVENOUS at 08:33

## 2021-10-29 RX ADMIN — EPOETIN ALFA-EPBX 10000 UNITS: 10000 INJECTION, SOLUTION INTRAVENOUS; SUBCUTANEOUS at 16:00

## 2021-10-29 RX ADMIN — PROPOFOL 50 MG: 10 INJECTION, EMULSION INTRAVENOUS at 08:32

## 2021-10-29 RX ADMIN — CEFAZOLIN 3000 MG: 10 INJECTION, POWDER, FOR SOLUTION INTRAVENOUS at 15:59

## 2021-10-29 RX ADMIN — PROPOFOL 120 MCG/KG/MIN: 10 INJECTION, EMULSION INTRAVENOUS at 08:34

## 2021-10-29 RX ADMIN — AMLODIPINE BESYLATE 5 MG: 5 TABLET ORAL at 16:00

## 2021-10-29 RX ADMIN — LIDOCAINE HYDROCHLORIDE 20 MG: 10 INJECTION, SOLUTION EPIDURAL; INFILTRATION; INTRACAUDAL; PERINEURAL at 08:31

## 2021-10-29 RX ADMIN — SODIUM CHLORIDE: 9 INJECTION, SOLUTION INTRAVENOUS at 08:17

## 2021-10-29 RX ADMIN — MIDAZOLAM 2 MG: 1 INJECTION INTRAMUSCULAR; INTRAVENOUS at 08:17

## 2021-10-29 ASSESSMENT — PAIN SCALES - GENERAL
PAINLEVEL_OUTOF10: 0

## 2021-10-29 ASSESSMENT — PULMONARY FUNCTION TESTS: PIF_VALUE: 0

## 2021-10-29 NOTE — PROGRESS NOTES
Department of Internal Medicine  Nephrology Progress Note    Events reviewed. Seen on dialysis, tolerating well. SUBJECTIVE: We are following Mr. Jania Cooper for ESRD on HD. He reports no complaints. PHYSICAL EXAM:      Vitals:    VITALS:  BP (!) 147/77   Pulse 87   Temp 98.4 °F (36.9 °C)   Resp 16   Ht 5' 8\" (1.727 m)   Wt 216 lb 4.3 oz (98.1 kg)   SpO2 100%   BMI 32.88 kg/m²   24HR INTAKE/OUTPUT:      Intake/Output Summary (Last 24 hours) at 10/29/2021 1450  Last data filed at 10/29/2021 1012  Gross per 24 hour   Intake 260 ml   Output 0 ml   Net 260 ml     Access: RIJ tunneled dialysis catheter  Constitutional:  Well developed and nourished,in no acute distres  HEENT:  NC/AT, PERRL, supple no JVD  Respiratory: CTA bilaterally and breath sounds equal.  Cardiovascular/Edema: Regular rate and rhythm, normal heart sounds, without pathological murmurs, ectopy, gallops, or rubs. No edema  Gastrointestinal: soft, no organomegaly , tender, bowel sounds present  Neurologic:  Alert and Ox3, non focal  Skin:  Turgor normal  Scheduled Meds:   medicated lip balm   Topical BID    epoetin tricia-epbx  10,000 Units SubCUTAneous Once per day on Mon Wed Fri    ceFAZolin  2,000 mg IntraVENous Once per day on Mon Wed    ceFAZolin  3,000 mg IntraVENous Once per day on Fri    amLODIPine  5 mg Oral Daily    bumetanide  2 mg Oral BID    carvedilol  6.25 mg Oral BID WC    fluticasone  2 spray Each Nostril Daily    atorvastatin  10 mg Oral Daily    metOLazone  5 mg Oral BID    montelukast  10 mg Oral Nightly    pantoprazole  40 mg Oral QAM AC    [Held by provider] sevelamer  1,600 mg Oral TID WC    sodium chloride flush  10 mL IntraVENous 2 times per day    insulin glargine  0.25 Units/kg SubCUTAneous Nightly    insulin lispro  0-12 Units SubCUTAneous TID WC    insulin lispro  0-6 Units SubCUTAneous Nightly    heparin (porcine)  5,000 Units SubCUTAneous Q8H     Continuous Infusions:   sodium chloride      dextrose       PRN Meds:. HYDROmorphone, sodium chloride flush, sodium chloride, promethazine **OR** ondansetron, polyethylene glycol, acetaminophen **OR** acetaminophen, glucose, dextrose, glucagon (rDNA), dextrose, albuterol    DATA:    CBC:   Lab Results   Component Value Date    WBC 8.1 10/29/2021    RBC 3.30 10/29/2021    HGB 9.0 10/29/2021    HCT 28.5 10/29/2021    MCV 86.4 10/29/2021    MCH 27.3 10/29/2021    MCHC 31.6 10/29/2021    RDW 15.7 10/29/2021     10/29/2021    MPV 10.2 10/29/2021     CMP:    Lab Results   Component Value Date     10/29/2021    K 3.7 10/29/2021    CL 96 10/29/2021    CO2 21 10/29/2021    BUN 44 10/29/2021    CREATININE 8.3 10/29/2021    GFRAA 8 10/29/2021    LABGLOM 7 10/29/2021    GLUCOSE 128 10/29/2021    PROT 5.8 10/29/2021    LABALBU 3.7 10/29/2021    CALCIUM 8.5 10/29/2021    BILITOT 0.3 10/29/2021    ALKPHOS 118 10/29/2021    AST 12 10/29/2021    ALT <5 10/29/2021     Magnesium:    Lab Results   Component Value Date    MG 2.0 10/26/2021     Phosphorus:    Lab Results   Component Value Date    PHOS 2.9 10/29/2021     U/A:    Lab Results   Component Value Date    COLORU Yellow 06/04/2021    PROTEINU >=300 06/04/2021    PHUR 5.5 06/04/2021    WBCUA 1-3 06/04/2021    RBCUA 2-5 06/04/2021    BACTERIA MANY 06/04/2021    CLARITYU CLOUDY 06/04/2021    SPECGRAV 1.015 06/04/2021    LEUKOCYTESUR Negative 06/04/2021    UROBILINOGEN 0.2 06/04/2021    BILIRUBINUR Negative 06/04/2021    BLOODU SMALL 06/04/2021    GLUCOSEU 500 06/04/2021     Microalbumen/Creatinine ratio:  No components found for: RUCREAT  Radiology Review:  none     BRIEF SUMMARY OF INITIAL CONSULT:     The patient is a 49 y.o. male with significant past medical history of end stage renal disease secondary to DM and HTN (was previously on PD now on Home HD), CHF, Type II DM with diabetic nephropathy, HTN, and Hyperlipidemia, last hemodialysis treatment on Friday, presents with chills and was admitted with line septicemia. Patient had a broken tessio that was replaced at the access center. 24 hrs later patient went to work where he began to experience chills, diaphoresis and acute confusion. EMS was called and he was taken to a hospital in [place] where his tunneled line was removed and a LIJ temporary catheter was placed. We are consulted for ESRD on hemodialysis    Problems resolved. · Hypoalbuminemia - start SPA with HD x 3 doses    IMPRESSION/RECOMMENDATIONS:     1. ESRD on Home hemodialysis, will dialysis MWF while inpatient. 2. HAGMA, 2/2 uremia, for dialysis today  3. MSSA bacteremia s/p right IJ tunneled cath removal and temporary left IJ catheter insertion. S/p MICHAEL without vegetation. On IV ancef MWF. S/p RIJ tunneled dialysis catheter placement 10/29 by Dr. Lissette Sorto  4. Type II, on insulin  5. HTN, on amlodipine and carvedilol  6. Anemia:  Continue Erythropoetin 69461 units x 3 per week  7. MBD of CKD:  Phosphorus 2.9 today. Hold sevelamer for now and monitor phosphorus levels daily.   8. HLD, on statin    Plan:    · HD today and 3 times per week MWF, seen on dialysis and tolerating well  · Continue to monitor labs daily  · Continue Bumex 2 mg po bid  · Continue metolazone 5 mg po bid  · Continue 1.5 L fluid restriction  · S/p RIJ tunneled dialysis catheter placement 10/29  · Vascular consulted for tunneled dialysis catheter placement  · Continue to hold sevelamer for now and monitor phosphorus levels daily  · OK for discharge home from renal POV      Electronically signed by DONOVAN Flores CNP on 10/29/2021 at 2:50 PM

## 2021-10-29 NOTE — PLAN OF CARE
Problem: Falls - Risk of:  Goal: Will remain free from falls  Description: Will remain free from falls  10/29/2021 0644 by Saloni Mitchell RN  Outcome: Met This Shift  10/28/2021 1754 by Ken Santiago RN  Outcome: Met This Shift  Goal: Absence of physical injury  Description: Absence of physical injury  10/29/2021 0644 by Saloni Mitchell RN  Outcome: Met This Shift  10/28/2021 1754 by Ken Santiago RN  Outcome: Met This Shift     Problem: Pain:  Goal: Pain level will decrease  Description: Pain level will decrease  10/29/2021 0644 by Saloni Mitchell RN  Outcome: Met This Shift  10/28/2021 1754 by Ken Santiago RN  Outcome: Met This Shift  Goal: Control of acute pain  Description: Control of acute pain  10/29/2021 0644 by Saloni Mitchell RN  Outcome: Met This Shift  10/28/2021 1754 by Ken Santiago RN  Outcome: Met This Shift  Goal: Control of chronic pain  Description: Control of chronic pain  10/28/2021 1754 by Ken Santiago RN  Outcome: Met This Shift     Problem: Gas Exchange - Impaired:  Goal: Levels of oxygenation will improve  Description: Levels of oxygenation will improve  10/28/2021 1754 by Ken Santiago RN  Outcome: Met This Shift     Problem: Infection, Septic Shock:  Goal: Will show no infection signs and symptoms  Description: Will show no infection signs and symptoms  10/28/2021 1754 by Ken Santiago RN  Outcome: Ongoing     Problem: Serum Glucose Level - Abnormal:  Goal: Ability to maintain appropriate glucose levels will improve  Description: Ability to maintain appropriate glucose levels will improve  10/28/2021 1754 by Ken Santiago RN  Outcome: Ongoing

## 2021-10-29 NOTE — ANESTHESIA POSTPROCEDURE EVALUATION
Department of Anesthesiology  Postprocedure Note    Patient: Marj Rendon  MRN: [id number]  YOB: 1965  Date of evaluation: 10/29/2021  Time:  1:50 PM     Procedure Summary     Date: 10/29/21 Room / Location: [facility] OR 03 / [facility]    Anesthesia Start: 0820 Anesthesia Stop: 7408    Procedure: INSERTION TUNNELED HEMODIALYSIS CATHETER REMOVAL TEMPORARY CATHETER (N/A ) Diagnosis: (/)    Surgeons: Amaryllis Prader, MD Responsible Provider: Eleanor Carrillo MD    Anesthesia Type: MAC ASA Status: 4          Anesthesia Type: MAC    Juana Phase I: Juana Score: 9    Juana Phase II:      Last vitals: Reviewed and per EMR flowsheets.        Anesthesia Post Evaluation    Patient location during evaluation: PACU  Patient participation: complete - patient participated  Level of consciousness: awake and alert  Airway patency: patent  Nausea & Vomiting: no nausea and no vomiting  Complications: no  Cardiovascular status: hemodynamically stable and blood pressure returned to baseline  Respiratory status: acceptable  Hydration status: euvolemic

## 2021-10-29 NOTE — CARE COORDINATION
10/29/2021 - Nephrology and ID following. Had tunneled dialysis catheter insertion today by vascular surgery. Pt in HD now. Received scripts for IV ancef 2 gm every M-W dialysis and IV ancef 3 gm every F dialysis. Cassia Smith at [facility] dialysis and she said she will speak to pt regarding what time he will have dialysis on Monday. Faxed ancef scripts to Ange Gentile at [phone]. Plan is for pt to discharge home when medically stable. SW/CM will follow.

## 2021-10-29 NOTE — PROGRESS NOTES
found for: WNDABS  Smear, Respiratory   Date Value Ref Range Status   01/07/2020   Final    Group 6: <25 PMN's/LPF and <25 Epithelial cells/LPF  Rare Polymorphonuclear leukocytes  Epithelial cells not seen  Rare Gram positive cocci  Rare Gram positive coccobacillary organisms  Rare Gram variable rods       No results found for: MPNEUMO, CLAMYDCU, LABLEGI, AFBCX, FUNGSM, LABFUNG  CULTURE, RESPIRATORY   Date Value Ref Range Status   01/07/2020 Oral Pharyngeal Karla present  Final     Culture Catheter Tip   Date Value Ref Range Status   06/07/2021 Growth not present  Final     Body Fluid Culture, Sterile   Date Value Ref Range Status   06/04/2021 Growth not present  Final     No results found for: CXSURG  Creatinine Ur POCT   Date Value Ref Range Status   05/02/2019 100  Final     Urine Culture, Routine   Date Value Ref Range Status   06/04/2021 Growth not present  Final     No results found for: [facility]    ASSESSMENT:  · MSSA bacteremia Healthcare associated bloodstream infection with 2 sets of blood cultures positive last set after the temporary catheter was placed    PLAN:  · MICHAEL negative for vegetation  · Continue Ancef 2-2-3 x6 weeks  · Med rec complete  · tunneled catheter placed  · -blood cultures remain negative  · Monitor labs    DONOVAN Villalba - CNP  1:57 PM  10/29/2021   Pt seen and examined. Above discussed agree with advanced practice nurse. Labs, cultures, and radiographs reviewed. Face to Face encounter occurred. Changes made as necessary.      Delores Tran MD

## 2021-10-29 NOTE — DISCHARGE SUMMARY
Hospitalist Discharge Summary    Patient ID: True Art   Patient : 1965  Patient's PCP: Mahesh Malin DO    Admit Date: 10/24/2021   Admitting Physician: Rosyln Osborn MD    Discharge Date:  10/29/2021   Discharge Physician: Roslyn Osborn MD   Discharge Condition: Stable  Discharge Disposition: [facility] course in brief:  (Please refer to daily progress notes for a comprehensive review of the hospitalization by requesting medical records)    Briefly, this is a 81-year-old male with end-stage renal disease was admitted on 10/24 with a chief complaint of fevers, chills, generalized weakness. Admitted for further evaluation. Noted to have MSSA bacteremia. Nephrology and ID consultations were sought. Patient was treated with IV Ancef. MICHAEL was performed and was negative. Patient had an exchange of his hemodialysis catheter per vascular surgery on 10/29. Patient was discharged with recommendations for IV antibiotics for 6 weeks per ID    Discharge exam  BP (!) 151/67   Pulse 92   Temp 97.9 °F (36.6 °C) (Oral)   Resp 16   Ht 5' 8\" (1.727 m)   Wt 212 lb 8.4 oz (96.4 kg)   SpO2 100%   BMI 32.31 kg/m²     BP (!) 151/67   Pulse 92   Temp 97.9 °F (36.6 °C) (Oral)   Resp 16   Ht 5' 8\" (1.727 m)   Wt 212 lb 8.4 oz (96.4 kg)   SpO2 100%   BMI 32.31 kg/m²   Constitutional: oriented to person, place, and time,appears well-developed and well-nourished. HEENT:   Right Ear: External ear normal.   Left Ear: External ear normal.   Mouth/Throat: Oropharynx is clear and moist. Herpes oralis lower lip  Eyes: Conjunctivae and EOM are normal. PERRLA. Neck: Normal range of motion. No thyromegaly present. No bruit. No JVD  Cardiovascular: Normal rate, regular rhythm, normal heart sounds. No murmurs appreciated  Pulmonary/Chest: Effort normal and breath sounds normal.  No rhonchi or rales appreciated  Abdominal: Soft. Bowel sounds are normal. Exhibits no distension. No tenderness. Musculoskeletal: Normal range of motion. Exhibits no edema.  deferred  Neurological:  is alert and oriented to person, place, and time. Motor and sensation grossly intact. Consults:   PHARMACY TO DOSE VANCOMYCIN  IP CONSULT TO INFECTIOUS DISEASES  IP CONSULT TO NEPHROLOGY  IP CONSULT TO PHARMACY  IP CONSULT TO VASCULAR SURGERY    Discharge Diagnoses:    MSSA bacteremia  ESRD on home HD  Recurrent PD related peritonitis  Vit D deficiency  MBD of CKD  HFpEF  ACD  DM type 1  HLD  Herpes oralis     Discharge Instructions / Follow up:    No future appointments. Continued appropriate risk factor modification of blood pressure, diabetes and serum lipids will remain essential to reducing risk of future atherosclerotic development    Activity: activity as tolerated    Significant labs:  CBC:   Recent Labs     10/27/21  0326 10/28/21  0349 10/29/21  0409   WBC 9.1 7.0 8.1   RBC 3.47* 3.10* 3.30*   HGB 9.7* 8.9* 9.0*   HCT 30.0* 27.1* 28.5*   MCV 86.5 87.4 86.4   RDW 14.9 15.4* 15.7*    246 341     BMP:   Recent Labs     10/27/21  0326 10/28/21  0349 10/29/21  0409    135 135   K 3.6 3.8 3.7   CL 92* 98 96*   CO2 23 24 21*   BUN 51* 33* 44*   CREATININE 8.2* 6.4* 8.3*   PHOS 3.3 2.7 2.9     LFT:  Recent Labs     10/27/21  0326 10/28/21  0349 10/29/21  0409   PROT 5.7* 5.4* 5.8*   ALKPHOS 134* 107 118   ALT 5 <5 <5   AST 13 11 12   BILITOT 0.3 <0.2 0.3     PT/INR: No results for input(s): INR, APTT in the last 72 hours. BNP: No results for input(s): BNP in the last 72 hours.   Hgb A1C:   Lab Results   Component Value Date    LABA1C 7.3 (H) 10/24/2021     Folate and B12:   Lab Results   Component Value Date    ZPBVHPOL35 323 09/21/2015   ,   Lab Results   Component Value Date    FOLATE 15.0 05/28/2020     Thyroid Studies:   Lab Results   Component Value Date    TSH 2.860 09/10/2020    L7MBFJO 92.16 04/24/2014    T7SFLLE 7.7 10/14/2014       Urinalysis:    Lab Results   Component Value Date    NITRU Negative 06/04/2021    WBCUA 1-3 06/04/2021    BACTERIA MANY 06/04/2021    RBCUA 2-5 06/04/2021    BLOODU SMALL 06/04/2021    SPECGRAV 1.015 06/04/2021    GLUCOSEU 500 06/04/2021       Imaging:  ECHO Transesophageal    Result Date: 10/26/2021  Transesophageal Echocardiography Report (MICHAEL)   Demographics   Patient Name    Vivienne Cough       Gender            Male                  [address] Record  [id number]     Room Number       7208  Number   Account #       [de-identified]    Procedure Date    10/26/2021   Corporate ID                 Ordering          Ambrosio Meier                               Physician   Accession       [id number]   Referring  Number                       Physician   Date of Birth   1965 [address]   Age             49 year(s)   Interpreting      [address]                               Physician         Physician Cardiology                                                 Divya Gil DO                                Any Other  Procedure Type of Study   MICHAEL procedure:Transesophageal Echo (MICHAEL), Doppler Echocardiography, Color  Flow Velocity Mapping. Procedure Date Date: 10/26/2021 Start: 11:11 AM Study Location: Portable Technical Quality: Adequate visualization Indications:R/O Vegetation. Patient Status: Routine Contrast Medium: Bubble Study. Height: 68 inches Weight: 217 pounds BSA: 2.12 m^2 BMI: 32.99 kg/m^2 Rhythm: Within normal limits MICHAEL Performed By: the attending and the sonographer  Type of Anesthesia: Moderate sedation   Findings   Left Ventricle  Overall ejection fraction is normal .   Right Ventricle  Normal right ventricle structure and function. Left Atrium  Moderately dilated left atrium. Agitated saline injected for shunt evaluation. No evidence of patent foramen ovale. No evidence of atrial septal defect. No evidence of thrombus within left atrium or appendage. Right Atrium  Normal right atrium.    Mitral Valve  Structurally normal mitral valve. No evidence of mitral valve stenosis. Physiologic and/or trace mitral regurgitation is present. Tricuspid Valve  The tricuspid valve appears structurally normal.  Physiologic and/or trace tricuspid regurgitation. Aortic Valve  The aortic valve is trileaflet. No hemodynamically significant aortic  stenosis is present. No evidence of aortic valve regurgitation. Pulmonic Valve  Pulmonic valve is structurally normal.   Pericardial Effusion  No evidence for hemodynamically significant pericardial effusion. Aorta  Normal aortic root and ascending aorta. Conclusions   Summary  No evidence of endocarditis. Overall ejection fraction is normal .  Normal right ventricle structure and function. Physiologic and/or trace mitral regurgitation is present. Signature   ----------------------------------------------------------------  Electronically signed by Imtiaz Crespo DO(Interpreting  physician) on 10/26/2021 11:43 AM  ----------------------------------------------------------------  [email or web address] [id number]    XR CHEST 1 VIEW    Result Date: 10/29/2021  EXAMINATION: ONE XRAY VIEW OF THE CHEST 10/29/2021 9:50 am COMPARISON: Chest, single view 06/07/2021 HISTORY: ORDERING SYSTEM PROVIDED HISTORY: Encounter for peritoneal dialysis catheter insertion [facility]) TECHNOLOGIST PROVIDED HISTORY: Reason for exam:->Encounter for peritoneal dialysis catheter insertion ([facility].) What reading provider will be dictating this exam?->CRC FINDINGS: A single portable AP view of the chest was obtained. Right internal jugular dialysis catheter terminates in the right atrium. There is enlargement of the cardiac silhouette. Mediastinal contour and pulmonary vascularity are within normal limits. There is no evidence of pneumothorax.      Right internal jugular dialysis catheter terminates in the right atrium without evidence of pneumothorax. Discharge Medications:      Medication List      START taking these medications    * ceFAZolin  infusion  Commonly known as: ANCEF  Infuse 3,000 mg intravenously once a week Compound per protocol. Every Friday with dialysis. * ceFAZolin  infusion  Commonly known as: ANCEF  Infuse 2,000 mg intravenously Twice a Week Compound per protocol. Every Monday and Wednesday with dialysis  Start taking on: November 1, 2021         * This list has 2 medication(s) that are the same as other medications prescribed for you. Read the directions carefully, and ask your doctor or other care provider to review them with you. CHANGE how you take these medications    bumetanide 2 MG tablet  Commonly known as: BUMEX  TAKE ONE TABLET BY MOUTH TWO TIMES A DAY  What changed: See the new instructions.         CONTINUE taking these medications    albuterol sulfate  (90 Base) MCG/ACT inhaler  Inhale 2 puffs into the lungs every 6 hours as needed for Wheezing or Shortness of Breath     amLODIPine 5 MG tablet  Commonly known as: NORVASC  Take 1 tablet by mouth daily     APPLE CIDER VINEGAR DIET PO     carvedilol 6.25 MG tablet  Commonly known as: COREG  Take 1 tablet by mouth 2 times daily (with meals)     Cinnamon 500 MG Caps     fluticasone 50 MCG/ACT nasal spray  Commonly known as: FLONASE  2 sprays by Each Nostril route daily     FreeStyle Anabel 14 Day Sensor Misc  As directed     . Teresita Samson [facility] [place] Carli Medina  As directed     * Insulin Syringe-Needle U-100 30G X 1/2\" 1 ML Misc  10 units units of Tresiba daily     * AIMSCO INS SYR .3CC/29GX0.5\" 29G X 1/2\" 0.3 ML Misc  Generic drug: Insulin Syringe-Needle U-100  1 each by Does not apply route daily     * Levemir 100 UNIT/ML injection vial  Generic drug: insulin detemir  Inject 20 Units into the skin nightly     * Levemir FlexTouch 100 UNIT/ML injection pen  Generic drug: insulin detemir  Inject 40 Units into the skin nightly     lovastatin 40 MG tablet  Commonly known as: MEVACOR  TAKE ONE TABLET BY MOUTH DAILY     [employer] Pen Halma 31G X 8 MM Misc  Generic drug: Insulin Pen Needle  1 each by Does not apply route 3 times daily     metOLazone 5 MG tablet  Commonly known as: ZAROXOLYN     montelukast 10 MG tablet  Commonly known as: SINGULAIR  TAKE ONE TABLET BY MOUTH DAILY     NovoLOG FlexPen 100 UNIT/ML injection pen  Generic drug: insulin aspart  Inject 10 Units into the skin 3 times daily (before meals)     pantoprazole 40 MG tablet  Commonly known as: Protonix  Take 1 tablet by mouth every morning (before breakfast)     Renvela 800 MG tablet  Generic drug: sevelamer     Shingrix 50 MCG/0.5ML Susr injection  Generic drug: zoster recombinant adjuvanted vaccine  Inject 0.5 mLs into the muscle See Admin Instructions 1 dose now and repeat in 2-6 months     sildenafil 100 MG tablet  Commonly known as: VIAGRA  TAKE ONE TABLET BY MOUTH AS NEEDED FOR ERECTILE DYSFUNCTION     sodium bicarbonate 650 MG tablet  Take 1 tablet by mouth 3 times daily     Tylenol 325 MG Caps  Generic drug: Acetaminophen     vitamin D 1.25 MG (95149 UT) Caps capsule  Commonly known as: ERGOCALCIFEROL  TAKE ONE CAPSULE BY MOUTH TWICE A WEEK     vitamin E 400 UNIT capsule     ZINC 15 PO         * This list has 4 medication(s) that are the same as other medications prescribed for you. Read the directions carefully, and ask your doctor or other care provider to review them with you.             STOP taking these medications    cefTRIAXone sodium 2 g Solr           Where to Get Your Medications      You can get these medications from any pharmacy    Bring a paper prescription for each of these medications  · ceFAZolin  infusion  · ceFAZolin  infusion         Time Spent on discharge is more than 45 minutes in the examination, evaluation, counseling and review of medications and discharge plan.    +++++++++++++++++++++++++++++++++++++++++++++++++  Dulce Solares MD  [employer] - [address] [place], [place]  +++++++++++++++++++++++++++++++++++++++++++++++++  NOTE: This report was transcribed using voice recognition software. Every effort was made to ensure accuracy; however, inadvertent computerized transcription errors may be present.

## 2021-10-29 NOTE — OP NOTE
Operative Note      Patient: Zuleyma Carrillo  YOB: 1965  MRN: [id number]    Date of Procedure: 10/29/2021    Pre-Op Diagnosis: ESRD    Post-Op Diagnosis: Same       Procedure : US guided access of R IJ    Fluoroscopic guidance   Insertion tunneled hd catheter R IJ   Removal of L IJ temp hd catheter     Surgeon(s):  Carolee Fenton MD    Assistant:   * No surgical staff found *    Anesthesia: Monitor Anesthesia Care    Estimated Blood Loss (mL): Minimal    Complications: None    Specimens:   * No specimens in log *    Implants:  * No implants in log *      Drains: * No LDAs found *    DESCRIPTION OF PROCEDURE: The patient was identified and the procedure was confirmed. The right neck and chest were prepped and draped in the usual sterile fashion. Next, 1% lidocaine mixed with 0.25% Marcaine was used for local anesthesia. The right internal jugular vein was identified under US, noted to be patent, compressible and was percutaneously entered under US guidance. Image documented. A guidewire was advanced into the superior vena cava under fluoroscopic guidance. A small incision was made around the wire. The catheter was pulled through a subcutaneous tunnel from an inferior chest stab incision to the neck incision. The dilators were passed over the wire followed by the introducer. The catheter was passed through the introducer and the tip was positioned in the superior vena cava right atrial junction under fluoroscopic guidance. Both lumens were noted to withdrawal and flush blood easily and were flushed with heparinized saline solution. They catheter was secured to the skin with nylon sutures and the neck incision was approximated with an interrupted Vicryl suture. Left neck prepped. Sutures cut, temporary catheter removed and pressure held. Sterile dressings were applied to the incisions in the operating room. Needle, sponge, and instrument counts were reported as correct times two.   The patient tolerated the procedure and was transferred back to the floor in satisfactory condition.      CC : Kaycee Saunders DO     Electronically signed by Jean Paul Schneider MD on 10/29/2021 at 9:22 AM

## 2021-10-29 NOTE — FLOWSHEET NOTE
Pt ran 4 hours; 4231 bath; 1.7 L removed; stable/tolerated well; denies c/o. HD CVC Heplocked per lumen fill volume, capped & clamped post Tx. good Access flow no issues achieving prescribed BFR. Next Tx scheduled Monday 11/01. \"B\" Profile for duration. 10/29/21 1500   Vital Signs   /80   Temp 98 °F (36.7 °C)   Pulse 76   Resp 15   Weight 212 lb 8.4 oz (96.4 kg)   Percent Weight Change -1.73   Pain Assessment   Pain Assessment 0-10   Pain Level 0   Post-Hemodialysis Assessment   Post-Treatment Procedures Blood returned;Catheter capped, clamped and heparinized x 2 ports   Machine Disinfection Process Acid/Vinegar Clean;Heat Disinfect; Exterior Machine Disinfection   Rinseback Volume (ml) 300 ml   Total Liters Processed (l/min) 61.6 l/min   Dialyzer Clearance Lightly streaked   Duration of Treatment (minutes) 240 minutes   Hemodialysis Intake (ml) 300 ml   Hemodialysis Output (ml) 2000 ml   NET Removed (ml) 1700 ml   Tolerated Treatment Good   Patient Response to Treatment \"B\" Profile for    Bilateral Breath Sounds Clear   RLE Edema Trace   LLE Edema Trace   Physician Notified?  Yes

## 2021-10-29 NOTE — PROGRESS NOTES
Page sent to Dr. Ritika Serrano to come speak with family at bedside about pt prognosis, per family request.

## 2021-11-01 ENCOUNTER — TELEPHONE (OUTPATIENT)
Dept: VASCULAR SURGERY | Age: 56
End: 2021-11-01

## 2021-11-01 ENCOUNTER — TELEPHONE (OUTPATIENT)
Dept: FAMILY MEDICINE CLINIC | Age: 56
End: 2021-11-01

## 2021-11-01 NOTE — TELEPHONE ENCOUNTER
[facility] Transitions Initial Follow Up Call    Outreach made within 2 business days of discharge: Yes    Patient: Eva Hernandez Patient : 1965   MRN: <[id number]>  Reason for Admission: Bacteremia    Discharge Date: 10/29/21       Spoke with: Shae Flores    Discharge department/facility: [address] [place] [address] Interactive Patient Contact:  Was patient able to fill all prescriptions: Yes  Was patient instructed to bring all medications to the follow-up visit: Yes  Is patient taking all medications as directed in the discharge summary?  Yes  Does patient understand their discharge instructions: Yes  Does patient have questions or concerns that need addressed prior to 7-14 day follow up office visit: no    Scheduled appointment with PCP within 7-14 days    Follow Up  Future Appointments   Date Time Provider Leon Christianson   11/3/2021  9:00 AM Heidy Vega MD [facility]   2021  4:00 PM [address] [place] [address]

## 2021-11-01 NOTE — TELEPHONE ENCOUNTER
[facility] from [address] [place] [address] dialysis postponed pt's 11/11 AV access to 12/16 as he is receiving IV abx.

## 2021-11-09 ENCOUNTER — TELEPHONE (OUTPATIENT)
Dept: VASCULAR SURGERY | Age: 56
End: 2021-11-09

## 2021-11-19 ENCOUNTER — TELEPHONE (OUTPATIENT)
Dept: VASCULAR SURGERY | Age: 56
End: 2021-11-19

## 2021-11-29 ENCOUNTER — OFFICE VISIT (OUTPATIENT)
Dept: FAMILY MEDICINE CLINIC | Age: 56
End: 2021-11-29
Payer: COMMERCIAL

## 2021-11-29 VITALS
WEIGHT: 212 LBS | HEART RATE: 82 BPM | TEMPERATURE: 97.2 F | SYSTOLIC BLOOD PRESSURE: 164 MMHG | HEIGHT: 68 IN | BODY MASS INDEX: 32.13 KG/M2 | RESPIRATION RATE: 17 BRPM | DIASTOLIC BLOOD PRESSURE: 80 MMHG | OXYGEN SATURATION: 98 %

## 2021-11-29 DIAGNOSIS — M25.562 ACUTE PAIN OF LEFT KNEE: Primary | ICD-10-CM

## 2021-11-29 DIAGNOSIS — M54.50 ACUTE LEFT-SIDED LOW BACK PAIN WITHOUT SCIATICA: ICD-10-CM

## 2021-11-29 PROCEDURE — 99213 OFFICE O/P EST LOW 20 MIN: CPT | Performed by: STUDENT IN AN ORGANIZED HEALTH CARE EDUCATION/TRAINING PROGRAM

## 2021-11-29 RX ORDER — METHOCARBAMOL 750 MG/1
750 TABLET, FILM COATED ORAL 4 TIMES DAILY
Qty: 40 TABLET | Refills: 0 | Status: SHIPPED | OUTPATIENT
Start: 2021-11-29 | End: 2021-12-09

## 2021-11-29 NOTE — PROGRESS NOTES
Chief Complaint       Fall (fell last week hurt knee, has back pain left elbow and wrist is hurting )      History of Present Illness   Source of history provided by:  patient. Gauri Burns is a 49 y.o. male who  has a past medical history of Congestive heart disease ([address] Diabetes mellitus ([address] Hyperlipidemia, Hypertension, and Type 2 diabetes mellitus with diabetic nephropathy, with long-term current use of insulin ([address] presenting to the walk in clinic for evaluation of multiple joint pains. Patient fell last week-he had a back pack over his left shoulder and fell backward; felt his left knee twist but denied having pain at the time of injury. His left knee has sine been painful and has been more painful with bending motion; Knee will be stiff in the morning and has difficulty straightening the leg. Reports some selling initially but none since and no erythema or warmth. Denies any catching in the knee. Has been using topical analgesics. Reports left lower back pain that became worse after his fall but he denies falling on his back. Pain is described as spasming. Pain does not radiate down his leg. No numbness or tingling. Has not tried anything. ROS    Unless otherwise stated in this report or unable to obtain because of the patient's clinical or mental status as evidenced by the medical record, this patients's positive and negative responses for Review of Systems, constitutional, psych, eyes, ENT, cardiovascular, respiratory, gastrointestinal, neurological, genitourinary, musculoskeletal, integument systems and systems related to the presenting problem are either stated in the preceding or were not pertinent or were negative for the symptoms and/or complaints related to the medical problem.     Past Medical History:  has a past medical history of Congestive heart disease ([address] Diabetes mellitus ([address] Hyperlipidemia, Hypertension, and Type 2 diabetes mellitus with diabetic nephropathy, KNEE LEFT (3 VIEWS); Future  -     [address] [facility] and Sports Medicine    Acute left-sided low back pain without sciatica    Other orders  -     methocarbamol (ROBAXIN-750) 750 MG tablet; Take 1 tablet by mouth 4 times daily for 10 days      Concern knee pain may be due to meniscus injury or possible LCL strain. Will obtain plain films and refer to ortho; advised patient to use ice and topical analgesics; avoid NSAIDs given renal function    Disposition:  Disposition: Discharge to home. Pt advised to f/u with PCP in 5-7 days for continued management and further care. ER immediately if symptoms worsen or change. Red flag symptoms discussed. Patient states understanding is in agreement with this care plan. All questions answered.     Magali Martinez MD

## 2021-11-30 ENCOUNTER — TELEPHONE (OUTPATIENT)
Dept: VASCULAR SURGERY | Age: 56
End: 2021-11-30

## 2021-12-01 ENCOUNTER — OFFICE VISIT (OUTPATIENT)
Dept: VASCULAR SURGERY | Age: 56
End: 2021-12-01
Payer: COMMERCIAL

## 2021-12-01 VITALS
WEIGHT: 205 LBS | BODY MASS INDEX: 31.07 KG/M2 | DIASTOLIC BLOOD PRESSURE: 90 MMHG | HEART RATE: 86 BPM | HEIGHT: 68 IN | SYSTOLIC BLOOD PRESSURE: 164 MMHG

## 2021-12-01 DIAGNOSIS — N18.6 STAGE 5 CHRONIC KIDNEY DISEASE ON CHRONIC DIALYSIS (HCC): Primary | ICD-10-CM

## 2021-12-01 DIAGNOSIS — Z99.2 STAGE 5 CHRONIC KIDNEY DISEASE ON CHRONIC DIALYSIS (HCC): Primary | ICD-10-CM

## 2021-12-01 PROCEDURE — 99213 OFFICE O/P EST LOW 20 MIN: CPT | Performed by: SURGERY

## 2021-12-01 NOTE — PROGRESS NOTES
Vascular Surgery Outpatient Progress Note      Chief Complaint   Patient presents with    Follow-up     pre op fistula       HISTORY OF PRESENT ILLNESS:                The patient is a 49 y.o. male who returns for follow-up evaluation of end-stage renal disease. He had his PD catheter removed. He has a tunneled catheter. Denies any chest pain shortness of breath or discomfort. He Bill has really no abdominal pain or discomfort. Denies any fever or chills. He is dialyzing successfully through his right tunneled catheter. He presents with vein mapping showing adequate left cephalic and basilic veins. As above. No change. Unfortunately he was in the hospital for a while and has not been able to keep up with his appointment. He is now here to discuss proceeding with arteriovenous access creation    Past Medical History:        Diagnosis Date    Congestive heart disease ([address] 6/5/2020    Diabetes mellitus ([facility]     Hyperlipidemia     Hypertension     Type 2 diabetes mellitus with diabetic nephropathy, with long-term current use of insulin ([facility] 1/22/2020     Past Surgical History:        Procedure Laterality Date    CATHETER REMOVAL N/A 6/7/2021    REMOVAL OF PERITONEAL DIALYSIS CATHETER   performed by Deep Akbar MD at [address] [address] [address] N/A 11/30/2020    CATHETER INSERTION PERITONEAL DIALYSIS LAPAROSCOPIC performed by Deep Akbar MD at [address] [facility] Right 6/7/2021    CATHETER INSERTION HEMODIALYSIS performed by Perez Weaver MD at [address] [facility] N/A 10/29/2021    INSERTION TUNNELED HEMODIALYSIS CATHETER REMOVAL TEMPORARY CATHETER performed by Kana Fish MD at [address]     Current Medications:   Prior to Admission medications    Medication Sig Start Date End Date Taking?  Authorizing Provider   methocarbamol (ROBAXIN-750) 750 MG tablet Take 1 tablet by mouth 4 times daily for 10 days 11/29/21 12/9/21 DO Jasson   Insulin Pen Needle (MEIJER PEN NEEDLES) 31G X 8 MM MISC 1 each by Does not apply route 3 times daily 7/13/21  Yes Omar Richmond DO   pantoprazole (PROTONIX) 40 MG tablet Take 1 tablet by mouth every morning (before breakfast) 6/10/21  Yes Cally Faith MD   montelukast (SINGULAIR) 10 MG tablet TAKE ONE TABLET BY MOUTH DAILY 1/11/21  Yes Omar Richmond DO   bumetanide (BUMEX) 2 MG tablet TAKE ONE TABLET BY MOUTH TWO TIMES A DAY  Patient taking differently: 2 mg 3 times daily TAKE ONE TABLET BY MOUTH TWO TIMES A DAY 12/30/20  Yes Omar Richmond DO   insulin detemir (LEVEMIR) 100 UNIT/ML injection vial Inject 20 Units into the skin nightly 12/8/20  Yes Omar Richmond DO   sildenafil (VIAGRA) 100 MG tablet TAKE ONE TABLET BY MOUTH AS NEEDED FOR ERECTILE DYSFUNCTION 10/27/20  Yes Omar Richmond DO   fluticasone (FLONASE) 50 MCG/ACT nasal spray 2 sprays by Each Nostril route daily 9/8/20  Yes Omar Richmond DO   Insulin Syringe-Needle U-100 (AIMSCO INS SYR .3CC/29GX0.5\") 29G X 1/2\" 0.3 ML MISC 1 each by Does not apply route daily 8/13/20  Yes Omar Richmond DO   Misc Natural Products (APPLE CIDER VINEGAR DIET PO) Take by mouth   Yes Historical Provider, MD   Zinc Sulfate (ZINC 15 PO) Take by mouth   Yes Historical Provider, MD   sodium bicarbonate 650 MG tablet Take 1 tablet by mouth 3 times daily 1/10/20  Yes Natan Mayes,    Acetaminophen (TYLENOL) 325 MG CAPS Take 650 mg by mouth as needed (FOR PAIN PER PT.)   Yes Historical Provider, MD   vitamin E 400 UNIT capsule Take 400 Units by mouth daily   Yes Historical Provider, MD   Insulin Syringe-Needle U-100 30G X 1/2\" 1 ML MISC 10 units units of Tresiba daily 10/22/19  Yes Ej Spaulding, DO   Cinnamon 500 MG CAPS Take 1,000 mg by mouth 2 times daily. Yes Historical Provider, MD     Allergies:  Patient has no known allergies.     Social History     Socioeconomic History    Marital status:      Spouse name: Not on file    Number of children: Not on file    Years of education: Not on file    Highest education level: Not on file   Occupational History    Not on file   Tobacco Use    Smoking status: Former Smoker     Packs/day: 0.50     Years: 11.00     Pack years: 5.50     Types: Cigars     Quit date: 2019     Years since quittin.2    Smokeless tobacco: Never Used   Vaping Use    Vaping Use: Never used   Substance and Sexual Activity    Alcohol use: Yes     Alcohol/week: 0.0 standard drinks     Comment: socially    Drug use: No    Sexual activity: Yes     Partners: Female   Other Topics Concern    Not on file   Social History Narrative    Not on file     Social Determinants of Health     Financial Resource Strain: Low Risk     Difficulty of Paying Living Expenses: Not hard at all   Food Insecurity: No Food Insecurity    Worried About [address] in the Last Year: Never true    [facility] [address] in the Last Year: Never true   Transportation Needs:     Lack of Transportation (Medical): Not on file    Lack of Transportation (Non-Medical):  Not on file   Physical Activity:     Days of Exercise per Week: Not on file    Minutes of Exercise per Session: Not on file   Stress:     Feeling of Stress : Not on file   Social Connections:     Frequency of Communication with Friends and Family: Not on file    Frequency of Social Gatherings with Friends and Family: Not on file    Attends Sikh Services: Not on file    Active Member of [address] [place] [address] or Organizations: Not on file    Attends Club or Organization Meetings: Not on file    Marital Status: Not on file   Intimate Partner Violence:     Fear of Current or Ex-Partner: Not on file    Emotionally Abused: Not on file    Physically Abused: Not on file    Sexually Abused: Not on file   Housing Stability:     Unable to Pay for Housing in the Last Year: Not on file    Number of [place] in the Last Year: Not on file    Unstable Housing in the Last Year: Not on file        Family History   Problem Relation Age of Onset    Heart Disease Mother        REVIEW OF SYSTEMS (New symptoms):    Eyes:      Blurred vision:  No [x]/Yes []               Diplopia:   No [x]/Yes []               Vision loss:       No [x]/Yes []   Ears, nose, throat:             Hearing loss:    No [x]/Yes []      Vertigo:   No [x]/Yes []                       Swallowing problem:  No [x]/Yes []               Nose bleeds:   No [x]/Yes []      Voice hoarseness:  No [x]/Yes []  Respiratory:             Cough:   No [x]/Yes []      Pleuritic chest pain:  No [x]/Yes []                        Dyspnea:   No [x]/Yes []      Wheezing:   No [x]/Yes []  Cardiovascular:             Angina:   No [x]/Yes []      Palpitations:   No [x]/Yes []          Claudication:    No [x]/Yes []      Leg swelling:   No [x]/Yes []  Gastrointestinal:             Nausea or vomiting:  No [x]/Yes []               Abdominal pain:  No [x]/Yes []                     Intestinal bleeding: No [x]/Yes []  Musculoskeletal:             Leg pain:   No [x]/Yes []      Back pain:   No [x]/Yes []                    Weakness:   No [x]/Yes []  Neurologic:             Numbness:   No [x]/Yes []      Paralysis:   No [x]/Yes []                       Headaches:   No [x]/Yes []  Hematologic, lymphatic:   Anemia:   No [x]/Yes []              Bleeding or bruising:  No [x]/Yes []              Fevers or chills: No [x]/Yes []  Endocrine:             Temp intolerance:   No [x]/Yes []                       Polydipsia, polyuria:  No [x]/Yes []  Skin:              Rash:    No [x]/Yes []      Ulcers:   No [x]/Yes []              Abnorm pigment: No [x]/Yes []  :              Frequency/urgency:  No [x]/Yes []      Hematuria:    No [x]/Yes []                      Incontinence:    No [x]/Yes []    PHYSICAL EXAM:  Vitals:    12/01/21 0858   BP: (!) 164/90   Pulse: 86     General Appearance: alert and oriented to person, place and time, in no acute distress, well seen and examined. Agree with above. Patient was examined at the bedside. All questions were answered according to the patient satisfaction. He understands and wishes to proceed    Risk benefits and alternatives were discussed with the patient including but not limited to bleeding, infection, arterial venous nerve injury, arterial steal, venous hypertension, sensorimotor disturbance or loss, maintenance of the access, need for additional access, loss of the access, if an AV graft needs to be used risk of infection and/or removal, distal embolization, wound complications, limb loss and/or death the patient understands and wishes to proceed. Edward    As above. This is unchanged. He still dialyzing through a tunneled cath and is ready to proceed with a left arm arteriovenous access creation. Risk benefits and alternatives were again reviewed with the patient he understands and wishes to proceed    No follow-ups on file.

## 2021-12-08 ENCOUNTER — OFFICE VISIT (OUTPATIENT)
Dept: ORTHOPEDIC SURGERY | Age: 56
End: 2021-12-08
Payer: COMMERCIAL

## 2021-12-08 VITALS — TEMPERATURE: 98 F | WEIGHT: 210 LBS | HEIGHT: 68 IN | BODY MASS INDEX: 31.83 KG/M2

## 2021-12-08 DIAGNOSIS — S83.207A TEAR OF MENISCUS OF LEFT KNEE, UNSPECIFIED MENISCUS, UNSPECIFIED TEAR TYPE, UNSPECIFIED WHETHER OLD OR CURRENT TEAR: Primary | ICD-10-CM

## 2021-12-08 PROCEDURE — 99203 OFFICE O/P NEW LOW 30 MIN: CPT | Performed by: ORTHOPAEDIC SURGERY

## 2021-12-08 NOTE — PROGRESS NOTES
Chief Complaint   Patient presents with    Knee Pain     left knee pain for the past 2 weeks fell woke up the next day and couldnt put any weight on it still painful to bend and get up and moving          HPI:    Patient is 49 y.o. male complaining chronic, atraumatic, insidious onset left knee pain for the past 2 weeks fell woke up the next day and couldnt put any weight on it still painful to bend and get up and moving. He admits to stiffness, deep, aching pain, swelling, difficulty with stairs and ambulating far distances. He admits to gross instability specifically in his Left knee. Previous treatments include rest, ice, and anti-inflammatory medication and HEP without much relief. ROS:    Skin: (-) rash,(-) psoriasis,(-) eczema, (-)skin cancer. Neurologic: (-)numbness, (-)tingling, (-)headaches, (-) LOC. Cardiovascular: (-) Chest pain, (-) swelling in legs/feet, (-) SOB, (-) cramping in legs/feet with walking.     All other review of systems negative except stated above or in HPI      Past Medical History:   Diagnosis Date    Congestive heart disease ([address] 6/5/2020    Diabetes mellitus ([facility] [address] [facility]     Hyperlipidemia     Hypertension     Type 2 diabetes mellitus with diabetic nephropathy, with long-term current use of insulin ([facility] [address] 1/22/2020     Past Surgical History:   Procedure Laterality Date    CATHETER REMOVAL N/A 6/7/2021    REMOVAL OF PERITONEAL DIALYSIS CATHETER   [facility] N/A 11/30/2020    CATHETER INSERTION PERITONEAL DIALYSIS LAPAROSCOPIC performed by Arturo Lyons MD at [address] [facility] Right 6/7/2021    CATHETER INSERTION HEMODIALYSIS performed by Benny Robertson MD at [facility] N/A 10/29/2021    INSERTION TUNNELED HEMODIALYSIS CATHETER REMOVAL TEMPORARY CATHETER performed by Philly Devi MD at [address]       Current Outpatient Medications:     ceFAZolin (ANCEF) infusion, Infuse 2,000 mg intravenously Twice a Week Compound per protocol.  Every Monday and Wednesday with dialysis, Disp: 24 g, Rfl: 0    sevelamer (RENVELA) 800 MG tablet, Take 2 tablets by mouth 3 times daily (with meals) , Disp: , Rfl:     albuterol sulfate  (90 Base) MCG/ACT inhaler, Inhale 2 puffs into the lungs every 6 hours as needed for Wheezing or Shortness of Breath, Disp: 18 g, Rfl: 5    insulin detemir (LEVEMIR FLEXTOUCH) 100 UNIT/ML injection pen, Inject 40 Units into the skin nightly, Disp: 5 pen, Rfl: 3    insulin aspart (NOVOLOG FLEXPEN) 100 UNIT/ML injection pen, Inject 10 Units into the skin 3 times daily (before meals), Disp: 5 pen, Rfl: 5    carvedilol (COREG) 6.25 MG tablet, Take 1 tablet by mouth 2 times daily (with meals), Disp: 60 tablet, Rfl: 5    lovastatin (MEVACOR) 40 MG tablet, TAKE ONE TABLET BY MOUTH DAILY, Disp: 30 tablet, Rfl: 5    amLODIPine (NORVASC) 5 MG tablet, Take 1 tablet by mouth daily, Disp: 30 tablet, Rfl: 1    metOLazone (ZAROXOLYN) 5 MG tablet, TAKE ONE TABLET BY MOUTH TWO TIMES A DAY, Disp: , Rfl:     zoster recombinant adjuvanted vaccine (SHINGRIX) 50 MCG/0.5ML SUSR injection, Inject 0.5 mLs into the muscle See Admin Instructions 1 dose now and repeat in 2-6 months, Disp: 0.5 mL, Rfl: 0    vitamin D (ERGOCALCIFEROL) 1.25 MG (86684 UT) CAPS capsule, TAKE ONE CAPSULE BY MOUTH TWICE A WEEK, Disp: 27 capsule, Rfl: 5    Continuous Blood Gluc Sensor (FREESTYLE RUBI 14 DAY SENSOR) MISC, As directed, Disp: 2 each, Rfl: 5    Continuous Blood Gluc  (FREESTYLE RUBI 2 READER) ARON, As directed, Disp: 2 each, Rfl: 5    Insulin Pen Needle (MEIJER PEN NEEDLES) 31G X 8 MM MISC, 1 each by Does not apply route 3 times daily, Disp: 100 each, Rfl: 3    pantoprazole (PROTONIX) 40 MG tablet, Take 1 tablet by mouth every morning (before breakfast), Disp: 30 tablet, Rfl: 0    montelukast (SINGULAIR) 10 MG tablet, TAKE ONE TABLET BY MOUTH DAILY, Disp: 30 tablet, Rfl: 0    bumetanide (BUMEX) 2 MG tablet, TAKE ONE TABLET BY MOUTH TWO TIMES A DAY (Patient taking differently: 2 mg 3 times daily TAKE ONE TABLET BY MOUTH TWO TIMES A DAY), Disp: 30 tablet, Rfl: 0    insulin detemir (LEVEMIR) 100 UNIT/ML injection vial, Inject 20 Units into the skin nightly, Disp: 3 vial, Rfl: 5    sildenafil (VIAGRA) 100 MG tablet, TAKE ONE TABLET BY MOUTH AS NEEDED FOR ERECTILE DYSFUNCTION, Disp: 10 tablet, Rfl: 0    fluticasone (FLONASE) 50 MCG/ACT nasal spray, 2 sprays by Each Nostril route daily, Disp: 3 Bottle, Rfl: 5    Insulin Syringe-Needle U-100 (AIMSCO INS SYR .3CC/29GX0.5\") 29G X 1/2\" 0.3 ML MISC, 1 each by Does not apply route daily, Disp: 100 each, Rfl: 3    Misc Natural Products (APPLE CIDER VINEGAR DIET PO), Take by mouth, Disp: , Rfl:     Zinc Sulfate (ZINC 15 PO), Take by mouth, Disp: , Rfl:     sodium bicarbonate 650 MG tablet, Take 1 tablet by mouth 3 times daily, Disp: 90 tablet, Rfl: 1    Acetaminophen (TYLENOL) 325 MG CAPS, Take 650 mg by mouth as needed (FOR PAIN PER PT.), Disp: , Rfl:     vitamin E 400 UNIT capsule, Take 400 Units by mouth daily, Disp: , Rfl:     Insulin Syringe-Needle U-100 30G X 1/2\" 1 ML MISC, 10 units units of Tresiba daily, Disp: 100 each, Rfl: 11    Cinnamon 500 MG CAPS, Take 1,000 mg by mouth 2 times daily. , Disp: , Rfl:   No Known Allergies  Social History     Socioeconomic History    Marital status:      Spouse name: Not on file    Number of children: Not on file    Years of education: Not on file    Highest education level: Not on file   Occupational History    Not on file   Tobacco Use    Smoking status: Former Smoker     Packs/day: 0.50     Years: 11.00     Pack years: 5.50     Types: Cigars     Quit date: 2019     Years since quittin.2    Smokeless tobacco: Never Used   Vaping Use    Vaping Use: Never used   Substance and Sexual Activity    Alcohol use:  Yes     Alcohol/week: 0.0 standard drinks Comment: socially    Drug use: No    Sexual activity: Yes     Partners: Female   Other Topics Concern    Not on file   Social History Narrative    Not on file     Social Determinants of Health     Financial Resource Strain: Low Risk     Difficulty of Paying Living Expenses: Not hard at all   Food Insecurity: No Food Insecurity    Worried About Running Out of Food in the Last Year: Never true    [address] in the Last Year: Never true   Transportation Needs:     Lack of Transportation (Medical): Not on file    Lack of Transportation (Non-Medical): Not on file   Physical Activity:     Days of Exercise per Week: Not on file    Minutes of Exercise per Session: Not on file   Stress:     Feeling of Stress : Not on file   Social Connections:     Frequency of Communication with Friends and Family: Not on file    Frequency of Social Gatherings with Friends and Family: Not on file    Attends Holiness Services: Not on file    Active Member of [address] [place] [address] or Organizations: Not on file    Attends Club or Organization Meetings: Not on file    Marital Status: Not on file   Intimate Partner Violence:     Fear of Current or Ex-Partner: Not on file    Emotionally Abused: Not on file    Physically Abused: Not on file    Sexually Abused: Not on file   Housing Stability:     Unable to Pay for Housing in the Last Year: Not on file    Number of [place] in the Last Year: Not on file    Unstable Housing in the Last Year: Not on file     Family History   Problem Relation Age of Onset    Heart Disease Mother            Physical Exam:    Temp 98 °F (36.7 °C)   Ht 5' 8\" (1.727 m)   Wt 210 lb (95.3 kg)   BMI 31.93 kg/m²     GENERAL: alert, appears stated age, cooperative, no acute distress    HEENT: Head is normocephalic, atraumatic. PERRLA. SKIN: Clean, dry, intact.  There is not any cellulitis or cutaneous lesions noted in the lower extremities except noted below in MSK    PULMONARY: breathing is regular Follow-up after MRI    In a 15 minute assessment and discussion, patient was counseled on continued weight loss, diet, and physical activity relating to this condition. He was educated with options in detail including nutrition, joining a health club/ weight loss program, and use of cardio equipment such as the Arc Trainer and the importance of use as well as range of motion and HEP exercises for weight loss.      Ephraim Push, DO

## 2021-12-13 ENCOUNTER — TELEPHONE (OUTPATIENT)
Dept: VASCULAR SURGERY | Age: 56
End: 2021-12-13

## 2022-01-16 NOTE — CARE COORDINATION
Spoke with Poppy Muñoz about Wife not wanting home dialysis but [employer]. Sheryle Merle has [facility] in [place] for karyna 10:30 - 11:30. Sheryle Merle will speak with Pt and then call SW back with day and time.    Salina King, L.S.W.  [phone]

## 2022-02-01 ENCOUNTER — TELEPHONE (OUTPATIENT)
Dept: FAMILY MEDICINE CLINIC | Age: 57
End: 2022-02-01

## 2022-02-01 DIAGNOSIS — Z79.4 TYPE 2 DIABETES MELLITUS WITH DIABETIC NEPHROPATHY, WITH LONG-TERM CURRENT USE OF INSULIN (HCC): ICD-10-CM

## 2022-02-01 DIAGNOSIS — E11.21 TYPE 2 DIABETES MELLITUS WITH DIABETIC NEPHROPATHY, WITH LONG-TERM CURRENT USE OF INSULIN (HCC): ICD-10-CM

## 2022-02-01 DIAGNOSIS — E11.65 TYPE 2 DIABETES MELLITUS WITH HYPERGLYCEMIA, WITHOUT LONG-TERM CURRENT USE OF INSULIN (HCC): ICD-10-CM

## 2022-02-01 DIAGNOSIS — E11.21 TYPE 2 DIABETES MELLITUS WITH DIABETIC NEPHROPATHY, WITHOUT LONG-TERM CURRENT USE OF INSULIN (HCC): ICD-10-CM

## 2022-02-01 RX ORDER — INSULIN DETEMIR 100 [IU]/ML
40 INJECTION, SOLUTION SUBCUTANEOUS NIGHTLY
Qty: 5 PEN | Refills: 3 | Status: SHIPPED | OUTPATIENT
Start: 2022-02-01

## 2022-02-01 RX ORDER — INSULIN ASPART 100 [IU]/ML
10 INJECTION, SOLUTION INTRAVENOUS; SUBCUTANEOUS
Qty: 5 PEN | Refills: 5 | Status: SHIPPED | OUTPATIENT
Start: 2022-02-01

## 2022-02-01 RX ORDER — INSULIN DETEMIR 100 [IU]/ML
20 INJECTION, SOLUTION SUBCUTANEOUS NIGHTLY
Status: CANCELLED | OUTPATIENT
Start: 2022-02-01

## 2022-02-01 NOTE — TELEPHONE ENCOUNTER
----- Message from Chelsea Manzano sent at 2/1/2022  3:16 PM EST -----  Subject: Medication Problem    QUESTIONS  Name of Medication? insulin detemir (LEVEMIR FLEXTOUCH) 100 UNIT/ML   injection pen  Patient-reported dosage and instructions? 40 units at night (?)  What question or problem do you have with the medication? Pt cant afford   his insulin, it was costing him 900$ after insurance. He is asking if the   dr office has any samples they can provide to hold him over. Preferred Pharmacy? [facility] [address], [address]  Pharmacy phone number (if available)? [phone]  Additional Information for Provider? Please ignore my last request for the   levemir vial. He is using the Pen instead. However, I didn't write down   what dose he was taking for the levemir, the dose is correct for the   previous submission.   ---------------------------------------------------------------------------  --------------  CALL BACK INFO  What is the best way for the office to contact you? OK to leave message on   voicemail  Preferred Call Back Phone Number? [phone]  ---------------------------------------------------------------------------  --------------  SCRIPT ANSWERS  Relationship to Patient?  Self

## 2022-02-01 NOTE — TELEPHONE ENCOUNTER
----- Message from Kerry Sherman sent at 2/1/2022  3:09 PM EST -----  Subject: Medication Problem    QUESTIONS  Name of Medication? insulin detemir (LEVEMIR) 100 UNIT/ML injection vial  Patient-reported dosage and instructions? 40 units at night before bed  What question or problem do you have with the medication? Pt cant afford   his insulin, it was costing him 900$ after insurance. He is asking if the   dr office has any samples they can provide to hold him over. Preferred Pharmacy? [facility] [address], [address]   Pharmacy phone number (if available)? [phone]  Additional Information for Provider?   ---------------------------------------------------------------------------  --------------  [address]  What is the best way for the office to contact you? OK to leave message on   voicemail  Preferred Call Back Phone Number? [phone]  ---------------------------------------------------------------------------  --------------  SCRIPT ANSWERS  Relationship to Patient?  Self

## 2022-03-29 DIAGNOSIS — J40 BRONCHITIS: ICD-10-CM

## 2022-03-29 DIAGNOSIS — J01.90 ACUTE BACTERIAL SINUSITIS: ICD-10-CM

## 2022-03-29 DIAGNOSIS — E11.21 TYPE 2 DIABETES MELLITUS WITH DIABETIC NEPHROPATHY, WITHOUT LONG-TERM CURRENT USE OF INSULIN (HCC): ICD-10-CM

## 2022-03-29 DIAGNOSIS — B96.89 ACUTE BACTERIAL SINUSITIS: ICD-10-CM

## 2022-03-29 DIAGNOSIS — I10 BENIGN ESSENTIAL HTN: ICD-10-CM

## 2022-03-29 DIAGNOSIS — N28.9 RENAL INSUFFICIENCY: ICD-10-CM

## 2022-03-29 DIAGNOSIS — E55.9 VITAMIN D DEFICIENCY: ICD-10-CM

## 2022-03-29 RX ORDER — LOVASTATIN 40 MG/1
TABLET ORAL
Qty: 30 TABLET | Refills: 5 | Status: SHIPPED | OUTPATIENT
Start: 2022-03-29

## 2022-04-01 RX ORDER — MONTELUKAST SODIUM 10 MG/1
TABLET ORAL
Qty: 90 TABLET | Refills: 5 | Status: SHIPPED | OUTPATIENT
Start: 2022-04-01

## 2022-11-07 DIAGNOSIS — N28.9 RENAL INSUFFICIENCY: ICD-10-CM

## 2022-11-07 DIAGNOSIS — E55.9 VITAMIN D DEFICIENCY: ICD-10-CM

## 2022-11-07 DIAGNOSIS — E11.21 TYPE 2 DIABETES MELLITUS WITH DIABETIC NEPHROPATHY, WITHOUT LONG-TERM CURRENT USE OF INSULIN (HCC): ICD-10-CM

## 2022-11-07 DIAGNOSIS — I10 BENIGN ESSENTIAL HTN: ICD-10-CM

## 2022-11-07 RX ORDER — LOVASTATIN 40 MG/1
TABLET ORAL
Qty: 30 TABLET | Refills: 5 | OUTPATIENT
Start: 2022-11-07

## 2022-11-27 ENCOUNTER — APPOINTMENT (OUTPATIENT)
Dept: GENERAL RADIOLOGY | Age: 57
End: 2022-11-27
Payer: COMMERCIAL

## 2022-11-27 ENCOUNTER — HOSPITAL ENCOUNTER (EMERGENCY)
Age: 57
Discharge: HOME | End: 2022-11-27
Payer: COMMERCIAL

## 2022-11-27 VITALS
TEMPERATURE: 98.2 F | DIASTOLIC BLOOD PRESSURE: 75 MMHG | BODY MASS INDEX: 34.86 KG/M2 | OXYGEN SATURATION: 99 % | SYSTOLIC BLOOD PRESSURE: 132 MMHG | HEART RATE: 86 BPM | RESPIRATION RATE: 20 BRPM | WEIGHT: 230 LBS | HEIGHT: 68 IN

## 2022-11-27 DIAGNOSIS — U07.1 COVID-19: Primary | ICD-10-CM

## 2022-11-27 LAB
BASOPHILS ABSOLUTE: 0.02 E9/L (ref 0–0.2)
BASOPHILS RELATIVE PERCENT: 1 % (ref 0–2)
CO2: 27 MMOL/L (ref 22–29)
EOSINOPHILS ABSOLUTE: 0.05 E9/L (ref 0.05–0.5)
EOSINOPHILS RELATIVE PERCENT: 2.6 % (ref 0–6)
GFR SERPL CREATININE-BSD FRML MDRD: 44 ML/MIN/1.73
GLUCOSE BLD-MCNC: 198 MG/DL (ref 74–99)
HCT VFR BLD CALC: 40.1 % (ref 37–54)
HEMOGLOBIN: 13.2 G/DL (ref 12.5–16.5)
IMMATURE GRANULOCYTES #: 0.05 E9/L
IMMATURE GRANULOCYTES %: 2.6 % (ref 0–5)
INFLUENZA A BY PCR: NOT DETECTED
INFLUENZA B BY PCR: NOT DETECTED
LYMPHOCYTES ABSOLUTE: 0.29 E9/L (ref 1.5–4)
LYMPHOCYTES RELATIVE PERCENT: 15.1 % (ref 20–42)
MCH RBC QN AUTO: 32.1 PG (ref 26–35)
MCHC RBC AUTO-ENTMCNC: 32.9 % (ref 32–34.5)
MCV RBC AUTO: 97.6 FL (ref 80–99.9)
MONOCYTES ABSOLUTE: 0.21 E9/L (ref 0.1–0.95)
MONOCYTES RELATIVE PERCENT: 10.9 % (ref 2–12)
NEUTROPHILS ABSOLUTE: 1.3 E9/L (ref 1.8–7.3)
NEUTROPHILS RELATIVE PERCENT: 67.8 % (ref 43–80)
PDW BLD-RTO: 13.9 FL (ref 11.5–15)
PERFORMED ON: ABNORMAL
PLATELET # BLD: 160 E9/L (ref 130–450)
PMV BLD AUTO: 10.1 FL (ref 7–12)
POC ANION GAP: 7 MMOL/L (ref 7–16)
POC BUN: 37 MG/DL (ref 8–23)
POC CHLORIDE: 108 MMOL/L (ref 100–108)
POC CREATININE: 1.8 MG/DL (ref 0.7–1.2)
POC POTASSIUM: 4.3 MMOL/L (ref 3.5–5)
POC SODIUM: 142 MMOL/L (ref 132–146)
RBC # BLD: 4.11 E12/L (ref 3.8–5.8)
RBC # BLD: NORMAL 10*6/UL
SARS-COV-2, NAAT: DETECTED
WBC # BLD: 1.9 E9/L (ref 4.5–11.5)

## 2022-11-27 PROCEDURE — 80051 ELECTROLYTE PANEL: CPT

## 2022-11-27 PROCEDURE — 82947 ASSAY GLUCOSE BLOOD QUANT: CPT

## 2022-11-27 PROCEDURE — 87502 INFLUENZA DNA AMP PROBE: CPT

## 2022-11-27 PROCEDURE — 85025 COMPLETE CBC W/AUTO DIFF WBC: CPT

## 2022-11-27 PROCEDURE — 71046 X-RAY EXAM CHEST 2 VIEWS: CPT

## 2022-11-27 PROCEDURE — 84520 ASSAY OF UREA NITROGEN: CPT

## 2022-11-27 PROCEDURE — 82565 ASSAY OF CREATININE: CPT

## 2022-11-27 PROCEDURE — 87635 SARS-COV-2 COVID-19 AMP PRB: CPT

## 2022-11-27 PROCEDURE — 99211 OFF/OP EST MAY X REQ PHY/QHP: CPT

## 2022-11-27 PROCEDURE — 36415 COLL VENOUS BLD VENIPUNCTURE: CPT

## 2022-11-27 ASSESSMENT — PAIN - FUNCTIONAL ASSESSMENT: PAIN_FUNCTIONAL_ASSESSMENT: 0-10

## 2022-11-28 NOTE — ED PROVIDER NOTES
HPI: Jaime Monroy is a 56 y.o. male with a past medical history of  has a past medical history of Congestive heart disease ([address] Diabetes mellitus ([address] Hyperlipidemia, Hypertension, and Type 2 diabetes mellitus with diabetic nephropathy, with long-term current use of insulin ([address] presenting with complaints of a cough with nasal congestion. The patient states that these symptoms began gradually. The history is obtained from the patient. The patient states that he has had some subjective chills at home. Patient does complain of a mild cough associated with it that is nonproductive. Patient denies excessive fatigue or sleeping greater than 18 hours a day. Patient denies exposure to mononucleosis. The patient denies any abdominal pain, left upper quadrant fullness, or early satiety. The patient also denies difficulty breathing, hemoptysis, neck pain/stiffness, or blurry vision. Sx have persisted and are mildly worse which is what prompted the visit today. Unknown exposure to sick contacts, patient presents for complaints of cough, body aches, nasal congestion, rhinorrhea with intermittent fevers and chills over the past 2 days. He denies any chest pain or shortness of breath. He does report a past surgical history of a kidney transplant at the [facility] approximately 1 year ago. ROS:   Pertinent positives and negatives are stated within HPI, all other systems reviewed and are negative.      --------------------------------------------- PAST HISTORY ---------------------------------------------  Past Medical History:  has a past medical history of Congestive heart disease ([address] Diabetes mellitus ([address] Hyperlipidemia, Hypertension, and Type 2 diabetes mellitus with diabetic nephropathy, with long-term current use of insulin ([address] Past Surgical History:  has a past surgical history that includes Tonsillectomy; Dialysis Catheter Insertion (N/A, 11/30/2020);  Catheter Removal (N/A, 6/7/2021); vascular surgery (Right, 6/7/2021); and vascular surgery (N/A, 10/29/2021). Social History:  reports that he quit smoking about 3 years ago. His smoking use included cigars. He has a 5.50 pack-year smoking history. He has never used smokeless tobacco. He reports current alcohol use. He reports that he does not use drugs. Family History: family history includes Heart Disease in his mother. The patients home medications have been reviewed. Allergies: Patient has no known allergies. ------------------------- NURSING NOTES AND VITALS REVIEWED ---------------------------   The nursing notes within the ED encounter and vital signs as below have been reviewed by myself. /75   Pulse 86   Temp 98.2 °F (36.8 °C)   Resp 20   Ht 5' 8\" (1.727 m)   Wt 230 lb (104.3 kg)   SpO2 99%   BMI 34.97 kg/m²   Oxygen Saturation Interpretation: Normal    The patients available past medical records and past encounters were reviewed. Physical exam:  Constitutional: Vital signs are reviewed the patient is comfortable. The patient is alert and oriented and conversant. Head: The head is atraumatic and normocephalic. Eyes: No discharge is present from the eyes. The sclera are normal.  ENT: The oropharynx demonstrates a small amount of erythema bilaterally. There is no tonsillar enlargement nor is there any exudate present. No uvular deviation or edema. No tonsillary asymmetry. Floor of the mouth soft, no trismus, handling secretions. TMs bilaterally demonstrate no evidence of infection. Neck: Normal range of motion is achieved in the neck. There is no JVD present. No meningeal signs are present   Anterior cervical adenopathy is normal.  Respiratory/chest: The chest is nontender. Breath sounds are normal. There is no respiratory distress. Cardiovascular: Heart shows a regular rate and rhythm without murmurs clicks or gallops.   Abdominal exam: The abdomen is non tender without evidence of peritoneal BELOW        RADIOLOGY:  Interpreted by Radiologist.  XR CHEST (2 VW)   Final Result   No acute process. ------------------------------ ED COURSE/MEDICAL DECISION MAKING----------------------  Medications - No data to display          Medical Decision Making:         Patient and his wife both informed of lab work including a CBC and a chemistry which appear to be at baseline in comparison to the labs during chart review from the [facility]. Chest x-ray is negative for any acute etiology. Unfortunately the COVID-19 is positive. His influenza are negative. Plan will be for symptomatic treatment and advised to follow-up with his transplant team first thing tomorrow morning regarding recommended treatment for his COVID. He does appear well and is afebrile currently pulse oximetry is at 99% with a heart rate of 86. If any change or worsening symptoms or develop any shortness of breath he is advised to promptly go to the emergency room otherwise follow-up with his transplant team at the [facility] for any further recommendations for any antiviral medications. Not hypoxic, nothing to suggests pneumonia. Well appearing, non toxic, appropriate for outpatient management. Plan is for symptom management and PCP follow up. This patient's ED course included: a personal history and physicial eaxmination and re-evaluation prior to disposition    This patient has remained hemodynamically stable during their ED course. Counseling: The emergency provider has spoken with the patient and discussed todays results, in addition to providing specific details for the plan of care and counseling regarding the diagnosis and prognosis.   Questions are answered at this time and they are agreeable with the plan.       --------------------------------- IMPRESSION AND DISPOSITION ---------------------------------    IMPRESSION  1. COVID-19        DISPOSITION  Disposition: Discharge to home  Patient condition is good              Khushi Mannr, DONOVAN - KRISTIE  11/27/22 2009

## 2022-12-05 ENCOUNTER — APPOINTMENT (OUTPATIENT)
Dept: GENERAL RADIOLOGY | Age: 57
End: 2022-12-05
Payer: COMMERCIAL

## 2022-12-05 ENCOUNTER — HOSPITAL ENCOUNTER (EMERGENCY)
Age: 57
Discharge: HOME | End: 2022-12-05
Payer: COMMERCIAL

## 2022-12-05 VITALS
RESPIRATION RATE: 20 BRPM | TEMPERATURE: 98.9 F | OXYGEN SATURATION: 99 % | DIASTOLIC BLOOD PRESSURE: 72 MMHG | SYSTOLIC BLOOD PRESSURE: 143 MMHG | WEIGHT: 225 LBS | HEART RATE: 84 BPM | HEIGHT: 68 IN | BODY MASS INDEX: 34.1 KG/M2

## 2022-12-05 DIAGNOSIS — R11.2 NAUSEA AND VOMITING, UNSPECIFIED VOMITING TYPE: Primary | ICD-10-CM

## 2022-12-05 LAB
BILIRUBIN URINE: NEGATIVE
BLOOD, URINE: NEGATIVE
CLARITY: CLEAR
CO2: 23 MMOL/L (ref 22–29)
COLOR: YELLOW
GFR SERPL CREATININE-BSD FRML MDRD: 44 ML/MIN/1.73
GLUCOSE BLD-MCNC: 157 MG/DL (ref 74–99)
GLUCOSE URINE: NEGATIVE MG/DL
HCT VFR BLD CALC: 37.5 % (ref 37–54)
HEMOGLOBIN: 12.5 G/DL (ref 12.5–16.5)
INFLUENZA A BY PCR: NOT DETECTED
INFLUENZA B BY PCR: NOT DETECTED
KETONES, URINE: NEGATIVE MG/DL
LEUKOCYTE ESTERASE, URINE: NEGATIVE
MCH RBC QN AUTO: 32.1 PG (ref 26–35)
MCHC RBC AUTO-ENTMCNC: 33.3 % (ref 32–34.5)
MCV RBC AUTO: 96.4 FL (ref 80–99.9)
NITRITE, URINE: NEGATIVE
PDW BLD-RTO: 13.5 FL (ref 11.5–15)
PERFORMED ON: ABNORMAL
PH UA: 5.5 (ref 5–9)
PLATELET # BLD: 188 E9/L (ref 130–450)
PMV BLD AUTO: 9.6 FL (ref 7–12)
POC ANION GAP: 10 MMOL/L (ref 7–16)
POC BUN: 23 MG/DL (ref 8–23)
POC CHLORIDE: 102 MMOL/L (ref 100–108)
POC CREATININE: 1.8 MG/DL (ref 0.7–1.2)
POC POTASSIUM: 4.8 MMOL/L (ref 3.5–5)
POC SODIUM: 135 MMOL/L (ref 132–146)
PROTEIN UA: NEGATIVE MG/DL
RBC # BLD: 3.89 E12/L (ref 3.8–5.8)
SPECIFIC GRAVITY UA: 1.01 (ref 1–1.03)
UROBILINOGEN, URINE: 0.2 E.U./DL
WBC # BLD: 2.5 E9/L (ref 4.5–11.5)

## 2022-12-05 PROCEDURE — 99211 OFF/OP EST MAY X REQ PHY/QHP: CPT

## 2022-12-05 PROCEDURE — 85027 COMPLETE CBC AUTOMATED: CPT

## 2022-12-05 PROCEDURE — 36415 COLL VENOUS BLD VENIPUNCTURE: CPT

## 2022-12-05 PROCEDURE — 84520 ASSAY OF UREA NITROGEN: CPT

## 2022-12-05 PROCEDURE — 82947 ASSAY GLUCOSE BLOOD QUANT: CPT

## 2022-12-05 PROCEDURE — 71046 X-RAY EXAM CHEST 2 VIEWS: CPT

## 2022-12-05 PROCEDURE — 80051 ELECTROLYTE PANEL: CPT

## 2022-12-05 PROCEDURE — 81003 URINALYSIS AUTO W/O SCOPE: CPT

## 2022-12-05 PROCEDURE — 87502 INFLUENZA DNA AMP PROBE: CPT

## 2022-12-05 PROCEDURE — 82565 ASSAY OF CREATININE: CPT

## 2022-12-05 ASSESSMENT — PAIN - FUNCTIONAL ASSESSMENT: PAIN_FUNCTIONAL_ASSESSMENT: 0-10

## 2022-12-05 ASSESSMENT — PAIN SCALES - GENERAL: PAINLEVEL_OUTOF10: 0

## 2022-12-05 NOTE — ED PROVIDER NOTES
HPI:  12/5/22, Time: 2:43 PM MAGGIE Mendiola is a 56 y.o. male presenting to the ED for congestion, beginning 1 week ago. The complaint has been persistent, mild in severity, and worsened by nothing. Patient reports that he was here a week ago and diagnosed with COVID. Overall symptoms are doing better and he returned to work on Friday. Was doing well over the weekend and was going to work today when he noticed a cough earlier today. States that he vomited twice on his way to work today. He called the [facility] transplant team for recommendations and they advised to come to urgent care to be evaluated. Patient denies any abdominal pain or back pain. No nausea at this time. Reports the vomiting in the car was not after a coughing episode and reports that he was nauseous just prior. No chest pain or shortness of breath. Does report that he has a cough now which he did not when he was diagnosed with COVID. Congestion is the same. Denies any recent travel. Patient denies all other symptoms at this time. Review of Systems:   A complete review of systems was performed and pertinent positives and negatives are stated within HPI, all other systems reviewed and are negative.          --------------------------------------------- PAST HISTORY ---------------------------------------------  Past Medical History:  has a past medical history of Congestive heart disease [address] Diabetes mellitus ([facility] Hyperlipidemia, Hypertension, and Type 2 diabetes mellitus with diabetic nephropathy, with long-term current use of insulin ([facility] Past Surgical History:  has a past surgical history that includes Tonsillectomy; Dialysis Catheter Insertion (N/A, 11/30/2020); Catheter Removal (N/A, 6/7/2021); vascular surgery (Right, 6/7/2021); and vascular surgery (N/A, 10/29/2021). Social History:  reports that he quit smoking about 3 years ago. His smoking use included cigars.  He has a 5.50 pack-year smoking history. He has never used smokeless tobacco. He reports current alcohol use. He reports that he does not use drugs. Family History: family history includes Heart Disease in his mother. The patients home medications have been reviewed. Allergies: Patient has no known allergies.     -------------------------------------------------- RESULTS -------------------------------------------------  All laboratory and radiology results have been personally reviewed by myself   LABS:  Results for orders placed or performed during the hospital encounter of 12/05/22   Rapid influenza A/B antigens    Specimen: Nares   Result Value Ref Range    Influenza A by PCR Not Detected Not Detected    Influenza B by PCR Not Detected Not Detected   Urinalysis   Result Value Ref Range    Color, UA Yellow Straw/Yellow    Clarity, UA Clear Clear    Glucose, Ur Negative Negative mg/dL    Bilirubin Urine Negative Negative    Ketones, Urine Negative Negative mg/dL    Specific Gravity, UA 1.015 1.005 - 1.030    Blood, Urine Negative Negative    pH, UA 5.5 5.0 - 9.0    Protein, UA Negative Negative mg/dL    Urobilinogen, Urine 0.2 <2.0 E.U./dL    Nitrite, Urine Negative Negative    Leukocyte Esterase, Urine Negative Negative   CBC   Result Value Ref Range    WBC 2.5 (L) 4.5 - 11.5 E9/L    RBC 3.89 3.80 - 5.80 E12/L    Hemoglobin 12.5 12.5 - 16.5 g/dL    Hematocrit 37.5 37.0 - 54.0 %    MCV 96.4 80.0 - 99.9 fL    MCH 32.1 26.0 - 35.0 pg    MCHC 33.3 32.0 - 34.5 %    RDW 13.5 11.5 - 15.0 fL    Platelets 492 318 - 614 E9/L    MPV 9.6 7.0 - 12.0 fL   POCT Venous   Result Value Ref Range    POC Sodium 135 132 - 146 mmol/L    POC Potassium 4.8 3.5 - 5.0 mmol/L    POC Chloride 102 100 - 108 mmol/L    CO2 23 22 - 29 mmol/L    POC Anion Gap 10 7 - 16 mmol/L    POC Glucose 157 (H) 74 - 99 mg/dl    POC BUN 23 8 - 23 mg/dL    POC Creatinine 1.8 (H) 0.7 - 1.2 mg/dL    Est, Glom Filt Rate 44 >=60 mL/min/1.73    Performed on SEE BELOW RADIOLOGY:  Interpreted by Radiologist.  XR CHEST (2 VW)   Final Result   No acute process. Sign knee             ------------------------- NURSING NOTES AND VITALS REVIEWED ---------------------------   The nursing notes within the ED encounter and vital signs as below have been reviewed. BP (!) 143/72   Pulse 84   Temp 98.9 °F (37.2 °C) (Infrared)   Resp 20   Ht 5' 8\" (1.727 m)   Wt 225 lb (102.1 kg)   SpO2 99%   BMI 34.21 kg/m²   Oxygen Saturation Interpretation: Normal      ---------------------------------------------------PHYSICAL EXAM--------------------------------------      Constitutional/General: Alert and oriented x3, well appearing, non toxic in NAD  Head: Normocephalic and atraumatic  Eyes: PERRL, EOMI  Mouth: Oropharynx clear, handling secretions, no trismus  Neck: Supple, full ROM,   Pulmonary: Lungs clear to auscultation bilaterally, no wheezes, rales, or rhonchi. Not in respiratory distress  Cardiovascular:  Regular rate and rhythm, no murmurs, gallops, or rubs. 2+ distal pulses  Abdomen: Soft, non tender, non distended,   Extremities: Moves all extremities x 4. Warm and well perfused  Skin: warm and dry without rash  Neurologic: GCS 15,  Psych: Normal Affect      ------------------------------ ED COURSE/MEDICAL DECISION MAKING----------------------  Medications - No data to display      ED COURSE:  ED Course as of 12/05/22 1559   Mon Dec 05, 2022   1527 ED workup interpreted by me and is as follows:     Reassessed patient. Remained stable neurovascularly intact. Discussed results with the patient. Patient's renal function is at baseline. Electrolytes within normal limits. No leukocytosis or evidence of anemia on CBC. No evidence of urinary tract infection. Influenza swabs negative at urgent care today. He is no longer nauseous or vomiting. Nontoxic-appearing, afebrile no acute distress. X-ray does not reveal evidence of pneumonia.   At this time we will plan on outpatient symptom management with very close follow-up with PCP for recheck. He is to return the emergency department any new or worsening symptoms. Patient voiced understanding and is agreeable to the above treatment plan. [MS]      ED Course User Index  [MS] Hernan Bernabe, [address]       Medical Decision Making:    See ED course above. Counseling: The emergency provider has spoken with the patient and discussed todays results, in addition to providing specific details for the plan of care and counseling regarding the diagnosis and prognosis. Questions are answered at this time and they are agreeable with the plan.      --------------------------------- IMPRESSION AND DISPOSITION ---------------------------------    IMPRESSION  1. Nausea and vomiting, unspecified vomiting type        DISPOSITION  Disposition: Discharge to home  Patient condition is stable      NOTE: This report was transcribed using voice recognition software.  Every effort was made to ensure accuracy; however, inadvertent computerized transcription errors may be present        Hernan Bernabe, [address]  12/05/22 [id number]

## 2022-12-07 ENCOUNTER — APPOINTMENT (OUTPATIENT)
Dept: GENERAL RADIOLOGY | Age: 57
End: 2022-12-07
Payer: COMMERCIAL

## 2022-12-07 ENCOUNTER — HOSPITAL ENCOUNTER (EMERGENCY)
Age: 57
Discharge: HOME | End: 2022-12-07
Attending: EMERGENCY MEDICINE
Payer: COMMERCIAL

## 2022-12-07 VITALS
WEIGHT: 225 LBS | HEART RATE: 75 BPM | BODY MASS INDEX: 34.21 KG/M2 | DIASTOLIC BLOOD PRESSURE: 90 MMHG | TEMPERATURE: 97.8 F | SYSTOLIC BLOOD PRESSURE: 196 MMHG | OXYGEN SATURATION: 95 % | RESPIRATION RATE: 18 BRPM

## 2022-12-07 DIAGNOSIS — R50.9 FEVER, UNSPECIFIED FEVER CAUSE: Primary | ICD-10-CM

## 2022-12-07 LAB
ANION GAP SERPL CALCULATED.3IONS-SCNC: 11 MMOL/L (ref 7–16)
BACTERIA: ABNORMAL /HPF
BASOPHILS ABSOLUTE: 0.02 E9/L (ref 0–0.2)
BASOPHILS RELATIVE PERCENT: 0.9 % (ref 0–2)
BILIRUBIN URINE: NEGATIVE
BLOOD, URINE: NEGATIVE
BUN BLDV-MCNC: 29 MG/DL (ref 6–20)
BURR CELLS: ABNORMAL
CALCIUM SERPL-MCNC: 9.1 MG/DL (ref 8.6–10.2)
CHLORIDE BLD-SCNC: 100 MMOL/L (ref 98–107)
CLARITY: CLEAR
CO2: 22 MMOL/L (ref 22–29)
COLOR: YELLOW
CREAT SERPL-MCNC: 1.9 MG/DL (ref 0.7–1.2)
EOSINOPHILS ABSOLUTE: 0 E9/L (ref 0.05–0.5)
EOSINOPHILS RELATIVE PERCENT: 0.4 % (ref 0–6)
GFR SERPL CREATININE-BSD FRML MDRD: 41 ML/MIN/1.73
GLUCOSE BLD-MCNC: 242 MG/DL (ref 74–99)
GLUCOSE URINE: 100 MG/DL
HCT VFR BLD CALC: 37.7 % (ref 37–54)
HEMOGLOBIN: 12.3 G/DL (ref 12.5–16.5)
INFLUENZA A BY PCR: NOT DETECTED
INFLUENZA B BY PCR: NOT DETECTED
KETONES, URINE: NEGATIVE MG/DL
LACTIC ACID: 1.9 MMOL/L (ref 0.5–2.2)
LEUKOCYTE ESTERASE, URINE: NEGATIVE
LYMPHOCYTES ABSOLUTE: 0.34 E9/L (ref 1.5–4)
LYMPHOCYTES RELATIVE PERCENT: 13 % (ref 20–42)
MCH RBC QN AUTO: 32 PG (ref 26–35)
MCHC RBC AUTO-ENTMCNC: 32.6 % (ref 32–34.5)
MCV RBC AUTO: 98.2 FL (ref 80–99.9)
MONOCYTES ABSOLUTE: 0.08 E9/L (ref 0.1–0.95)
MONOCYTES RELATIVE PERCENT: 2.6 % (ref 2–12)
NEUTROPHILS ABSOLUTE: 2.18 E9/L (ref 1.8–7.3)
NEUTROPHILS RELATIVE PERCENT: 83.5 % (ref 43–80)
NITRITE, URINE: NEGATIVE
OVALOCYTES: ABNORMAL
PDW BLD-RTO: 13.5 FL (ref 11.5–15)
PH UA: 6 (ref 5–9)
PLATELET # BLD: 190 E9/L (ref 130–450)
PMV BLD AUTO: 10.3 FL (ref 7–12)
POIKILOCYTES: ABNORMAL
POLYCHROMASIA: ABNORMAL
POTASSIUM SERPL-SCNC: 5.5 MMOL/L (ref 3.5–5)
PROTEIN UA: NEGATIVE MG/DL
RBC # BLD: 3.84 E12/L (ref 3.8–5.8)
RBC UA: ABNORMAL /HPF (ref 0–2)
SODIUM BLD-SCNC: 133 MMOL/L (ref 132–146)
SPECIFIC GRAVITY UA: 1.01 (ref 1–1.03)
TARGET CELLS: ABNORMAL
TEAR DROP CELLS: ABNORMAL
UROBILINOGEN, URINE: 0.2 E.U./DL
WBC # BLD: 2.6 E9/L (ref 4.5–11.5)
WBC UA: ABNORMAL /HPF (ref 0–5)

## 2022-12-07 PROCEDURE — 87040 BLOOD CULTURE FOR BACTERIA: CPT

## 2022-12-07 PROCEDURE — 36415 COLL VENOUS BLD VENIPUNCTURE: CPT

## 2022-12-07 PROCEDURE — 87502 INFLUENZA DNA AMP PROBE: CPT

## 2022-12-07 PROCEDURE — 87088 URINE BACTERIA CULTURE: CPT

## 2022-12-07 PROCEDURE — 83605 ASSAY OF LACTIC ACID: CPT

## 2022-12-07 PROCEDURE — 99284 EMERGENCY DEPT VISIT MOD MDM: CPT

## 2022-12-07 PROCEDURE — 71046 X-RAY EXAM CHEST 2 VIEWS: CPT

## 2022-12-07 PROCEDURE — 80048 BASIC METABOLIC PNL TOTAL CA: CPT

## 2022-12-07 PROCEDURE — 85025 COMPLETE CBC W/AUTO DIFF WBC: CPT

## 2022-12-07 PROCEDURE — 81001 URINALYSIS AUTO W/SCOPE: CPT

## 2022-12-07 ASSESSMENT — ENCOUNTER SYMPTOMS
NAUSEA: 0
CONSTIPATION: 0
SORE THROAT: 0
COUGH: 1
SINUS PAIN: 1
EYE DISCHARGE: 0
VOMITING: 0
SHORTNESS OF BREATH: 0
DIARRHEA: 0
COLOR CHANGE: 0
ABDOMINAL PAIN: 0

## 2022-12-07 ASSESSMENT — PAIN - FUNCTIONAL ASSESSMENT: PAIN_FUNCTIONAL_ASSESSMENT: NONE - DENIES PAIN

## 2022-12-07 NOTE — ED NOTES
FIRST PROVIDER CONTACT ASSESSMENT NOTE       Department of Emergency Medicine                 First Provider Note            22  4:01 PM EST    Date of Encounter: No admission date for patient encounter. Patient Name: Yaneli Young  : 1965  MRN: [id number]    Chief Complaint: No chief complaint on file. History of Present Illness:   Yaneli Young is a 56 y.o. male who presents to the ED for fever. Patient states he had COVID 10 days ago. Was doing well, has had 2 days of fever recently. Denies abdominal pain. Had renal transplant 1 year ago. Focused Physical Exam:  VS:    ED Triage Vitals [22 1529]   BP Temp Temp Source Heart Rate Resp SpO2 Height Weight   -- 98.2 °F (36.8 °C) Oral 77 18 97 % -- --        Physical Ex: Constitutional: Alert and non-toxic. Medical History:  has a past medical history of Congestive heart disease ([facility] Diabetes mellitus ([facility] Hyperlipidemia, Hypertension, and Type 2 diabetes mellitus with diabetic nephropathy, with long-term current use of insulin ([facility] Surgical History:  has a past surgical history that includes Tonsillectomy; Dialysis Catheter Insertion (N/A, 2020); Catheter Removal (N/A, 2021); vascular surgery (Right, 2021); and vascular surgery (N/A, 10/29/2021). Social History:  reports that he quit smoking about 3 years ago. His smoking use included cigars. He has a 5.50 pack-year smoking history. He has never used smokeless tobacco. He reports current alcohol use. He reports that he does not use drugs. Family History: family history includes Heart Disease in his mother. Allergies: Patient has no known allergies.      Initial Plan of Care: Initiate Treatment-Testing, Proceed toTreatment Area When Bed Available for ED Attending/MLP to Continue Care      ---END OF FIRST PROVIDER CONTACT ASSESSMENT NOTE---  Electronically signed by SONIA Church   DD: 22       Monserrat Church  [phone]

## 2022-12-07 NOTE — Clinical Note
Ora Dietrich was seen and treated in our emergency department on 12/7/2022. He may return to work on . May return to work when fever free for 24 hours. If you have any questions or concerns, please don't hesitate to call.       Dang Gallegos, DO

## 2022-12-07 NOTE — Clinical Note
Isaac Angel was seen and treated in our emergency department on 12/7/2022. He may return to work on . May return to work when fever free for 24 hours. If you have any questions or concerns, please don't hesitate to call.       Walker Sunshine, DO

## 2022-12-08 NOTE — ED PROVIDER NOTES
David Giraldo is a 56 y.o. male with a hx of DM, CKD, renal insufficiency, kidney transplant       Patient is a 56 y.o. male presents with a chief complaint of fevers  This has been occurring for the past two days. Patient states that it gets better with nothing. Patient states that it gets worse with nothing. Patient states that it is severe in severity. Patient states it was acute in onset. He notes he tested positive for COVID about 2 weeks ago. With his COVID illness he did not seem to have much cough, however over the past week has been having mild cough, sinus pain and congestion. 2 days ago he had an episode of nausea and vomiting but has not had any since. The past two mornings pt does report waking up with a fever of 104degF. He is denying any HA, neck pain or stiffness, abd pain, dysuria, hematuria, back pain, recent unexplained weight loss. Review of Systems   Constitutional:  Positive for fever. Negative for chills. HENT:  Positive for congestion and sinus pain. Negative for sore throat. Eyes:  Negative for discharge and visual disturbance. Respiratory:  Positive for cough. Negative for shortness of breath. Cardiovascular:  Negative for chest pain and leg swelling. Gastrointestinal:  Negative for abdominal pain, constipation, diarrhea, nausea and vomiting. Endocrine: Negative for polyuria. Genitourinary:  Negative for difficulty urinating, dysuria, frequency and hematuria. Musculoskeletal:  Negative for arthralgias and joint swelling. Skin:  Negative for color change and rash. Neurological:  Negative for dizziness, weakness, light-headedness, numbness and headaches. All other systems reviewed and are negative. Physical Exam  Constitutional:       General: He is not in acute distress. Appearance: Normal appearance. HENT:      Head: Normocephalic and atraumatic. Mouth/Throat:      Mouth: Mucous membranes are moist.      Pharynx: Oropharynx is clear.    Eyes: Extraocular Movements: Extraocular movements intact. Conjunctiva/sclera: Conjunctivae normal.      Pupils: Pupils are equal, round, and reactive to light. Cardiovascular:      Rate and Rhythm: Normal rate and regular rhythm. Pulses: Normal pulses. Heart sounds: Normal heart sounds. Pulmonary:      Effort: Pulmonary effort is normal.      Breath sounds: Normal breath sounds. Abdominal:      General: Abdomen is flat. Palpations: Abdomen is soft. Musculoskeletal:         General: No swelling. Normal range of motion. Cervical back: Normal range of motion and neck supple. Skin:     General: Skin is warm and dry. Neurological:      General: No focal deficit present. Mental Status: He is alert and oriented to person, place, and time. Psychiatric:         Mood and Affect: Mood normal.         Behavior: Behavior normal.        Procedures     MDM  Number of Diagnoses or Management Options  Fever, unspecified fever cause  Diagnosis management comments: Kiarra Smart is a 68-year-old male presenting to the ED with complaints of fever. Patient is afebrile at this time. In ED, rapid influenza swabs were negative. CBC did reveal a mild leukopenia with a count of 2.6. Otherwise baseline as per patient. BMP relatively baseline for patient with creatinine of 1.9. Urinalysis unremarkable. Urine and blood cultures pending at this time. Lactic acid of 1.9. Chest x-ray with no acute processes. Plan to discharge patient to home and advised to follow-up with PCP discussed with patient.   He is understanding and agreeable to plan.                    --------------------------------------------- PAST HISTORY ---------------------------------------------  Past Medical History:  has a past medical history of Congestive heart disease ([facility] [address] Diabetes mellitus ([facility] [place] [facility] Hyperlipidemia, Hypertension, and Type 2 diabetes mellitus with diabetic nephropathy, with long-term current use of insulin ([facility] Past Surgical History:  has a past surgical history that includes Tonsillectomy; Dialysis Catheter Insertion (N/A, 11/30/2020); Catheter Removal (N/A, 6/7/2021); vascular surgery (Right, 6/7/2021); and vascular surgery (N/A, 10/29/2021). Social History:  reports that he quit smoking about 3 years ago. His smoking use included cigars. He has a 5.50 pack-year smoking history. He has never used smokeless tobacco. He reports current alcohol use. He reports that he does not use drugs. Family History: family history includes Heart Disease in his mother. The patients home medications have been reviewed. Allergies: Patient has no known allergies.     -------------------------------------------------- RESULTS -------------------------------------------------  Labs:  Results for orders placed or performed during the hospital encounter of 12/07/22   Rapid influenza A/B antigens    Specimen: Nasopharyngeal   Result Value Ref Range    Influenza A by PCR Not Detected Not Detected    Influenza B by PCR Not Detected Not Detected   CBC with Auto Differential   Result Value Ref Range    WBC 2.6 (L) 4.5 - 11.5 E9/L    RBC 3.84 3.80 - 5.80 E12/L    Hemoglobin 12.3 (L) 12.5 - 16.5 g/dL    Hematocrit 37.7 37.0 - 54.0 %    MCV 98.2 80.0 - 99.9 fL    MCH 32.0 26.0 - 35.0 pg    MCHC 32.6 32.0 - 34.5 %    RDW 13.5 11.5 - 15.0 fL    Platelets 292 704 - 152 E9/L    MPV 10.3 7.0 - 12.0 fL    Neutrophils % 83.5 (H) 43.0 - 80.0 %    Lymphocytes % 13.0 (L) 20.0 - 42.0 %    Monocytes % 2.6 2.0 - 12.0 %    Eosinophils % 0.4 0.0 - 6.0 %    Basophils % 0.9 0.0 - 2.0 %    Neutrophils Absolute 2.18 1.80 - 7.30 E9/L    Lymphocytes Absolute 0.34 (L) 1.50 - 4.00 E9/L    Monocytes Absolute 0.08 (L) 0.10 - 0.95 E9/L    Eosinophils Absolute 0.00 (L) 0.05 - 0.50 E9/L    Basophils Absolute 0.02 0.00 - 0.20 E9/L    Polychromasia 1+     Poikilocytes 1+     Stacie Cells 1+     Ovalocytes 1+     Target Cells 1+     Tear Drop Cells 1+    Basic Metabolic Panel   Result Value Ref Range    Sodium 133 132 - 146 mmol/L    Potassium 5.5 (H) 3.5 - 5.0 mmol/L    Chloride 100 98 - 107 mmol/L    CO2 22 22 - 29 mmol/L    Anion Gap 11 7 - 16 mmol/L    Glucose 242 (H) 74 - 99 mg/dL    BUN 29 (H) 6 - 20 mg/dL    Creatinine 1.9 (H) 0.7 - 1.2 mg/dL    Est, Glom Filt Rate 41 >=60 mL/min/1.73    Calcium 9.1 8.6 - 10.2 mg/dL   Urinalysis with Microscopic   Result Value Ref Range    Color, UA Yellow Straw/Yellow    Clarity, UA Clear Clear    Glucose, Ur 100 (A) Negative mg/dL    Bilirubin Urine Negative Negative    Ketones, Urine Negative Negative mg/dL    Specific Gravity, UA 1.010 1.005 - 1.030    Blood, Urine Negative Negative    pH, UA 6.0 5.0 - 9.0    Protein, UA Negative Negative mg/dL    Urobilinogen, Urine 0.2 <2.0 E.U./dL    Nitrite, Urine Negative Negative    Leukocyte Esterase, Urine Negative Negative    WBC, UA NONE 0 - 5 /HPF    RBC, UA NONE 0 - 2 /HPF    Bacteria, UA NONE SEEN None Seen /HPF   Lactic Acid   Result Value Ref Range    Lactic Acid 1.9 0.5 - 2.2 mmol/L       Radiology:  XR CHEST (2 VW)   Final Result   No acute process. ------------------------- NURSING NOTES AND VITALS REVIEWED ---------------------------  Date / Time Roomed:  12/7/2022  9:12 PM  ED Bed Assignment:  05/05    The nursing notes within the ED encounter and vital signs as below have been reviewed. BP (!) 196/90   Pulse 75   Temp 97.8 °F (36.6 °C) (Oral)   Resp 18   Wt 225 lb (102.1 kg)   SpO2 95%   BMI 34.21 kg/m²   Oxygen Saturation Interpretation: Normal      ------------------------------------------ PROGRESS NOTES ------------------------------------------  3:16 AM EST  I have spoken with the patient and discussed todays results, in addition to providing specific details for the plan of care and counseling regarding the diagnosis and prognosis. Their questions are answered at this time and they are agreeable with the plan.  I discussed at length with them reasons for immediate return here for re evaluation. They will followup with their primary care physician by calling their office tomorrow. --------------------------------- ADDITIONAL PROVIDER NOTES ---------------------------------  At this time the patient is without objective evidence of an acute process requiring hospitalization or inpatient management. They have remained hemodynamically stable throughout their entire ED visit and are stable for discharge with outpatient follow-up. The plan has been discussed in detail and they are aware of the specific conditions for emergent return, as well as the importance of follow-up. Discharge Medication List as of 12/7/2022 11:38 PM          Diagnosis:  1. Fever, unspecified fever cause        Disposition:  Patient's disposition: Discharge to home  Patient's condition is stable. Patient was given return precautions. Labs were interpreted by me. Patient will follow up with their primary care provider. Patient is agreeable to this plan. Patient has remained stable throughout their stay in the ED. Patient was seen and evaluated by myself and my attending Ameena Pisano DO. Assessment and Plan discussed with attending provider, please see attestation for final plan of care. This note was done using dictation software and there may be some grammatical errors associated with this.     Nora Gibbs 59, DO  Resident  12/08/22 [id number]

## 2022-12-09 LAB
BLOOD CULTURE, ROUTINE: NORMAL
CULTURE, BLOOD 2: NORMAL

## 2022-12-10 LAB — URINE CULTURE, ROUTINE: NORMAL

## 2022-12-13 LAB
BLOOD CULTURE, ROUTINE: NORMAL
CULTURE, BLOOD 2: NORMAL

## 2023-04-18 LAB — DIABETIC RETINOPATHY: NORMAL

## 2023-05-10 LAB — DIABETIC RETINOPATHY: POSITIVE

## 2023-06-24 LAB — DIABETIC RETINOPATHY: POSITIVE

## 2023-10-24 ENCOUNTER — OFFICE VISIT (OUTPATIENT)
Dept: FAMILY MEDICINE CLINIC | Age: 58
End: 2023-10-24
Payer: COMMERCIAL

## 2023-10-24 VITALS
TEMPERATURE: 97.6 F | HEART RATE: 74 BPM | OXYGEN SATURATION: 98 % | HEIGHT: 68 IN | SYSTOLIC BLOOD PRESSURE: 147 MMHG | RESPIRATION RATE: 17 BRPM | WEIGHT: 225 LBS | DIASTOLIC BLOOD PRESSURE: 71 MMHG | BODY MASS INDEX: 34.1 KG/M2
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
## 2023-10-24 DIAGNOSIS — M54.40 BILATERAL LOW BACK PAIN WITH SCIATICA, SCIATICA LATERALITY UNSPECIFIED, UNSPECIFIED CHRONICITY: ICD-10-CM

## 2023-10-24 LAB
BILIRUBIN, POC: NORMAL
BLOOD URINE, POC: NORMAL
CLARITY, POC: CLEAR
COLOR, POC: YELLOW
GLUCOSE URINE, POC: NORMAL
KETONES, POC: NORMAL
LEUKOCYTE EST, POC: NORMAL
NITRITE, POC: NORMAL
PH, POC: 6
PROTEIN, POC: NORMAL
SPECIFIC GRAVITY, POC: 1.01
UROBILINOGEN, POC: 0.2

## 2023-10-24 PROCEDURE — 3077F SYST BP >= 140 MM HG: CPT

## 2023-10-24 PROCEDURE — 3078F DIAST BP <80 MM HG: CPT

## 2023-10-24 PROCEDURE — 99213 OFFICE O/P EST LOW 20 MIN: CPT

## 2023-10-24 PROCEDURE — 81002 URINALYSIS NONAUTO W/O SCOPE: CPT

## 2023-10-24 RX ORDER — CYCLOBENZAPRINE HCL 5 MG
5 TABLET ORAL 3 TIMES DAILY PRN
Qty: 15 TABLET | Refills: 0 | Status: SHIPPED | OUTPATIENT
Start: 2023-10-24

## 2023-10-24 SDOH — ECONOMIC STABILITY: INCOME INSECURITY: HOW HARD IS IT FOR YOU TO PAY FOR THE VERY BASICS LIKE FOOD, HOUSING, MEDICAL CARE, AND HEATING?: NOT HARD AT ALL

## 2023-10-24 SDOH — ECONOMIC STABILITY: FOOD INSECURITY: WITHIN THE PAST 12 MONTHS, THE FOOD YOU BOUGHT JUST DIDN'T LAST AND YOU DIDN'T HAVE MONEY TO GET MORE.: NEVER TRUE

## 2023-10-24 SDOH — ECONOMIC STABILITY: FOOD INSECURITY: WITHIN THE PAST 12 MONTHS, YOU WORRIED THAT YOUR FOOD WOULD RUN OUT BEFORE YOU GOT MONEY TO BUY MORE.: NEVER TRUE

## 2023-10-24 SDOH — ECONOMIC STABILITY: HOUSING INSECURITY
IN THE LAST 12 MONTHS, WAS THERE A TIME WHEN YOU DID NOT HAVE A STEADY PLACE TO SLEEP OR SLEPT IN A SHELTER (INCLUDING NOW)?: NO

## 2023-10-24 ASSESSMENT — PATIENT HEALTH QUESTIONNAIRE - PHQ9
SUM OF ALL RESPONSES TO PHQ QUESTIONS 1-9: 0
SUM OF ALL RESPONSES TO PHQ QUESTIONS 1-9: 0
2. FEELING DOWN, DEPRESSED OR HOPELESS: 0
SUM OF ALL RESPONSES TO PHQ QUESTIONS 1-9: 0
DEPRESSION UNABLE TO ASSESS: FUNCTIONAL CAPACITY MOTIVATION LIMITS ACCURACY
1. LITTLE INTEREST OR PLEASURE IN DOING THINGS: 0
SUM OF ALL RESPONSES TO PHQ QUESTIONS 1-9: 0
SUM OF ALL RESPONSES TO PHQ9 QUESTIONS 1 & 2: 0

## 2023-10-25 ENCOUNTER — TELEPHONE (OUTPATIENT)
Dept: ORTHOPEDIC SURGERY | Age: 58
End: 2023-10-25

## 2023-10-25 NOTE — TELEPHONE ENCOUNTER
Called patient and LVM to return call to [facility] Navigator at [phone] to schedule for follow up with [facility] provider for back injury.

## 2023-10-25 NOTE — TELEPHONE ENCOUNTER
Patient returned call to schedule with provider. Recommendations to see Matias Cardona PA-C with Dr. Lisa Potter for further imaging and evaluation.

## 2023-10-30 ENCOUNTER — OFFICE VISIT (OUTPATIENT)
Dept: NEUROSURGERY | Age: 58
End: 2023-10-30
Payer: COMMERCIAL

## 2023-10-30 VITALS — RESPIRATION RATE: 16 BRPM | WEIGHT: 225 LBS | BODY MASS INDEX: 35.31 KG/M2 | HEIGHT: 67 IN

## 2023-10-30 DIAGNOSIS — M54.40 LOW BACK PAIN WITH SCIATICA, SCIATICA LATERALITY UNSPECIFIED, UNSPECIFIED BACK PAIN LATERALITY, UNSPECIFIED CHRONICITY: Primary | ICD-10-CM

## 2023-10-30 PROCEDURE — 3017F COLORECTAL CA SCREEN DOC REV: CPT | Performed by: PHYSICIAN ASSISTANT

## 2023-10-30 PROCEDURE — 1036F TOBACCO NON-USER: CPT | Performed by: PHYSICIAN ASSISTANT

## 2023-10-30 PROCEDURE — G8417 CALC BMI ABV UP PARAM F/U: HCPCS | Performed by: PHYSICIAN ASSISTANT

## 2023-10-30 PROCEDURE — 99202 OFFICE O/P NEW SF 15 MIN: CPT

## 2023-10-30 PROCEDURE — G8484 FLU IMMUNIZE NO ADMIN: HCPCS | Performed by: PHYSICIAN ASSISTANT

## 2023-10-30 PROCEDURE — G8427 DOCREV CUR MEDS BY ELIG CLIN: HCPCS | Performed by: PHYSICIAN ASSISTANT

## 2023-10-30 PROCEDURE — 99203 OFFICE O/P NEW LOW 30 MIN: CPT | Performed by: PHYSICIAN ASSISTANT

## 2023-10-30 ASSESSMENT — ENCOUNTER SYMPTOMS
RESPIRATORY NEGATIVE: 1
GASTROINTESTINAL NEGATIVE: 1
ALLERGIC/IMMUNOLOGIC NEGATIVE: 1
EYES NEGATIVE: 1
BACK PAIN: 1

## 2023-10-30 NOTE — PROGRESS NOTES
Subjective:      Patient ID: Sergio Messer is a 66 y.o. male. Back Pain  This is a new problem. Episode onset: 1 week. The problem occurs daily. The problem is unchanged. The pain is present in the lumbar spine. The quality of the pain is described as aching. The pain is at a severity of 7/10. The symptoms are aggravated by twisting, standing and bending. Pertinent negatives include no bladder incontinence. He has tried NSAIDs, analgesics and muscle relaxant for the symptoms. The treatment provided mild relief. Review of Systems   Constitutional: Negative. HENT: Negative. Eyes: Negative. Respiratory: Negative. Cardiovascular: Negative. Gastrointestinal: Negative. Endocrine: Negative. Genitourinary: Negative. Negative for bladder incontinence. Musculoskeletal:  Positive for back pain. Skin: Negative. Allergic/Immunologic: Negative. Neurological: Negative. Hematological: Negative. Psychiatric/Behavioral: Negative. Objective:   Physical Exam  Constitutional:       Appearance: Normal appearance. HENT:      Head: Normocephalic and atraumatic. Nose: Nose normal.   Eyes:      Pupils: Pupils are equal, round, and reactive to light. Pulmonary:      Effort: Pulmonary effort is normal.   Abdominal:      General: There is no distension. Skin:     General: Skin is warm and dry. Neurological:      Mental Status: He is alert. GCS: GCS eye subscore is 4. GCS verbal subscore is 5. GCS motor subscore is 6. Cranial Nerves: Cranial nerves 2-12 are intact. Sensory: Sensation is intact. Motor: Motor function is intact. Gait: Gait is intact. Deep Tendon Reflexes:      Reflex Scores:       Patellar reflexes are 1+ on the right side and 1+ on the left side. Achilles reflexes are 1+ on the right side and 1+ on the left side.   Psychiatric:         Mood and Affect: Mood normal.         Assessment:      66year old male with right L3

## 2023-11-22 ENCOUNTER — OFFICE VISIT (OUTPATIENT)
Dept: FAMILY MEDICINE CLINIC | Age: 58
End: 2023-11-22
Payer: COMMERCIAL

## 2023-11-22 VITALS
BODY MASS INDEX: 36.26 KG/M2 | HEIGHT: 67 IN | RESPIRATION RATE: 18 BRPM | DIASTOLIC BLOOD PRESSURE: 78 MMHG | SYSTOLIC BLOOD PRESSURE: 126 MMHG | HEART RATE: 82 BPM | OXYGEN SATURATION: 96 % | WEIGHT: 231 LBS

## 2023-11-22 DIAGNOSIS — R73.01 IFG (IMPAIRED FASTING GLUCOSE): ICD-10-CM

## 2023-11-22 DIAGNOSIS — Z01.818 PRE-OPERATIVE CLEARANCE: Primary | ICD-10-CM

## 2023-11-22 DIAGNOSIS — I10 BENIGN ESSENTIAL HTN: ICD-10-CM

## 2023-11-22 DIAGNOSIS — E78.2 MIXED HYPERLIPIDEMIA: ICD-10-CM

## 2023-11-22 DIAGNOSIS — Z79.4 TYPE 2 DIABETES MELLITUS WITHOUT COMPLICATION, WITH LONG-TERM CURRENT USE OF INSULIN (HCC): ICD-10-CM

## 2023-11-22 DIAGNOSIS — Z12.5 SCREENING FOR MALIGNANT NEOPLASM OF PROSTATE: ICD-10-CM

## 2023-11-22 DIAGNOSIS — R53.82 CHRONIC FATIGUE: ICD-10-CM

## 2023-11-22 DIAGNOSIS — E55.9 VITAMIN D DEFICIENCY: ICD-10-CM

## 2023-11-22 DIAGNOSIS — Z23 NEED FOR INFLUENZA VACCINATION: ICD-10-CM

## 2023-11-22 DIAGNOSIS — E11.9 TYPE 2 DIABETES MELLITUS WITHOUT COMPLICATION, WITH LONG-TERM CURRENT USE OF INSULIN (HCC): ICD-10-CM

## 2023-11-22 PROCEDURE — 90674 CCIIV4 VAC NO PRSV 0.5 ML IM: CPT | Performed by: FAMILY MEDICINE

## 2023-11-22 PROCEDURE — 3078F DIAST BP <80 MM HG: CPT | Performed by: FAMILY MEDICINE

## 2023-11-22 PROCEDURE — 3074F SYST BP LT 130 MM HG: CPT | Performed by: FAMILY MEDICINE

## 2023-11-22 PROCEDURE — 90471 IMMUNIZATION ADMIN: CPT | Performed by: FAMILY MEDICINE

## 2023-11-22 PROCEDURE — 99214 OFFICE O/P EST MOD 30 MIN: CPT | Performed by: FAMILY MEDICINE

## 2023-11-22 RX ORDER — LOVASTATIN 40 MG/1
TABLET ORAL
Qty: 90 TABLET | Refills: 5
Start: 2023-11-22

## 2023-11-22 RX ORDER — CARVEDILOL 6.25 MG/1
6.25 TABLET ORAL 2 TIMES DAILY WITH MEALS
Qty: 60 TABLET | Refills: 5
Start: 2023-11-22

## 2023-11-22 ASSESSMENT — ENCOUNTER SYMPTOMS
CONSTIPATION: 0
EYE PAIN: 0
PHOTOPHOBIA: 0
COUGH: 0
SORE THROAT: 0
RECTAL PAIN: 0
NAUSEA: 0
BACK PAIN: 0
ANAL BLEEDING: 0
TROUBLE SWALLOWING: 0
VOMITING: 0
ABDOMINAL DISTENTION: 0
EYE REDNESS: 0
WHEEZING: 0
APNEA: 0
SHORTNESS OF BREATH: 0
CHEST TIGHTNESS: 0
SINUS PRESSURE: 0
RHINORRHEA: 0
BLOOD IN STOOL: 0
EYE DISCHARGE: 0
COLOR CHANGE: 0
DIARRHEA: 0
VOICE CHANGE: 0
ABDOMINAL PAIN: 0
STRIDOR: 0
EYE ITCHING: 0
FACIAL SWELLING: 0
CHOKING: 0
SINUS PAIN: 0

## 2023-11-22 NOTE — PROGRESS NOTES
carvedilol (COREG) 6.25 MG tablet; Take 1 tablet by mouth 2 times daily (with meals)      PATIENT CLEARED FOR SURGERY  Reviewed healthmaintenance report. Patient is aware of deficiencies and suggested preventative tests.

## 2024-01-06 LAB — DIABETIC RETINOPATHY: POSITIVE

## 2024-04-04 LAB — DIABETIC RETINOPATHY: POSITIVE

## 2024-05-02 LAB — DIABETIC RETINOPATHY: POSITIVE

## 2024-08-26 ENCOUNTER — HOSPITAL ENCOUNTER (OUTPATIENT)
Dept: WOUND CARE | Age: 59
Discharge: HOME | End: 2024-08-26

## 2024-08-26 VITALS
HEIGHT: 68 IN | HEART RATE: 73 BPM | WEIGHT: 231 LBS | SYSTOLIC BLOOD PRESSURE: 138 MMHG | RESPIRATION RATE: 18 BRPM | DIASTOLIC BLOOD PRESSURE: 82 MMHG | BODY MASS INDEX: 35.01 KG/M2 | TEMPERATURE: 97.4 F

## 2024-08-26 DIAGNOSIS — R09.89 DECREASED DORSALIS PEDIS PULSE: ICD-10-CM

## 2024-08-26 DIAGNOSIS — L97.519 ULCER OF RIGHT GREAT TOE DUE TO DIABETES MELLITUS (HCC): Primary | ICD-10-CM

## 2024-08-26 DIAGNOSIS — L97.522 TOE ULCER, LEFT, WITH FAT LAYER EXPOSED (HCC): ICD-10-CM

## 2024-08-26 DIAGNOSIS — N18.32 CHRONIC RENAL FAILURE, STAGE 3B (HCC): ICD-10-CM

## 2024-08-26 DIAGNOSIS — B35.3 TINEA PEDIS OF BOTH FEET: ICD-10-CM

## 2024-08-26 DIAGNOSIS — E11.621 ULCER OF RIGHT GREAT TOE DUE TO DIABETES MELLITUS (HCC): Primary | ICD-10-CM

## 2024-08-26 DIAGNOSIS — I73.9 PVD (PERIPHERAL VASCULAR DISEASE) (HCC): ICD-10-CM

## 2024-08-26 DIAGNOSIS — F17.200 TOBACCO DEPENDENCE: ICD-10-CM

## 2024-08-26 DIAGNOSIS — Z94.0 KIDNEY TRANSPLANT STATUS: ICD-10-CM

## 2024-08-26 PROBLEM — N18.4 CHRONIC KIDNEY DISEASE, STAGE IV (SEVERE) (HCC): Status: RESOLVED | Noted: 2020-06-05 | Resolved: 2024-08-26

## 2024-08-26 PROBLEM — N18.9 CHRONIC RENAL FAILURE: Status: ACTIVE | Noted: 2024-08-26

## 2024-08-26 PROBLEM — D63.1 ANEMIA IN CKD (CHRONIC KIDNEY DISEASE): Status: RESOLVED | Noted: 2020-06-05 | Resolved: 2024-08-26

## 2024-08-26 PROBLEM — K65.9 PERITONITIS (HCC): Status: RESOLVED | Noted: 2021-06-04 | Resolved: 2024-08-26

## 2024-08-26 PROBLEM — N28.9 RENAL INSUFFICIENCY: Status: RESOLVED | Noted: 2020-01-22 | Resolved: 2024-08-26

## 2024-08-26 PROBLEM — N18.9 ANEMIA IN CKD (CHRONIC KIDNEY DISEASE): Status: RESOLVED | Noted: 2020-06-05 | Resolved: 2024-08-26

## 2024-08-26 PROBLEM — Z99.2 ENCOUNTER REGARDING VASCULAR ACCESS FOR DIALYSIS FOR END-STAGE RENAL DISEASE (HCC): Status: RESOLVED | Noted: 2021-10-28 | Resolved: 2024-08-26

## 2024-08-26 PROBLEM — R78.81 BACTEREMIA: Status: RESOLVED | Noted: 2021-10-21 | Resolved: 2024-08-26

## 2024-08-26 PROBLEM — N18.6 ENCOUNTER REGARDING VASCULAR ACCESS FOR DIALYSIS FOR END-STAGE RENAL DISEASE (HCC): Status: RESOLVED | Noted: 2021-10-28 | Resolved: 2024-08-26

## 2024-08-26 PROCEDURE — 11042 DBRDMT SUBQ TIS 1ST 20SQCM/<: CPT

## 2024-08-26 PROCEDURE — 11042 DBRDMT SUBQ TIS 1ST 20SQCM/<: CPT | Performed by: SURGERY

## 2024-08-26 PROCEDURE — 99204 OFFICE O/P NEW MOD 45 MIN: CPT | Performed by: SURGERY

## 2024-08-26 PROCEDURE — 99213 OFFICE O/P EST LOW 20 MIN: CPT

## 2024-08-26 RX ORDER — LIDOCAINE 50 MG/G
OINTMENT TOPICAL ONCE
OUTPATIENT
Start: 2024-08-26 | End: 2024-08-26

## 2024-08-26 RX ORDER — SILVER SULFADIAZINE 10 MG/G
CREAM TOPICAL ONCE
OUTPATIENT
Start: 2024-08-26 | End: 2024-08-26

## 2024-08-26 RX ORDER — BACITRACIN ZINC 500 [USP'U]/G
OINTMENT TOPICAL ONCE
OUTPATIENT
Start: 2024-08-26 | End: 2024-08-26

## 2024-08-26 RX ORDER — CILOSTAZOL 100 MG/1
100 TABLET ORAL 2 TIMES DAILY
Qty: 60 TABLET | Refills: 11 | Status: SHIPPED | OUTPATIENT
Start: 2024-08-26 | End: 2025-08-21

## 2024-08-26 RX ORDER — LIDOCAINE HYDROCHLORIDE 20 MG/ML
JELLY TOPICAL ONCE
OUTPATIENT
Start: 2024-08-26 | End: 2024-08-26

## 2024-08-26 RX ORDER — LIDOCAINE HYDROCHLORIDE 40 MG/ML
SOLUTION TOPICAL ONCE
OUTPATIENT
Start: 2024-08-26 | End: 2024-08-26

## 2024-08-26 RX ORDER — SODIUM CHLOR/HYPOCHLOROUS ACID 0.033 %
SOLUTION, IRRIGATION IRRIGATION ONCE
OUTPATIENT
Start: 2024-08-26 | End: 2024-08-26

## 2024-08-26 RX ORDER — LIDOCAINE 40 MG/G
CREAM TOPICAL ONCE
OUTPATIENT
Start: 2024-08-26 | End: 2024-08-26

## 2024-08-26 RX ORDER — BACITRACIN ZINC AND POLYMYXIN B SULFATE 500; 1000 [USP'U]/G; [USP'U]/G
OINTMENT TOPICAL ONCE
OUTPATIENT
Start: 2024-08-26 | End: 2024-08-26

## 2024-08-26 RX ORDER — TERBINAFINE HYDROCHLORIDE 250 MG/1
250 TABLET ORAL DAILY
Qty: 30 TABLET | Refills: 0 | Status: SHIPPED | OUTPATIENT
Start: 2024-08-26 | End: 2024-09-25

## 2024-08-26 RX ORDER — CLOBETASOL PROPIONATE 0.5 MG/G
OINTMENT TOPICAL ONCE
OUTPATIENT
Start: 2024-08-26 | End: 2024-08-26

## 2024-08-26 RX ORDER — GENTAMICIN SULFATE 1 MG/G
OINTMENT TOPICAL ONCE
OUTPATIENT
Start: 2024-08-26 | End: 2024-08-26

## 2024-08-26 RX ORDER — ASPIRIN 81 MG/1
81 TABLET ORAL DAILY
Qty: 30 TABLET | Refills: 3
Start: 2024-08-26

## 2024-08-26 RX ORDER — TRIAMCINOLONE ACETONIDE 1 MG/G
OINTMENT TOPICAL ONCE
OUTPATIENT
Start: 2024-08-26 | End: 2024-08-26

## 2024-08-26 RX ORDER — NEOMYCIN/BACITRACIN/POLYMYXINB 3.5-400-5K
OINTMENT (GRAM) TOPICAL ONCE
OUTPATIENT
Start: 2024-08-26 | End: 2024-08-26

## 2024-08-26 RX ORDER — MUPIROCIN 20 MG/G
OINTMENT TOPICAL ONCE
OUTPATIENT
Start: 2024-08-26 | End: 2024-08-26

## 2024-08-26 RX ORDER — BETAMETHASONE DIPROPIONATE 0.5 MG/G
CREAM TOPICAL ONCE
OUTPATIENT
Start: 2024-08-26 | End: 2024-08-26

## 2024-08-26 ASSESSMENT — PAIN DESCRIPTION - LOCATION: LOCATION: TOE (COMMENT WHICH ONE)

## 2024-08-26 ASSESSMENT — PAIN DESCRIPTION - DESCRIPTORS: DESCRIPTORS: BURNING

## 2024-08-26 ASSESSMENT — PAIN SCALES - GENERAL: PAINLEVEL_OUTOF10: 7

## 2024-08-26 ASSESSMENT — PAIN - FUNCTIONAL ASSESSMENT: PAIN_FUNCTIONAL_ASSESSMENT: ACTIVITIES ARE NOT PREVENTED

## 2024-08-26 NOTE — PROGRESS NOTES
Wound Healing Center /Hyperbarics   History and Physical/Consultation  Vascular    Referring Physician : Ej Spaulding DO  Chaz Rodriguez  MEDICAL RECORD NUMBER:  [id number]  AGE: 59 y.o.   GENDER: male  : 1965  EPISODE DATE:  2024  Subjective:     Chief Complaint   Patient presents with    Wound Check     Right great toe         HISTORY of PRESENT ILLNESS HPI     Chaz Rodriguez is a 59 y.o. male who presents today for wound/ulcer evaluation.   History of Wound Context:  The patient has had a wound of right great toe which was first noted approximately since May 2024.  This has been treated by patient's podiatrist Dr. Pena.  On their initial visit to the wound healing center, 24, the patient has noted that the wound has not been improving.  The patient has not had similar previous wounds in the past.      Patient is a smoker regularly in the past but recently cut down, smokes only occasional cigar    Patient did undergo kidney transplant, follows up with his doctors at the [facility]    Pt is currently not on abx.      Wound/Ulcer Pain Timing/Severity: constant  Quality of pain: dull, aching, burning, throbbing  Severity:  4 / 10   Modifying Factors: Pain worsens with walking  Associated Signs/Symptoms: drainage and pain    Ulcer Identification:  Ulcer Type: arterial, diabetic, and non-healing surgical, occasional tobacco use  Contributing Factors: diabetes    Diabetic/Pressure/Non Pressure Ulcers only:  Ulcer: Diabetic ulcer, fat layer exposed    If patient has diabetic lower extremity wounds  Choe Classification of diabetic lower extremity wounds:    Grade Description   []  0 No open wound   []  1 Superficial ulcer involving the full skin thickness   []  2 Deep ulcer involves ligament, tendon, joint capsule, or fascia  No bone involvement or abscess presence   []  3 Deep Ulcer with abcess formation and/or osteomyelitis   []  4 Localized gangrene   []  5 Extensive gangrene of the foot  MULTIVITAMIN DAILY IF NOT CONTRAINDICATED      [facility] followup visit ______continue to see Dr mathew for wound care _- may return if no improvement ______________________  (Please note your next appointment above and if you are unable to keep, kindly give a 24 hour notice. Thank you.)    Physician signature:__________________________      If you experience any of the following, please call the Wound Care Center during business hours:    * Increase in Pain  * Temperature over 101  * Increase in drainage from your wound  * Drainage with a foul odor  * Bleeding  * Increase in swelling  * Need for compression bandage changes due to slippage, breakthrough drainage.    If you need medical attention outside of the business hours of the Wound Care Centers please contact your PCP or go to the nearest emergency room.        Electronically signed by Hilario Vides MD on 8/26/2024 at 12:20 PM

## 2024-08-26 NOTE — DISCHARGE INSTRUCTIONS
Visit Discharge/Physician Orders    Discharge condition: Stable  Assessment of pain at discharge: yes   Anesthetic used: lidocaine 4%  Discharge to: Home  Left via:Private automobile  Accompanied by: accompanied by self   ECF/HHA:     Dressing Orders: Cleanse wound to right foot great toe with normal saline, apply santyl to wound bed and cover with gauze- adhere with tape, change daily. (Continue Dr Rosen orders)   No pressure to wound at all times- make sure shoes are not rubbing on toe     Treatment Orders: 8/26 Take Aspirin 81mg 1 tab by mouth daily  8/26 Pletal 100 mg 1 tab by mouth every day - take for 3 months- if no improvement in wound healing may discontinue   8/26 Miconazole Ointment prescription sent to pharmacy- apply to feet, toes, and legs- avoiding wounds twice a day for one month   8/26 Lamisil tablets prescription sent to pharmacy- take by mouth as prescribed.   See primary care/family doctor regarding pain in toe   EAT DIET WITH PROTEINS AND VITAMIN C  TAKE A MULTIVITAMIN DAILY IF NOT CONTRAINDICATED      [facility] followup visit ______continue to see Dr mathew for wound care _- may return if no improvement ______________________  (Please note your next appointment above and if you are unable to keep, kindly give a 24 hour notice. Thank you.)    Physician signature:__________________________      If you experience any of the following, please call the Wound Care Center during business hours:    * Increase in Pain  * Temperature over 101  * Increase in drainage from your wound  * Drainage with a foul odor  * Bleeding  * Increase in swelling  * Need for compression bandage changes due to slippage, breakthrough drainage.    If you need medical attention outside of the business hours of the Wound Care Centers please contact your PCP or go to the nearest emergency room.

## 2025-03-04 ENCOUNTER — TELEPHONE (OUTPATIENT)
Dept: HYPERBARIC MEDICINE | Age: 60
End: 2025-03-04

## 2025-03-05 ENCOUNTER — TELEPHONE (OUTPATIENT)
Dept: HYPERBARIC MEDICINE | Age: 60
End: 2025-03-05

## 2025-03-11 ENCOUNTER — TELEPHONE (OUTPATIENT)
Dept: VASCULAR SURGERY | Age: 60
End: 2025-03-11

## 2025-03-11 ENCOUNTER — HOSPITAL ENCOUNTER (OUTPATIENT)
Dept: HYPERBARIC MEDICINE | Age: 60
Discharge: HOME | End: 2025-03-11
Payer: COMMERCIAL

## 2025-03-11 VITALS
HEART RATE: 88 BPM | SYSTOLIC BLOOD PRESSURE: 129 MMHG | DIASTOLIC BLOOD PRESSURE: 75 MMHG | TEMPERATURE: 97.1 F | RESPIRATION RATE: 16 BRPM

## 2025-03-11 DIAGNOSIS — E11.621 DIABETIC ULCER OF TOE OF RIGHT FOOT ASSOCIATED WITH TYPE 2 DIABETES MELLITUS, WITH NECROSIS OF BONE (HCC): Primary | ICD-10-CM

## 2025-03-11 DIAGNOSIS — I73.9 PVD (PERIPHERAL VASCULAR DISEASE): Primary | ICD-10-CM

## 2025-03-11 DIAGNOSIS — L97.514 DIABETIC ULCER OF TOE OF RIGHT FOOT ASSOCIATED WITH TYPE 2 DIABETES MELLITUS, WITH NECROSIS OF BONE (HCC): Primary | ICD-10-CM

## 2025-03-11 PROCEDURE — 99214 OFFICE O/P EST MOD 30 MIN: CPT

## 2025-03-11 RX ORDER — INSULIN GLARGINE 100 [IU]/ML
36 INJECTION, SOLUTION SUBCUTANEOUS NIGHTLY
COMMUNITY

## 2025-03-11 RX ORDER — MYCOPHENOLATE MOFETIL 250 MG/1
500 CAPSULE ORAL 2 TIMES DAILY
COMMUNITY

## 2025-03-11 RX ORDER — PREDNISONE 5 MG/1
5 TABLET ORAL DAILY
COMMUNITY

## 2025-03-11 RX ORDER — SULFAMETHOXAZOLE AND TRIMETHOPRIM 800; 160 MG/1; MG/1
1 TABLET ORAL ONCE
COMMUNITY

## 2025-03-11 NOTE — TELEPHONE ENCOUNTER
Spoke with the pt, scheduled abdominal aortogram possible intervention with Dr. Guzman 3/19/25.  Pt was instructed to report to [address] floor registration at 8:30 a.m, be NPO after midnight the night before except heart and/or BP meds the morning of with sips of water and must have transportation.  Also, was notified of le arterial Doppler study at [facility] 3/25 at 8:00 a.m, arrival time 7:30 a.m.

## 2025-03-11 NOTE — PROGRESS NOTES
Hyperbaric Medicine Department  Transcutaneous Oxygen Study (TCOM)    TODAY'S DATE:  3/11/2025      NAME: Chaz Rodriguez  MEDICAL RECORD NUMBER:  [id number]  AGE: 59 y.o.   GENDER: male  : 1965         Procedure:  Following calibration of the [employer]im[employer] [id number],  Transcutaneous Oxygen studies were performed on the on right lower extremity at multiple levels. Room air testing was performed for 15 minutes. An oxygen challenge study was then performed with the patient breathing 100% oxygen at 15 lpm via non-rebreather mask for a period of 10 minutes. The patient tolerated procedure well.    TCOM Site Photo:         Document Information    Wound Care Image: Wound   RLE tcpO2 lead placement   2025 10:24   Attached To:   Hospital Encounter on 3/11/25 with [facility]    Source Information    Valerio Bunch LPN [facility]   Document History            TCOM Test:         Document Information    Wound Care Image: Wound Care   tcpO2 results   2025 09:00   Attached To:   Hospital Encounter on 3/11/25 with [facility]    Source Information    Valerio Bunch LPN [facility]   Document History        Tissue pO2 measurement (in mmHg )   COMPONENT TIME SITE  1 SITE  2 SITE  3   x Baseline values: Patient breathing AIR @ 1 DELFINA @ 15 min 46.6 76.1 80.1   x Oxygen challenge: Patient breathing 100% O2 @ 1 DELFINA @ 10 min 80.1 133 137    Hyperbaric oxygen challenge  Patient breathing 100%O2 @ _______ DELFINA @ 30 min        @ 60 min        @ 90 min              Electronically signed by Valerio Bunch LPN on 3/11/25 at 11:24 AM EDT  
    Hyperbaric Oxygen Therapy   Patient Orientation      [facility]  MEDICAL RECORD NUMBER:  [id number]  AGE: 59 y.o.   GENDER: male  : 1965  EPISODE DATE:  3/11/2025    HBO Orientation Completed with:  Patient         INTRODUCTION   Welcome to the Wound Center. This guide will assist in answering any questions which you might have concerning your scheduled hyperbaric oxygen treatment and if appropriate, any wound care you might need. During your stay with us you will hear your treatment called a “dive”.  This is just a nickname given to the procedure and does not refer to being in water.  You will only be exposed to air pressure changes during your treatments.    HYPERBARIC OXYGEN THERAPY  Hyperbaric oxygen treatment is a means of providing additional oxygen to your body tissues.  By increasing the oxygen in the tissues, healing is enhanced.  Two important effects on difficult wounds are first, to speed new microscopic blood vessel growth into the wound and second, to improve the ability of your white blood cells to kill germs.  It is important to know that hyperbaric oxygen is an additional (adjunctive) therapy in addition to the current medical or surgical care you are receiving.  It is also essential that you understand that this is not a “cure-all” but a part of your total medical care.     THE STAFF  The [facility] staff consists of fully trained physicians, nursing staff, and chamber operators.  There will always be a physician, as well as other staff members with you in the department while you are having your hyperbaric oxygen therapy treatment. Please feel free to ask for help from any of the staff members while you are here.    THE CHAMBER  The hyperbaric chamber located at the [facility] is a monoplace, seamless acrylic pressure chamber designed to treat one patient at a time.  You will be lying down with your head slightly elevated for your treatments.  A communication 
1.25 MG (26671 UT) CAPS capsule TAKE ONE CAPSULE BY MOUTH TWICE A WEEK (Patient taking differently: 1 capsule every 30 days) 27 capsule 5    Insulin Pen Needle (MEIJER PEN NEEDLES) 31G X 8 MM MISC 1 each by Does not apply route 3 times daily 100 each 3    Acetaminophen (TYLENOL) 325 MG CAPS Take 650 mg by mouth as needed (FOR PAIN PER PT.)      Insulin Syringe-Needle U-100 30G X 1/2\" 1 ML MISC 10 units units of Tresiba daily 100 each 11    Cinnamon 500 MG CAPS Take 2 capsules by mouth 2 times daily       No current facility-administered medications for this encounter.     Social History:    reports that he quit smoking about 5 years ago. His smoking use included cigars and cigarettes. He started smoking about 16 years ago. He has a 5.5 pack-year smoking history. He has been exposed to tobacco smoke. He has never used smokeless tobacco. He reports current alcohol use. He reports that he does not use drugs.  Family History:   family history includes Heart Disease in his mother.  PHYSICAL EXAM:  Vitals:  Vitals:    03/11/25 0941   BP: 129/75   Pulse: 88   Resp: 16   Temp: 97.1 °F (36.2 °C)     General:  /75   Pulse 88   Temp 97.1 °F (36.2 °C) (Temporal)   Resp 16   /75   Pulse 88   Temp 97.1 °F (36.2 °C) (Temporal)   Resp 16   CONSTITUTIONAL:   Awake, alert, cooperative  PSYCHIATRIC :  Oriented to time, place and person     Appropriate insight to disease process  EYES: Lids and lashes normal  ENT:  External ears and nose without lesions   Hearing deficits   NECK: Supple, symmetrical, trachea midline   Thyroid goiter not appreciated  LUNGS:  No increased work of breathing                 Clear to auscultation  CARDIOVASCULAR:  regular rate and rhythm   ABDOMEN:  soft, non-distended, non-tender   Hernias not noted   Aorta is not palpable  Lymphatics : Cervical lymphadenopathy not appreciated     Femoral lymphadenopathy not appreciated  SKIN:   Normal skin color   Texture and turgor normal, no

## 2025-03-12 PROBLEM — L97.514 DIABETIC ULCER OF TOE OF RIGHT FOOT ASSOCIATED WITH TYPE 2 DIABETES MELLITUS, WITH NECROSIS OF BONE (HCC): Status: ACTIVE | Noted: 2024-08-26

## 2025-03-19 ENCOUNTER — HOSPITAL ENCOUNTER (OUTPATIENT)
Age: 60
Discharge: HOME | End: 2025-03-19
Attending: STUDENT IN AN ORGANIZED HEALTH CARE EDUCATION/TRAINING PROGRAM | Admitting: STUDENT IN AN ORGANIZED HEALTH CARE EDUCATION/TRAINING PROGRAM
Payer: COMMERCIAL

## 2025-03-19 VITALS
DIASTOLIC BLOOD PRESSURE: 81 MMHG | RESPIRATION RATE: 20 BRPM | SYSTOLIC BLOOD PRESSURE: 178 MMHG | OXYGEN SATURATION: 97 % | HEART RATE: 79 BPM | TEMPERATURE: 97.1 F

## 2025-03-19 DIAGNOSIS — I73.9 PERIPHERAL VASCULAR DISEASE, UNSPECIFIED: ICD-10-CM

## 2025-03-19 LAB
ABO + RH BLD: NORMAL
ANION GAP SERPL CALCULATED.3IONS-SCNC: 16 MMOL/L (ref 7–16)
ARM BAND NUMBER: NORMAL
BLOOD BANK SAMPLE EXPIRATION: NORMAL
BLOOD GROUP ANTIBODIES SERPL: NEGATIVE
BUN SERPL-MCNC: 26 MG/DL (ref 6–20)
CALCIUM SERPL-MCNC: 9 MG/DL (ref 8.6–10.2)
CHLORIDE SERPL-SCNC: 106 MMOL/L (ref 98–107)
CO2 SERPL-SCNC: 20 MMOL/L (ref 22–29)
CREAT SERPL-MCNC: 1.9 MG/DL (ref 0.7–1.2)
ERYTHROCYTE [DISTWIDTH] IN BLOOD BY AUTOMATED COUNT: 14.9 % (ref 11.5–15)
GFR, ESTIMATED: 41 ML/MIN/1.73M2
GLUCOSE BLD-MCNC: 103 MG/DL (ref 74–99)
GLUCOSE SERPL-MCNC: 130 MG/DL (ref 74–99)
HCT VFR BLD AUTO: 41.3 % (ref 37–54)
HGB BLD-MCNC: 13.2 G/DL (ref 12.5–16.5)
MCH RBC QN AUTO: 27.3 PG (ref 26–35)
MCHC RBC AUTO-ENTMCNC: 32 G/DL (ref 32–34.5)
MCV RBC AUTO: 85.3 FL (ref 80–99.9)
PLATELET # BLD AUTO: 248 K/UL (ref 130–450)
PMV BLD AUTO: 10.5 FL (ref 7–12)
POTASSIUM SERPL-SCNC: 4.1 MMOL/L (ref 3.5–5)
RBC # BLD AUTO: 4.84 M/UL (ref 3.8–5.8)
SODIUM SERPL-SCNC: 142 MMOL/L (ref 132–146)
WBC OTHER # BLD: 5.3 K/UL (ref 4.5–11.5)

## 2025-03-19 PROCEDURE — 86901 BLOOD TYPING SEROLOGIC RH(D): CPT

## 2025-03-19 PROCEDURE — 6360000002 HC RX W HCPCS

## 2025-03-19 PROCEDURE — 6370000000 HC RX 637 (ALT 250 FOR IP): Performed by: STUDENT IN AN ORGANIZED HEALTH CARE EDUCATION/TRAINING PROGRAM

## 2025-03-19 PROCEDURE — 75710 ARTERY X-RAYS ARM/LEG: CPT | Performed by: STUDENT IN AN ORGANIZED HEALTH CARE EDUCATION/TRAINING PROGRAM

## 2025-03-19 PROCEDURE — C1760 CLOSURE DEV, VASC: HCPCS | Performed by: STUDENT IN AN ORGANIZED HEALTH CARE EDUCATION/TRAINING PROGRAM

## 2025-03-19 PROCEDURE — 80048 BASIC METABOLIC PNL TOTAL CA: CPT

## 2025-03-19 PROCEDURE — 2500000003 HC RX 250 WO HCPCS

## 2025-03-19 PROCEDURE — 6360000002 HC RX W HCPCS: Performed by: STUDENT IN AN ORGANIZED HEALTH CARE EDUCATION/TRAINING PROGRAM

## 2025-03-19 PROCEDURE — 75774 ARTERY X-RAY EACH VESSEL: CPT | Performed by: STUDENT IN AN ORGANIZED HEALTH CARE EDUCATION/TRAINING PROGRAM

## 2025-03-19 PROCEDURE — 86900 BLOOD TYPING SEROLOGIC ABO: CPT

## 2025-03-19 PROCEDURE — C1725 CATH, TRANSLUMIN NON-LASER: HCPCS | Performed by: STUDENT IN AN ORGANIZED HEALTH CARE EDUCATION/TRAINING PROGRAM

## 2025-03-19 PROCEDURE — 2709999900 HC NON-CHARGEABLE SUPPLY: Performed by: STUDENT IN AN ORGANIZED HEALTH CARE EDUCATION/TRAINING PROGRAM

## 2025-03-19 PROCEDURE — C1887 CATHETER, GUIDING: HCPCS | Performed by: STUDENT IN AN ORGANIZED HEALTH CARE EDUCATION/TRAINING PROGRAM

## 2025-03-19 PROCEDURE — 86850 RBC ANTIBODY SCREEN: CPT

## 2025-03-19 PROCEDURE — C1769 GUIDE WIRE: HCPCS | Performed by: STUDENT IN AN ORGANIZED HEALTH CARE EDUCATION/TRAINING PROGRAM

## 2025-03-19 PROCEDURE — 76937 US GUIDE VASCULAR ACCESS: CPT | Performed by: STUDENT IN AN ORGANIZED HEALTH CARE EDUCATION/TRAINING PROGRAM

## 2025-03-19 PROCEDURE — C1894 INTRO/SHEATH, NON-LASER: HCPCS | Performed by: STUDENT IN AN ORGANIZED HEALTH CARE EDUCATION/TRAINING PROGRAM

## 2025-03-19 PROCEDURE — 37228 HC TIB PER TERRITORY PLASTY: CPT | Performed by: STUDENT IN AN ORGANIZED HEALTH CARE EDUCATION/TRAINING PROGRAM

## 2025-03-19 PROCEDURE — 6360000004 HC RX CONTRAST MEDICATION: Performed by: STUDENT IN AN ORGANIZED HEALTH CARE EDUCATION/TRAINING PROGRAM

## 2025-03-19 PROCEDURE — 82962 GLUCOSE BLOOD TEST: CPT

## 2025-03-19 PROCEDURE — 2580000003 HC RX 258: Performed by: STUDENT IN AN ORGANIZED HEALTH CARE EDUCATION/TRAINING PROGRAM

## 2025-03-19 PROCEDURE — 85027 COMPLETE CBC AUTOMATED: CPT

## 2025-03-19 PROCEDURE — 7100000011 HC PHASE II RECOVERY - ADDTL 15 MIN: Performed by: STUDENT IN AN ORGANIZED HEALTH CARE EDUCATION/TRAINING PROGRAM

## 2025-03-19 PROCEDURE — 7100000010 HC PHASE II RECOVERY - FIRST 15 MIN: Performed by: STUDENT IN AN ORGANIZED HEALTH CARE EDUCATION/TRAINING PROGRAM

## 2025-03-19 DEVICE — ANGIO-SEAL VIP VASCULAR CLOSURE DEVICE
Type: IMPLANTABLE DEVICE | Status: FUNCTIONAL
Brand: ANGIO-SEAL

## 2025-03-19 RX ORDER — ONDANSETRON 2 MG/ML
4 INJECTION INTRAMUSCULAR; INTRAVENOUS EVERY 8 HOURS PRN
Status: DISCONTINUED | OUTPATIENT
Start: 2025-03-19 | End: 2025-03-19 | Stop reason: HOSPADM

## 2025-03-19 RX ORDER — NITROGLYCERIN 20 MG/100ML
INJECTION INTRAVENOUS PRN
Status: DISCONTINUED | OUTPATIENT
Start: 2025-03-19 | End: 2025-03-19 | Stop reason: HOSPADM

## 2025-03-19 RX ORDER — SODIUM CHLORIDE 0.9 % (FLUSH) 0.9 %
5-40 SYRINGE (ML) INJECTION EVERY 12 HOURS SCHEDULED
Status: DISCONTINUED | OUTPATIENT
Start: 2025-03-19 | End: 2025-03-19 | Stop reason: HOSPADM

## 2025-03-19 RX ORDER — SODIUM CHLORIDE 9 MG/ML
INJECTION, SOLUTION INTRAVENOUS PRN
Status: DISCONTINUED | OUTPATIENT
Start: 2025-03-19 | End: 2025-03-19 | Stop reason: HOSPADM

## 2025-03-19 RX ORDER — FENTANYL CITRATE 50 UG/ML
INJECTION, SOLUTION INTRAMUSCULAR; INTRAVENOUS PRN
Status: DISCONTINUED | OUTPATIENT
Start: 2025-03-19 | End: 2025-03-19 | Stop reason: HOSPADM

## 2025-03-19 RX ORDER — CLOPIDOGREL BISULFATE 75 MG/1
75 TABLET ORAL DAILY
Qty: 30 TABLET | Refills: 11 | Status: SHIPPED | OUTPATIENT
Start: 2025-03-19

## 2025-03-19 RX ORDER — MIDAZOLAM HYDROCHLORIDE 1 MG/ML
INJECTION, SOLUTION INTRAMUSCULAR; INTRAVENOUS PRN
Status: DISCONTINUED | OUTPATIENT
Start: 2025-03-19 | End: 2025-03-19 | Stop reason: HOSPADM

## 2025-03-19 RX ORDER — IOPAMIDOL 612 MG/ML
INJECTION, SOLUTION INTRAVASCULAR PRN
Status: DISCONTINUED | OUTPATIENT
Start: 2025-03-19 | End: 2025-03-19 | Stop reason: HOSPADM

## 2025-03-19 RX ORDER — SODIUM CHLORIDE 9 MG/ML
INJECTION, SOLUTION INTRAVENOUS CONTINUOUS
Status: DISCONTINUED | OUTPATIENT
Start: 2025-03-19 | End: 2025-03-19 | Stop reason: HOSPADM

## 2025-03-19 RX ORDER — SODIUM CHLORIDE 0.9 % (FLUSH) 0.9 %
5-40 SYRINGE (ML) INJECTION PRN
Status: DISCONTINUED | OUTPATIENT
Start: 2025-03-19 | End: 2025-03-19 | Stop reason: HOSPADM

## 2025-03-19 RX ORDER — ACETAMINOPHEN 325 MG/1
650 TABLET ORAL EVERY 4 HOURS PRN
Status: DISCONTINUED | OUTPATIENT
Start: 2025-03-19 | End: 2025-03-19 | Stop reason: HOSPADM

## 2025-03-19 RX ORDER — CLOPIDOGREL 300 MG/1
TABLET, FILM COATED ORAL PRN
Status: DISCONTINUED | OUTPATIENT
Start: 2025-03-19 | End: 2025-03-19 | Stop reason: HOSPADM

## 2025-03-19 RX ORDER — HEPARIN SODIUM 1000 [USP'U]/ML
INJECTION, SOLUTION INTRAVENOUS; SUBCUTANEOUS PRN
Status: DISCONTINUED | OUTPATIENT
Start: 2025-03-19 | End: 2025-03-19 | Stop reason: HOSPADM

## 2025-03-19 RX ORDER — SODIUM CHLORIDE 9 MG/ML
INJECTION, SOLUTION INTRAVENOUS CONTINUOUS PRN
Status: COMPLETED | OUTPATIENT
Start: 2025-03-19 | End: 2025-03-19

## 2025-03-19 RX ADMIN — CEFAZOLIN 2000 MG: 2 INJECTION, POWDER, FOR SOLUTION INTRAMUSCULAR; INTRAVENOUS at 11:34

## 2025-03-19 NOTE — DISCHARGE INSTRUCTIONS
Discharge Instructions for Lower extremity angiogram    Call Dr. Guzman's office [phone] for follow-up appointment.    Groin Care  - keep clean and dry  - ok to shower or sponge bath  - ok to clean site with lukewarm water and mild soap  - use a soft wash cloth to gently wipe the incision area  - do not scrub the incision areas  - no swimming or baths    Home Care    Follow these guidelines after surgery:   Rest. Try to move as tolerated. A mix of rest and light activity improves healing.   The incision area may be sore for a few days. To minimize pain and soreness:   Take pain medicine as directed.   Avoid strenuous activity and heavy lifting.       Diet    You can return to your regular diet. You may work with a dietician who will help you follow a heart-healthy diet.   Physical Activity    You will feel sore after the surgery. Try to walk steadily within two weeks. You may be able to return to normal activities within 1-3 weeks. While recovering, you will need to avoid strenuous activities, like heavy lifting.   Ask your doctor when you will be able to return to work.    Do not drive unless your doctor has given you permission to do so.    Medications    Your doctor may recommend:   Over-the-counter or prescription pain medicine   Aspirin or a cholesterol-lowering drug to prevent complications   If you had to stop medicines before the procedure, ask your doctor when you can start again. Medicines that may have been stopped include:   Anti-inflammatory drugs (eg, aspirin, ibuprofen)   Blood thinners, like warfarin (Coumadin)   Clopidogrel (Plavix)   When taking medicines:   Take your medicine as directed. Do not change the amount or the schedule.   Do not stop taking them without talking to your doctor.   Do not share them.   Know what results and side effects to look for. Report them to your doctor.   Some drugs can be dangerous when mixed. Talk to a doctor or pharmacist if you are taking more than one drug.

## 2025-03-19 NOTE — OP NOTE
common femoral artery was gained using micropuncture kit and modified Seldinger technique, transitional dilator was used to bury a Glidewire advantage deep in the aorta, dilator was switched for a 5 Latvian sheath.  Omni Flush catheter was advanced to aortic bifurcation, Glidewire advantage and catheter were used to select the right iliac system and then the right common femoral artery.  Catheter was advanced to the level of the right common femoral artery, selective right lower extremity angiogram with final catheter position of right common femoral artery was performed, the findings which are detailed above.  Catheter was then advanced over wire into the right SFA and popliteal artery, selective right lower extremity angiogram with final catheter position at right superficial femoral artery and popliteal artery was performed, the findings which are detailed above.  Decision was made to intervene.  10,000 units of parenteral heparin was administered.  After adequate circulation time, 6 Latvian send 90 cm destination sheath was advanced to the level of the common femoral artery over the Glidewire advantage wire.  Trailblazer catheter and Glidewire advantage was used to select the right posterior tibial artery.  Wire was switched for a 018 Glidewire advantage, posterior tibial artery stenoses were crossed into the medial tarsal.  Posterior tibial artery was balloon angioplasty with a 2 x 220 mm and then a 2.5 x 220 mm Jim balloon.  Completion angiogram demonstrated fantastic result with 0% residual stenosis.  Lateral plantar artery was selected with 014 Glidewire advantage, 1.5 x 40 mm noemi balloon was then used to balloon angioplasty the stenosis at this location, there was excellent angiographic result with 20% residual stenosis and improved filling to the toes.  Satisfied with this, wires and catheters were removed, sheath was pulled back over the aortic bifurcation over a 035 Glidewire advantage, 6 Latvian

## 2025-03-19 NOTE — PRE SEDATION
Sedation Plan  ASA: class 3 - patient with severe systemic disease     Mallampati class: III - soft palate, base of uvula visible.    Sedation plan: moderate (conscious sedation) and level 2-1: moderate/analgesia (conscious sedation)    Risks, benefits, and alternatives discussed with patient.        Immediate reassessment prior to sedation:  Patient's status reviewed and vital signs assessed; acceptable to perform procedure and proceed to administer sedation as planned.

## 2025-03-19 NOTE — POST SEDATION
Sedation Post Procedure Note    Patient Name: Chaz Rodriguez   YOB: 1965  Room/Bed: Cath Pool Room/  Medical Record Number: [id number]  Date: 3/19/2025   Time: 1:02 PM         Physicians/Assistants: Oly Guzman MD, MD    Procedure Performed:  angiogram    Post-Sedation Vital Signs:  Vitals:    03/19/25 1247   BP: (!) 154/92   Pulse: 71   Resp: 16   Temp:    SpO2: 96%      Vital signs were reviewed and were stable after the procedure (see flow sheet for vitals)            Post-Sedation Exam: CTAB RRR           Complications: none    Electronically signed by Oly Guzman MD on 3/19/2025 at 1:02 PM

## 2025-03-19 NOTE — PROGRESS NOTES
Patient to CVL recovery area post abdominal aortogram. Patient A&O x 3. VSS. Left groin site soft, without oozing or hematoma. Dressing clean and dry.

## 2025-03-19 NOTE — PROGRESS NOTES
Discharge instructions reviewed with patient. Patient verbalized understanding of instructions. IV removed, catheter intact.

## 2025-03-19 NOTE — H&P
Vascular Surgery History & Physical Exam      Chief Complaint: Peripheral vascular disease, RLE tissue loss    HISTORY OF PRESENT ILLNESS:                The patient is a 59 y.o. male who presents to the hospital for elective arteriogram with possible intervention.  The patient has a history of peripheral vascular disease and RLE tissue loss.      IMPRESSION:    Active Hospital Problems    Diagnosis     PVD (peripheral vascular disease) [I73.9]        PLAN:  Aortogram,  Bilateral lower extremity arteriogram, possible intervention.    I reviewed the procedure with the patient.  I discussed the risks, benefits, and alternatives of the procedure.  The patient understands and consents.  All questions were answered.    Patient Active Problem List   Diagnosis Code    Type 2 diabetes mellitus without complication ([facility]) E11.9    Mixed hyperlipidemia E78.2    Benign essential HTN I10    Diabetic ulcer of toe of right foot associated with type 2 diabetes mellitus, with necrosis of bone ([facility]) E11.621, L97.514    Toe ulcer, left, with fat layer exposed ([facility]) L97.522    Kidney transplant status Z94.0    Tinea pedis of both feet B35.3    Decreased dorsalis pedis pulse R09.89    PVD (peripheral vascular disease) I73.9    Chronic renal failure N18.9    Tobacco dependence F17.200       Past Medical History:   Diagnosis Date    Chronic renal failure 08/26/2024 June 2024, creatinine 1.77, GFR 44      Congestive heart disease ([facility]) 06/05/2020    Diabetes mellitus ([facility])     Hyperlipidemia     Hypertension     Kidney transplant status 08/26/2024 December 2021, [facility]      PVD (peripheral vascular disease) 08/26/2024    Tobacco dependence 08/26/2024    Quit smoking cigarettes many years ago    Currently smokes occasional cigar      Type 2 diabetes mellitus with diabetic nephropathy, with long-term current use of insulin ([facility]) 01/22/2020    Ulcer of right great toe due to diabetes mellitus ([facility]) 08/26/2024        Past

## 2025-03-24 ENCOUNTER — HOSPITAL ENCOUNTER (OUTPATIENT)
Dept: HYPERBARIC MEDICINE | Age: 60
Discharge: HOME | End: 2025-03-24
Payer: COMMERCIAL

## 2025-03-24 VITALS
HEART RATE: 80 BPM | SYSTOLIC BLOOD PRESSURE: 155 MMHG | TEMPERATURE: 96 F | DIASTOLIC BLOOD PRESSURE: 69 MMHG | RESPIRATION RATE: 18 BRPM

## 2025-03-24 DIAGNOSIS — E11.621 DIABETIC ULCER OF TOE OF RIGHT FOOT ASSOCIATED WITH TYPE 2 DIABETES MELLITUS, WITH NECROSIS OF BONE: Primary | ICD-10-CM

## 2025-03-24 DIAGNOSIS — L97.514 DIABETIC ULCER OF TOE OF RIGHT FOOT ASSOCIATED WITH TYPE 2 DIABETES MELLITUS, WITH NECROSIS OF BONE: Primary | ICD-10-CM

## 2025-03-24 LAB
GLUCOSE BLD-MCNC: 121 MG/DL (ref 74–99)
GLUCOSE BLD-MCNC: 211 MG/DL (ref 74–99)
GLUCOSE BLD-MCNC: 92 MG/DL (ref 74–99)
GLUCOSE BLD-MCNC: 95 MG/DL (ref 74–99)

## 2025-03-24 PROCEDURE — 99183 HYPERBARIC OXYGEN THERAPY: CPT | Performed by: SURGERY

## 2025-03-24 PROCEDURE — 82962 GLUCOSE BLOOD TEST: CPT

## 2025-03-24 PROCEDURE — G0277 HBOT, FULL BODY CHAMBER, 30M: HCPCS

## 2025-03-24 NOTE — DISCHARGE INSTRUCTIONS
walking or numbness and tingling of your arms and legs.  [x]If you are on prescription medicines from your personal doctor, you may continue to take these as directed.      Hyperbaric Medicine Department Information:    If you have questions or develop any of the symptoms mentioned, please contact the Hyperbaric Department at [phone] MONDAY - FRIDAY 8:30 am - 4:30 pm.  If you need help outside these hours and cannot wait until we are again available, contact your PCP or go to the hospital emergency room.     Additional Instructions:    Yes  Please make sure to eat a breakfast high in protein to maintain blood sugar above 130, for the purpose of Hyperbaric therapy.   Please arrive 30 minutes before appointment time to allow for check-in.    Patient Signature:_______________________Date:_________Time:________    [] Patient unable to sign Discharge Instructions given to ECF/Transportation/POA    The information contained in the After Visit Summary has been reviewed with me, the patient and/or responsible adult, by my health care provider(s).  I had the opportunity to ask questions regarding this information.  I have elected to receive;  []  After Visit Summary  [x]  Comprehensive Discharge Instruction      Nurse Signature:_______________________Date:_________Time:_________    Electronically signed by Deep Hatch RN on 3/24/2025 at 8:58 AM

## 2025-03-24 NOTE — PROGRESS NOTES
[facility].    In my clinical judgement, ongoing HBO therapy is  necessary at this time, given a threat to patient function, limb or life from the current condition.      Supervision and attendance of Hyperbaric Oxygen Therapy provided.  Continue HBO treatment as outlined in the treatment plan.    Hyperbaric Oxygen: Chaz Rodriguez tolerated Treatment Number: 1 well today without complications.    Discharge Instructions were explained and given to Mr. Rodriguez     Electronically signed by Hilario Vides MD on 3/24/2025 at 11:22 AM

## 2025-03-25 ENCOUNTER — HOSPITAL ENCOUNTER (OUTPATIENT)
Dept: INTERVENTIONAL RADIOLOGY/VASCULAR | Age: 60
Discharge: HOME | End: 2025-03-27
Attending: STUDENT IN AN ORGANIZED HEALTH CARE EDUCATION/TRAINING PROGRAM
Payer: COMMERCIAL

## 2025-03-25 DIAGNOSIS — I73.9 PVD (PERIPHERAL VASCULAR DISEASE): ICD-10-CM

## 2025-03-25 PROCEDURE — 93923 UPR/LXTR ART STDY 3+ LVLS: CPT

## 2025-03-25 PROCEDURE — 93923 UPR/LXTR ART STDY 3+ LVLS: CPT | Performed by: SURGERY

## 2025-03-26 ENCOUNTER — TELEPHONE (OUTPATIENT)
Dept: VASCULAR SURGERY | Age: 60
End: 2025-03-26

## 2025-03-26 ENCOUNTER — HOSPITAL ENCOUNTER (OUTPATIENT)
Dept: HYPERBARIC MEDICINE | Age: 60
Discharge: HOME | End: 2025-03-26
Payer: COMMERCIAL

## 2025-03-26 VITALS
TEMPERATURE: 97.1 F | HEART RATE: 83 BPM | SYSTOLIC BLOOD PRESSURE: 158 MMHG | RESPIRATION RATE: 18 BRPM | DIASTOLIC BLOOD PRESSURE: 72 MMHG

## 2025-03-26 DIAGNOSIS — L97.514 DIABETIC ULCER OF TOE OF RIGHT FOOT ASSOCIATED WITH TYPE 2 DIABETES MELLITUS, WITH NECROSIS OF BONE: Primary | ICD-10-CM

## 2025-03-26 DIAGNOSIS — E11.621 DIABETIC ULCER OF TOE OF RIGHT FOOT ASSOCIATED WITH TYPE 2 DIABETES MELLITUS, WITH NECROSIS OF BONE: Primary | ICD-10-CM

## 2025-03-26 LAB
GLUCOSE BLD-MCNC: 114 MG/DL (ref 74–99)
GLUCOSE BLD-MCNC: 162 MG/DL (ref 74–99)
GLUCOSE BLD-MCNC: 97 MG/DL (ref 74–99)

## 2025-03-26 PROCEDURE — G0277 HBOT, FULL BODY CHAMBER, 30M: HCPCS

## 2025-03-26 PROCEDURE — 82962 GLUCOSE BLOOD TEST: CPT

## 2025-03-26 PROCEDURE — 99183 HYPERBARIC OXYGEN THERAPY: CPT | Performed by: SURGERY

## 2025-03-26 NOTE — TELEPHONE ENCOUNTER
Spoke with the pt.  As he continues at [facility], will cancel 3/31 ov with Dr. Guzman.

## 2025-03-26 NOTE — PROGRESS NOTES
[facility]  Hyperbaric Oxygen Therapy   Progress Note    NAME: Chaz Rodriguez  MEDICAL RECORD NUMBER:  [id number]  AGE: 59 y.o.   GENDER: male  : 1965  EPISODE DATE:  3/26/2025   Subjective   HBO Treatment Number: 2 out of Total Treatments: 30  HBO Diagnosis:   Problem List Items Addressed This Visit       Diabetic ulcer of toe of right foot associated with type 2 diabetes mellitus, with necrosis of bone (HCC) - Primary    Relevant Orders    Hypoglycemial protocol    POCT glucose    Care order/instruction    Notify physician (specify)    Hyperbaric Oxygen Therapy     Safety checks performed prior to treatment.  See doc flowsheets for documentation.  Objective      Please see lab results for finger stick glucose results  Pre treatment Vital Signs       Temp: (!) 96.4 °F (35.8 °C)     Pulse: 83     Respirations: 17     BP: (!) 142/60     Post treatment Vital Signs  Temp: 97.1 °F (36.2 °C)  Pulse: 83  Respirations: 18  BP: (!) 158/72  Assessment      Physical Exam:  General Appearance:  alert and oriented to person, place and time, well-developed and well-nourished, in no acute distress  ENT:  tympanic membranes intact bilaterally  Pulmonary/Chest:  clear to auscultation bilaterally- no wheezes, rales or rhonchi, normal air movement, no respiratory distress  Cardiovascular:  S1S2  Chamber #: [id number]  Treatment Start Time: 0751     Pressure Reached Time: 0800  DELFINA : 2  Number of Air Breaks:  Treatment Status: No Air break     Decompression Time: 0930   Treatment End Time: 0938  Length of Treatment: 90 Minutes  Symptoms Noted During Treatment: None  Total Treatment Time (min): 107    Adverse Event: no    I was present on these premises and immediately available to furnish assistance & direction throughout the procedure.   Plan      Chaz Rodriguez is a 59 y.o. male  did successfully complete today's hyperbaric oxygen treatment at [facility].    In my

## 2025-03-27 ENCOUNTER — HOSPITAL ENCOUNTER (OUTPATIENT)
Dept: HYPERBARIC MEDICINE | Age: 60
Discharge: HOME | End: 2025-03-27
Payer: COMMERCIAL

## 2025-03-27 VITALS
HEIGHT: 68 IN | BODY MASS INDEX: 32.21 KG/M2 | RESPIRATION RATE: 18 BRPM | TEMPERATURE: 96.9 F | DIASTOLIC BLOOD PRESSURE: 82 MMHG | HEART RATE: 81 BPM | WEIGHT: 212.5 LBS | SYSTOLIC BLOOD PRESSURE: 162 MMHG

## 2025-03-27 DIAGNOSIS — E11.621 DIABETIC ULCER OF TOE OF RIGHT FOOT ASSOCIATED WITH TYPE 2 DIABETES MELLITUS, WITH NECROSIS OF BONE: Primary | ICD-10-CM

## 2025-03-27 DIAGNOSIS — L97.514 DIABETIC ULCER OF TOE OF RIGHT FOOT ASSOCIATED WITH TYPE 2 DIABETES MELLITUS, WITH NECROSIS OF BONE: Primary | ICD-10-CM

## 2025-03-27 LAB
GLUCOSE BLD-MCNC: 178 MG/DL (ref 74–99)
GLUCOSE BLD-MCNC: 236 MG/DL (ref 74–99)

## 2025-03-27 PROCEDURE — 82962 GLUCOSE BLOOD TEST: CPT

## 2025-03-27 PROCEDURE — G0277 HBOT, FULL BODY CHAMBER, 30M: HCPCS

## 2025-03-27 NOTE — PROGRESS NOTES
Chaz Rodriguez is a 59 y.o. male has been receiving hyperbaric oxygen treatment for management of:Indication  Indications: Lower Extremity Diabetic Wound ___(site) (Diabetic ulcer of toe of right foot associated with type 2 diabetes mellitus, with necrosis of bone). Mr. Rodriguez has completed Treatment Number: 3 out of a treatment protocol of Total Treatments: 30.    Problem List:  Patient Active Problem List   Diagnosis Code    Type 2 diabetes mellitus without complication (HCC) E11.9    Mixed hyperlipidemia E78.2    Benign essential HTN I10    Diabetic ulcer of toe of right foot associated with type 2 diabetes mellitus, with necrosis of bone (HCC) E11.621, L97.514    Toe ulcer, left, with fat layer exposed (HCC) L97.522    Kidney transplant status Z94.0    Tinea pedis of both feet B35.3    Decreased dorsalis pedis pulse R09.89    PVD (peripheral vascular disease) I73.9    Chronic renal failure N18.9    Tobacco dependence F17.200       Patients ID was verified.Hyperbaric oxygen therapy plan of care, patient history, outpatient fall risk assessment, nutritional assessment and daily medications were reviewed, addressed and updated as needed.    Pt is tolerating hyperbaric oxygen therapy  well without complications.         Chandrika Gonzalez RN  3/27/2025  8:02 AM

## 2025-03-28 ENCOUNTER — APPOINTMENT (OUTPATIENT)
Dept: HYPERBARIC MEDICINE | Age: 60
End: 2025-03-28
Payer: COMMERCIAL

## 2025-03-28 DIAGNOSIS — I10 BENIGN ESSENTIAL HTN: ICD-10-CM

## 2025-03-28 RX ORDER — AMLODIPINE BESYLATE 5 MG/1
5 TABLET ORAL DAILY
Qty: 30 TABLET | Refills: 1 | OUTPATIENT
Start: 2025-03-28

## 2025-03-31 ENCOUNTER — HOSPITAL ENCOUNTER (OUTPATIENT)
Dept: HYPERBARIC MEDICINE | Age: 60
Discharge: HOME | End: 2025-03-31
Payer: COMMERCIAL

## 2025-03-31 VITALS
TEMPERATURE: 97 F | SYSTOLIC BLOOD PRESSURE: 134 MMHG | DIASTOLIC BLOOD PRESSURE: 73 MMHG | HEART RATE: 79 BPM | RESPIRATION RATE: 18 BRPM

## 2025-03-31 DIAGNOSIS — E11.621 DIABETIC ULCER OF TOE OF RIGHT FOOT ASSOCIATED WITH TYPE 2 DIABETES MELLITUS, WITH NECROSIS OF BONE: Primary | ICD-10-CM

## 2025-03-31 DIAGNOSIS — L97.514 DIABETIC ULCER OF TOE OF RIGHT FOOT ASSOCIATED WITH TYPE 2 DIABETES MELLITUS, WITH NECROSIS OF BONE: Primary | ICD-10-CM

## 2025-03-31 LAB
GLUCOSE BLD-MCNC: 174 MG/DL (ref 74–99)
GLUCOSE BLD-MCNC: 175 MG/DL (ref 74–99)

## 2025-03-31 PROCEDURE — 99183 HYPERBARIC OXYGEN THERAPY: CPT | Performed by: SURGERY

## 2025-03-31 PROCEDURE — G0277 HBOT, FULL BODY CHAMBER, 30M: HCPCS

## 2025-03-31 PROCEDURE — 82962 GLUCOSE BLOOD TEST: CPT

## 2025-03-31 NOTE — DISCHARGE INSTRUCTIONS
Discharge instructions were reviewed with the patient. Patient declines written instructions at this time.

## 2025-03-31 NOTE — PROGRESS NOTES
[facility]  Hyperbaric Oxygen Therapy   Progress Note    NAME: Chaz Rodriguez  MEDICAL RECORD NUMBER:  [id number]  AGE: 59 y.o.   GENDER: male  : 1965  EPISODE DATE:  3/31/2025   Subjective   HBO Treatment Number: 4 out of Total Treatments: 30  HBO Diagnosis:   Problem List Items Addressed This Visit       Diabetic ulcer of toe of right foot associated with type 2 diabetes mellitus, with necrosis of bone (HCC) - Primary    Relevant Orders    Notify physician (specify)    Hyperbaric Oxygen Therapy     Safety checks performed prior to treatment.  See doc flowsheets for documentation.  Objective      Please see lab results for finger stick glucose results  Pre treatment Vital Signs       Temp: (!) 95.6 °F (35.3 °C)     Pulse: 89     Respirations: 18     BP: 132/61     Post treatment Vital Signs  Temp: 97 °F (36.1 °C)  Pulse: 79  Respirations: 18  BP: 134/73  Assessment      Physical Exam:  General Appearance:  alert and oriented to person, place and time, well-developed and well-nourished, in no acute distress  ENT:  tympanic membranes intact bilaterally  Pulmonary/Chest:  clear to auscultation bilaterally- no wheezes, rales or rhonchi, normal air movement, no respiratory distress  Cardiovascular:  regular rate and rhythm  Chamber #: [id number]  Treatment Start Time: 0744     Pressure Reached Time: 0757  DELFINA : 2  Number of Air Breaks:  Treatment Status: No Air break     Decompression Time: 927   Treatment End Time: 938  Length of Treatment: 90 Minutes  Symptoms Noted During Treatment: None  Total Treatment Time (min): 114    Adverse Event: no    I was present on these premises and immediately available to furnish assistance & direction throughout the procedure.   Plan      Chaz Rodriguez is a 59 y.o. male  did successfully complete today's hyperbaric oxygen treatment at [facility].    In my clinical judgement, ongoing HBO therapy is  necessary at this

## 2025-04-01 ENCOUNTER — HOSPITAL ENCOUNTER (OUTPATIENT)
Dept: HYPERBARIC MEDICINE | Age: 60
Discharge: HOME | End: 2025-04-01
Payer: COMMERCIAL

## 2025-04-01 VITALS
TEMPERATURE: 97.2 F | HEART RATE: 81 BPM | SYSTOLIC BLOOD PRESSURE: 142 MMHG | RESPIRATION RATE: 18 BRPM | DIASTOLIC BLOOD PRESSURE: 57 MMHG

## 2025-04-01 DIAGNOSIS — L97.514 DIABETIC ULCER OF TOE OF RIGHT FOOT ASSOCIATED WITH TYPE 2 DIABETES MELLITUS, WITH NECROSIS OF BONE: Primary | ICD-10-CM

## 2025-04-01 DIAGNOSIS — E11.621 DIABETIC ULCER OF TOE OF RIGHT FOOT ASSOCIATED WITH TYPE 2 DIABETES MELLITUS, WITH NECROSIS OF BONE: Primary | ICD-10-CM

## 2025-04-01 LAB
GLUCOSE BLD-MCNC: 138 MG/DL (ref 74–99)
GLUCOSE BLD-MCNC: 150 MG/DL (ref 74–99)

## 2025-04-01 PROCEDURE — 82962 GLUCOSE BLOOD TEST: CPT

## 2025-04-01 PROCEDURE — 99183 HYPERBARIC OXYGEN THERAPY: CPT | Performed by: STUDENT IN AN ORGANIZED HEALTH CARE EDUCATION/TRAINING PROGRAM

## 2025-04-01 PROCEDURE — G0277 HBOT, FULL BODY CHAMBER, 30M: HCPCS

## 2025-04-01 NOTE — PROGRESS NOTES
[facility]  Hyperbaric Oxygen Therapy   Progress Note    NAME: Chaz Rodriguez  MEDICAL RECORD NUMBER:  [id number]  AGE: 59 y.o.   GENDER: male  : 1965  EPISODE DATE:  2025   Subjective   HBO Treatment Number: 5 out of Total Treatments: 30  HBO Diagnosis:   Problem List Items Addressed This Visit          Endocrine    Diabetic ulcer of toe of right foot associated with type 2 diabetes mellitus, with necrosis of bone - Primary    Relevant Orders    Hypoglycemial protocol    POCT glucose    Care order/instruction    Notify physician (specify)    Hyperbaric Oxygen Therapy    Care order/instruction     Safety checks performed prior to treatment.  See doc flowsheets for documentation.  Objective      Please see lab results for finger stick glucose results  Pre treatment Vital Signs       Temp: 96.8 °F (36 °C)     Pulse: 87     Respirations: 18     BP: (!) 138/59     Post treatment Vital Signs  Temp: 97.2 °F (36.2 °C)  Pulse: 81  Respirations: 18  BP: (!) 142/57  Assessment      Physical Exam:  General Appearance:  alert and oriented to person, place and time, well-developed and well-nourished, in no acute distress  ENT:  tympanic membranes intact bilaterally  Pulmonary/Chest:  clear to auscultation bilaterally- no wheezes, rales or rhonchi, normal air movement, no respiratory distress  Cardiovascular:  regular rate and rhythm  Chamber #: [id number]  Treatment Start Time: 0755     Pressure Reached Time: 0805  DELFINA : 2  Number of Air Breaks:  Treatment Status: No Air break     Decompression Time: 0935   Treatment End Time: 0945  Length of Treatment: 90 Minutes  Symptoms Noted During Treatment: None  Total Treatment Time (min): 110    Adverse Event: no    I was present on these premises and immediately available to furnish assistance & direction throughout the procedure.   Plan      Chaz Rodriguez is a 59 y.o. male  did successfully complete today's hyperbaric oxygen treatment at [employer]

## 2025-04-02 ENCOUNTER — HOSPITAL ENCOUNTER (OUTPATIENT)
Dept: HYPERBARIC MEDICINE | Age: 60
Discharge: HOME | End: 2025-04-02
Payer: COMMERCIAL

## 2025-04-02 VITALS
SYSTOLIC BLOOD PRESSURE: 142 MMHG | RESPIRATION RATE: 16 BRPM | TEMPERATURE: 96.9 F | DIASTOLIC BLOOD PRESSURE: 69 MMHG | HEART RATE: 77 BPM

## 2025-04-02 DIAGNOSIS — E11.621 DIABETIC ULCER OF TOE OF RIGHT FOOT ASSOCIATED WITH TYPE 2 DIABETES MELLITUS, WITH NECROSIS OF BONE: Primary | ICD-10-CM

## 2025-04-02 DIAGNOSIS — L97.514 DIABETIC ULCER OF TOE OF RIGHT FOOT ASSOCIATED WITH TYPE 2 DIABETES MELLITUS, WITH NECROSIS OF BONE: Primary | ICD-10-CM

## 2025-04-02 LAB
GLUCOSE BLD-MCNC: 102 MG/DL (ref 74–99)
GLUCOSE BLD-MCNC: 106 MG/DL (ref 74–99)
GLUCOSE BLD-MCNC: 117 MG/DL (ref 74–99)
GLUCOSE BLD-MCNC: 163 MG/DL (ref 74–99)

## 2025-04-02 PROCEDURE — 99183 HYPERBARIC OXYGEN THERAPY: CPT | Performed by: SURGERY

## 2025-04-02 PROCEDURE — 82962 GLUCOSE BLOOD TEST: CPT

## 2025-04-02 PROCEDURE — G0277 HBOT, FULL BODY CHAMBER, 30M: HCPCS

## 2025-04-02 NOTE — PROGRESS NOTES
[facility]  Hyperbaric Oxygen Therapy   Progress Note    NAME: Chaz Rodriguez  MEDICAL RECORD NUMBER:  [id number]  AGE: 59 y.o.   GENDER: male  : 1965  EPISODE DATE:  2025   Subjective   HBO Treatment Number: 6 out of Total Treatments: 30  HBO Diagnosis:   Problem List Items Addressed This Visit       Diabetic ulcer of toe of right foot associated with type 2 diabetes mellitus, with necrosis of bone - Primary    Relevant Orders    Hypoglycemial protocol    POCT glucose    Care order/instruction    Care order/instruction    Notify physician (specify)    Hyperbaric Oxygen Therapy     Safety checks performed prior to treatment.  See doc flowsheets for documentation.  Objective      Please see lab results for finger stick glucose results  Pre treatment Vital Signs       Temp: (!) 96.2 °F (35.7 °C)     Pulse: 84     Respirations: 18     BP: 134/62     Post treatment Vital Signs  Temp: 96.9 °F (36.1 °C)  Pulse: 77  Respirations: 16  BP: (!) 142/69  Assessment      Physical Exam:  General Appearance:  alert and oriented to person, place and time, well-developed and well-nourished, in no acute distress  ENT:  tympanic membranes intact bilaterally  Pulmonary/Chest:  clear to auscultation bilaterally- no wheezes, rales or rhonchi, normal air movement, no respiratory distress  Cardiovascular:  S1S2  Chamber #: [id number]  Treatment Start Time: 0816     Pressure Reached Time: 0825  DELFINA : 2  Number of Air Breaks:  Treatment Status: No Air break     Decompression Time: 0954   Treatment End Time: 1002  Length of Treatment: 90 Minutes  Symptoms Noted During Treatment: None  Total Treatment Time (min): 106    Adverse Event: no    I was present on these premises and immediately available to furnish assistance & direction throughout the procedure.   Plan      Chaz Rodriguez is a 59 y.o. male  did successfully complete today's hyperbaric oxygen treatment at [facility]

## 2025-04-03 ENCOUNTER — HOSPITAL ENCOUNTER (OUTPATIENT)
Dept: HYPERBARIC MEDICINE | Age: 60
Discharge: HOME | End: 2025-04-03
Payer: COMMERCIAL

## 2025-04-03 VITALS
TEMPERATURE: 97.2 F | SYSTOLIC BLOOD PRESSURE: 132 MMHG | RESPIRATION RATE: 18 BRPM | HEART RATE: 82 BPM | DIASTOLIC BLOOD PRESSURE: 60 MMHG

## 2025-04-03 DIAGNOSIS — E11.621 DIABETIC ULCER OF TOE OF RIGHT FOOT ASSOCIATED WITH TYPE 2 DIABETES MELLITUS, WITH NECROSIS OF BONE: Primary | ICD-10-CM

## 2025-04-03 DIAGNOSIS — L97.514 DIABETIC ULCER OF TOE OF RIGHT FOOT ASSOCIATED WITH TYPE 2 DIABETES MELLITUS, WITH NECROSIS OF BONE: Primary | ICD-10-CM

## 2025-04-03 LAB
GLUCOSE BLD-MCNC: 188 MG/DL (ref 74–99)
GLUCOSE BLD-MCNC: 201 MG/DL (ref 74–99)

## 2025-04-03 PROCEDURE — 82962 GLUCOSE BLOOD TEST: CPT

## 2025-04-03 PROCEDURE — G0277 HBOT, FULL BODY CHAMBER, 30M: HCPCS

## 2025-04-03 NOTE — PROGRESS NOTES
Chaz Rodriguez is a 59 y.o. male has been receiving hyperbaric oxygen treatment for management of:Indication  Indications: Lower Extremity Diabetic Wound ___(site) (Diabetic ulcer of toe of right foot associated with type 2 diabetes mellitus, with necrosis of bone). Mr. Rodriguez has completed Treatment Number: 7 out of a treatment protocol of Total Treatments: 30.    Problem List:  Patient Active Problem List   Diagnosis Code    Type 2 diabetes mellitus without complication E11.9    Mixed hyperlipidemia E78.2    Benign essential HTN I10    Diabetic ulcer of toe of right foot associated with type 2 diabetes mellitus, with necrosis of bone E11.621, L97.514    Toe ulcer, left, with fat layer exposed (HCC) L97.522    Kidney transplant status Z94.0    Tinea pedis of both feet B35.3    Decreased dorsalis pedis pulse R09.89    PVD (peripheral vascular disease) I73.9    Chronic renal failure N18.9    Tobacco dependence F17.200       Patients ID was verified.Hyperbaric oxygen therapy plan of care, patient history, outpatient fall risk assessment, nutritional assessment and daily medications were reviewed, addressed and updated as needed.    Pt is tolerating hyperbaric oxygen therapy  well without complications.         Chandrika Gonzalez RN  4/3/2025  8:04 AM

## 2025-04-04 ENCOUNTER — HOSPITAL ENCOUNTER (OUTPATIENT)
Dept: HYPERBARIC MEDICINE | Age: 60
Discharge: HOME | End: 2025-04-04
Payer: COMMERCIAL

## 2025-04-04 VITALS
DIASTOLIC BLOOD PRESSURE: 69 MMHG | TEMPERATURE: 97 F | HEART RATE: 79 BPM | SYSTOLIC BLOOD PRESSURE: 149 MMHG | RESPIRATION RATE: 16 BRPM

## 2025-04-04 DIAGNOSIS — E11.621 DIABETIC ULCER OF TOE OF RIGHT FOOT ASSOCIATED WITH TYPE 2 DIABETES MELLITUS, WITH NECROSIS OF BONE: Primary | ICD-10-CM

## 2025-04-04 DIAGNOSIS — L97.514 DIABETIC ULCER OF TOE OF RIGHT FOOT ASSOCIATED WITH TYPE 2 DIABETES MELLITUS, WITH NECROSIS OF BONE: Primary | ICD-10-CM

## 2025-04-04 LAB
GLUCOSE BLD-MCNC: 184 MG/DL (ref 74–99)
GLUCOSE BLD-MCNC: 202 MG/DL (ref 74–99)

## 2025-04-04 PROCEDURE — G0277 HBOT, FULL BODY CHAMBER, 30M: HCPCS

## 2025-04-04 PROCEDURE — 82962 GLUCOSE BLOOD TEST: CPT

## 2025-04-04 RX ORDER — AMLODIPINE BESYLATE 5 MG/1
5 TABLET ORAL DAILY
Qty: 90 TABLET | Refills: 3 | Status: SHIPPED | OUTPATIENT
Start: 2025-04-04

## 2025-04-04 NOTE — TELEPHONE ENCOUNTER
Name of Medication(s) Requested:  Requested Prescriptions     Pending Prescriptions Disp Refills    amLODIPine (NORVASC) 5 MG tablet 10 tablet 0     Sig: Take 1 tablet by mouth daily     Refused Prescriptions Disp Refills    amLODIPine (NORVASC) 5 MG tablet 30 tablet 1     Sig: Take 1 tablet by mouth daily     Refused By: LUI CAMACHO     Reason for Refusal: Patient needs an appointment       Medication is on current medication list Yes    Dosage and directions were verified? Yes    Quantity verified: 10 day supply     Pharmacy Verified?  Yes    Last Appointment:  11/22/2023    Future appts:  Future Appointments   Date Time Provider Department Center   4/7/2025  8:00 AM [facility] HYPERBARIC CHAMBER 01 [facility] HYPER [facility]   4/10/2025  2:30 PM Ej Spaulding, DO Joyce CRAFT [facility] DEP        (If no appt send self scheduling link. .REFILLAPPT)  Scheduling request sent?     [] Yes  [x] No    Does patient need updated?  [] Yes  [x] No

## 2025-04-07 ENCOUNTER — HOSPITAL ENCOUNTER (OUTPATIENT)
Dept: HYPERBARIC MEDICINE | Age: 60
Discharge: HOME | End: 2025-04-07
Payer: COMMERCIAL

## 2025-04-07 VITALS
SYSTOLIC BLOOD PRESSURE: 132 MMHG | HEART RATE: 82 BPM | TEMPERATURE: 97.2 F | RESPIRATION RATE: 18 BRPM | DIASTOLIC BLOOD PRESSURE: 70 MMHG

## 2025-04-07 DIAGNOSIS — L97.514 DIABETIC ULCER OF TOE OF RIGHT FOOT ASSOCIATED WITH TYPE 2 DIABETES MELLITUS, WITH NECROSIS OF BONE: Primary | ICD-10-CM

## 2025-04-07 DIAGNOSIS — E11.621 DIABETIC ULCER OF TOE OF RIGHT FOOT ASSOCIATED WITH TYPE 2 DIABETES MELLITUS, WITH NECROSIS OF BONE: Primary | ICD-10-CM

## 2025-04-07 LAB
GLUCOSE BLD-MCNC: 143 MG/DL (ref 74–99)
GLUCOSE BLD-MCNC: 170 MG/DL (ref 74–99)

## 2025-04-07 PROCEDURE — 82962 GLUCOSE BLOOD TEST: CPT

## 2025-04-07 PROCEDURE — 99183 HYPERBARIC OXYGEN THERAPY: CPT | Performed by: NURSE PRACTITIONER

## 2025-04-07 PROCEDURE — G0277 HBOT, FULL BODY CHAMBER, 30M: HCPCS

## 2025-04-07 NOTE — PROGRESS NOTES
[facility].    In my clinical judgement, ongoing HBO therapy is  necessary at this time, given a threat to patient function, limb or life from the current condition.      Supervision and attendance of Hyperbaric Oxygen Therapy provided.  Continue HBO treatment as outlined in the treatment plan.    Hyperbaric Oxygen: Chaz Rodriguez tolerated Treatment Number: 9 well today without complications.    Discharge Instructions were explained and given to Mr. Rodriguez     Electronically signed by DONOVAN Haddad CNP on 4/7/2025 at 10:58 AM

## 2025-04-08 ENCOUNTER — HOSPITAL ENCOUNTER (OUTPATIENT)
Dept: HYPERBARIC MEDICINE | Age: 60
Discharge: HOME | End: 2025-04-08
Payer: COMMERCIAL

## 2025-04-08 VITALS
SYSTOLIC BLOOD PRESSURE: 146 MMHG | RESPIRATION RATE: 18 BRPM | HEART RATE: 89 BPM | DIASTOLIC BLOOD PRESSURE: 69 MMHG | TEMPERATURE: 97.1 F

## 2025-04-08 DIAGNOSIS — L97.514 DIABETIC ULCER OF TOE OF RIGHT FOOT ASSOCIATED WITH TYPE 2 DIABETES MELLITUS, WITH NECROSIS OF BONE: Primary | ICD-10-CM

## 2025-04-08 DIAGNOSIS — E11.621 DIABETIC ULCER OF TOE OF RIGHT FOOT ASSOCIATED WITH TYPE 2 DIABETES MELLITUS, WITH NECROSIS OF BONE: Primary | ICD-10-CM

## 2025-04-08 LAB
GLUCOSE BLD-MCNC: 136 MG/DL (ref 74–99)
GLUCOSE BLD-MCNC: 150 MG/DL (ref 74–99)

## 2025-04-08 PROCEDURE — 99183 HYPERBARIC OXYGEN THERAPY: CPT | Performed by: STUDENT IN AN ORGANIZED HEALTH CARE EDUCATION/TRAINING PROGRAM

## 2025-04-08 PROCEDURE — G0277 HBOT, FULL BODY CHAMBER, 30M: HCPCS

## 2025-04-08 PROCEDURE — 82962 GLUCOSE BLOOD TEST: CPT

## 2025-04-08 NOTE — PROGRESS NOTES
[facility]  Hyperbaric Oxygen Therapy   Progress Note    NAME: Chaz Rodriguez  MEDICAL RECORD NUMBER:  [id number]  AGE: 59 y.o.   GENDER: male  : 1965  EPISODE DATE:  2025   Subjective   HBO Treatment Number: 10 out of Total Treatments: 30  HBO Diagnosis:   Problem List Items Addressed This Visit          Endocrine    Diabetic ulcer of toe of right foot associated with type 2 diabetes mellitus, with necrosis of bone - Primary    Relevant Orders    Notify physician (specify)    Hyperbaric Oxygen Therapy    Hypoglycemial protocol    POCT glucose    Care order/instruction    Care order/instruction     Safety checks performed prior to treatment.  See doc flowsheets for documentation.  Objective      Please see lab results for finger stick glucose results  Pre treatment Vital Signs       Temp: (!) 96.6 °F (35.9 °C)     Pulse: 87     Respirations: 18     BP: (!) 148/65     Post treatment Vital Signs  Temp: 97.1 °F (36.2 °C)  Pulse: 89  Respirations: 18  BP: (!) 146/69  Assessment      Physical Exam:  General Appearance:  alert and oriented to person, place and time, well-developed and well-nourished, in no acute distress  ENT:  tympanic membranes intact bilaterally  Pulmonary/Chest:  clear to auscultation bilaterally- no wheezes, rales or rhonchi, normal air movement, no respiratory distress  Cardiovascular:  Normal rate and rhythm   Chamber #: [id number]  Treatment Start Time: 0752     Pressure Reached Time: 0802  DELFINA : 2  Number of Air Breaks:  Treatment Status: No Air break     Decompression Time: 0933   Treatment End Time: 0943  Length of Treatment: 90 Minutes  Symptoms Noted During Treatment: None  Total Treatment Time (min): 111    Adverse Event: no    I was present on these premises and immediately available to furnish assistance & direction throughout the procedure.   Plan      Chaz Rodriguez is a 59 y.o. male  did successfully complete today's hyperbaric oxygen treatment at [place]

## 2025-04-09 ENCOUNTER — HOSPITAL ENCOUNTER (OUTPATIENT)
Dept: HYPERBARIC MEDICINE | Age: 60
Discharge: HOME | End: 2025-04-09
Payer: COMMERCIAL

## 2025-04-09 VITALS
DIASTOLIC BLOOD PRESSURE: 84 MMHG | HEART RATE: 71 BPM | TEMPERATURE: 97.1 F | SYSTOLIC BLOOD PRESSURE: 122 MMHG | RESPIRATION RATE: 19 BRPM

## 2025-04-09 DIAGNOSIS — L97.514 DIABETIC ULCER OF TOE OF RIGHT FOOT ASSOCIATED WITH TYPE 2 DIABETES MELLITUS, WITH NECROSIS OF BONE: Primary | ICD-10-CM

## 2025-04-09 DIAGNOSIS — E11.621 DIABETIC ULCER OF TOE OF RIGHT FOOT ASSOCIATED WITH TYPE 2 DIABETES MELLITUS, WITH NECROSIS OF BONE: Primary | ICD-10-CM

## 2025-04-09 LAB
GLUCOSE BLD-MCNC: 155 MG/DL (ref 74–99)
GLUCOSE BLD-MCNC: 190 MG/DL (ref 74–99)

## 2025-04-09 PROCEDURE — G0277 HBOT, FULL BODY CHAMBER, 30M: HCPCS

## 2025-04-09 PROCEDURE — 82962 GLUCOSE BLOOD TEST: CPT

## 2025-04-10 ENCOUNTER — HOSPITAL ENCOUNTER (OUTPATIENT)
Dept: HYPERBARIC MEDICINE | Age: 60
Discharge: HOME | End: 2025-04-10
Payer: COMMERCIAL

## 2025-04-10 ENCOUNTER — OFFICE VISIT (OUTPATIENT)
Dept: FAMILY MEDICINE CLINIC | Age: 60
End: 2025-04-10
Payer: COMMERCIAL

## 2025-04-10 VITALS
OXYGEN SATURATION: 97 % | DIASTOLIC BLOOD PRESSURE: 78 MMHG | WEIGHT: 217 LBS | HEIGHT: 68 IN | SYSTOLIC BLOOD PRESSURE: 126 MMHG | HEART RATE: 83 BPM | RESPIRATION RATE: 18 BRPM | BODY MASS INDEX: 32.89 KG/M2

## 2025-04-10 VITALS
SYSTOLIC BLOOD PRESSURE: 146 MMHG | HEART RATE: 83 BPM | TEMPERATURE: 96.3 F | DIASTOLIC BLOOD PRESSURE: 73 MMHG | RESPIRATION RATE: 18 BRPM

## 2025-04-10 DIAGNOSIS — L97.514 DIABETIC ULCER OF TOE OF RIGHT FOOT ASSOCIATED WITH TYPE 2 DIABETES MELLITUS, WITH NECROSIS OF BONE: Primary | ICD-10-CM

## 2025-04-10 DIAGNOSIS — E11.9 TYPE 2 DIABETES MELLITUS WITHOUT COMPLICATION, WITH LONG-TERM CURRENT USE OF INSULIN: ICD-10-CM

## 2025-04-10 DIAGNOSIS — Z79.4 TYPE 2 DIABETES MELLITUS WITHOUT COMPLICATION, WITH LONG-TERM CURRENT USE OF INSULIN: ICD-10-CM

## 2025-04-10 DIAGNOSIS — E11.621 DIABETIC ULCER OF TOE OF RIGHT FOOT ASSOCIATED WITH TYPE 2 DIABETES MELLITUS, WITH NECROSIS OF BONE: Primary | ICD-10-CM

## 2025-04-10 DIAGNOSIS — I10 BENIGN ESSENTIAL HTN: ICD-10-CM

## 2025-04-10 DIAGNOSIS — R53.82 CHRONIC FATIGUE: ICD-10-CM

## 2025-04-10 DIAGNOSIS — E78.2 MIXED HYPERLIPIDEMIA: Primary | ICD-10-CM

## 2025-04-10 DIAGNOSIS — L97.522 TOE ULCER, LEFT, WITH FAT LAYER EXPOSED (HCC): ICD-10-CM

## 2025-04-10 LAB
GLUCOSE BLD-MCNC: 122 MG/DL (ref 74–99)
GLUCOSE BLD-MCNC: 132 MG/DL (ref 74–99)
GLUCOSE BLD-MCNC: 158 MG/DL (ref 74–99)

## 2025-04-10 PROCEDURE — 3078F DIAST BP <80 MM HG: CPT | Performed by: FAMILY MEDICINE

## 2025-04-10 PROCEDURE — 3074F SYST BP LT 130 MM HG: CPT | Performed by: FAMILY MEDICINE

## 2025-04-10 PROCEDURE — 99214 OFFICE O/P EST MOD 30 MIN: CPT | Performed by: FAMILY MEDICINE

## 2025-04-10 PROCEDURE — 82962 GLUCOSE BLOOD TEST: CPT

## 2025-04-10 PROCEDURE — G0277 HBOT, FULL BODY CHAMBER, 30M: HCPCS

## 2025-04-10 SDOH — ECONOMIC STABILITY: FOOD INSECURITY: WITHIN THE PAST 12 MONTHS, THE FOOD YOU BOUGHT JUST DIDN'T LAST AND YOU DIDN'T HAVE MONEY TO GET MORE.: PATIENT DECLINED

## 2025-04-10 SDOH — ECONOMIC STABILITY: FOOD INSECURITY: WITHIN THE PAST 12 MONTHS, YOU WORRIED THAT YOUR FOOD WOULD RUN OUT BEFORE YOU GOT MONEY TO BUY MORE.: PATIENT DECLINED

## 2025-04-10 ASSESSMENT — PATIENT HEALTH QUESTIONNAIRE - PHQ9
SUM OF ALL RESPONSES TO PHQ QUESTIONS 1-9: 0
1. LITTLE INTEREST OR PLEASURE IN DOING THINGS: NOT AT ALL
2. FEELING DOWN, DEPRESSED OR HOPELESS: NOT AT ALL

## 2025-04-10 NOTE — PROGRESS NOTES
[facility]  Hyperbaric Oxygen Therapy   Progress Note    NAME: Chaz Rodriguez  MEDICAL RECORD NUMBER:  [id number]  AGE: 59 y.o.   GENDER: male  : 1965  EPISODE DATE:  2025   Subjective   HBO Treatment Number: 11 out of Total Treatments: 30  HBO Diagnosis:   Problem List Items Addressed This Visit       Diabetic ulcer of toe of right foot associated with type 2 diabetes mellitus, with necrosis of bone - Primary    Relevant Orders    POCT glucose     Safety checks performed prior to treatment.  See doc flowsheets for documentation.  Objective      Please see lab results for finger stick glucose results  Pre treatment Vital Signs       Temp: 97.5 °F (36.4 °C)     Pulse: 82     Respirations: 17     BP: (!) 142/59     Post treatment Vital Signs  Temp: 97.1 °F (36.2 °C)  Pulse: 71  Respirations: 19  BP: 122/84  Assessment      Physical Exam:  General Appearance:  alert and oriented to person, place and time, well-developed and well-nourished, in no acute distress  ENT:  tympanic membranes intact bilaterally  Pulmonary/Chest:  clear to auscultation bilaterally- no wheezes, rales or rhonchi, normal air movement, no respiratory distress  Cardiovascular:  S1S2  Chamber #: [id number]  Treatment Start Time: 0752     Pressure Reached Time: 0801  DELFINA : 2  Number of Air Breaks:  Treatment Status: No Air break     Decompression Time: 931   Treatment End Time: 938  Length of Treatment: 90 Minutes  Symptoms Noted During Treatment: None  Total Treatment Time (min): 106    Adverse Event: no    I was present on these premises and immediately available to furnish assistance & direction throughout the procedure.   Plan      Chaz Rodriguez is a 59 y.o. male  did successfully complete today's hyperbaric oxygen treatment at [facility].    In my clinical judgement, ongoing HBO therapy is  necessary at this time, given a threat to patient function, limb or life from the

## 2025-04-10 NOTE — PROGRESS NOTES
Chaz Rodriguez is a 59 y.o. male  .  Subjective:      Wants to stop Lovastatin for month and see if feels better as far as myalgias. If it does we will try another statin. Possibly rosuvastatin.  Understands that he really needs to be on statin especially with history of peripheral arterial disease most recently treated by vascular.  Following with renal. Will repeat blood work. Had vascular surgery. Seeing podiatry for wound care..  Was [facility] 1 month ago. Had injection to eye.  Patient will have his complete blood work done and then we will do a follow-up in 1 month virtual.  At this point we will start him on his new statin.        Review of Systems   Constitutional:  Positive for fatigue. Negative for activity change, appetite change, chills, diaphoresis, fever and unexpected weight change.   HENT:  Negative for congestion, dental problem, drooling, ear discharge, ear pain, facial swelling, hearing loss, mouth sores, nosebleeds, postnasal drip, rhinorrhea, sinus pressure, sneezing, sore throat, tinnitus, trouble swallowing and voice change.    Eyes:  Negative for photophobia, pain, discharge, redness, itching and visual disturbance.   Respiratory:  Negative for apnea, cough, choking, chest tightness, shortness of breath, wheezing and stridor.    Cardiovascular:  Negative for chest pain, palpitations and leg swelling.   Gastrointestinal:  Negative for abdominal distention, abdominal pain, anal bleeding, blood in stool, constipation, diarrhea, nausea, rectal pain and vomiting.   Endocrine: Negative for cold intolerance, heat intolerance, polydipsia, polyphagia and polyuria.   Genitourinary:  Negative for decreased urine volume, difficulty urinating, dysuria, enuresis, flank pain, frequency, genital sores, hematuria, penile discharge, penile pain, penile swelling, scrotal swelling, testicular pain and urgency.   Musculoskeletal:  Negative for arthralgias, back pain, gait problem, joint swelling,

## 2025-04-11 ENCOUNTER — HOSPITAL ENCOUNTER (OUTPATIENT)
Dept: HYPERBARIC MEDICINE | Age: 60
Discharge: HOME | End: 2025-04-11
Payer: COMMERCIAL

## 2025-04-11 VITALS
SYSTOLIC BLOOD PRESSURE: 142 MMHG | HEART RATE: 86 BPM | TEMPERATURE: 96.5 F | RESPIRATION RATE: 18 BRPM | DIASTOLIC BLOOD PRESSURE: 56 MMHG

## 2025-04-11 DIAGNOSIS — L97.514 DIABETIC ULCER OF TOE OF RIGHT FOOT ASSOCIATED WITH TYPE 2 DIABETES MELLITUS, WITH NECROSIS OF BONE: Primary | ICD-10-CM

## 2025-04-11 DIAGNOSIS — E11.621 DIABETIC ULCER OF TOE OF RIGHT FOOT ASSOCIATED WITH TYPE 2 DIABETES MELLITUS, WITH NECROSIS OF BONE: Primary | ICD-10-CM

## 2025-04-11 LAB
GLUCOSE BLD-MCNC: 108 MG/DL (ref 74–99)
GLUCOSE BLD-MCNC: 113 MG/DL (ref 74–99)
GLUCOSE BLD-MCNC: 145 MG/DL (ref 74–99)

## 2025-04-11 PROCEDURE — 82962 GLUCOSE BLOOD TEST: CPT

## 2025-04-11 PROCEDURE — G0277 HBOT, FULL BODY CHAMBER, 30M: HCPCS

## 2025-04-11 RX ORDER — AMLODIPINE BESYLATE 5 MG/1
5 TABLET ORAL DAILY
Qty: 90 TABLET | Refills: 3 | Status: SHIPPED | OUTPATIENT
Start: 2025-04-11

## 2025-04-11 ASSESSMENT — ENCOUNTER SYMPTOMS
SORE THROAT: 0
STRIDOR: 0
SHORTNESS OF BREATH: 0
BACK PAIN: 0
RECTAL PAIN: 0
TROUBLE SWALLOWING: 0
RHINORRHEA: 0
CONSTIPATION: 0
NAUSEA: 0
WHEEZING: 0
APNEA: 0
EYE ITCHING: 0
EYE PAIN: 0
COUGH: 0
CHOKING: 0
VOMITING: 0
PHOTOPHOBIA: 0
SINUS PRESSURE: 0
VOICE CHANGE: 0
ABDOMINAL PAIN: 0
CHEST TIGHTNESS: 0
BLOOD IN STOOL: 0
COLOR CHANGE: 0
ABDOMINAL DISTENTION: 0
EYE REDNESS: 0
EYE DISCHARGE: 0
ANAL BLEEDING: 0
FACIAL SWELLING: 0
DIARRHEA: 0

## 2025-04-14 ENCOUNTER — APPOINTMENT (OUTPATIENT)
Dept: HYPERBARIC MEDICINE | Age: 60
End: 2025-04-14
Payer: COMMERCIAL

## 2025-04-15 ENCOUNTER — HOSPITAL ENCOUNTER (OUTPATIENT)
Dept: HYPERBARIC MEDICINE | Age: 60
Discharge: HOME | End: 2025-04-15
Payer: COMMERCIAL

## 2025-04-15 VITALS
RESPIRATION RATE: 16 BRPM | DIASTOLIC BLOOD PRESSURE: 51 MMHG | SYSTOLIC BLOOD PRESSURE: 139 MMHG | HEART RATE: 85 BPM | TEMPERATURE: 97.5 F

## 2025-04-15 DIAGNOSIS — E11.621 DIABETIC ULCER OF TOE OF RIGHT FOOT ASSOCIATED WITH TYPE 2 DIABETES MELLITUS, WITH NECROSIS OF BONE: Primary | ICD-10-CM

## 2025-04-15 DIAGNOSIS — L97.514 DIABETIC ULCER OF TOE OF RIGHT FOOT ASSOCIATED WITH TYPE 2 DIABETES MELLITUS, WITH NECROSIS OF BONE: Primary | ICD-10-CM

## 2025-04-15 LAB
GLUCOSE BLD-MCNC: 107 MG/DL (ref 74–99)
GLUCOSE BLD-MCNC: 113 MG/DL (ref 74–99)
GLUCOSE BLD-MCNC: 158 MG/DL (ref 74–99)

## 2025-04-15 PROCEDURE — 99183 HYPERBARIC OXYGEN THERAPY: CPT | Performed by: STUDENT IN AN ORGANIZED HEALTH CARE EDUCATION/TRAINING PROGRAM

## 2025-04-15 PROCEDURE — G0277 HBOT, FULL BODY CHAMBER, 30M: HCPCS

## 2025-04-15 PROCEDURE — 82962 GLUCOSE BLOOD TEST: CPT

## 2025-04-15 NOTE — PROGRESS NOTES
[facility]  Hyperbaric Oxygen Therapy   Progress Note    NAME: Chaz Rodriguez  MEDICAL RECORD NUMBER:  [id number]  AGE: 59 y.o.   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  See doc flowsheets for documentation.  Objective      Please see lab results for finger stick glucose results  Pre treatment Vital Signs       Temp: 97.5 °F (36.4 °C)     Pulse: 85     Respirations: 15     BP: (!) 121/57     Post treatment Vital Signs  Temp: 97.5 °F (36.4 °C)  Pulse: 85  Respirations: 16  BP: (!) 139/51  Assessment      Physical Exam:  General Appearance:  alert and oriented to person, place and time, well-developed and well-nourished, in no acute distress  ENT:  tympanic membranes intact bilaterally  Pulmonary/Chest:  clear to auscultation bilaterally- no wheezes, rales or rhonchi, normal air movement, no respiratory distress  Cardiovascular:  S1S2  Chamber #: [id number]  Treatment Start Time: 0744     Pressure Reached Time: 0758  DELFINA : 2  Number of Air Breaks:  Treatment Status: No Air break     Decompression Time: 09   Treatment End Time: 0940  Length of Treatment: 90 Minutes  Symptoms Noted During Treatment: None  Total Treatment Time (min): 116    Adverse Event: no    I was present on these premises and immediately available to furnish assistance & direction throughout the procedure.   Plan      Chaz Rodriguez is a 59 y.o. male  did successfully complete today's hyperbaric oxygen treatment at [facility].    In my

## 2025-04-16 ENCOUNTER — HOSPITAL ENCOUNTER (OUTPATIENT)
Dept: HYPERBARIC MEDICINE | Age: 60
Discharge: HOME | End: 2025-04-16
Payer: COMMERCIAL

## 2025-04-16 VITALS
TEMPERATURE: 97.3 F | SYSTOLIC BLOOD PRESSURE: 160 MMHG | HEART RATE: 89 BPM | DIASTOLIC BLOOD PRESSURE: 75 MMHG | RESPIRATION RATE: 16 BRPM

## 2025-04-16 DIAGNOSIS — L97.514 DIABETIC ULCER OF TOE OF RIGHT FOOT ASSOCIATED WITH TYPE 2 DIABETES MELLITUS, WITH NECROSIS OF BONE: Primary | ICD-10-CM

## 2025-04-16 DIAGNOSIS — E11.621 DIABETIC ULCER OF TOE OF RIGHT FOOT ASSOCIATED WITH TYPE 2 DIABETES MELLITUS, WITH NECROSIS OF BONE: Primary | ICD-10-CM

## 2025-04-16 LAB
GLUCOSE BLD-MCNC: 119 MG/DL (ref 74–99)
GLUCOSE BLD-MCNC: 124 MG/DL (ref 74–99)
GLUCOSE BLD-MCNC: 173 MG/DL (ref 74–99)

## 2025-04-16 PROCEDURE — G0277 HBOT, FULL BODY CHAMBER, 30M: HCPCS

## 2025-04-16 PROCEDURE — 82962 GLUCOSE BLOOD TEST: CPT

## 2025-04-16 NOTE — DISCHARGE INSTRUCTIONS
Discharge Instructions for  Hyperbaric Oxygen Therapy  [facility]  [address], [place] [address]  Telephone: [phone]     FAX [phone]    NAME:  Chaz Rodriguez  YOB: 1965  MEDICAL RECORD NUMBER:  [id number]  DATE:  4/16/2025    You have been treated with 100% oxygen in the Hyperbaric Chamber at the [facility].  There are usually no adverse effects or problems which follow this treatment.  However, for comfort and safety, we strongly recommend these guidelines are followed:    It is perfectly normal to feel tired after a hyperbaric treatment.  This tiredness should go away 2 to 3 days after your last treatment.  Avoid heavy exertion, exercise or other unnecessary activity if you are feeling tired.   Some people develop a feeling of fullness or stuffiness in their ears.  This is a result of a small amount of fluid build-up in the middle ear.  You may take an over the counter decongestant if approved by your doctor.  Follow the instructions in the medicine package for the amount to take.  If this problem lasts for more than 48 hours, please call your personal doctor.  You also may call or return to the [facility] Hyperbaric Medicine Department and have a Hyperbaric physician examine you.  In rare cases, patients may have so called “late effects” after the treatments.  Please call the Hyperbaric Medicine Department if you have any of these symptoms:     [x]Headache that is not relieved by over the counter pain medicine.  [x]Changes in vision.  During the course of many hyperbaric treatments (20 or more) some patients may complain of a temporary problem with sharp focus on distant objects.  This is a result of changes in the shape of the lens.  Your vision should return to normal in 6 to 8 weeks.  [x]Severe pain in your ears.  [x]Nausea or vomiting.  [x]Difficulty concentrating.  [x]Clumsiness, difficulty

## 2025-04-17 ENCOUNTER — HOSPITAL ENCOUNTER (OUTPATIENT)
Dept: HYPERBARIC MEDICINE | Age: 60
Discharge: HOME | End: 2025-04-17
Payer: COMMERCIAL

## 2025-04-17 VITALS
HEART RATE: 72 BPM | RESPIRATION RATE: 18 BRPM | TEMPERATURE: 97.3 F | DIASTOLIC BLOOD PRESSURE: 65 MMHG | SYSTOLIC BLOOD PRESSURE: 135 MMHG

## 2025-04-17 DIAGNOSIS — L97.514 DIABETIC ULCER OF TOE OF RIGHT FOOT ASSOCIATED WITH TYPE 2 DIABETES MELLITUS, WITH NECROSIS OF BONE: Primary | ICD-10-CM

## 2025-04-17 DIAGNOSIS — E11.621 DIABETIC ULCER OF TOE OF RIGHT FOOT ASSOCIATED WITH TYPE 2 DIABETES MELLITUS, WITH NECROSIS OF BONE: Primary | ICD-10-CM

## 2025-04-17 LAB
GLUCOSE BLD-MCNC: 127 MG/DL (ref 74–99)
GLUCOSE BLD-MCNC: 139 MG/DL (ref 74–99)
GLUCOSE BLD-MCNC: 177 MG/DL (ref 74–99)

## 2025-04-17 PROCEDURE — 82962 GLUCOSE BLOOD TEST: CPT

## 2025-04-17 PROCEDURE — G0277 HBOT, FULL BODY CHAMBER, 30M: HCPCS

## 2025-04-17 NOTE — PROGRESS NOTES
Chaz Rodriguez is a 59 y.o. male has been receiving hyperbaric oxygen treatment for management of:Indication  Indications: Lower Extremity Diabetic Wound ___(site) (Diabetic ulcer of toe of right foot associated with type 2 diabetes mellitus, with necrosis of bone). Mr. Rodriguez has completed Treatment Number: 16 out of a treatment protocol of Total Treatments: 30.    Problem List:  Patient Active Problem List   Diagnosis Code    Type 2 diabetes mellitus without complication E11.9    Mixed hyperlipidemia E78.2    Benign essential HTN I10    Diabetic ulcer of toe of right foot associated with type 2 diabetes mellitus, with necrosis of bone E11.621, L97.514    Toe ulcer, left, with fat layer exposed (HCC) L97.522    Kidney transplant status Z94.0    Tinea pedis of both feet B35.3    Decreased dorsalis pedis pulse R09.89    PVD (peripheral vascular disease) I73.9    Chronic renal failure N18.9    Tobacco dependence F17.200       Patients ID was verified.Hyperbaric oxygen therapy plan of care, patient history, outpatient fall risk assessment, nutritional assessment and daily medications were reviewed, addressed and updated as needed.    Pt is tolerating hyperbaric oxygen therapy  well without complications.         Chandrika Gonzalez RN  4/17/2025  7:47 AM

## 2025-04-18 ENCOUNTER — HOSPITAL ENCOUNTER (OUTPATIENT)
Dept: HYPERBARIC MEDICINE | Age: 60
Discharge: HOME | End: 2025-04-18
Payer: COMMERCIAL

## 2025-04-18 VITALS
HEART RATE: 81 BPM | DIASTOLIC BLOOD PRESSURE: 71 MMHG | TEMPERATURE: 97 F | RESPIRATION RATE: 16 BRPM | SYSTOLIC BLOOD PRESSURE: 145 MMHG

## 2025-04-18 DIAGNOSIS — L97.514 DIABETIC ULCER OF TOE OF RIGHT FOOT ASSOCIATED WITH TYPE 2 DIABETES MELLITUS, WITH NECROSIS OF BONE: Primary | ICD-10-CM

## 2025-04-18 DIAGNOSIS — E11.621 DIABETIC ULCER OF TOE OF RIGHT FOOT ASSOCIATED WITH TYPE 2 DIABETES MELLITUS, WITH NECROSIS OF BONE: Primary | ICD-10-CM

## 2025-04-18 LAB
GLUCOSE BLD-MCNC: 185 MG/DL (ref 74–99)
GLUCOSE BLD-MCNC: 213 MG/DL (ref 74–99)

## 2025-04-18 PROCEDURE — 82962 GLUCOSE BLOOD TEST: CPT

## 2025-04-18 PROCEDURE — G0277 HBOT, FULL BODY CHAMBER, 30M: HCPCS

## 2025-04-18 NOTE — PROGRESS NOTES
[facility]  Hyperbaric Oxygen Therapy   Progress Note    NAME: Chaz Rodriguez  MEDICAL RECORD NUMBER:  [id number]  AGE: 59 y.o.   GENDER: male  : 1965  EPISODE DATE:  2025   Subjective   HBO Treatment Number: 17 out of Total Treatments: 30  HBO Diagnosis:   Problem List Items Addressed This Visit       Diabetic ulcer of toe of right foot associated with type 2 diabetes mellitus, with necrosis of bone - Primary    Relevant Orders    Notify physician (specify)    Hyperbaric Oxygen Therapy    POCT glucose    Care order/instruction    Care order/instruction     Safety checks performed prior to treatment.  See doc flowsheets for documentation.  Objective      Please see lab results for finger stick glucose results  Pre treatment Vital Signs       Temp: (!) 96.5 °F (35.8 °C)     Pulse: 86     Respirations: 18     BP: 131/61     Post treatment Vital Signs  Temp: 97 °F (36.1 °C)  Pulse: 81  Respirations: 16  BP: (!) 145/71  Assessment      Physical Exam:  General Appearance:  alert and oriented to person, place and time, well-developed and well-nourished, in no acute distress  ENT:  tympanic membranes intact bilaterally  Pulmonary/Chest:  clear to auscultation bilaterally- no wheezes, rales or rhonchi, normal air movement, no respiratory distress  Cardiovascular:  regular rate and rhythm  Chamber #: [id number]  Treatment Start Time: 0748     Pressure Reached Time: 0800  DELFINA : 2  Number of Air Breaks:  Treatment Status: No Air break     Decompression Time: 931   Treatment End Time: 09  Length of Treatment: 90 Minutes  Symptoms Noted During Treatment: None  Total Treatment Time (min): 110    Adverse Event: no    I was present on these premises and immediately available to furnish assistance & direction throughout the procedure.   Plan      Chaz Rodriguez is a 59 y.o. male  did successfully complete today's hyperbaric oxygen treatment at [facility].    In my

## 2025-04-21 ENCOUNTER — HOSPITAL ENCOUNTER (OUTPATIENT)
Dept: HYPERBARIC MEDICINE | Age: 60
Discharge: HOME | End: 2025-04-21
Payer: COMMERCIAL

## 2025-04-21 VITALS
DIASTOLIC BLOOD PRESSURE: 68 MMHG | SYSTOLIC BLOOD PRESSURE: 148 MMHG | HEART RATE: 92 BPM | RESPIRATION RATE: 16 BRPM | TEMPERATURE: 96.8 F

## 2025-04-21 DIAGNOSIS — L97.514 DIABETIC ULCER OF TOE OF RIGHT FOOT ASSOCIATED WITH TYPE 2 DIABETES MELLITUS, WITH NECROSIS OF BONE (HCC): Primary | ICD-10-CM

## 2025-04-21 DIAGNOSIS — E11.621 DIABETIC ULCER OF TOE OF RIGHT FOOT ASSOCIATED WITH TYPE 2 DIABETES MELLITUS, WITH NECROSIS OF BONE (HCC): Primary | ICD-10-CM

## 2025-04-21 LAB
GLUCOSE BLD-MCNC: 129 MG/DL (ref 74–99)
GLUCOSE BLD-MCNC: 135 MG/DL (ref 74–99)
GLUCOSE BLD-MCNC: 136 MG/DL (ref 74–99)

## 2025-04-21 PROCEDURE — 99183 HYPERBARIC OXYGEN THERAPY: CPT | Performed by: SURGERY

## 2025-04-21 PROCEDURE — G0277 HBOT, FULL BODY CHAMBER, 30M: HCPCS

## 2025-04-21 PROCEDURE — 82962 GLUCOSE BLOOD TEST: CPT

## 2025-04-21 NOTE — PROGRESS NOTES
[facility]  Hyperbaric Oxygen Therapy   Progress Note    NAME: Chaz Rodriguez  MEDICAL RECORD NUMBER:  [id number]  AGE: 59 y.o.   GENDER: male  : 1965  EPISODE DATE:  2025   Subjective   HBO Treatment Number: 18 out of Total Treatments: 30  HBO Diagnosis:   Problem List Items Addressed This Visit       Diabetic ulcer of toe of right foot associated with type 2 diabetes mellitus, with necrosis of bone - Primary    Relevant Orders    Notify physician (specify)    Hyperbaric Oxygen Therapy    POCT glucose    Care order/instruction    Care order/instruction     Safety checks performed prior to treatment.  See doc flowsheets for documentation.  Objective      Please see lab results for finger stick glucose results  Pre treatment Vital Signs       Temp: 97.1 °F (36.2 °C)     Pulse: 85     Respirations: 16     BP: (!) 158/60     Post treatment Vital Signs  Temp: 96.8 °F (36 °C)  Pulse: 92  Respirations: 16  BP: (!) 148/68  Assessment      Physical Exam:  General Appearance:  alert and oriented to person, place and time, well-developed and well-nourished, in no acute distress  ENT:  tympanic membranes intact bilaterally  Pulmonary/Chest:  clear to auscultation bilaterally- no wheezes, rales or rhonchi, normal air movement, no respiratory distress  Cardiovascular:  regular rate and rhythm  Chamber #: [id number]  Treatment Start Time: 0746     Pressure Reached Time: 0754  DELFINA : 2  Number of Air Breaks:  Treatment Status: No Air break     Decompression Time: 0925   Treatment End Time: 0932  Length of Treatment: 90 Minutes  Symptoms Noted During Treatment: None  Total Treatment Time (min): 106    Adverse Event: no    I was present on these premises and immediately available to furnish assistance & direction throughout the procedure.   Plan      Chaz Rodriguez is a 59 y.o. male  did successfully complete today's hyperbaric oxygen treatment at [facility].    In my

## 2025-04-22 ENCOUNTER — HOSPITAL ENCOUNTER (OUTPATIENT)
Dept: HYPERBARIC MEDICINE | Age: 60
Discharge: HOME | End: 2025-04-22
Payer: COMMERCIAL

## 2025-04-22 VITALS
HEART RATE: 79 BPM | SYSTOLIC BLOOD PRESSURE: 137 MMHG | DIASTOLIC BLOOD PRESSURE: 62 MMHG | TEMPERATURE: 96.9 F | RESPIRATION RATE: 18 BRPM

## 2025-04-22 DIAGNOSIS — L97.514 DIABETIC ULCER OF TOE OF RIGHT FOOT ASSOCIATED WITH TYPE 2 DIABETES MELLITUS, WITH NECROSIS OF BONE (HCC): Primary | ICD-10-CM

## 2025-04-22 DIAGNOSIS — E11.621 DIABETIC ULCER OF TOE OF RIGHT FOOT ASSOCIATED WITH TYPE 2 DIABETES MELLITUS, WITH NECROSIS OF BONE (HCC): Primary | ICD-10-CM

## 2025-04-22 LAB
GLUCOSE BLD-MCNC: 153 MG/DL (ref 74–99)
GLUCOSE BLD-MCNC: 160 MG/DL (ref 74–99)

## 2025-04-22 PROCEDURE — G0277 HBOT, FULL BODY CHAMBER, 30M: HCPCS

## 2025-04-22 PROCEDURE — 82962 GLUCOSE BLOOD TEST: CPT

## 2025-04-22 PROCEDURE — 99183 HYPERBARIC OXYGEN THERAPY: CPT | Performed by: STUDENT IN AN ORGANIZED HEALTH CARE EDUCATION/TRAINING PROGRAM

## 2025-04-22 NOTE — PROGRESS NOTES
[facility]  Hyperbaric Oxygen Therapy   Progress Note    NAME: Chaz Rodriguez  MEDICAL RECORD NUMBER:  [id number]  AGE: 59 y.o.   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  See doc flowsheets for documentation.  Objective      Please see lab results for finger stick glucose results  Pre treatment Vital Signs       Temp: 97.2 °F (36.2 °C)     Pulse: 80     Respirations: 18     BP: (!) 140/67     Post treatment Vital Signs  Temp: 96.9 °F (36.1 °C)  Pulse: 79  Respirations: 18  BP: 137/62  Assessment      Physical Exam:  General Appearance:  alert and oriented to person, place and time, well-developed and well-nourished, in no acute distress  ENT:  tympanic membranes intact bilaterally  Pulmonary/Chest:  clear to auscultation bilaterally- no wheezes, rales or rhonchi, normal air movement, no respiratory distress  Cardiovascular:  regular rate and rhythm  Chamber #: [id number]  Treatment Start Time: 0753     Pressure Reached Time: 0802  DELFINA : 2  Number of Air Breaks:  Treatment Status: No Air break     Decompression Time: 0932   Treatment End Time: 0940  Length of Treatment: 90 Minutes  Symptoms Noted During Treatment: None  Total Treatment Time (min): 107    Adverse Event: no    I was present on these premises and immediately available to furnish assistance & direction throughout the procedure.   Plan      Chaz Rodriguez is a 59 y.o. male  did successfully complete today's hyperbaric oxygen treatment at [facility].    In my clinical judgement, ongoing HBO therapy is  necessary

## 2025-04-23 ENCOUNTER — HOSPITAL ENCOUNTER (OUTPATIENT)
Dept: HYPERBARIC MEDICINE | Age: 60
Discharge: HOME | End: 2025-04-23
Payer: COMMERCIAL

## 2025-04-23 VITALS
DIASTOLIC BLOOD PRESSURE: 63 MMHG | RESPIRATION RATE: 18 BRPM | SYSTOLIC BLOOD PRESSURE: 129 MMHG | HEART RATE: 75 BPM | TEMPERATURE: 96.6 F

## 2025-04-23 DIAGNOSIS — L97.514 DIABETIC ULCER OF TOE OF RIGHT FOOT ASSOCIATED WITH TYPE 2 DIABETES MELLITUS, WITH NECROSIS OF BONE (HCC): Primary | ICD-10-CM

## 2025-04-23 DIAGNOSIS — E11.621 DIABETIC ULCER OF TOE OF RIGHT FOOT ASSOCIATED WITH TYPE 2 DIABETES MELLITUS, WITH NECROSIS OF BONE (HCC): Primary | ICD-10-CM

## 2025-04-23 LAB
GLUCOSE BLD-MCNC: 127 MG/DL (ref 74–99)
GLUCOSE BLD-MCNC: 173 MG/DL (ref 74–99)

## 2025-04-23 PROCEDURE — 82962 GLUCOSE BLOOD TEST: CPT

## 2025-04-23 PROCEDURE — 99183 HYPERBARIC OXYGEN THERAPY: CPT | Performed by: SURGERY

## 2025-04-23 PROCEDURE — G0277 HBOT, FULL BODY CHAMBER, 30M: HCPCS

## 2025-04-23 NOTE — PROGRESS NOTES
Wound Care Center.    In my clinical judgement, ongoing HBO therapy is  necessary at this time, given a threat to patient function, limb or life from the current condition.      Supervision and attendance of Hyperbaric Oxygen Therapy provided.  Continue HBO treatment as outlined in the treatment plan.    Hyperbaric Oxygen: Chaz Rodriguez tolerated Treatment Number: 20 well today without complications.    Discharge Instructions were explained and given to Mr. Rodriguez     Electronically signed by Hilario Vides MD on 4/23/2025 at 11:26 AM

## 2025-04-24 ENCOUNTER — HOSPITAL ENCOUNTER (OUTPATIENT)
Dept: HYPERBARIC MEDICINE | Age: 60
Discharge: HOME | End: 2025-04-24
Payer: COMMERCIAL

## 2025-04-24 VITALS
HEART RATE: 80 BPM | DIASTOLIC BLOOD PRESSURE: 62 MMHG | RESPIRATION RATE: 18 BRPM | SYSTOLIC BLOOD PRESSURE: 138 MMHG | TEMPERATURE: 97.3 F

## 2025-04-24 DIAGNOSIS — E11.621 DIABETIC ULCER OF TOE OF RIGHT FOOT ASSOCIATED WITH TYPE 2 DIABETES MELLITUS, WITH NECROSIS OF BONE (HCC): Primary | ICD-10-CM

## 2025-04-24 DIAGNOSIS — L97.514 DIABETIC ULCER OF TOE OF RIGHT FOOT ASSOCIATED WITH TYPE 2 DIABETES MELLITUS, WITH NECROSIS OF BONE (HCC): Primary | ICD-10-CM

## 2025-04-24 LAB
GLUCOSE BLD-MCNC: 166 MG/DL (ref 74–99)
GLUCOSE BLD-MCNC: 201 MG/DL (ref 74–99)

## 2025-04-24 PROCEDURE — 82962 GLUCOSE BLOOD TEST: CPT

## 2025-04-24 PROCEDURE — G0277 HBOT, FULL BODY CHAMBER, 30M: HCPCS

## 2025-04-24 NOTE — PROGRESS NOTES
Chaz Rodriguez is a 59 y.o. male has been receiving hyperbaric oxygen treatment for management of:Indication  Indications: Lower Extremity Diabetic Wound ___(site) (Diabetic ulcer of toe of right foot associated with type 2 diabetes mellitus, with necrosis of bone). Mr. Rodriguez has completed Treatment Number: 21 out of a treatment protocol of Total Treatments: 30.    Problem List:  Patient Active Problem List   Diagnosis Code    Type 2 diabetes mellitus without complication (HCC) E11.9    Mixed hyperlipidemia E78.2    Benign essential HTN I10    Diabetic ulcer of toe of right foot associated with type 2 diabetes mellitus, with necrosis of bone (HCC) E11.621, L97.514    Toe ulcer, left, with fat layer exposed (HCC) L97.522    Kidney transplant status Z94.0    Tinea pedis of both feet B35.3    Decreased dorsalis pedis pulse R09.89    PVD (peripheral vascular disease) I73.9    Chronic renal failure N18.9    Tobacco dependence F17.200       Patients ID was verified.Hyperbaric oxygen therapy plan of care, patient history, outpatient fall risk assessment, nutritional assessment and daily medications were reviewed, addressed and updated as needed.    Pt is tolerating hyperbaric oxygen therapy  well without complications.         Chandrika Gonzalez RN  4/24/2025  8:11 AM

## 2025-04-25 ENCOUNTER — HOSPITAL ENCOUNTER (OUTPATIENT)
Dept: HYPERBARIC MEDICINE | Age: 60
Discharge: HOME | End: 2025-04-25
Payer: COMMERCIAL

## 2025-04-25 VITALS
TEMPERATURE: 97.3 F | HEART RATE: 82 BPM | SYSTOLIC BLOOD PRESSURE: 168 MMHG | RESPIRATION RATE: 16 BRPM | DIASTOLIC BLOOD PRESSURE: 85 MMHG

## 2025-04-25 DIAGNOSIS — L97.514 DIABETIC ULCER OF TOE OF RIGHT FOOT ASSOCIATED WITH TYPE 2 DIABETES MELLITUS, WITH NECROSIS OF BONE (HCC): Primary | ICD-10-CM

## 2025-04-25 DIAGNOSIS — E11.621 DIABETIC ULCER OF TOE OF RIGHT FOOT ASSOCIATED WITH TYPE 2 DIABETES MELLITUS, WITH NECROSIS OF BONE (HCC): Primary | ICD-10-CM

## 2025-04-25 LAB
GLUCOSE BLD-MCNC: 102 MG/DL (ref 74–99)
GLUCOSE BLD-MCNC: 107 MG/DL (ref 74–99)
GLUCOSE BLD-MCNC: 116 MG/DL (ref 74–99)
GLUCOSE BLD-MCNC: 142 MG/DL (ref 74–99)
GLUCOSE BLD-MCNC: 90 MG/DL (ref 74–99)

## 2025-04-25 PROCEDURE — 82962 GLUCOSE BLOOD TEST: CPT

## 2025-04-25 PROCEDURE — G0277 HBOT, FULL BODY CHAMBER, 30M: HCPCS

## 2025-04-28 ENCOUNTER — HOSPITAL ENCOUNTER (OUTPATIENT)
Dept: HYPERBARIC MEDICINE | Age: 60
Discharge: HOME | End: 2025-04-28
Payer: COMMERCIAL

## 2025-04-28 VITALS
SYSTOLIC BLOOD PRESSURE: 160 MMHG | TEMPERATURE: 97.6 F | RESPIRATION RATE: 16 BRPM | DIASTOLIC BLOOD PRESSURE: 66 MMHG | HEART RATE: 88 BPM

## 2025-04-28 DIAGNOSIS — L97.514 DIABETIC ULCER OF TOE OF RIGHT FOOT ASSOCIATED WITH TYPE 2 DIABETES MELLITUS, WITH NECROSIS OF BONE (HCC): Primary | ICD-10-CM

## 2025-04-28 DIAGNOSIS — E11.621 DIABETIC ULCER OF TOE OF RIGHT FOOT ASSOCIATED WITH TYPE 2 DIABETES MELLITUS, WITH NECROSIS OF BONE (HCC): Primary | ICD-10-CM

## 2025-04-28 LAB
GLUCOSE BLD-MCNC: 102 MG/DL (ref 74–99)
GLUCOSE BLD-MCNC: 114 MG/DL (ref 74–99)
GLUCOSE BLD-MCNC: 173 MG/DL (ref 74–99)

## 2025-04-28 PROCEDURE — G0277 HBOT, FULL BODY CHAMBER, 30M: HCPCS

## 2025-04-28 PROCEDURE — 82962 GLUCOSE BLOOD TEST: CPT

## 2025-04-28 NOTE — CONSULTS
[facility]  Hyperbaric Oxygen Therapy   Progress Note    NAME: Chaz Rodriguez  MEDICAL RECORD NUMBER:  [id number]  AGE: 59 y.o.   GENDER: male  : 1965  EPISODE DATE:  2025   Subjective   HBO Treatment Number: 23 out of Total Treatments: 30  HBO Diagnosis:   Problem List Items Addressed This Visit       Diabetic ulcer of toe of right foot associated with type 2 diabetes mellitus, with necrosis of bone (HCC) - Primary    Relevant Orders    Hypoglycemial protocol    POCT glucose    Care order/instruction    Notify physician (specify)    Hyperbaric Oxygen Therapy     Safety checks performed prior to treatment.  See doc flowsheets for documentation.  Objective      Please see lab results for finger stick glucose results  Pre treatment Vital Signs       Temp: 97.3 °F (36.3 °C)     Pulse: 91     Respirations: 18     BP: 128/83     Post treatment Vital Signs  Temp: 97.6 °F (36.4 °C)  Pulse: 88  Respirations: 16  BP: (!) 160/66  Assessment      Physical Exam:  General Appearance:  alert and oriented to person, place and time, well-developed and well-nourished, in no acute distress  ENT:  tympanic membranes intact bilaterally  Pulmonary/Chest:  clear to auscultation bilaterally- no wheezes, rales or rhonchi, normal air movement, no respiratory distress  Cardiovascular:  regular rate and rhythm  Chamber #: [id number]  Treatment Start Time: 0757     Pressure Reached Time: 0808  DELFINA : 2  Number of Air Breaks:  Treatment Status: No Air break         Treatment End Time: 0949  Length of Treatment: 90 Minutes  Symptoms Noted During Treatment: None  Total Treatment Time (min): 112    Adverse Event: no    I was present on these premises and immediately available to furnish assistance & direction throughout the procedure.   Plan      Chaz Rodriguez is a 59 y.o. male  did successfully complete today's hyperbaric oxygen treatment at [facility].    In my clinical

## 2025-04-29 ENCOUNTER — HOSPITAL ENCOUNTER (OUTPATIENT)
Dept: HYPERBARIC MEDICINE | Age: 60
Discharge: HOME | End: 2025-04-29
Payer: COMMERCIAL

## 2025-04-29 VITALS
HEART RATE: 89 BPM | TEMPERATURE: 96.8 F | SYSTOLIC BLOOD PRESSURE: 140 MMHG | DIASTOLIC BLOOD PRESSURE: 70 MMHG | RESPIRATION RATE: 16 BRPM

## 2025-04-29 DIAGNOSIS — E11.621 DIABETIC ULCER OF TOE OF RIGHT FOOT ASSOCIATED WITH TYPE 2 DIABETES MELLITUS, WITH NECROSIS OF BONE (HCC): Primary | ICD-10-CM

## 2025-04-29 DIAGNOSIS — L97.514 DIABETIC ULCER OF TOE OF RIGHT FOOT ASSOCIATED WITH TYPE 2 DIABETES MELLITUS, WITH NECROSIS OF BONE (HCC): Primary | ICD-10-CM

## 2025-04-29 LAB
GLUCOSE BLD-MCNC: 182 MG/DL (ref 74–99)
GLUCOSE BLD-MCNC: 225 MG/DL (ref 74–99)

## 2025-04-29 PROCEDURE — 99183 HYPERBARIC OXYGEN THERAPY: CPT | Performed by: STUDENT IN AN ORGANIZED HEALTH CARE EDUCATION/TRAINING PROGRAM

## 2025-04-29 PROCEDURE — G0277 HBOT, FULL BODY CHAMBER, 30M: HCPCS

## 2025-04-29 PROCEDURE — 82962 GLUCOSE BLOOD TEST: CPT

## 2025-04-29 NOTE — PROGRESS NOTES
[facility]  Hyperbaric Oxygen Therapy   Progress Note    NAME: Chaz Rodriguez  MEDICAL RECORD NUMBER:  [id number]  AGE: 59 y.o.   GENDER: male  : 1965  EPISODE DATE:  2025   Subjective   HBO Treatment Number: 24 out of Total Treatments: 30  HBO Diagnosis:   Problem List Items Addressed This Visit          Endocrine    Diabetic ulcer of toe of right foot associated with type 2 diabetes mellitus, with necrosis of bone (HCC) - Primary    Relevant Orders    Hypoglycemial protocol    POCT glucose    Care order/instruction    Notify physician (specify)    Hyperbaric Oxygen Therapy    Care order/instruction     Safety checks performed prior to treatment.  See doc flowsheets for documentation.  Objective      Please see lab results for finger stick glucose results  Pre treatment Vital Signs       Temp: 97 °F (36.1 °C)     Pulse: 88     Respirations: 16     BP: (!) 141/61     Post treatment Vital Signs  Temp: 96.8 °F (36 °C)  Pulse: 89  Respirations: 16  BP: (!) 140/70  Assessment      Physical Exam:  General Appearance:  alert and oriented to person, place and time, well-developed and well-nourished, in no acute distress  ENT:  tympanic membranes intact bilaterally  Pulmonary/Chest:  clear to auscultation bilaterally- no wheezes, rales or rhonchi, normal air movement, no respiratory distress  Cardiovascular:  regular rate and rhythm  Chamber #: [id number]  Treatment Start Time: 0735     Pressure Reached Time: 0744  DELFINA : 2  Number of Air Breaks:  Treatment Status: No Air break     Decompression Time: 0914   Treatment End Time: 0925  Length of Treatment: 90 Minutes  Symptoms Noted During Treatment: None  Total Treatment Time (min): 110    Adverse Event: no    I was present on these premises and immediately available to furnish assistance & direction throughout the procedure.   Plan      Chaz Rodriguez is a 59 y.o. male  did successfully complete today's hyperbaric oxygen treatment at [place]

## 2025-04-30 ENCOUNTER — HOSPITAL ENCOUNTER (OUTPATIENT)
Dept: HYPERBARIC MEDICINE | Age: 60
Discharge: HOME | End: 2025-04-30
Payer: COMMERCIAL

## 2025-04-30 VITALS
SYSTOLIC BLOOD PRESSURE: 154 MMHG | TEMPERATURE: 97.5 F | HEART RATE: 81 BPM | RESPIRATION RATE: 18 BRPM | DIASTOLIC BLOOD PRESSURE: 59 MMHG

## 2025-04-30 DIAGNOSIS — E11.621 DIABETIC ULCER OF TOE OF RIGHT FOOT ASSOCIATED WITH TYPE 2 DIABETES MELLITUS, WITH NECROSIS OF BONE (HCC): Primary | ICD-10-CM

## 2025-04-30 DIAGNOSIS — L97.514 DIABETIC ULCER OF TOE OF RIGHT FOOT ASSOCIATED WITH TYPE 2 DIABETES MELLITUS, WITH NECROSIS OF BONE (HCC): Primary | ICD-10-CM

## 2025-04-30 LAB
GLUCOSE BLD-MCNC: 168 MG/DL (ref 74–99)
GLUCOSE BLD-MCNC: 202 MG/DL (ref 74–99)

## 2025-04-30 PROCEDURE — G0277 HBOT, FULL BODY CHAMBER, 30M: HCPCS

## 2025-04-30 PROCEDURE — 99183 HYPERBARIC OXYGEN THERAPY: CPT | Performed by: SURGERY

## 2025-04-30 PROCEDURE — 82962 GLUCOSE BLOOD TEST: CPT

## 2025-04-30 NOTE — PROGRESS NOTES
[facility]  Hyperbaric Oxygen Therapy   Progress Note    NAME: Chaz Rodriguez  MEDICAL RECORD NUMBER:  [id number]  AGE: 59 y.o.   GENDER: male  : 1965  EPISODE DATE:  2025   Subjective   HBO Treatment Number: 25 (Simultaneous filing. User may not have seen previous data.) out of Total Treatments: 30  HBO Diagnosis:   Problem List Items Addressed This Visit       Diabetic ulcer of toe of right foot associated with type 2 diabetes mellitus, with necrosis of bone (HCC) - Primary    Relevant Orders    Hypoglycemial protocol    POCT glucose    Care order/instruction    Notify physician (specify)    Hyperbaric Oxygen Therapy    Care order/instruction     Safety checks performed prior to treatment.  See doc flowsheets for documentation.  Objective      Please see lab results for finger stick glucose results  Pre treatment Vital Signs       Temp: 97 °F (36.1 °C)     Pulse: 83     Respirations: 16     BP: (!) 145/60     Post treatment Vital Signs  Temp: 97.5 °F (36.4 °C)  Pulse: 81  Respirations: 18  BP: (!) 154/59  Assessment      Physical Exam:  General Appearance:  alert and oriented to person, place and time, well-developed and well-nourished, in no acute distress  ENT:  tympanic membranes intact bilaterally  Pulmonary/Chest:  clear to auscultation bilaterally- no wheezes, rales or rhonchi, normal air movement, no respiratory distress  Cardiovascular:  S1S2  Chamber #: [id number] (Simultaneous filing. User may not have seen previous data.)  Treatment Start Time: 0736     Pressure Reached Time: 0743  DELFINA : 2  Number of Air Breaks:  Treatment Status: No Air break     Decompression Time: 09   Treatment End Time: 923  Length of Treatment: 90 Minutes  Symptoms Noted During Treatment: None  Total Treatment Time (min): 107    Adverse Event: no    I was present on these premises and immediately available to furnish assistance & direction throughout the procedure.   Plan      Chaz Rodriguez is a 59

## 2025-04-30 NOTE — PROGRESS NOTES
Hyperbarics Provider Reassessment     Chaz Rodriguez is a 59 y.o. male has been receiving hyperbaric oxygen treatment for management of    HBO Diagnosis:   Problem List Items Addressed This Visit       Diabetic ulcer of toe of right foot associated with type 2 diabetes mellitus, with necrosis of bone (HCC) - Primary    Relevant Orders    Hypoglycemial protocol    POCT glucose    Care order/instruction    Notify physician (specify)    Hyperbaric Oxygen Therapy    Care order/instruction       Michael has completed Treatment Number: 25 (Simultaneous filing. User may not have seen previous data.) out of a treatment protocol of Total Treatments: 30.    Hyperbaric oxygen therapy physician orders,plan of care, vascular and nutritional status reviewed, addressed and updated as needed.    Pt is tolerating hyperbaric oxygen therapy  well without complications.      All wound related documentation/correspondences from the [facility] and/or referring/collaborating physicians has been reviewed.     Wound:  Wound 03/11/25 Toe (Comment  which one) Right (Active)   Wound Image   04/29/25 0718   Wound Etiology Diabetic 03/11/25 1115   Dressing Status New dressing applied 04/09/25 1011   Wound Cleansed Cleansed with saline 04/09/25 1011   Dressing/Treatment Dry dressing 04/09/25 1011   Offloading for Diabetic Foot Ulcers Post op shoe 04/09/25 1011   Wound Length (cm) 0.8 cm 04/29/25 0718   Wound Width (cm) 2.1 cm 04/29/25 0718   Wound Depth (cm) 0.3 cm 04/29/25 0718   Wound Surface Area (cm^2) 1.68 cm^2 04/29/25 0718   Change in Wound Size % (l*w) 72.73 04/29/25 0718   Wound Volume (cm^3) 0.504 cm^3 04/29/25 0718   Wound Healing % 73 04/29/25 0718   Wound Assessment Fibrin;Pink/red 04/29/25 0718   Drainage Amount Moderate (25-50%) 04/29/25 0718   Drainage Description Yellow 04/29/25 0718   Odor None 04/29/25 0718   Yuko-wound Assessment Blanchable erythema 04/29/25 0718   Margins Defined edges 04/17/25 0722   Wound Thickness

## 2025-05-01 ENCOUNTER — HOSPITAL ENCOUNTER (OUTPATIENT)
Dept: HYPERBARIC MEDICINE | Age: 60
Discharge: HOME | End: 2025-05-01
Payer: COMMERCIAL

## 2025-05-01 VITALS
HEART RATE: 79 BPM | RESPIRATION RATE: 18 BRPM | SYSTOLIC BLOOD PRESSURE: 148 MMHG | DIASTOLIC BLOOD PRESSURE: 58 MMHG | TEMPERATURE: 96.8 F

## 2025-05-01 DIAGNOSIS — L97.514 DIABETIC ULCER OF TOE OF RIGHT FOOT ASSOCIATED WITH TYPE 2 DIABETES MELLITUS, WITH NECROSIS OF BONE (HCC): Primary | ICD-10-CM

## 2025-05-01 DIAGNOSIS — E11.621 DIABETIC ULCER OF TOE OF RIGHT FOOT ASSOCIATED WITH TYPE 2 DIABETES MELLITUS, WITH NECROSIS OF BONE (HCC): Primary | ICD-10-CM

## 2025-05-01 LAB
GLUCOSE BLD-MCNC: 131 MG/DL (ref 74–99)
GLUCOSE BLD-MCNC: 193 MG/DL (ref 74–99)

## 2025-05-01 PROCEDURE — 82962 GLUCOSE BLOOD TEST: CPT

## 2025-05-01 PROCEDURE — G0277 HBOT, FULL BODY CHAMBER, 30M: HCPCS

## 2025-05-01 NOTE — PROGRESS NOTES
Chaz Rodriguez is a 59 y.o. male has been receiving hyperbaric oxygen treatment for management of:Indication  Indications: Lower Extremity Diabetic Wound ___(site) (Diabetic ulcer of toe of right foot associated with type 2 diabetes mellitus, with necrosis of bone). Mr. Rodriguez has completed Treatment Number: 26 out of a treatment protocol of Total Treatments: 30.    Problem List:  Patient Active Problem List   Diagnosis Code    Type 2 diabetes mellitus without complication (HCC) E11.9    Mixed hyperlipidemia E78.2    Benign essential HTN I10    Diabetic ulcer of toe of right foot associated with type 2 diabetes mellitus, with necrosis of bone (HCC) E11.621, L97.514    Toe ulcer, left, with fat layer exposed (HCC) L97.522    Kidney transplant status Z94.0    Tinea pedis of both feet B35.3    Decreased dorsalis pedis pulse R09.89    PVD (peripheral vascular disease) I73.9    Chronic renal failure N18.9    Tobacco dependence F17.200       Patients ID was verified.Hyperbaric oxygen therapy plan of care, patient history, outpatient fall risk assessment, nutritional assessment and daily medications were reviewed, addressed and updated as needed.    Pt is tolerating hyperbaric oxygen therapy  well without complications.         Chandrika Gonzalez RN  5/1/2025  8:05 AM

## 2025-05-02 ENCOUNTER — HOSPITAL ENCOUNTER (OUTPATIENT)
Dept: HYPERBARIC MEDICINE | Age: 60
Discharge: HOME | End: 2025-05-02
Payer: COMMERCIAL

## 2025-05-02 ENCOUNTER — APPOINTMENT (OUTPATIENT)
Dept: HYPERBARIC MEDICINE | Age: 60
End: 2025-05-02
Payer: COMMERCIAL

## 2025-05-02 VITALS
RESPIRATION RATE: 18 BRPM | SYSTOLIC BLOOD PRESSURE: 121 MMHG | TEMPERATURE: 97.2 F | DIASTOLIC BLOOD PRESSURE: 57 MMHG | HEART RATE: 81 BPM

## 2025-05-02 DIAGNOSIS — L97.514 DIABETIC ULCER OF TOE OF RIGHT FOOT ASSOCIATED WITH TYPE 2 DIABETES MELLITUS, WITH NECROSIS OF BONE (HCC): Primary | ICD-10-CM

## 2025-05-02 DIAGNOSIS — E11.621 DIABETIC ULCER OF TOE OF RIGHT FOOT ASSOCIATED WITH TYPE 2 DIABETES MELLITUS, WITH NECROSIS OF BONE (HCC): Primary | ICD-10-CM

## 2025-05-02 LAB
GLUCOSE BLD-MCNC: 108 MG/DL (ref 74–99)
GLUCOSE BLD-MCNC: 126 MG/DL (ref 74–99)
GLUCOSE BLD-MCNC: 139 MG/DL (ref 74–99)

## 2025-05-02 PROCEDURE — 82962 GLUCOSE BLOOD TEST: CPT

## 2025-05-05 ENCOUNTER — HOSPITAL ENCOUNTER (OUTPATIENT)
Dept: HYPERBARIC MEDICINE | Age: 60
Discharge: HOME | End: 2025-05-05
Payer: COMMERCIAL

## 2025-05-05 VITALS
TEMPERATURE: 96 F | DIASTOLIC BLOOD PRESSURE: 71 MMHG | SYSTOLIC BLOOD PRESSURE: 135 MMHG | HEART RATE: 96 BPM | RESPIRATION RATE: 18 BRPM

## 2025-05-05 DIAGNOSIS — L97.514 DIABETIC ULCER OF TOE OF RIGHT FOOT ASSOCIATED WITH TYPE 2 DIABETES MELLITUS, WITH NECROSIS OF BONE (HCC): Primary | ICD-10-CM

## 2025-05-05 DIAGNOSIS — E11.621 DIABETIC ULCER OF TOE OF RIGHT FOOT ASSOCIATED WITH TYPE 2 DIABETES MELLITUS, WITH NECROSIS OF BONE (HCC): Primary | ICD-10-CM

## 2025-05-05 LAB
GLUCOSE BLD-MCNC: 109 MG/DL (ref 74–99)
GLUCOSE BLD-MCNC: 115 MG/DL (ref 74–99)
GLUCOSE BLD-MCNC: 140 MG/DL (ref 74–99)

## 2025-05-05 PROCEDURE — G0277 HBOT, FULL BODY CHAMBER, 30M: HCPCS

## 2025-05-05 PROCEDURE — 82962 GLUCOSE BLOOD TEST: CPT

## 2025-05-05 PROCEDURE — 99183 HYPERBARIC OXYGEN THERAPY: CPT | Performed by: SURGERY

## 2025-05-05 NOTE — PROGRESS NOTES
[facility]  Hyperbaric Oxygen Therapy   Progress Note    NAME: Chaz Rodriguez  MEDICAL RECORD NUMBER:  [id number]  AGE: 60 y.o.   GENDER: male  : 1965  EPISODE DATE:  2025   Subjective   HBO Treatment Number: 28 out of Total Treatments: 30  HBO Diagnosis:   Problem List Items Addressed This Visit       Diabetic ulcer of toe of right foot associated with type 2 diabetes mellitus, with necrosis of bone (HCC) - Primary    Relevant Orders    Notify physician (specify)    Hyperbaric Oxygen Therapy    Hypoglycemial protocol    POCT glucose    Care order/instruction     Safety checks performed prior to treatment.  See doc flowsheets for documentation.  Objective      Please see lab results for finger stick glucose results  Pre treatment Vital Signs       Temp: 96.9 °F (36.1 °C)     Pulse: 84     Respirations: 18     BP: (!) 131/58     Post treatment Vital Signs  Temp: (!) 96 °F (35.6 °C)  Pulse: 96  Respirations: 18  BP: 135/71  Assessment      Physical Exam:  General Appearance:  alert and oriented to person, place and time, well-developed and well-nourished, in no acute distress  ENT:  tympanic membranes intact bilaterally  Pulmonary/Chest:  clear to auscultation bilaterally- no wheezes, rales or rhonchi, normal air movement, no respiratory distress  Cardiovascular:  regular rate and rhythm  Chamber #: [id number]  Treatment Start Time: 0749     Pressure Reached Time: 0759  DELFINA : 2  Number of Air Breaks:  Treatment Status: No Air break     Decompression Time: 09   Treatment End Time: 0936  Length of Treatment: 90 Minutes  Symptoms Noted During Treatment: None  Total Treatment Time (min): 107    Adverse Event: no    I was present on these premises and immediately available to furnish assistance & direction throughout the procedure.   Plan      Chaz Rodriguez is a 60 y.o. male  did successfully complete today's hyperbaric oxygen treatment at [facility]

## 2025-05-06 ENCOUNTER — HOSPITAL ENCOUNTER (OUTPATIENT)
Dept: HYPERBARIC MEDICINE | Age: 60
Discharge: HOME | End: 2025-05-06
Payer: COMMERCIAL

## 2025-05-06 VITALS
SYSTOLIC BLOOD PRESSURE: 151 MMHG | DIASTOLIC BLOOD PRESSURE: 53 MMHG | HEART RATE: 86 BPM | RESPIRATION RATE: 18 BRPM | TEMPERATURE: 96.1 F

## 2025-05-06 DIAGNOSIS — L97.514 DIABETIC ULCER OF TOE OF RIGHT FOOT ASSOCIATED WITH TYPE 2 DIABETES MELLITUS, WITH NECROSIS OF BONE (HCC): Primary | ICD-10-CM

## 2025-05-06 DIAGNOSIS — E11.621 DIABETIC ULCER OF TOE OF RIGHT FOOT ASSOCIATED WITH TYPE 2 DIABETES MELLITUS, WITH NECROSIS OF BONE (HCC): Primary | ICD-10-CM

## 2025-05-06 LAB
GLUCOSE BLD-MCNC: 231 MG/DL (ref 74–99)
GLUCOSE BLD-MCNC: 232 MG/DL (ref 74–99)

## 2025-05-06 PROCEDURE — G0277 HBOT, FULL BODY CHAMBER, 30M: HCPCS

## 2025-05-06 PROCEDURE — 99183 HYPERBARIC OXYGEN THERAPY: CPT | Performed by: STUDENT IN AN ORGANIZED HEALTH CARE EDUCATION/TRAINING PROGRAM

## 2025-05-06 PROCEDURE — 82962 GLUCOSE BLOOD TEST: CPT

## 2025-05-06 NOTE — PROGRESS NOTES
[facility]  Hyperbaric Oxygen Therapy   Progress Note    NAME: Chaz Rodriguez  MEDICAL RECORD NUMBER:  [id number]  AGE: 60 y.o.   GENDER: male  : 1965  EPISODE DATE:  2025   Subjective   HBO Treatment Number: 29 out of Total Treatments: 30  HBO Diagnosis:   Problem List Items Addressed This Visit          Endocrine    Diabetic ulcer of toe of right foot associated with type 2 diabetes mellitus, with necrosis of bone (HCC) - Primary    Relevant Orders    Hypoglycemial protocol    POCT glucose    Care order/instruction    Notify physician (specify)    Hyperbaric Oxygen Therapy    Care order/instruction     Safety checks performed prior to treatment.  See doc flowsheets for documentation.  Objective      Please see lab results for finger stick glucose results  Pre treatment Vital Signs       Temp: (!) 95.7 °F (35.4 °C)     Pulse: 90     Respirations: 18     BP: 133/61     Post treatment Vital Signs  Temp: (!) 96.1 °F (35.6 °C)  Pulse: 86  Respirations: 18  BP: (!) 151/53  Assessment      Physical Exam:  General Appearance:  alert and oriented to person, place and time, well-developed and well-nourished, in no acute distress  ENT:  tympanic membranes intact bilaterally  Pulmonary/Chest:  clear to auscultation bilaterally- no wheezes, rales or rhonchi, normal air movement, no respiratory distress  Cardiovascular:  regular rate and rhythm  Chamber #: [id number]  Treatment Start Time: 0726     Pressure Reached Time: 0734  DELFINA : 2  Number of Air Breaks:  Treatment Status: No Air break     Decompression Time: 09   Treatment End Time: 0912  Length of Treatment: 90 Minutes  Symptoms Noted During Treatment: None  Total Treatment Time (min): 106    Adverse Event: no    I was present on these premises and immediately available to furnish assistance & direction throughout the procedure.   Plan      Chaz Rodriguez is a 60 y.o. male  did successfully complete today's hyperbaric oxygen treatment at [facility]

## 2025-05-08 ENCOUNTER — HOSPITAL ENCOUNTER (OUTPATIENT)
Dept: HYPERBARIC MEDICINE | Age: 60
Discharge: HOME | End: 2025-05-08
Payer: COMMERCIAL

## 2025-05-08 VITALS
RESPIRATION RATE: 18 BRPM | SYSTOLIC BLOOD PRESSURE: 128 MMHG | TEMPERATURE: 96.6 F | DIASTOLIC BLOOD PRESSURE: 71 MMHG | HEART RATE: 71 BPM

## 2025-05-08 DIAGNOSIS — L97.514 DIABETIC ULCER OF TOE OF RIGHT FOOT ASSOCIATED WITH TYPE 2 DIABETES MELLITUS, WITH NECROSIS OF BONE (HCC): Primary | ICD-10-CM

## 2025-05-08 DIAGNOSIS — E11.621 DIABETIC ULCER OF TOE OF RIGHT FOOT ASSOCIATED WITH TYPE 2 DIABETES MELLITUS, WITH NECROSIS OF BONE (HCC): Primary | ICD-10-CM

## 2025-05-08 LAB
GLUCOSE BLD-MCNC: 131 MG/DL (ref 74–99)
GLUCOSE BLD-MCNC: 157 MG/DL (ref 74–99)

## 2025-05-08 PROCEDURE — 82962 GLUCOSE BLOOD TEST: CPT

## 2025-05-08 PROCEDURE — G0277 HBOT, FULL BODY CHAMBER, 30M: HCPCS

## 2025-05-08 NOTE — PROGRESS NOTES
Chaz Rodriguez is a 60 y.o. male has been receiving hyperbaric oxygen treatment for management of:Indication  Indications: Lower Extremity Diabetic Wound ___(site) (Diabetic ulcer of toe of right foot associated with type 2 diabetes mellitus, with necrosis of bone). Mr. Rodriguez has completed Treatment Number: 30 out of a treatment protocol of Total Treatments: 30.    Problem List:  Patient Active Problem List   Diagnosis Code    Type 2 diabetes mellitus without complication (HCC) E11.9    Mixed hyperlipidemia E78.2    Benign essential HTN I10    Diabetic ulcer of toe of right foot associated with type 2 diabetes mellitus, with necrosis of bone (HCC) E11.621, L97.514    Toe ulcer, left, with fat layer exposed (HCC) L97.522    Kidney transplant status Z94.0    Tinea pedis of both feet B35.3    Decreased dorsalis pedis pulse R09.89    PVD (peripheral vascular disease) I73.9    Chronic renal failure N18.9    Tobacco dependence F17.200       Patients ID was verified.Hyperbaric oxygen therapy plan of care, patient history, outpatient fall risk assessment, nutritional assessment and daily medications were reviewed, addressed and updated as needed.    Pt is tolerating hyperbaric oxygen therapy  well without complications.         Chandrika Gonzalez RN  5/8/2025  8:07 AM

## 2025-05-09 DIAGNOSIS — R53.82 CHRONIC FATIGUE: ICD-10-CM

## 2025-05-09 DIAGNOSIS — Z79.4 TYPE 2 DIABETES MELLITUS WITHOUT COMPLICATION, WITH LONG-TERM CURRENT USE OF INSULIN (HCC): ICD-10-CM

## 2025-05-09 DIAGNOSIS — I10 BENIGN ESSENTIAL HTN: ICD-10-CM

## 2025-05-09 DIAGNOSIS — E11.9 TYPE 2 DIABETES MELLITUS WITHOUT COMPLICATION, WITH LONG-TERM CURRENT USE OF INSULIN (HCC): ICD-10-CM

## 2025-05-09 DIAGNOSIS — E78.2 MIXED HYPERLIPIDEMIA: ICD-10-CM

## 2025-05-09 DIAGNOSIS — L97.522 TOE ULCER, LEFT, WITH FAT LAYER EXPOSED (HCC): ICD-10-CM

## 2025-05-09 LAB
ALBUMIN: 4.1 G/DL (ref 3.5–5.2)
ALP BLD-CCNC: 72 U/L (ref 40–129)
ALT SERPL-CCNC: 10 U/L (ref 0–50)
ANION GAP SERPL CALCULATED.3IONS-SCNC: 13 MMOL/L (ref 7–16)
AST SERPL-CCNC: 17 U/L (ref 0–50)
BASOPHILS ABSOLUTE: 0.01 K/UL (ref 0–0.2)
BASOPHILS RELATIVE PERCENT: 0 % (ref 0–2)
BILIRUB SERPL-MCNC: <0.2 MG/DL (ref 0–1.2)
BUN BLDV-MCNC: 25 MG/DL (ref 6–20)
CALCIUM SERPL-MCNC: 9.8 MG/DL (ref 8.6–10)
CHLORIDE BLD-SCNC: 107 MMOL/L (ref 98–107)
CHOLESTEROL, TOTAL: 133 MG/DL
CO2: 23 MMOL/L (ref 22–29)
CREAT SERPL-MCNC: 2.2 MG/DL (ref 0.7–1.2)
EOSINOPHILS ABSOLUTE: 0.13 K/UL (ref 0.05–0.5)
EOSINOPHILS RELATIVE PERCENT: 3 % (ref 0–6)
GFR, ESTIMATED: 34 ML/MIN/1.73M2
GLUCOSE BLD-MCNC: 112 MG/DL (ref 74–99)
HBA1C MFR BLD: 7.2 % (ref 4–5.6)
HCT VFR BLD CALC: 41.7 % (ref 37–54)
HDLC SERPL-MCNC: 41 MG/DL
HEMOGLOBIN: 13.2 G/DL (ref 12.5–16.5)
IMMATURE GRANULOCYTES %: 0 % (ref 0–5)
IMMATURE GRANULOCYTES ABSOLUTE: <0.03 K/UL (ref 0–0.58)
LDL CHOLESTEROL: 64 MG/DL
LYMPHOCYTES ABSOLUTE: 1.19 K/UL (ref 1.5–4)
LYMPHOCYTES RELATIVE PERCENT: 25 % (ref 20–42)
MCH RBC QN AUTO: 27.8 PG (ref 26–35)
MCHC RBC AUTO-ENTMCNC: 31.7 G/DL (ref 32–34.5)
MCV RBC AUTO: 88 FL (ref 80–99.9)
MONOCYTES ABSOLUTE: 0.56 K/UL (ref 0.1–0.95)
MONOCYTES RELATIVE PERCENT: 12 % (ref 2–12)
NEUTROPHILS ABSOLUTE: 2.81 K/UL (ref 1.8–7.3)
NEUTROPHILS RELATIVE PERCENT: 60 % (ref 43–80)
PDW BLD-RTO: 14.9 % (ref 11.5–15)
PLATELET # BLD: 258 K/UL (ref 130–450)
PMV BLD AUTO: 10.5 FL (ref 7–12)
POTASSIUM SERPL-SCNC: 4.1 MMOL/L (ref 3.5–5.1)
RBC # BLD: 4.74 M/UL (ref 3.8–5.8)
SODIUM BLD-SCNC: 142 MMOL/L (ref 136–145)
T4 FREE: 1.1 NG/DL (ref 0.9–1.7)
TOTAL PROTEIN: 6.3 G/DL (ref 6.4–8.3)
TRIGL SERPL-MCNC: 143 MG/DL
TSH SERPL DL<=0.05 MIU/L-ACNC: 1.61 UIU/ML (ref 0.27–4.2)
VLDLC SERPL CALC-MCNC: 29 MG/DL
WBC # BLD: 4.7 K/UL (ref 4.5–11.5)

## 2025-05-20 ENCOUNTER — HOSPITAL ENCOUNTER (OUTPATIENT)
Dept: HYPERBARIC MEDICINE | Age: 60
Discharge: HOME | End: 2025-05-20
Payer: COMMERCIAL

## 2025-05-20 VITALS
RESPIRATION RATE: 16 BRPM | SYSTOLIC BLOOD PRESSURE: 130 MMHG | HEART RATE: 79 BPM | TEMPERATURE: 96.8 F | DIASTOLIC BLOOD PRESSURE: 66 MMHG

## 2025-05-20 DIAGNOSIS — L97.514 DIABETIC ULCER OF TOE OF RIGHT FOOT ASSOCIATED WITH TYPE 2 DIABETES MELLITUS, WITH NECROSIS OF BONE (HCC): Primary | ICD-10-CM

## 2025-05-20 DIAGNOSIS — E11.621 DIABETIC ULCER OF TOE OF RIGHT FOOT ASSOCIATED WITH TYPE 2 DIABETES MELLITUS, WITH NECROSIS OF BONE (HCC): Primary | ICD-10-CM

## 2025-05-20 LAB
GLUCOSE BLD-MCNC: 109 MG/DL (ref 74–99)
GLUCOSE BLD-MCNC: 110 MG/DL (ref 74–99)
GLUCOSE BLD-MCNC: 189 MG/DL (ref 74–99)

## 2025-05-20 PROCEDURE — 82962 GLUCOSE BLOOD TEST: CPT

## 2025-05-20 PROCEDURE — 99183 HYPERBARIC OXYGEN THERAPY: CPT | Performed by: SURGERY

## 2025-05-20 PROCEDURE — G0277 HBOT, FULL BODY CHAMBER, 30M: HCPCS

## 2025-05-20 NOTE — PROGRESS NOTES
[facility]  Hyperbaric Oxygen Therapy   Progress Note    NAME: Chaz Rodriguez  MEDICAL RECORD NUMBER:  [id number]  AGE: 60 y.o.   GENDER: male  : 1965  EPISODE DATE:  2025   Subjective   HBO Treatment Number: 31 out of Total Treatments: 40  HBO Diagnosis:   Problem List Items Addressed This Visit          Endocrine    Diabetic ulcer of toe of right foot associated with type 2 diabetes mellitus, with necrosis of bone (HCC) - Primary    Relevant Orders    Notify physician (specify)    Hyperbaric Oxygen Therapy    Hypoglycemial protocol    POCT glucose    Care order/instruction    Care order/instruction     Safety checks performed prior to treatment.  See doc flowsheets for documentation.  Objective      Please see lab results for finger stick glucose results  Pre treatment Vital Signs       Temp: 97 °F (36.1 °C)     Pulse: 81     Respirations: 16     BP: 125/67     Post treatment Vital Signs  Temp: 96.8 °F (36 °C)  Pulse: 79  Respirations: 16  BP: 130/66  Assessment      Physical Exam:  General Appearance:  alert and oriented to person, place and time, well-developed and well-nourished, in no acute distress  ENT:  tympanic membranes intact bilaterally  Pulmonary/Chest:  clear to auscultation bilaterally- no wheezes, rales or rhonchi, normal air movement, no respiratory distress  Cardiovascular:  normal  Chamber #: [id number]  Treatment Start Time: 0802     Pressure Reached Time: 0813  DELFINA : 2  Number of Air Breaks:  Treatment Status: No Air break     Decompression Time: 943   Treatment End Time: 09  Length of Treatment: 90 Minutes  Symptoms Noted During Treatment: None  Total Treatment Time (min): 111    Adverse Event: no    I was present on these premises and immediately available to furnish assistance & direction throughout the procedure.   Plan      Chaz Rodriguez is a 60 y.o. male  did successfully complete today's hyperbaric oxygen treatment at [facility]

## 2025-05-21 ENCOUNTER — HOSPITAL ENCOUNTER (OUTPATIENT)
Dept: HYPERBARIC MEDICINE | Age: 60
Discharge: HOME | End: 2025-05-21
Payer: COMMERCIAL

## 2025-05-21 VITALS
RESPIRATION RATE: 18 BRPM | HEART RATE: 83 BPM | DIASTOLIC BLOOD PRESSURE: 81 MMHG | TEMPERATURE: 96.9 F | SYSTOLIC BLOOD PRESSURE: 136 MMHG

## 2025-05-21 DIAGNOSIS — E11.621 DIABETIC ULCER OF TOE OF RIGHT FOOT ASSOCIATED WITH TYPE 2 DIABETES MELLITUS, WITH NECROSIS OF BONE (HCC): Primary | ICD-10-CM

## 2025-05-21 DIAGNOSIS — L97.514 DIABETIC ULCER OF TOE OF RIGHT FOOT ASSOCIATED WITH TYPE 2 DIABETES MELLITUS, WITH NECROSIS OF BONE (HCC): Primary | ICD-10-CM

## 2025-05-21 LAB
GLUCOSE BLD-MCNC: 177 MG/DL (ref 74–99)
GLUCOSE BLD-MCNC: 220 MG/DL (ref 74–99)

## 2025-05-21 PROCEDURE — 99183 HYPERBARIC OXYGEN THERAPY: CPT | Performed by: SURGERY

## 2025-05-21 PROCEDURE — 82962 GLUCOSE BLOOD TEST: CPT

## 2025-05-21 PROCEDURE — G0277 HBOT, FULL BODY CHAMBER, 30M: HCPCS

## 2025-05-22 ENCOUNTER — HOSPITAL ENCOUNTER (OUTPATIENT)
Dept: HYPERBARIC MEDICINE | Age: 60
Discharge: HOME | End: 2025-05-22
Payer: COMMERCIAL

## 2025-05-22 VITALS
TEMPERATURE: 97.5 F | DIASTOLIC BLOOD PRESSURE: 61 MMHG | RESPIRATION RATE: 18 BRPM | HEART RATE: 76 BPM | SYSTOLIC BLOOD PRESSURE: 117 MMHG

## 2025-05-22 DIAGNOSIS — L97.514 DIABETIC ULCER OF TOE OF RIGHT FOOT ASSOCIATED WITH TYPE 2 DIABETES MELLITUS, WITH NECROSIS OF BONE (HCC): Primary | ICD-10-CM

## 2025-05-22 DIAGNOSIS — E11.621 DIABETIC ULCER OF TOE OF RIGHT FOOT ASSOCIATED WITH TYPE 2 DIABETES MELLITUS, WITH NECROSIS OF BONE (HCC): Primary | ICD-10-CM

## 2025-05-22 LAB
GLUCOSE BLD-MCNC: 154 MG/DL (ref 74–99)
GLUCOSE BLD-MCNC: 242 MG/DL (ref 74–99)

## 2025-05-22 PROCEDURE — 82962 GLUCOSE BLOOD TEST: CPT

## 2025-05-22 PROCEDURE — G0277 HBOT, FULL BODY CHAMBER, 30M: HCPCS

## 2025-05-22 NOTE — PROGRESS NOTES
Chaz Rodriguez is a 60 y.o. male has been receiving hyperbaric oxygen treatment for management of:Indication  Indications: Other (Comment) (Diabetic ulcer of toe of right foot associated with type 2 diabetes mellitus, with necrosis of bone). Mr. Rodriguez has completed Treatment Number: 33 out of a treatment protocol of Total Treatments: 40.    Problem List:  Patient Active Problem List   Diagnosis Code    Type 2 diabetes mellitus without complication (HCC) E11.9    Mixed hyperlipidemia E78.2    Benign essential HTN I10    Diabetic ulcer of toe of right foot associated with type 2 diabetes mellitus, with necrosis of bone (HCC) E11.621, L97.514    Toe ulcer, left, with fat layer exposed (HCC) L97.522    Kidney transplant status Z94.0    Tinea pedis of both feet B35.3    Decreased dorsalis pedis pulse R09.89    PVD (peripheral vascular disease) I73.9    Chronic renal failure N18.9    Tobacco dependence F17.200       Patients ID was verified.Hyperbaric oxygen therapy plan of care, patient history, outpatient fall risk assessment, nutritional assessment and daily medications were reviewed, addressed and updated as needed.    Pt is tolerating hyperbaric oxygen therapy  well without complications.         Chandrika Gonzalez RN  5/22/2025  7:20 AM

## 2025-05-22 NOTE — PROGRESS NOTES
[facility]  Hyperbaric Oxygen Therapy   Progress Note    NAME: Chaz Rodriguez  MEDICAL RECORD NUMBER:  [id number]  AGE: 60 y.o.   GENDER: male  : 1965  EPISODE DATE:  2025   Subjective   HBO Treatment Number: 32 out of Total Treatments: 40  HBO Diagnosis:   Problem List Items Addressed This Visit       Diabetic ulcer of toe of right foot associated with type 2 diabetes mellitus, with necrosis of bone (HCC) - Primary    Relevant Orders    Hypoglycemial protocol    POCT glucose    Care order/instruction    Care order/instruction     Safety checks performed prior to treatment.  See doc flowsheets for documentation.  Objective      Please see lab results for finger stick glucose results  Pre treatment Vital Signs       Temp: 97.3 °F (36.3 °C)     Pulse: 85     Respirations: 18     BP: 125/81     Post treatment Vital Signs  Temp: 96.9 °F (36.1 °C)  Pulse: 83  Respirations: 18  BP: 136/81  Assessment      Physical Exam:  General Appearance:  alert and oriented to person, place and time, well-developed and well-nourished, in no acute distress  ENT:  tympanic membranes intact bilaterally  Pulmonary/Chest:  clear to auscultation bilaterally- no wheezes, rales or rhonchi, normal air movement, no respiratory distress  Cardiovascular:  S1S2  Chamber #: [id number]  Treatment Start Time: 0729     Pressure Reached Time: 0738  DELFINA : 2  Number of Air Breaks:  Treatment Status: No Air break     Decompression Time: 0908   Treatment End Time: 0915  Length of Treatment: 90 Minutes  Symptoms Noted During Treatment: None  Total Treatment Time (min): 106    Adverse Event: no    I was present on these premises and immediately available to furnish assistance & direction throughout the procedure.   Plan      Chaz Rodriguez is a 60 y.o. male  did successfully complete today's hyperbaric oxygen treatment at [facility].    In my clinical judgement, ongoing HBO therapy is

## 2025-05-28 ENCOUNTER — HOSPITAL ENCOUNTER (OUTPATIENT)
Dept: HYPERBARIC MEDICINE | Age: 60
Discharge: HOME | End: 2025-05-28
Payer: COMMERCIAL

## 2025-05-28 VITALS
DIASTOLIC BLOOD PRESSURE: 64 MMHG | TEMPERATURE: 98.4 F | HEART RATE: 86 BPM | SYSTOLIC BLOOD PRESSURE: 156 MMHG | RESPIRATION RATE: 16 BRPM

## 2025-05-28 DIAGNOSIS — E11.621 DIABETIC ULCER OF TOE OF RIGHT FOOT ASSOCIATED WITH TYPE 2 DIABETES MELLITUS, WITH NECROSIS OF BONE (HCC): Primary | ICD-10-CM

## 2025-05-28 DIAGNOSIS — L97.514 DIABETIC ULCER OF TOE OF RIGHT FOOT ASSOCIATED WITH TYPE 2 DIABETES MELLITUS, WITH NECROSIS OF BONE (HCC): Primary | ICD-10-CM

## 2025-05-28 LAB
GLUCOSE BLD-MCNC: 205 MG/DL (ref 74–99)
GLUCOSE BLD-MCNC: 244 MG/DL (ref 74–99)

## 2025-05-28 PROCEDURE — 99183 HYPERBARIC OXYGEN THERAPY: CPT | Performed by: PHYSICIAN ASSISTANT

## 2025-05-28 PROCEDURE — G0277 HBOT, FULL BODY CHAMBER, 30M: HCPCS

## 2025-05-28 PROCEDURE — 82962 GLUCOSE BLOOD TEST: CPT

## 2025-05-28 NOTE — PROGRESS NOTES
[facility]  Hyperbaric Oxygen Therapy   Progress Note    NAME: Chaz Rodriguez  MEDICAL RECORD NUMBER:  [id number]  AGE: 60 y.o.   GENDER: male  : 1965  EPISODE DATE:  2025   Subjective   HBO Treatment Number: 34 out of Total Treatments: 40  HBO Diagnosis:   Problem List Items Addressed This Visit          Endocrine    Diabetic ulcer of toe of right foot associated with type 2 diabetes mellitus, with necrosis of bone (HCC) - Primary    Relevant Orders    Hypoglycemial protocol    POCT glucose    Care order/instruction    Notify physician (specify)    Hyperbaric Oxygen Therapy    Care order/instruction     Safety checks performed prior to treatment.  See doc flowsheets for documentation.  Objective      Please see lab results for finger stick glucose results  Pre treatment Vital Signs       Temp: 97.9 °F (36.6 °C)     Pulse: 91     Respirations: 17     BP: (!) 144/62     Post treatment Vital Signs  Temp: 98.4 °F (36.9 °C)  Pulse: 86  Respirations: 16  BP: (!) 156/64  Assessment      Physical Exam:  General Appearance:  alert and oriented to person, place and time, well-developed and well-nourished, in no acute distress  ENT:  tympanic membranes intact bilaterally  Pulmonary/Chest:  clear to auscultation bilaterally- no wheezes, rales or rhonchi, normal air movement, no respiratory distress  Cardiovascular:  S1S2  Chamber #: [id number]  Treatment Start Time: 0736     Pressure Reached Time: 0745  DELFINA : 2  Number of Air Breaks:  Treatment Status: No Air break     Decompression Time: 0915   Treatment End Time: 0924  Length of Treatment: 90 Minutes  Symptoms Noted During Treatment: None  Total Treatment Time (min): 108    Adverse Event: no    I was present on these premises and immediately available to furnish assistance & direction throughout the procedure.   Plan      Chaz Rodriguez is a 60 y.o. male  did successfully complete today's hyperbaric oxygen treatment at [facility]

## 2025-05-29 ENCOUNTER — HOSPITAL ENCOUNTER (OUTPATIENT)
Dept: HYPERBARIC MEDICINE | Age: 60
Discharge: HOME | End: 2025-05-29
Payer: COMMERCIAL

## 2025-05-29 VITALS
RESPIRATION RATE: 18 BRPM | DIASTOLIC BLOOD PRESSURE: 73 MMHG | TEMPERATURE: 96.9 F | SYSTOLIC BLOOD PRESSURE: 128 MMHG | HEART RATE: 68 BPM

## 2025-05-29 DIAGNOSIS — L97.514 DIABETIC ULCER OF TOE OF RIGHT FOOT ASSOCIATED WITH TYPE 2 DIABETES MELLITUS, WITH NECROSIS OF BONE (HCC): Primary | ICD-10-CM

## 2025-05-29 DIAGNOSIS — E11.621 DIABETIC ULCER OF TOE OF RIGHT FOOT ASSOCIATED WITH TYPE 2 DIABETES MELLITUS, WITH NECROSIS OF BONE (HCC): Primary | ICD-10-CM

## 2025-05-29 LAB
GLUCOSE BLD-MCNC: 132 MG/DL (ref 74–99)
GLUCOSE BLD-MCNC: 181 MG/DL (ref 74–99)

## 2025-05-29 PROCEDURE — G0277 HBOT, FULL BODY CHAMBER, 30M: HCPCS

## 2025-05-29 PROCEDURE — 82962 GLUCOSE BLOOD TEST: CPT

## 2025-05-30 ENCOUNTER — HOSPITAL ENCOUNTER (OUTPATIENT)
Dept: HYPERBARIC MEDICINE | Age: 60
Discharge: HOME | End: 2025-05-30
Payer: COMMERCIAL

## 2025-05-30 VITALS
HEART RATE: 68 BPM | TEMPERATURE: 97.2 F | SYSTOLIC BLOOD PRESSURE: 128 MMHG | DIASTOLIC BLOOD PRESSURE: 67 MMHG | RESPIRATION RATE: 17 BRPM

## 2025-05-30 DIAGNOSIS — L97.514 DIABETIC ULCER OF TOE OF RIGHT FOOT ASSOCIATED WITH TYPE 2 DIABETES MELLITUS, WITH NECROSIS OF BONE (HCC): Primary | ICD-10-CM

## 2025-05-30 DIAGNOSIS — E11.621 DIABETIC ULCER OF TOE OF RIGHT FOOT ASSOCIATED WITH TYPE 2 DIABETES MELLITUS, WITH NECROSIS OF BONE (HCC): Primary | ICD-10-CM

## 2025-05-30 LAB
GLUCOSE BLD-MCNC: 136 MG/DL (ref 74–99)
GLUCOSE BLD-MCNC: 199 MG/DL (ref 74–99)

## 2025-05-30 PROCEDURE — G0277 HBOT, FULL BODY CHAMBER, 30M: HCPCS

## 2025-05-30 PROCEDURE — 82962 GLUCOSE BLOOD TEST: CPT

## 2025-05-31 NOTE — PROGRESS NOTES
[facility]  Hyperbaric Oxygen Therapy   Progress Note    NAME: Chaz Rodriguez  MEDICAL RECORD NUMBER:  [id number]  AGE: 60 y.o.   GENDER: male  : 1965  EPISODE DATE:  2025   Subjective   HBO Treatment Number: 36 out of Total Treatments: 40  HBO Diagnosis:   Problem List Items Addressed This Visit       Diabetic ulcer of toe of right foot associated with type 2 diabetes mellitus, with necrosis of bone (HCC) - Primary    Relevant Orders    Notify physician (specify)    Hyperbaric Oxygen Therapy    Hypoglycemial protocol    POCT glucose    Care order/instruction    Care order/instruction     Safety checks performed prior to treatment by [facility]OT staff.  See doc flowsheets for documentation.  Objective      Please see lab results for finger stick glucose results  Pre treatment Vital Signs       Temp: 97.8 °F (36.6 °C)     Pulse: 66     Respirations: 19     BP: 101/72     Post treatment Vital Signs  Temp: 97.2 °F (36.2 °C)  Pulse: 68  Respirations: 17  BP: 128/67  Assessment      Physical Exam:  General Appearance:  alert and oriented to person, place and time, well-developed and well-nourished, in no acute distress  ENT:  tympanic membranes intact bilaterally, normal color, normal light reflex bilaterally  Pulmonary/Chest:  clear to auscultation bilaterally- no wheezes, rales or rhonchi, normal air movement, no respiratory distress  Cardiovascular:  regular rate and rhythm  Chamber #: [id number]  Treatment Start Time: 07     Pressure Reached Time: 0737  DELFINA : 2  Number of Air Breaks:  Treatment Status: No Air break     Decompression Time: 0908   Treatment End Time: 09  Length of Treatment: 90 Minutes  Symptoms Noted During Treatment: None  Total Treatment Time (min): 110    Adverse Event: no    I was present on these premises and immediately available to furnish assistance & direction throughout the procedure.   Plan      Chaz Rodriguez is a 60 y.o. male  did successfully complete today's  Not applicable

## 2025-05-31 NOTE — PROGRESS NOTES
[facility]  Hyperbaric Oxygen Therapy   Progress Note    NAME: Chaz Rodriguez  MEDICAL RECORD NUMBER:  [id number]  AGE: 60 y.o.   GENDER: male  : 1965  EPISODE DATE:  2025   Subjective   HBO Treatment Number: 35 out of Total Treatments: 40  HBO Diagnosis:   Problem List Items Addressed This Visit       Diabetic ulcer of toe of right foot associated with type 2 diabetes mellitus, with necrosis of bone (HCC) - Primary    Relevant Orders    POCT glucose     Safety checks performed prior to treatment by [facility]OT staff.  See doc flowsheets for documentation.  Objective      Please see lab results for finger stick glucose results  Pre treatment Vital Signs       Temp: 97.4 °F (36.3 °C)     Pulse: 86     Respirations: 18     BP: (!) 157/69     Post treatment Vital Signs  Temp: 96.9 °F (36.1 °C)  Pulse: 68  Respirations: 18  BP: 128/73  Assessment      Physical Exam:  General Appearance:  alert and oriented to person, place and time, well-developed and well-nourished, in no acute distress  ENT:  tympanic membranes intact bilaterally, normal color, normal light reflex bilaterally  Pulmonary/Chest:  clear to auscultation bilaterally- no wheezes, rales or rhonchi, normal air movement, no respiratory distress  Cardiovascular:  regular rate and rhythm  Chamber #: [id number]  Treatment Start Time: 0744     Pressure Reached Time: 0755  DELFINA : 2  Number of Air Breaks:  Treatment Status: No Air break     Decompression Time: 926   Treatment End Time: 935  Length of Treatment: 90 Minutes  Symptoms Noted During Treatment: None  Total Treatment Time (min): 111    Adverse Event: no    I was present on these premises and immediately available to furnish assistance & direction throughout the procedure.   Plan      Chaz Rodriguez is a 60 y.o. male  did successfully complete today's hyperbaric oxygen treatment at [facility].    In my clinical judgement, ongoing HBO therapy is

## 2025-06-02 ENCOUNTER — HOSPITAL ENCOUNTER (OUTPATIENT)
Dept: HYPERBARIC MEDICINE | Age: 60
Discharge: HOME | End: 2025-06-02
Payer: COMMERCIAL

## 2025-06-02 VITALS
SYSTOLIC BLOOD PRESSURE: 149 MMHG | DIASTOLIC BLOOD PRESSURE: 65 MMHG | HEART RATE: 70 BPM | RESPIRATION RATE: 18 BRPM | TEMPERATURE: 97.8 F

## 2025-06-02 DIAGNOSIS — E11.621 DIABETIC ULCER OF TOE OF RIGHT FOOT ASSOCIATED WITH TYPE 2 DIABETES MELLITUS, WITH NECROSIS OF BONE (HCC): Primary | ICD-10-CM

## 2025-06-02 DIAGNOSIS — L97.514 DIABETIC ULCER OF TOE OF RIGHT FOOT ASSOCIATED WITH TYPE 2 DIABETES MELLITUS, WITH NECROSIS OF BONE (HCC): Primary | ICD-10-CM

## 2025-06-02 LAB
GLUCOSE BLD-MCNC: 157 MG/DL (ref 74–99)
GLUCOSE BLD-MCNC: 165 MG/DL (ref 74–99)

## 2025-06-02 PROCEDURE — 82962 GLUCOSE BLOOD TEST: CPT

## 2025-06-02 PROCEDURE — G0277 HBOT, FULL BODY CHAMBER, 30M: HCPCS

## 2025-06-02 PROCEDURE — 99183 HYPERBARIC OXYGEN THERAPY: CPT | Performed by: SURGERY

## 2025-06-02 NOTE — PROGRESS NOTES
[facility]  Hyperbaric Oxygen Therapy   Progress Note    NAME: Chaz Rodriguez  MEDICAL RECORD NUMBER:  [id number]  AGE: 60 y.o.   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  See doc flowsheets for documentation.  Objective      Please see lab results for finger stick glucose results  Pre treatment Vital Signs       Temp: 97.4 °F (36.3 °C)     Pulse: 76     Respirations: 18     BP: (!) 143/73     Post treatment Vital Signs  Temp: 97.8 °F (36.6 °C)  Pulse: 70  Respirations: 18  BP: (!) 149/65  Assessment      Physical Exam:  General Appearance:  alert and oriented to person, place and time, well-developed and well-nourished, in no acute distress  ENT:  tympanic membranes intact bilaterally  Pulmonary/Chest:  clear to auscultation bilaterally- no wheezes, rales or rhonchi, normal air movement, no respiratory distress  Cardiovascular:  regular rate and rhythm  Chamber #: [id number]  Treatment Start Time: 0755     Pressure Reached Time: 0805  DELFINA : 2  Number of Air Breaks:  Treatment Status: No Air break     Decompression Time: 0935   Treatment End Time: 0943  Length of Treatment: 90 Minutes  Symptoms Noted During Treatment: None  Total Treatment Time (min): 108    Adverse Event: no    I was present on these premises and immediately available to furnish assistance & direction throughout the procedure.   Plan      Chaz Rodriguez is a 60 y.o. male  did successfully complete today's hyperbaric oxygen treatment at [facility]

## 2025-06-03 ENCOUNTER — HOSPITAL ENCOUNTER (OUTPATIENT)
Dept: HYPERBARIC MEDICINE | Age: 60
Discharge: HOME | End: 2025-06-03
Payer: COMMERCIAL

## 2025-06-03 VITALS
DIASTOLIC BLOOD PRESSURE: 66 MMHG | RESPIRATION RATE: 18 BRPM | SYSTOLIC BLOOD PRESSURE: 156 MMHG | HEART RATE: 80 BPM | TEMPERATURE: 98.3 F

## 2025-06-03 DIAGNOSIS — E11.621 DIABETIC ULCER OF TOE OF RIGHT FOOT ASSOCIATED WITH TYPE 2 DIABETES MELLITUS, WITH NECROSIS OF BONE (HCC): Primary | ICD-10-CM

## 2025-06-03 DIAGNOSIS — L97.514 DIABETIC ULCER OF TOE OF RIGHT FOOT ASSOCIATED WITH TYPE 2 DIABETES MELLITUS, WITH NECROSIS OF BONE (HCC): Primary | ICD-10-CM

## 2025-06-03 LAB
GLUCOSE BLD-MCNC: 195 MG/DL (ref 74–99)
GLUCOSE BLD-MCNC: 255 MG/DL (ref 74–99)

## 2025-06-03 PROCEDURE — G0277 HBOT, FULL BODY CHAMBER, 30M: HCPCS

## 2025-06-03 PROCEDURE — 99183 HYPERBARIC OXYGEN THERAPY: CPT | Performed by: STUDENT IN AN ORGANIZED HEALTH CARE EDUCATION/TRAINING PROGRAM

## 2025-06-03 PROCEDURE — 82962 GLUCOSE BLOOD TEST: CPT

## 2025-06-03 NOTE — PROGRESS NOTES
[facility]  Hyperbaric Oxygen Therapy   Progress Note    NAME: Chaz Rodriguez  MEDICAL RECORD NUMBER:  [id number]  AGE: 60 y.o.   GENDER: male  : 1965  EPISODE DATE:  6/3/2025   Subjective   HBO Treatment Number: 38 out of Total Treatments: 40  HBO Diagnosis:   Problem List Items Addressed This Visit          Endocrine    Diabetic ulcer of toe of right foot associated with type 2 diabetes mellitus, with necrosis of bone (HCC) - Primary    Relevant Orders    Hypoglycemial protocol    POCT glucose    Care order/instruction    Notify physician (specify)    Hyperbaric Oxygen Therapy    Care order/instruction     Safety checks performed prior to treatment.  See doc flowsheets for documentation.  Objective      Please see lab results for finger stick glucose results  Pre treatment Vital Signs       Temp: 97.2 °F (36.2 °C)     Pulse: 77     Respirations: 18     BP: (!) 139/58     Post treatment Vital Signs  Temp: 98.3 °F (36.8 °C)  Pulse: 80  Respirations: 18  BP: (!) 156/66  Assessment      Physical Exam:  General Appearance:  alert and oriented to person, place and time, well-developed and well-nourished, in no acute distress  ENT:  tympanic membranes intact bilaterally  Pulmonary/Chest:  clear to auscultation bilaterally- no wheezes, rales or rhonchi, normal air movement, no respiratory distress  Cardiovascular:  regular rate and rhythm  Chamber #: [id number]  Treatment Start Time: 0729     Pressure Reached Time: 0739  DELFINA : 2  Number of Air Breaks:  Treatment Status: No Air break     Decompression Time: 0910   Treatment End Time: 0917  Length of Treatment: 90 Minutes  Symptoms Noted During Treatment: None  Total Treatment Time (min): 108    Adverse Event: no    I was present on these premises and immediately available to furnish assistance & direction throughout the procedure.   Plan      Chaz Rodriguez is a 60 y.o. male  did successfully complete today's hyperbaric oxygen treatment at [facility]

## 2025-06-04 ENCOUNTER — HOSPITAL ENCOUNTER (OUTPATIENT)
Dept: HYPERBARIC MEDICINE | Age: 60
Discharge: HOME | End: 2025-06-04
Payer: COMMERCIAL

## 2025-06-04 VITALS
RESPIRATION RATE: 16 BRPM | TEMPERATURE: 96.9 F | HEART RATE: 70 BPM | DIASTOLIC BLOOD PRESSURE: 59 MMHG | SYSTOLIC BLOOD PRESSURE: 124 MMHG

## 2025-06-04 DIAGNOSIS — E11.621 DIABETIC ULCER OF TOE OF RIGHT FOOT ASSOCIATED WITH TYPE 2 DIABETES MELLITUS, WITH NECROSIS OF BONE (HCC): Primary | ICD-10-CM

## 2025-06-04 DIAGNOSIS — L97.514 DIABETIC ULCER OF TOE OF RIGHT FOOT ASSOCIATED WITH TYPE 2 DIABETES MELLITUS, WITH NECROSIS OF BONE (HCC): Primary | ICD-10-CM

## 2025-06-04 LAB
GLUCOSE BLD-MCNC: 163 MG/DL (ref 74–99)
GLUCOSE BLD-MCNC: 59 MG/DL (ref 74–99)
GLUCOSE BLD-MCNC: 63 MG/DL (ref 74–99)
GLUCOSE BLD-MCNC: 88 MG/DL (ref 74–99)

## 2025-06-04 PROCEDURE — G0277 HBOT, FULL BODY CHAMBER, 30M: HCPCS

## 2025-06-04 PROCEDURE — 82962 GLUCOSE BLOOD TEST: CPT

## 2025-06-04 PROCEDURE — 99183 HYPERBARIC OXYGEN THERAPY: CPT | Performed by: SURGERY

## 2025-06-04 NOTE — PROGRESS NOTES
[facility]  Hyperbaric Oxygen Therapy   Progress Note    NAME: Chaz Rodriguez  MEDICAL RECORD NUMBER:  [id number]  AGE: 60 y.o.   GENDER: male  : 1965  EPISODE DATE:  2025   Subjective   HBO Treatment Number: 39 out of Total Treatments: 40  HBO Diagnosis:   Problem List Items Addressed This Visit       Diabetic ulcer of toe of right foot associated with type 2 diabetes mellitus, with necrosis of bone (HCC) - Primary    Relevant Orders    Hypoglycemial protocol    POCT glucose    Care order/instruction    Notify physician (specify)    Hyperbaric Oxygen Therapy    Care order/instruction     Safety checks performed prior to treatment.  See doc flowsheets for documentation.  Objective      Please see lab results for finger stick glucose results  Pre treatment Vital Signs       Temp: 96.8 °F (36 °C)     Pulse: 73     Respirations: 16     BP: (!) 132/58     Post treatment Vital Signs  Temp: 96.9 °F (36.1 °C)  Pulse: 70  Respirations: 16  BP: (!) 124/59  Assessment      Physical Exam:  General Appearance:  alert and oriented to person, place and time, well-developed and well-nourished, in no acute distress  ENT:  tympanic membranes intact bilaterally  Pulmonary/Chest:  clear to auscultation bilaterally- no wheezes, rales or rhonchi, normal air movement, no respiratory distress  Cardiovascular:  S1S2  Chamber #: [id number]  Treatment Start Time: 0733     Pressure Reached Time: 0742  DELFINA : 2  Number of Air Breaks:  Treatment Status: No Air break     Decompression Time: 0912   Treatment End Time: 0922  Length of Treatment: 90 Minutes  Symptoms Noted During Treatment: None  Total Treatment Time (min): 109    Adverse Event: no    I was present on these premises and immediately available to furnish assistance & direction throughout the procedure.   Plan      Chaz Rodriguez is a 60 y.o. male  did successfully complete today's hyperbaric oxygen treatment at [facility]

## 2025-06-06 ENCOUNTER — HOSPITAL ENCOUNTER (OUTPATIENT)
Dept: HYPERBARIC MEDICINE | Age: 60
Discharge: HOME | End: 2025-06-06
Payer: COMMERCIAL

## 2025-06-06 VITALS
HEART RATE: 68 BPM | RESPIRATION RATE: 18 BRPM | SYSTOLIC BLOOD PRESSURE: 144 MMHG | TEMPERATURE: 97 F | DIASTOLIC BLOOD PRESSURE: 62 MMHG

## 2025-06-06 DIAGNOSIS — L97.514 DIABETIC ULCER OF TOE OF RIGHT FOOT ASSOCIATED WITH TYPE 2 DIABETES MELLITUS, WITH NECROSIS OF BONE (HCC): Primary | ICD-10-CM

## 2025-06-06 DIAGNOSIS — E11.621 DIABETIC ULCER OF TOE OF RIGHT FOOT ASSOCIATED WITH TYPE 2 DIABETES MELLITUS, WITH NECROSIS OF BONE (HCC): Primary | ICD-10-CM

## 2025-06-06 LAB
GLUCOSE BLD-MCNC: 122 MG/DL (ref 74–99)
GLUCOSE BLD-MCNC: 135 MG/DL (ref 74–99)
GLUCOSE BLD-MCNC: 138 MG/DL (ref 74–99)

## 2025-06-06 PROCEDURE — 82962 GLUCOSE BLOOD TEST: CPT

## 2025-06-06 PROCEDURE — G0277 HBOT, FULL BODY CHAMBER, 30M: HCPCS

## 2025-06-06 NOTE — PROGRESS NOTES
Chaz Rodriguez is a 60 y.o. male has been receiving hyperbaric oxygen treatment for management of:Indication  Indications: Lower Extremity Diabetic Wound ___(site) (Diabetic ulcer of toe of right foot associated with type 2 diabetes mellitus, with necrosis of bone). Mr. Rodriguez has completed Treatment Number: 40 out of a treatment protocol of Total Treatments: 40.    Problem List:  Patient Active Problem List   Diagnosis Code    Type 2 diabetes mellitus without complication (HCC) E11.9    Mixed hyperlipidemia E78.2    Benign essential HTN I10    Diabetic ulcer of toe of right foot associated with type 2 diabetes mellitus, with necrosis of bone (HCC) E11.621, L97.514    Toe ulcer, left, with fat layer exposed (HCC) L97.522    Kidney transplant status Z94.0    Tinea pedis of both feet B35.3    Decreased dorsalis pedis pulse R09.89    PVD (peripheral vascular disease) I73.9    Chronic renal failure N18.9    Tobacco dependence F17.200       Patients ID was verified.Hyperbaric oxygen therapy plan of care, patient history, outpatient fall risk assessment, nutritional assessment and daily medications were reviewed, addressed and updated as needed.    Pt is tolerating hyperbaric oxygen therapy  well without complications.         Chandrika Gonzalez RN  6/6/2025  8:20 AM

## 2025-06-08 ENCOUNTER — RESULTS FOLLOW-UP (OUTPATIENT)
Dept: FAMILY MEDICINE CLINIC | Age: 60
End: 2025-06-08

## (undated) DEVICE — SUTURE BOOTIES, YELLOW, STERILE, 5 PAIR/PAD; 5 PADS/BOX: Brand: KEY SURGICAL SUTURE BOOTIES

## (undated) DEVICE — RADIFOCUS GLIDEWIRE ADVANTAGE GUIDEWIRE: Brand: GLIDEWIRE ADVANTAGE

## (undated) DEVICE — INSUFFLATION NEEDLE TO ESTABLISH PNEUMOPERITONEUM.: Brand: INSUFFLATION NEEDLE

## (undated) DEVICE — LABEL MED 4 IN SURG PANEL W/ PEN STRL

## (undated) DEVICE — SOLUTION IV 500ML 0.9% SOD CHL PH 5 INJ USP VIAFLX PLAS

## (undated) DEVICE — APPLICATOR PREP 6ML 0.7% IOD POVACRYLEX 74% ISO ALC

## (undated) DEVICE — SYRINGE MED 10ML TRNSLUC BRL PLUNG BLK MRK POLYPR CTRL

## (undated) DEVICE — GOWN,SIRUS,FABRNF,XL,20/CS: Brand: MEDLINE

## (undated) DEVICE — TUBING PRSS MON L48IN PVC RIG NONEXPANDING M TO FEM CONN

## (undated) DEVICE — DECANTER BAG 9": Brand: MEDLINE INDUSTRIES, INC.

## (undated) DEVICE — MAGNETIC INSTR DRAPE 20X16: Brand: MEDLINE INDUSTRIES, INC.

## (undated) DEVICE — MICROPUNCTURE INTRODUCER SET SILHOUETTE TRANSITIONLESS PUSH-PLUS DESIGN - STIFFENED CANNULA WITH STAINLESS STEEL WIRE GUIDE: Brand: MICROPUNCTURE

## (undated) DEVICE — SYRINGE MED 10ML POLYPR LUERSLIP TIP FLAT TOP W/O SFTY DISP

## (undated) DEVICE — GARMENT,MEDLINE,DVT,INT,CALF,MED, GEN2: Brand: MEDLINE

## (undated) DEVICE — DRAPE SHEET: Brand: UNBRANDED

## (undated) DEVICE — SURGICAL PROCEDURE PACK BASIC

## (undated) DEVICE — Z DUP USE 2257490 ADHESIVE SKIN CLSRE 036ML TPCL 2CTL CNCRLTE HIGH VSCSTY DRMB

## (undated) DEVICE — SET INST MINOR PROCEDURE

## (undated) DEVICE — DRESSING,GAUZE,XEROFORM,CURAD,1"X8",ST: Brand: CURAD

## (undated) DEVICE — CATHETER PERI DLYS AD 15FR L47CM UNIV DBL CUF LIN STR RADPQ

## (undated) DEVICE — Device

## (undated) DEVICE — COVER,LIGHT HANDLE,FLX,2/PK: Brand: MEDLINE INDUSTRIES, INC.

## (undated) DEVICE — TOWEL,OR,DSP,ST,BLUE,STD,6/PK,12PK/CS: Brand: MEDLINE

## (undated) DEVICE — TROCAR: Brand: KII SHIELDED BLADED ACCESS SYSTEM

## (undated) DEVICE — CATHETER VASC DIAG SHEP FLSH PERIPH W/O HYDRPHLC COAT AD

## (undated) DEVICE — DRESSING TRNSPAR W4XL55IN ACRYL SUP FLM W ADH WTRPRF OPSITE

## (undated) DEVICE — 3M™ MEDIPORE™ + PAD 3564: Brand: 3M™ MEDIPORE™

## (undated) DEVICE — SYRINGE MED 10ML LUERLOCK TIP W/O SFTY DISP

## (undated) DEVICE — E-Z CLEAN, NON-STICK, PTFE COATED, ELECTROSURGICAL NEEDLE ELECTRODE, MODIFIED EXTENDED INSULATION, 2.75 INCH (7 CM): Brand: MEGADYNE

## (undated) DEVICE — INSUFFLATION TUBING SET WITH FILTER, FUNNEL CONNECTOR AND LUER LOCK: Brand: JOSNOE MEDICAL INC

## (undated) DEVICE — ELECTRODE NDL L2.8IN COAT LO PWR SET EDGE

## (undated) DEVICE — GLOVE ORANGE PI 7   MSG9070

## (undated) DEVICE — INFLATION DEVICE KIT: Brand: ENCORE™ 26 ADVANTAGE KIT

## (undated) DEVICE — ADHESIVE SKIN CLOSURE TOP 36 CC HI VISC DERMBND MINI

## (undated) DEVICE — TIBURON GENERAL ENDOSCOPY DRAPE: Brand: CONVERTORS

## (undated) DEVICE — INTENDED FOR TISSUE SEPARATION, AND OTHER PROCEDURES THAT REQUIRE A SHARP SURGICAL BLADE TO PUNCTURE OR CUT.: Brand: BARD-PARKER ® STAINLESS STEEL BLADES

## (undated) DEVICE — CATHETER HAD AD L40CM INSRT L23CM DIA14.5FR POLYUR PRE CRV

## (undated) DEVICE — GLOVE ORANGE PI 7 1/2   MSG9075

## (undated) DEVICE — DOUBLE BASIN SET: Brand: MEDLINE INDUSTRIES, INC.

## (undated) DEVICE — RADIFOCUS GLIDEWIRE: Brand: GLIDEWIRE

## (undated) DEVICE — CAP BTL FOR PRE FIL PVP STAY SAFE

## (undated) DEVICE — APPLICATOR PREP 26ML 0.7% IOD POVACRYLEX 74% ISO ALC ST

## (undated) DEVICE — WARMER SCP LAP

## (undated) DEVICE — DESTINATION CAROTID GUIDING SHEATH: Brand: DESTINATION

## (undated) DEVICE — TROCAR: Brand: KII® SLEEVE

## (undated) DEVICE — BLADE CLIPPER GEN PURP NS

## (undated) DEVICE — SHEET, T, LAPAROTOMY, STERILE: Brand: MEDLINE

## (undated) DEVICE — ANTISEPTIC 16OZ H PEROX 1ST AID ORAL DEBRIDING AGNT

## (undated) DEVICE — TRAY VCF/TESIO TRAY  REUSABLE

## (undated) DEVICE — DRESSING COMP W4XL4IN N ADH PD W2.5XL2.5IN GZ BORDERED ADH

## (undated) DEVICE — CANNULA NSL CANN NSL L25FT TBNG AD O2 SUP SFT UC

## (undated) DEVICE — SYRINGE 20ML LL S/C 50

## (undated) DEVICE — TRAP,MUCUS SPECIMEN,40CC: Brand: MEDLINE

## (undated) DEVICE — KIT ANGIO W/ AT P65 PREM HND CTRL FOR CNTRST DEL ANGIOTOUCH

## (undated) DEVICE — PATIENT RETURN ELECTRODE, SINGLE-USE, CONTACT QUALITY MONITORING, ADULT, WITH 9FT CORD, FOR PATIENTS WEIGING OVER 33LBS. (15KG): Brand: MEGADYNE

## (undated) DEVICE — STANDARD HYPODERMIC NEEDLE,ALUMINUM HUB: Brand: MONOJECT

## (undated) DEVICE — CHECK-FLO PERFORMER INTRODUCER: Brand: PERFORMER

## (undated) DEVICE — CATHETER HAD ADMIN SET LNG TERM PRECRV W/ SIDE H

## (undated) DEVICE — CATHETER GUID OTW 0.035 IN 6 FRX135 CM 5 FR TRAILBLAZER

## (undated) DEVICE — PACK,LAPAROTOMY,NO GOWNS: Brand: MEDLINE

## (undated) DEVICE — CAMERA STRYKER 1488 HD GEN

## (undated) DEVICE — CLOTH SURG PREP PREOPERATIVE CHLORHEXIDINE GLUC 2% READYPREP

## (undated) DEVICE — 18 GA N.G. KIT, 10 PACK: Brand: SITE-RITE

## (undated) DEVICE — SURGICAL PROCEDURE PACK VASC MAJ CUST

## (undated) DEVICE — PINNACLE INTRODUCER SHEATH: Brand: PINNACLE

## (undated) DEVICE — NEEDLE HYPO 25GA L1.5IN BLU POLYPR HUB S STL REG BVL STR

## (undated) DEVICE — KIT,ANTI FOG,W/SPONGE & FLUID,SOFT PACK: Brand: MEDLINE

## (undated) DEVICE — SET ENDO INSTR LAPAROSCOPIC STGENLAP

## (undated) DEVICE — STAY.SAFE® CATHETER EXTENSION SET WITH LUER-LOCK, 12 INCH: Brand: STAY.SAFE®